# Patient Record
Sex: MALE | Race: WHITE | NOT HISPANIC OR LATINO | Employment: OTHER | URBAN - METROPOLITAN AREA
[De-identification: names, ages, dates, MRNs, and addresses within clinical notes are randomized per-mention and may not be internally consistent; named-entity substitution may affect disease eponyms.]

---

## 2017-01-18 ENCOUNTER — TRANSFERRED RECORDS (OUTPATIENT)
Dept: HEALTH INFORMATION MANAGEMENT | Facility: CLINIC | Age: 67
End: 2017-01-18

## 2017-01-19 ENCOUNTER — TELEPHONE (OUTPATIENT)
Dept: INTERNAL MEDICINE | Facility: CLINIC | Age: 67
End: 2017-01-19

## 2017-01-19 DIAGNOSIS — E78.5 HYPERLIPIDEMIA LDL GOAL <100: ICD-10-CM

## 2017-01-19 DIAGNOSIS — E10.9 TYPE 1 DIABETES, HBA1C GOAL < 7% (H): ICD-10-CM

## 2017-01-19 DIAGNOSIS — E78.5 HYPERLIPIDEMIA LDL GOAL <100: Primary | ICD-10-CM

## 2017-01-19 DIAGNOSIS — R80.9 PROTEINURIA: ICD-10-CM

## 2017-01-19 LAB
ALBUMIN SERPL-MCNC: 4.2 G/DL (ref 3.4–5)
ALP SERPL-CCNC: 68 U/L (ref 40–150)
ALT SERPL W P-5'-P-CCNC: 47 U/L (ref 0–70)
ANION GAP SERPL CALCULATED.3IONS-SCNC: 7 MMOL/L (ref 3–14)
AST SERPL W P-5'-P-CCNC: 22 U/L (ref 0–45)
BILIRUB DIRECT SERPL-MCNC: 0.2 MG/DL (ref 0–0.2)
BILIRUB SERPL-MCNC: 0.9 MG/DL (ref 0.2–1.3)
BUN SERPL-MCNC: 19 MG/DL (ref 7–30)
CALCIUM SERPL-MCNC: 9.2 MG/DL (ref 8.5–10.1)
CHLORIDE SERPL-SCNC: 105 MMOL/L (ref 94–109)
CHOLEST SERPL-MCNC: 159 MG/DL
CO2 SERPL-SCNC: 28 MMOL/L (ref 20–32)
CREAT SERPL-MCNC: 1.03 MG/DL (ref 0.66–1.25)
CREAT UR-MCNC: 120 MG/DL
GFR SERPL CREATININE-BSD FRML MDRD: 72 ML/MIN/1.7M2
GLUCOSE SERPL-MCNC: 148 MG/DL (ref 70–99)
HBA1C MFR BLD: 6.2 % (ref 4.3–6)
HDLC SERPL-MCNC: 65 MG/DL
LDLC SERPL CALC-MCNC: 74 MG/DL
MICROALBUMIN UR-MCNC: 112 MG/L
MICROALBUMIN/CREAT UR: 93.33 MG/G CR (ref 0–17)
NONHDLC SERPL-MCNC: 94 MG/DL
POTASSIUM SERPL-SCNC: 4.7 MMOL/L (ref 3.4–5.3)
PROT SERPL-MCNC: 7.6 G/DL (ref 6.8–8.8)
SODIUM SERPL-SCNC: 140 MMOL/L (ref 133–144)
TRIGL SERPL-MCNC: 100 MG/DL

## 2017-01-19 PROCEDURE — 82043 UR ALBUMIN QUANTITATIVE: CPT | Performed by: FAMILY MEDICINE

## 2017-01-19 PROCEDURE — 36415 COLL VENOUS BLD VENIPUNCTURE: CPT | Performed by: FAMILY MEDICINE

## 2017-01-19 PROCEDURE — 83036 HEMOGLOBIN GLYCOSYLATED A1C: CPT | Performed by: FAMILY MEDICINE

## 2017-01-19 PROCEDURE — 80048 BASIC METABOLIC PNL TOTAL CA: CPT | Performed by: FAMILY MEDICINE

## 2017-01-19 PROCEDURE — 80076 HEPATIC FUNCTION PANEL: CPT | Performed by: FAMILY MEDICINE

## 2017-01-19 PROCEDURE — 80061 LIPID PANEL: CPT | Performed by: FAMILY MEDICINE

## 2017-01-19 NOTE — TELEPHONE ENCOUNTER
Labs ordered for LFT and lipids in addition a nd put in as future order. Please call   EP lab to release these orders

## 2017-01-19 NOTE — TELEPHONE ENCOUNTER
Reason for Call: Request for an order or referral:    Order or referral being requested: lipid lab order    Date needed: as soon as possible    Has the patient been seen by the PCP for this problem? Not Applicable    Additional comments: Pt had labs drawn 1/19/17 in [facility] lab.  Pt is due for a lipid test.  Please add on an order for a lipid test to the current blood draw.    Phone number Patient can be reached at:  Home number on file [phone] (home)    Best Time:      Can we leave a detailed message on this number?      Call taken on 1/19/2017 at 8:51 AM by DARCY THORNTON

## 2017-01-23 ENCOUNTER — OFFICE VISIT (OUTPATIENT)
Dept: INTERNAL MEDICINE | Facility: CLINIC | Age: 67
End: 2017-01-23
Payer: COMMERCIAL

## 2017-01-23 VITALS
OXYGEN SATURATION: 98 % | SYSTOLIC BLOOD PRESSURE: 168 MMHG | HEART RATE: 72 BPM | HEIGHT: 71 IN | WEIGHT: 179 LBS | DIASTOLIC BLOOD PRESSURE: 82 MMHG | BODY MASS INDEX: 25.06 KG/M2 | TEMPERATURE: 98.3 F

## 2017-01-23 DIAGNOSIS — Z23 NEED FOR PROPHYLACTIC VACCINATION AGAINST STREPTOCOCCUS PNEUMONIAE (PNEUMOCOCCUS): ICD-10-CM

## 2017-01-23 DIAGNOSIS — R80.9 PROTEINURIA: ICD-10-CM

## 2017-01-23 DIAGNOSIS — E10.21 TYPE 1 DIABETES MELLITUS WITH NEPHROPATHY (H): ICD-10-CM

## 2017-01-23 DIAGNOSIS — K21.9 GASTROESOPHAGEAL REFLUX DISEASE, ESOPHAGITIS PRESENCE NOT SPECIFIED: ICD-10-CM

## 2017-01-23 DIAGNOSIS — E78.5 HYPERLIPIDEMIA LDL GOAL <100: ICD-10-CM

## 2017-01-23 DIAGNOSIS — I10 ESSENTIAL HYPERTENSION: ICD-10-CM

## 2017-01-23 DIAGNOSIS — N52.9 ERECTILE DYSFUNCTION, UNSPECIFIED ERECTILE DYSFUNCTION TYPE: ICD-10-CM

## 2017-01-23 DIAGNOSIS — Z00.01 ENCOUNTER FOR ROUTINE ADULT MEDICAL EXAM WITH ABNORMAL FINDINGS: Primary | ICD-10-CM

## 2017-01-23 DIAGNOSIS — D47.2 MGUS (MONOCLONAL GAMMOPATHY OF UNKNOWN SIGNIFICANCE): ICD-10-CM

## 2017-01-23 DIAGNOSIS — Z11.59 NEED FOR HEPATITIS C SCREENING TEST: ICD-10-CM

## 2017-01-23 DIAGNOSIS — Z12.5 SCREENING FOR PROSTATE CANCER: ICD-10-CM

## 2017-01-23 PROCEDURE — 99207 C FOOT EXAM  NO CHARGE: CPT | Mod: 25 | Performed by: INTERNAL MEDICINE

## 2017-01-23 PROCEDURE — 99213 OFFICE O/P EST LOW 20 MIN: CPT | Mod: 25 | Performed by: INTERNAL MEDICINE

## 2017-01-23 PROCEDURE — 99397 PER PM REEVAL EST PAT 65+ YR: CPT | Mod: 25 | Performed by: INTERNAL MEDICINE

## 2017-01-23 PROCEDURE — 90670 PCV13 VACCINE IM: CPT | Performed by: INTERNAL MEDICINE

## 2017-01-23 PROCEDURE — 90471 IMMUNIZATION ADMIN: CPT | Performed by: INTERNAL MEDICINE

## 2017-01-23 RX ORDER — NICOTINE POLACRILEX 4 MG/1
GUM, CHEWING ORAL
Qty: 90 TABLET | Refills: 3 | Status: SHIPPED | OUTPATIENT
Start: 2017-01-23 | End: 2018-12-17

## 2017-01-23 RX ORDER — SIMVASTATIN 10 MG
5 TABLET ORAL AT BEDTIME
Qty: 45 TABLET | Refills: 3 | Status: SHIPPED | OUTPATIENT
Start: 2017-01-23 | End: 2018-03-14

## 2017-01-23 RX ORDER — LISINOPRIL 30 MG/1
30 TABLET ORAL DAILY
Qty: 90 TABLET | Refills: 3 | Status: CANCELLED | OUTPATIENT
Start: 2017-01-23

## 2017-01-23 RX ORDER — LISINOPRIL 40 MG/1
40 TABLET ORAL DAILY
Qty: 90 TABLET | Refills: 3 | Status: SHIPPED | OUTPATIENT
Start: 2017-01-23 | End: 2018-01-08

## 2017-01-23 RX ORDER — SILDENAFIL 50 MG/1
25-50 TABLET, FILM COATED ORAL DAILY PRN
Qty: 30 TABLET | Refills: 3 | Status: SHIPPED
Start: 2017-01-23 | End: 2018-10-10

## 2017-01-23 RX ORDER — CARVEDILOL 6.25 MG/1
6.25 TABLET ORAL 2 TIMES DAILY WITH MEALS
Qty: 180 TABLET | Refills: 3 | Status: SHIPPED | OUTPATIENT
Start: 2017-01-23 | End: 2018-01-10

## 2017-01-23 RX ORDER — MULTIVITAMIN WITH IRON
1 TABLET ORAL DAILY
COMMUNITY
End: 2020-01-28

## 2017-01-23 NOTE — PATIENT INSTRUCTIONS
Increase Lisinopril to 40mg daily for blood pressure control  In 2-3 weeks, then check blood pressure twice a day for 4 days and [facility]   Repeat nonfasting labs in 3 months (blood and urine)  Prevnar vaccine today. Will give Pneumovax (23 valent) in 1 year  Diabetes management per Dr Patel

## 2017-01-23 NOTE — MR AVS SNAPSHOT
After Visit Summary   1/23/2017    Eh Perry    MRN: [id number]           Patient Information     Date Of Birth          1950        Visit Information        Provider Department      1/23/2017 9:30 AM Hernan Juárez MD [facility]        Today's Diagnoses     Encounter for routine adult medical exam with abnormal findings    -  1     Essential hypertension         Proteinuria         Gastroesophageal reflux disease, esophagitis presence not specified         Erectile dysfunction, unspecified erectile dysfunction type         Need for hepatitis C screening test         MGUS (monoclonal gammopathy of unknown significance)         Type 1 diabetes mellitus with nephropathy (goal A1C<7)           Care Instructions    Increase Lisinopril to 40mg daily for blood pressure control  In 2-3 weeks, then check blood pressure twice a day for 4 days and email me results in Mychart   Repeat nonfasting labs in 3 months (blood and urine)  Prevnar vaccine today. Will give Pneumovax (23 valent) in 1 year  Diabetes management per Dr Patel        Follow-ups after your visit        Future tests that were ordered for you today     Open Future Orders        Priority Expected Expires Ordered    Hepatitis C Screen Reflex to HCV RNA Quant and Genotype Routine 4/23/2017 1/23/2018 1/23/2017    CBC with platelets Routine 4/23/2017 1/23/2018 1/23/2017    Protein electrophoresis Routine 4/23/2017 1/23/2018 1/23/2017    Basic metabolic panel Routine 4/23/2017 1/23/2018 1/23/2017    Albumin Random Urine Quantitative Routine 4/23/2017 1/23/2018 1/23/2017            Who to contact     If you have questions or need follow up information about today's clinic visit or your schedule please contact [facility] directly at [phone].  Normal or non-critical lab and imaging results will be communicated to you by MyChart, letter or phone within 4 business days after the clinic has  "received the results. If you do not hear from us within 7 days, please contact the clinic through kompany or phone. If you have a critical or abnormal lab result, we will notify you by phone as soon as possible.  Submit refill requests through kompany or call your pharmacy and they will forward the refill request to us. Please allow 3 business days for your refill to be completed.          Additional Information About Your Visit        [facility] Information     kompany gives you secure access to your electronic health record. If you see a primary care provider, you can also send messages to your care team and make appointments. If you have questions, please call your primary care clinic.  If you do not have a primary care provider, please call [phone] and they will assist you.        Care EveryWhere ID     This is your Care EveryWhere ID. This could be used by other organizations to access your [place] medical records  [id number]        Your Vitals Were     Pulse Temperature Height BMI (Body Mass Index) Pulse Oximetry       72 98.3  F (36.8  C) (Oral) 5' 11\" (1.803 m) 24.98 kg/m2 98%        Blood Pressure from Last 3 Encounters:   01/23/17 168/82   10/31/16 178/94   07/27/16 168/88    Weight from Last 3 Encounters:   01/23/17 179 lb (81.194 kg)   11/21/16 173 lb 12.8 oz (78.835 kg)   10/31/16 180 lb (81.647 kg)              We Performed the Following     FOOT EXAM  NO CHARGE [10833.114]          Today's Medication Changes          These changes are accurate as of: 1/23/17 10:27 AM.  If you have any questions, ask your nurse or doctor.               These medicines have changed or have updated prescriptions.        Dose/Directions    * blood glucose monitoring test strip   Commonly known as:  FREESTYLE LITE   This may have changed:  Another medication with the same name was added. Make sure you understand how and when to take each.   Used for:  Type 1 diabetes, HbA1c goal < 7% (H)   Changed by:  Hernan Juárez, " MD        Use to test blood sugars 6-8 daily or as directed.   Quantity:  750 each   Refills:  3       * blood glucose monitoring test strip   Commonly known as:  ACCU-CHEK ERUM PLUS   This may have changed:  Another medication with the same name was added. Make sure you understand how and when to take each.        Test 4 times a day   Quantity:  400 each   Refills:  4       * blood glucose monitoring test strip   Commonly known as:  no brand specified   This may have changed:  You were already taking a medication with the same name, and this prescription was added. Make sure you understand how and when to take each.   Used for:  Type 1 diabetes mellitus with nephropathy (H)   Changed by:  Hernan Juárez MD        Use to test blood sugars 4 times daily or as directed   Quantity:  400 strip   Refills:  3       * Continuous Glucose Monitor Betsy   This may have changed:  additional instructions   Used for:  Type 1 diabetes, HbA1c goal < 7% (H)        FreeStyle Lite Glucose Monitor Device- Check blood sugars 6-8 times daily as directed   Quantity:  1 each   Refills:  0       * DEXCOM G5 MOB/G4 PLAT SENSOR Kit   This may have changed:  how much to take   Used for:  Type 2 diabetes mellitus with proliferative diabetic retinopathy without macular edema        Dose:  12 kit   12 kits every 7 days   Quantity:  12 kit   Refills:  3       * DEXCOM G5 MOBILE TRANSMITTER Kit   This may have changed:  when to take this   Used for:  Type 1 diabetes mellitus with nephropathy (H)        Dose:  1 Box   1 Box See Admin Instructions   Quantity:  1 kit   Refills:  3       lisinopril 40 MG tablet   Commonly known as:  PRINIVIL/ZESTRIL   This may have changed:    - medication strength  - how much to take   Used for:  Essential hypertension   Changed by:  Hernan Juárez MD        Dose:  40 mg   Take 1 tablet (40 mg) by mouth daily   Quantity:  90 tablet   Refills:  3       * Notice:  This list has 6 medication(s) that are the same as other  medications prescribed for you. Read the directions carefully, and ask your doctor or other care provider to review them with you.         Where to get your medicines      These medications were sent to [facility] [place] - [address]  [address], [place] [address]     Phone:  [phone]    - blood glucose monitoring test strip  - carvedilol 6.25 MG tablet  - lisinopril 40 MG tablet  - omeprazole 20 MG tablet  - sildenafil 50 MG cap/tab  - simvastatin 10 MG tablet             Primary Care Provider Office Phone # Fax #    Hernan Juárez [phone]       [facility] [address]  [facility] [address]        Thank you!     Thank you for choosing [facility]  for your care. Our goal is always to provide you with excellent care. Hearing back from our patients is one way we can continue to improve our services. Please take a few minutes to complete the written survey that you may receive in the mail after your visit with us. Thank you!             Your Updated Medication List - Protect others around you: Learn how to safely use, store and throw away your medicines at www.disposemymeds.org.          This list is accurate as of: 1/23/17 10:27 AM.  Always use your most recent med list.                   Brand Name Dispense Instructions for use    aspirin 81 MG tablet      Take 1 tablet by mouth daily.       blood glucose monitoring lancets     3 Box    Test 4 times a day       * blood glucose monitoring test strip    FREESTYLE LITE    750 each    Use to test blood sugars 6-8 daily or as directed.       * blood glucose monitoring test strip    ACCU-CHEK ERUM PLUS    400 each    Test 4 times a day       * blood glucose monitoring test strip    no brand specified    400 strip    Use to test blood sugars 4 times daily or as directed       carvedilol 6.25 MG tablet    COREG    180 tablet    Take 1 tablet (6.25 mg)  "by mouth 2 times daily (with meals)       * Continuous Glucose Monitor Betsy     1 each    FreeStyle Lite Glucose Monitor Device- Check blood sugars 6-8 times daily as directed       * DEXCOM G5 MOB/G4 PLAT SENSOR Kit     12 kit    12 kits every 7 days       * DEXCOM G5 MOBILE TRANSMITTER Kit     1 kit    1 Box See Admin Instructions       fish oil-omega-3 fatty acids 1000 MG capsule      Take 1-2 g by mouth daily       insulin glargine 100 UNIT/ML injection    LANTUS VIAL    3 vial    Inject  Subcutaneous 18 units  Before dinner       insulin lispro 100 UNIT/ML injection    humaLOG    3 vial    3 units before meals and sliding scale. Max  20 units/day total       insulin syringe-needle U-100 30G X 1/2\" 0.3 ML    BD insulin syringe ultrafine    400 each    Injection approx 4-6 times a day       lisinopril 40 MG tablet    PRINIVIL/ZESTRIL    90 tablet    Take 1 tablet (40 mg) by mouth daily       magnesium 250 MG tablet      Take 1 tablet by mouth daily       MULTI vitamin  MENS Tabs     30 tablet    Take  by mouth.       omeprazole 20 MG tablet     90 tablet    1 tab daily       sildenafil 50 MG cap/tab    REVATIO/VIAGRA    30 tablet    Take 0.5-1 tablets (25-50 mg) by mouth daily as needed for erectile dysfunction Take 30 min to 4 hours before intercourse.  Never use with nitroglycerin, terazosin or doxazosin.       simvastatin 10 MG tablet    ZOCOR    45 tablet    Take 0.5 tablets (5 mg) by mouth At Bedtime       * Notice:  This list has 6 medication(s) that are the same as other medications prescribed for you. Read the directions carefully, and ask your doctor or other care provider to review them with you.      "

## 2017-01-23 NOTE — PROGRESS NOTES
SUBJECTIVE:     CC: Eh Perry is an 66 year old male who presents for preventative health visit.     Physical  Annual:     Getting at least 3 servings of Calcium per day::  Yes    Bi-annual eye exam::  Yes    Dental care twice a year::  Yes    Sleep apnea or symptoms of sleep apnea::  None    Diet::  Diabetic    Frequency of exercise::  6-7 days/week    Duration of exercise::  45-60 minutes    Taking medications regularly::  Yes    Medication side effects::  Muscle aches    Additional concerns today::  No      Today's PHQ-2 Score:   PHQ-2 ( 1999 [employer]) 1/20/2017   Q1: Little interest or pleasure in doing things -   Q2: Feeling down, depressed or hopeless -   PHQ-2 Score -   Little interest or pleasure in doing things Not at all   Feeling down, depressed or hopeless Not at all   PHQ-2 Score 0     Answers for HPI/ROS submitted by the patient on 1/20/2017   PHQ-2 Depressed: Not at all, Not at all  PHQ-2 Score: 0      Abuse: Current or Past(Physical, Sexual or Emotional)- No  Do you feel safe in your environment - Yes    Social History   Substance Use Topics     Smoking status: Former Smoker     Smokeless tobacco: Never Used     Alcohol Use: Yes      Comment: occ     The patient does not drink >3 drinks per day nor >7 drinks per week.    Last PSA: No results found for: PSA    Recent Labs   Lab Test  01/19/17   0804  01/07/16   0859  01/16/15   0832  01/20/14   0925   CHOL  159  171  156  163   HDL  65  84  82  60   LDL  74  76  61  88   TRIG  100  57  64  72   CHOLHDLRATIO   --    --   1.9  2.7   NHDL  94  87   --    --        Reviewed orders with patient. Reviewed health maintenance and updated orders accordingly - Yes    All Histories reviewed and updated in Epic.      ROS:  C: NEGATIVE for fever, chills, change in weight  I: NEGATIVE for worrisome rashes, moles or lesions  E: NEGATIVE for vision changes or irritation. Has glasses  ENT: NEGATIVE for ear, mouth and throat problems. Rare nasal congest  R: NEGATIVE  "for significant cough or SOB  CV: NEGATIVE for chest pain, palpitations or peripheral edema (with Carvedilol).  Blood pressure elevated today. Patient states his blood pressure reading at home was 140/74 today  GI: NEGATIVE for nausea, abdominal pain, heartburn, or change in bowel habits   male: negative for dysuria, hematuria, decreased urinary stream,   urethral discharge. Has Viagra for ED. Hx  proteinuria  M: NEGATIVE for significant arthralgias or myalgia  H: Hx prior MGUS. More recent SPEP neg. Oncology wanted labs to be checked annually  N: NEGATIVE for weakness, dizziness or paresthesias  P: NEGATIVE for changes in mood or affect  E: diabetes and lipid therapy. See labs below re: control.  Also followed by endocrinology. Sugars reviewed and under good control    GLC      148   1/19/2017  GLC      204   4/16/2012  A1C      6.2   1/19/2017  CHOL      159   1/19/2017  LDL       74   1/19/2017  HDL       65   1/19/2017  TRIG      100   1/19/2017  CR     1.03   1/19/2017  ALT       47   1/19/2017  AST       22   1/19/2017  MICROL      112   1/19/2017  TSH     2.38   6/25/2015      Problem list, Medication list, Allergies, and Medical/Social/Surgical histories reviewed in [facility] and updated as appropriate.  OBJECTIVE:     /82 mmHg  Pulse 72  Temp(Src) 98.3  F (36.8  C) (Oral)  Ht 5' 11\" (1.803 m)  Wt 179 lb (81.194 kg)  BMI 24.98 kg/m2  SpO2 98%  EXAM:  General appearance - healthy, alert, no distress  Skin - No rashes or lesions.  Head - normocephalic, atraumatic  Eyes - JULIETTE, EOMI, fundi exam with nondilated pupils negative.  Ears - External ears normal. Canals clear. TM's normal.  Nose/Sinuses - Nares normal. Septum midline. Mucosa normal. No drainage or sinus tenderness.  Oropharynx - No erythema, no adenopathy, no exudates.  Neck - Supple without adenopathy or thyromegaly. No bruits.  Lungs - Clear to auscultation without wheezes/rhonchi.  Heart - Regular rate and rhythm without murmurs, clicks, " or gallops.  Nodes - No supraclavicular, axillary, or inguinal adenopathy palpable.  Abdomen - Abdomen soft, non-tender. BS normal. No masses or hepatosplenomegaly palpable. No bruits. Has CGM on right abdomen  Extremities -No cyanosis, clubbing or edema.    Musculoskeletal - Spine ROM normal. Muscular strength intact.   Peripheral pulses - radial=4/4, femoral=4/4, posterior tibial=4/4, dorsalis pedis=4/4,  Neuro - Gait normal. Reflexes normal and symmetric. Sensation grossly WNL.  Genital - Normal-appearing male external genitalia. No scrotal masses or inguinal hernia palpable.   Rectal - Guaic negative stool. Normal tone. Prostate normal in size to palpation. No rectal masses or prostate nodularity palpable      ASSESSMENT/PLAN:     1. Encounter for routine adult medical exam with abnormal findings   vaccinations as below. Other healthcare maintenance up to date    2. Essential hypertension   uncontrolled. See plan discussion below regarding lisinopril dose increase  - carvedilol (COREG) 6.25 MG tablet; Take 1 tablet (6.25 mg) by mouth 2 times daily (with meals)  Dispense: 180 tablet; Refill: 3  - lisinopril (PRINIVIL/ZESTRIL) 40 MG tablet; Take 1 tablet (40 mg) by mouth daily  Dispense: 90 tablet; Refill: 3  - Comprehensive metabolic panel; Future  - Lipid panel reflex to direct LDL; Future  - OFFICE/OUTPT VISIT,EST,LEVL III    3. Proteinuria   increase lisinopril. Future labs ordered. See plan discussion below  - Basic metabolic panel; Future  - Albumin Random Urine Quantitative; Future  - Comprehensive metabolic panel; Future  - Lipid panel reflex to direct LDL; Future  - OFFICE/OUTPT VISIT,EST,LEVL III    4. Gastroesophageal reflux disease, esophagitis presence not specified   stable. Continue current medication  - omeprazole 20 MG tablet; 1 tab daily  Dispense: 90 tablet; Refill: 3    5. Erectile dysfunction, unspecified erectile dysfunction type   stable. Continue current medication  - sildenafil  (REVATIO/VIAGRA) 50 MG cap/tab; Take 0.5-1 tablets (25-50 mg) by mouth daily as needed for erectile dysfunction Take 30 min to 4 hours before intercourse.  Never use with nitroglycerin, terazosin or doxazosin.  Dispense: 30 tablet; Refill: 3    6. MGUS (monoclonal gammopathy of unknown significance)   lab monitoring is ordered  - CBC with platelets; Future  - Protein electrophoresis; Future    7. Type 1 diabetes mellitus with nephropathy (goal A1C<7)   stable. Continue current medication  - simvastatin (ZOCOR) 10 MG tablet; Take 0.5 tablets (5 mg) by mouth At Bedtime  Dispense: 45 tablet; Refill: 3  - FOOT EXAM  NO CHARGE [45650.114]  - blood glucose monitoring (NO BRAND SPECIFIED) test strip; Use to test blood sugars 4 times daily or as directed  Dispense: 400 strip; Refill: 3  - Comprehensive metabolic panel; Future  - Lipid panel reflex to direct LDL; Future  - TSH with free T4 reflex; Future    8. Hyperlipidemia LDL goal <100   stable. Continue current medication. Future labs 1 year  - Comprehensive metabolic panel; Future  - Lipid panel reflex to direct LDL; Future    9. Need for prophylactic vaccination against Streptococcus pneumoniae (pneumococcus)  - PNEUMOCOCCAL CONJ VACCINE 13 VALENT IM (PREVNAR 13)    10. Need for hepatitis C screening test  Pt meets criteria for hepatitis C screening based on birth year 1944-65. Discussed pro/cons of Hep C screening/treatment and recs of USPTF. Pt agreeable to screening  - Hepatitis C Screen Reflex to HCV RNA Quant and Genotype; Future    11. Screening for prostate cancer   PSA ordered for intermittent monitoring after discussion with pt and pt preference. Not doing annual PSA per USPTF recs    - Prostate spec antigen screen; Future        COUNSELING:   Reviewed preventive health counseling, as reflected in patient instructions  Special attention given to:        Regular exercise       Healthy diet/nutrition         reports that he has quit smoking. He has never used  "smokeless tobacco.    Estimated body mass index is 24.77 kg/(m^2) as calculated from the following:    Height as of 10/31/16: 5' 10.25\" (1.784 m).    Weight as of 11/21/16: 173 lb 12.8 oz (78.835 kg).       Counseling Resources:  ATP IV Guidelines  Pooled Cohorts Equation Calculator  FRAX Risk Assessment  ICSI Preventive Guidelines  Dietary Guidelines for Americans, 2010  USDA's MyPlate  ASA Prophylaxis  Lung CA Screening      PLAN:  Increase Lisinopril to 40mg daily for blood pressure control  In 2-3 weeks, then check blood pressure twice a day for 4 days and email me results in [facility]   Repeat nonfasting labs in 3 months (blood and urine) as ordered  Prevnar vaccine today. Will give Pneumovax (23 valent) in 1 year  Diabetes management per Dr Patel  Continue other  medications. Refills done  Pt was informed regarding extra E&M billing for medical issues ( hypertension, proteinuria)  not related to preventative physical       Hernan Juárez MD  [facility]      "

## 2017-01-23 NOTE — NURSING NOTE
"Chief Complaint   Patient presents with     Physical       Initial /90 mmHg  Pulse 72  Temp(Src) 98.3  F (36.8  C) (Oral)  Ht 5' 11\" (1.803 m)  Wt 179 lb (81.194 kg)  BMI 24.98 kg/m2  SpO2 98% Estimated body mass index is 24.98 kg/(m^2) as calculated from the following:    Height as of this encounter: 5' 11\" (1.803 m).    Weight as of this encounter: 179 lb (81.194 kg).  BP completed using cuff size: regular    "

## 2017-02-04 ENCOUNTER — MYC MEDICAL ADVICE (OUTPATIENT)
Dept: INTERNAL MEDICINE | Facility: CLINIC | Age: 67
End: 2017-02-04

## 2017-05-05 DIAGNOSIS — E10.21 TYPE 1 DIABETES MELLITUS WITH NEPHROPATHY (H): Primary | ICD-10-CM

## 2017-05-05 NOTE — TELEPHONE ENCOUNTER
LANTUS VIAL         Last Written Prescription Date: 07/27/16  Last Fill Quantity: 3 vial, # refills: 3  Last Office Visit with [facility], [facility] or  Health prescribing provider:  01/23/17        BP Readings from Last 3 Encounters:   01/23/17 168/82   10/31/16 (!) 178/94   07/27/16 168/88     Lab Results   Component Value Date    MICROL 112 01/19/2017     Lab Results   Component Value Date    UMALCR 93.33 01/19/2017     Creatinine   Date Value Ref Range Status   01/19/2017 1.03 0.66 - 1.25 mg/dL Final   ]  GFR Estimate   Date Value Ref Range Status   01/19/2017 72 >60 mL/min/1.7m2 Final     Comment:     Non  GFR Calc   10/25/2016 79 >60 mL/min/1.7m2 Final     Comment:     Non  GFR Calc   01/07/2016 78 >60 mL/min/1.7m2 Final     Comment:     Non  GFR Calc     GFR Estimate If Black   Date Value Ref Range Status   01/19/2017 87 >60 mL/min/1.7m2 Final     Comment:      GFR Calc   10/25/2016 >90   GFR Calc   >60 mL/min/1.7m2 Final   01/07/2016 >90   GFR Calc   >60 mL/min/1.7m2 Final     Lab Results   Component Value Date    CHOL 159 01/19/2017     Lab Results   Component Value Date    HDL 65 01/19/2017     Lab Results   Component Value Date    LDL 74 01/19/2017     Lab Results   Component Value Date    TRIG 100 01/19/2017     Lab Results   Component Value Date    CHOLHDLRATIO 1.9 01/16/2015     Lab Results   Component Value Date    AST 22 01/19/2017     Lab Results   Component Value Date    ALT 47 01/19/2017     Lab Results   Component Value Date    A1C 6.2 01/19/2017    A1C 6.2 10/25/2016    A1C 6.2 07/27/2016    A1C 6.8 01/07/2016    A1C 6.5 06/25/2015     Potassium   Date Value Ref Range Status   01/19/2017 4.7 3.4 - 5.3 mmol/L Final       Humalog         Last Written Prescription Date: 01/14/16  Last Fill Quantity: 3 vial, # refills: 3  Last Office Visit with [facility], [facility] or  Health prescribing provider:  01/23/17        BP Readings  from Last 3 Encounters:   01/23/17 168/82   10/31/16 (!) 178/94   07/27/16 168/88     Lab Results   Component Value Date    MICROL 112 01/19/2017     Lab Results   Component Value Date    UMALCR 93.33 01/19/2017     Creatinine   Date Value Ref Range Status   01/19/2017 1.03 0.66 - 1.25 mg/dL Final   ]  GFR Estimate   Date Value Ref Range Status   01/19/2017 72 >60 mL/min/1.7m2 Final     Comment:     Non  GFR Calc   10/25/2016 79 >60 mL/min/1.7m2 Final     Comment:     Non  GFR Calc   01/07/2016 78 >60 mL/min/1.7m2 Final     Comment:     Non  GFR Calc     GFR Estimate If Black   Date Value Ref Range Status   01/19/2017 87 >60 mL/min/1.7m2 Final     Comment:      GFR Calc   10/25/2016 >90   GFR Calc   >60 mL/min/1.7m2 Final   01/07/2016 >90   GFR Calc   >60 mL/min/1.7m2 Final     Lab Results   Component Value Date    CHOL 159 01/19/2017     Lab Results   Component Value Date    HDL 65 01/19/2017     Lab Results   Component Value Date    LDL 74 01/19/2017     Lab Results   Component Value Date    TRIG 100 01/19/2017     Lab Results   Component Value Date    CHOLHDLRATIO 1.9 01/16/2015     Lab Results   Component Value Date    AST 22 01/19/2017     Lab Results   Component Value Date    ALT 47 01/19/2017     Lab Results   Component Value Date    A1C 6.2 01/19/2017    A1C 6.2 10/25/2016    A1C 6.2 07/27/2016    A1C 6.8 01/07/2016    A1C 6.5 06/25/2015     Potassium   Date Value Ref Range Status   01/19/2017 4.7 3.4 - 5.3 mmol/L Final

## 2017-05-10 ENCOUNTER — MYC MEDICAL ADVICE (OUTPATIENT)
Dept: INTERNAL MEDICINE | Facility: CLINIC | Age: 67
End: 2017-05-10

## 2017-05-10 RX ORDER — INSULIN GLARGINE 100 [IU]/ML
INJECTION, SOLUTION SUBCUTANEOUS
Qty: 3 VIAL | Refills: 2 | Status: SHIPPED | OUTPATIENT
Start: 2017-05-10 | End: 2018-01-11

## 2017-05-24 DIAGNOSIS — D47.2 MGUS (MONOCLONAL GAMMOPATHY OF UNKNOWN SIGNIFICANCE): ICD-10-CM

## 2017-05-24 DIAGNOSIS — Z12.5 SCREENING FOR PROSTATE CANCER: ICD-10-CM

## 2017-05-24 DIAGNOSIS — E10.21 TYPE 1 DIABETES MELLITUS WITH NEPHROPATHY (H): ICD-10-CM

## 2017-05-24 DIAGNOSIS — Z11.59 NEED FOR HEPATITIS C SCREENING TEST: ICD-10-CM

## 2017-05-24 DIAGNOSIS — R80.9 PROTEINURIA: ICD-10-CM

## 2017-05-24 LAB
ANION GAP SERPL CALCULATED.3IONS-SCNC: 9 MMOL/L (ref 3–14)
BUN SERPL-MCNC: 17 MG/DL (ref 7–30)
CALCIUM SERPL-MCNC: 9.3 MG/DL (ref 8.5–10.1)
CHLORIDE SERPL-SCNC: 104 MMOL/L (ref 94–109)
CO2 SERPL-SCNC: 26 MMOL/L (ref 20–32)
CREAT SERPL-MCNC: 1.03 MG/DL (ref 0.66–1.25)
CREAT UR-MCNC: 36 MG/DL
ERYTHROCYTE [DISTWIDTH] IN BLOOD BY AUTOMATED COUNT: 11.6 % (ref 10–15)
GFR SERPL CREATININE-BSD FRML MDRD: 72 ML/MIN/1.7M2
GLUCOSE SERPL-MCNC: 127 MG/DL (ref 70–99)
HBA1C MFR BLD: 5.9 % (ref 4.3–6)
HCT VFR BLD AUTO: 40.7 % (ref 40–53)
HGB BLD-MCNC: 14.5 G/DL (ref 13.3–17.7)
MCH RBC QN AUTO: 33.4 PG (ref 26.5–33)
MCHC RBC AUTO-ENTMCNC: 35.6 G/DL (ref 31.5–36.5)
MCV RBC AUTO: 94 FL (ref 78–100)
MICROALBUMIN UR-MCNC: 72 MG/L
MICROALBUMIN/CREAT UR: 200 MG/G CR (ref 0–17)
PLATELET # BLD AUTO: 260 10E9/L (ref 150–450)
POTASSIUM SERPL-SCNC: 4.9 MMOL/L (ref 3.4–5.3)
PSA SERPL-ACNC: 0.74 UG/L (ref 0–4)
RBC # BLD AUTO: 4.34 10E12/L (ref 4.4–5.9)
SODIUM SERPL-SCNC: 139 MMOL/L (ref 133–144)
WBC # BLD AUTO: 4.4 10E9/L (ref 4–11)

## 2017-05-24 PROCEDURE — 36415 COLL VENOUS BLD VENIPUNCTURE: CPT | Performed by: INTERNAL MEDICINE

## 2017-05-24 PROCEDURE — 84165 PROTEIN E-PHORESIS SERUM: CPT | Performed by: INTERNAL MEDICINE

## 2017-05-24 PROCEDURE — 83036 HEMOGLOBIN GLYCOSYLATED A1C: CPT | Performed by: INTERNAL MEDICINE

## 2017-05-24 PROCEDURE — G0103 PSA SCREENING: HCPCS | Performed by: INTERNAL MEDICINE

## 2017-05-24 PROCEDURE — 82043 UR ALBUMIN QUANTITATIVE: CPT | Performed by: INTERNAL MEDICINE

## 2017-05-24 PROCEDURE — 80048 BASIC METABOLIC PNL TOTAL CA: CPT | Performed by: INTERNAL MEDICINE

## 2017-05-24 PROCEDURE — 85027 COMPLETE CBC AUTOMATED: CPT | Performed by: INTERNAL MEDICINE

## 2017-05-24 PROCEDURE — 00000402 ZZHCL STATISTIC TOTAL PROTEIN: Performed by: INTERNAL MEDICINE

## 2017-05-24 PROCEDURE — 86803 HEPATITIS C AB TEST: CPT | Performed by: INTERNAL MEDICINE

## 2017-05-25 LAB
ALBUMIN SERPL ELPH-MCNC: 4.7 G/DL (ref 3.7–5.1)
ALPHA1 GLOB SERPL ELPH-MCNC: 0.2 G/DL (ref 0.2–0.4)
ALPHA2 GLOB SERPL ELPH-MCNC: 0.5 G/DL (ref 0.5–0.9)
B-GLOBULIN SERPL ELPH-MCNC: 0.7 G/DL (ref 0.6–1)
GAMMA GLOB SERPL ELPH-MCNC: 1.2 G/DL (ref 0.7–1.6)
HCV AB SERPL QL IA: NORMAL
M PROTEIN SERPL ELPH-MCNC: 0 G/DL
PROT PATTERN SERPL ELPH-IMP: NORMAL

## 2017-06-04 ENCOUNTER — MYC MEDICAL ADVICE (OUTPATIENT)
Dept: INTERNAL MEDICINE | Facility: CLINIC | Age: 67
End: 2017-06-04

## 2017-06-04 DIAGNOSIS — E10.21 TYPE 1 DIABETES MELLITUS WITH NEPHROPATHY (H): Primary | ICD-10-CM

## 2017-07-12 ENCOUNTER — TRANSFERRED RECORDS (OUTPATIENT)
Dept: HEALTH INFORMATION MANAGEMENT | Facility: CLINIC | Age: 67
End: 2017-07-12

## 2017-10-04 DIAGNOSIS — E10.21 TYPE 1 DIABETES MELLITUS WITH NEPHROPATHY (H): Primary | ICD-10-CM

## 2017-10-09 RX ORDER — BLOOD-GLUCOSE TRANSMITTER
EACH MISCELLANEOUS
Qty: 1 EACH | Refills: 3 | Status: SHIPPED | OUTPATIENT
Start: 2017-10-09 | End: 2018-10-29

## 2017-10-09 RX ORDER — BLOOD-GLUCOSE SENSOR
EACH MISCELLANEOUS
Qty: 12 EACH | Refills: 3 | Status: SHIPPED | OUTPATIENT
Start: 2017-10-09 | End: 2018-10-29

## 2017-10-10 NOTE — TELEPHONE ENCOUNTER
Dr Sexton's prescription did not get to the specialty pharmacy, so this nurse read it to pharmacist over the phone.

## 2017-11-09 ENCOUNTER — TRANSFERRED RECORDS (OUTPATIENT)
Dept: HEALTH INFORMATION MANAGEMENT | Facility: CLINIC | Age: 67
End: 2017-11-09

## 2017-12-15 ENCOUNTER — HOSPITAL ENCOUNTER (OUTPATIENT)
Dept: WOUND CARE | Facility: CLINIC | Age: 67
Discharge: HOME | End: 2017-12-15
Attending: PODIATRIST | Admitting: PODIATRIST
Payer: COMMERCIAL

## 2017-12-15 VITALS
DIASTOLIC BLOOD PRESSURE: 93 MMHG | RESPIRATION RATE: 16 BRPM | HEART RATE: 75 BPM | SYSTOLIC BLOOD PRESSURE: 166 MMHG | TEMPERATURE: 97.7 F

## 2017-12-15 PROCEDURE — 27211158 ZZH IODOSORB GEL, 40 GM

## 2017-12-15 PROCEDURE — 99203 OFFICE O/P NEW LOW 30 MIN: CPT | Mod: 25 | Performed by: PODIATRIST

## 2017-12-15 PROCEDURE — 11042 DBRDMT SUBQ TIS 1ST 20SQCM/<: CPT

## 2017-12-15 PROCEDURE — 99214 OFFICE O/P EST MOD 30 MIN: CPT | Mod: 25

## 2017-12-15 PROCEDURE — 11042 DBRDMT SUBQ TIS 1ST 20SQCM/<: CPT | Performed by: PODIATRIST

## 2017-12-15 NOTE — PROGRESS NOTES
[facility] [place] Progress Note    Subject: Patient was seen at wound center.  He presents to Center for the first time for ulceration to the right anterior shin. He was seen by dermatology and had a mall removed. He had that on the left leg as well to but that healed the right one the incision broke open. He's been doing bacitracin to the area. Denies fever, nausea, vomiting, shortness of breath. Minimal pain. He is diabetic sugars are well controlled.    PMH:   Past Medical History:   Diagnosis Date     Anemia      CKD (chronic kidney disease) stage 3, GFR 30-59 ml/min      GERD (gastroesophageal reflux disease)      Hepatitis 2011    elevated AST due to excess ETOH use (5 drinks/day)     HTN (hypertension)      Hyperlipidemia LDL goal <100 3/7/2013     MGUS (monoclonal gammopathy of unknown significance)      IgG Lambda. Followed by Dr Rogers     Proteinuria 1/22/2015     PVC (premature ventricular contraction)      Retinopathy due to secondary diabetes (H)     s/p laser therapy left     Tobacco abuse     20 pack hx. Quit 1991     Type 1 diabetes mellitus with nephropathy (goal A1C<7) 1978       Social Hx:   Social History     Social History     Marital status:      Spouse name: N/A     Number of children: N/A     Years of education: N/A     Occupational History      Retired     anesthesiologist     Social History Main Topics     Smoking status: Former Smoker     Smokeless tobacco: Never Used     Alcohol use Yes      Comment: occ     Drug use: No     Sexual activity: Yes     Partners: Female     Other Topics Concern     Not on file     Social History Narrative       Surgical Hx:   Past Surgical History:   Procedure Laterality Date     HAND SURGERY  2007    contracture release       Allergies:    Allergies   Allergen Reactions     Atorvastatin Calcium       Elevated Ck and myalgias     Wasp Venom Protein      Wasp sting       Medications:   Current Outpatient Prescriptions   Medication      "Continuous Blood Gluc Sensor (DEXCOM G5 MOB/G4 PLAT SENSOR) MISC     Continuous Blood Gluc Transmit (DEXCOM G5 MOBILE TRANSMITTER) MISC     insulin glargine (LANTUS VIAL) 100 UNIT/ML injection     insulin lispro (HUMALOG) 100 UNIT/ML injection     carvedilol (COREG) 6.25 MG tablet     simvastatin (ZOCOR) 10 MG tablet     omeprazole 20 MG tablet     sildenafil (REVATIO/VIAGRA) 50 MG cap/tab     magnesium 250 MG tablet     lisinopril (PRINIVIL/ZESTRIL) 40 MG tablet     blood glucose monitoring (NO BRAND SPECIFIED) test strip     insulin syringe-needle U-100 (BD INSULIN SYRINGE ULTRAFINE) 30G X 1/2\" 0.3 ML     blood glucose monitoring (ACCU-CHEK ERUM PLUS) test strip     blood glucose monitoring (ACCU-CHEK FASTCLIX) lancets     blood glucose monitoring (FREESTYLE LITE) test strip     Continuous Glucose Monitor EMERSON     Multiple Vitamin (MULTI VITAMIN  MENS) TABS     fish oil-omega-3 fatty acids (FISH OIL) 1000 MG capsule     aspirin 81 MG tablet     No current facility-administered medications for this encounter.          Objective:  Vitals:  BP (!) 166/93  Pulse 75  Temp 97.7  F (36.5  C) (Tympanic)  Resp 16    A1C: 5.9    General:  Patient is alert and orientated.  NAD   Dermatologic: Wound dehiscence/ulceration dorsal right anterior shin measuring 1.5 x 1.0 x 0.1 cm. The surrounding erythema. Drainage or malodor noted. No tendon infection noted.     Vascular: DP & PT pulses are intact & regular bilaterally.  Minimal edema and varicosities noted.  CFT and skin temperature is normal to both lower extremities.     Neurologic: Lower extremity sensation is intact to light touch.  No evidence of weakness or contracture in the lower extremities.  No evidence of neuropathy.     Musculoskeletal: Patient is ambulatory without assistive device or brace.  No gross ankle deformity noted.  No foot or ankle joint effusion is noted.    Assessment: A 67-year-old type I diabetic with wound dehiscence right anterior shin, " ulceration right ankle with fat layer exposed, varicosities    Plan:  Wound was debrided. At this time we will do iodosorb dressing changes daily to the wound. He does wear compression socks we will have him continue to wear these. He will follow-up in one month. He was told to call if further questions or concerns.    Procedure:  After verbal consent, per protocol lidocaine was applied to the wound. Excisional debridement was performed on ulcer.   #15 blade was used to debride ulcer down to and including subcutaneous tissue. Bleeding controlled with light pressure.   No drainage noted.  No anesthesia was used due to neuropathy. Dry dressing applied to foot.  Patient tolerated procedure well.      Celi Hdez DPM, Podiatry/Foot and Ankle Surgery

## 2018-01-03 DIAGNOSIS — E10.21 TYPE 1 DIABETES MELLITUS WITH NEPHROPATHY (H): ICD-10-CM

## 2018-01-03 LAB
ALBUMIN SERPL-MCNC: 4 G/DL (ref 3.4–5)
ALP SERPL-CCNC: 62 U/L (ref 40–150)
ALT SERPL W P-5'-P-CCNC: 50 U/L (ref 0–70)
ANION GAP SERPL CALCULATED.3IONS-SCNC: 8 MMOL/L (ref 3–14)
AST SERPL W P-5'-P-CCNC: 40 U/L (ref 0–45)
BILIRUB SERPL-MCNC: 0.9 MG/DL (ref 0.2–1.3)
BUN SERPL-MCNC: 18 MG/DL (ref 7–30)
CALCIUM SERPL-MCNC: 8.6 MG/DL (ref 8.5–10.1)
CHLORIDE SERPL-SCNC: 105 MMOL/L (ref 94–109)
CHOLEST SERPL-MCNC: 163 MG/DL
CO2 SERPL-SCNC: 25 MMOL/L (ref 20–32)
CREAT SERPL-MCNC: 0.89 MG/DL (ref 0.66–1.25)
GFR SERPL CREATININE-BSD FRML MDRD: 85 ML/MIN/1.7M2
GLUCOSE SERPL-MCNC: 188 MG/DL (ref 70–99)
HBA1C MFR BLD: 5.9 % (ref 4.3–6)
HDLC SERPL-MCNC: 82 MG/DL
LDLC SERPL CALC-MCNC: 65 MG/DL
NONHDLC SERPL-MCNC: 81 MG/DL
POTASSIUM SERPL-SCNC: 4 MMOL/L (ref 3.4–5.3)
PROT SERPL-MCNC: 7.1 G/DL (ref 6.8–8.8)
SODIUM SERPL-SCNC: 138 MMOL/L (ref 133–144)
T4 FREE SERPL-MCNC: 0.83 NG/DL (ref 0.76–1.46)
TRIGL SERPL-MCNC: 80 MG/DL
TSH SERPL DL<=0.005 MIU/L-ACNC: 5.47 MU/L (ref 0.4–4)

## 2018-01-03 PROCEDURE — 80053 COMPREHEN METABOLIC PANEL: CPT | Performed by: INTERNAL MEDICINE

## 2018-01-03 PROCEDURE — 80061 LIPID PANEL: CPT | Performed by: INTERNAL MEDICINE

## 2018-01-03 PROCEDURE — 36415 COLL VENOUS BLD VENIPUNCTURE: CPT | Performed by: INTERNAL MEDICINE

## 2018-01-03 PROCEDURE — 84443 ASSAY THYROID STIM HORMONE: CPT | Performed by: INTERNAL MEDICINE

## 2018-01-03 PROCEDURE — 84439 ASSAY OF FREE THYROXINE: CPT | Performed by: INTERNAL MEDICINE

## 2018-01-03 PROCEDURE — 83036 HEMOGLOBIN GLYCOSYLATED A1C: CPT | Performed by: INTERNAL MEDICINE

## 2018-01-08 DIAGNOSIS — I10 ESSENTIAL HYPERTENSION: ICD-10-CM

## 2018-01-08 NOTE — TELEPHONE ENCOUNTER
Requested Prescriptions   Pending Prescriptions Disp Refills     lisinopril (PRINIVIL/ZESTRIL) 40 MG tablet [Pharmacy Med Name: LISINOPRIL 40MG TABS]  Last Written Prescription Date:  1/23/2017  Last Fill Quantity: 90,  # refills: 3   Last Office Visit with [facility], [facility] or [facility] prescribing provider:  1/23/2017   Future Office Visit:    Next 5 appointments (look out 90 days)     Jan 11, 2018 10:00 AM CST   PHYSICAL with Hernan Juárez MD   [facility]    [address] [id number] [phone]            Jan 12, 2018  9:45 AM CST   Return Visit with Celi Hdez DPM, Podiatry/Foot and Ankle Surgery   [facility] [place] ([facility])    [address] [facility] [address]  [facility] [address]   [phone]                  90 tablet 3     Sig: TAKE ONE TABLET BY MOUTH EVERY DAY    ACE Inhibitors (Including Combos) Protocol Failed    1/8/2018 10:18 AM       Failed - Blood pressure under 140/90    BP Readings from Last 3 Encounters:   12/15/17 (!) 166/93   01/23/17 168/82   10/31/16 (!) 178/94                Passed - Recent or future visit with authorizing provider's specialty    Patient had office visit in the last year or has a visit in the next 30 days with authorizing provider.  See chart review.              Passed - Patient is age 18 or older       Passed - Normal serum creatinine on file in past 12 months    Recent Labs   Lab Test  01/03/18   0855   CR  0.89            Passed - Normal serum potassium on file in past 12 months    Recent Labs   Lab Test  01/03/18   0855   POTASSIUM  4.0

## 2018-01-09 RX ORDER — LISINOPRIL 40 MG/1
TABLET ORAL
Qty: 90 TABLET | Refills: 0 | Status: SHIPPED | OUTPATIENT
Start: 2018-01-09 | End: 2018-03-14

## 2018-01-09 ASSESSMENT — ACTIVITIES OF DAILY LIVING (ADL)
CURRENT_FUNCTION: NO ASSISTANCE NEEDED
I_NEED_ASSISTANCE_FOR_THE_FOLLOWING_DAILY_ACTIVITIES:: NO ASSISTANCE IS NEEDED

## 2018-01-10 ENCOUNTER — MEDICAL CORRESPONDENCE (OUTPATIENT)
Dept: HEALTH INFORMATION MANAGEMENT | Facility: CLINIC | Age: 68
End: 2018-01-10

## 2018-01-10 DIAGNOSIS — E10.21 TYPE 1 DIABETES MELLITUS WITH NEPHROPATHY (H): Primary | ICD-10-CM

## 2018-01-10 DIAGNOSIS — I10 ESSENTIAL HYPERTENSION: ICD-10-CM

## 2018-01-10 RX ORDER — CARVEDILOL 6.25 MG/1
TABLET ORAL
Qty: 180 TABLET | Refills: 3 | Status: SHIPPED | OUTPATIENT
Start: 2018-01-10 | End: 2018-07-20

## 2018-01-10 RX ORDER — LANCETS
EACH MISCELLANEOUS
Qty: 306 EACH | Refills: 4 | Status: SHIPPED | OUTPATIENT
Start: 2018-01-10 | End: 2019-01-08

## 2018-01-10 RX ORDER — SYRING-NEEDL,DISP,INSUL,0.3 ML 30 G X1/2"
SYRINGE, EMPTY DISPOSABLE MISCELLANEOUS
Qty: 400 EACH | Refills: 3 | Status: SHIPPED | OUTPATIENT
Start: 2018-01-10 | End: 2020-01-28

## 2018-01-10 NOTE — TELEPHONE ENCOUNTER
Routing refill request to provider for review/approval because:   out of range:  Blood pressure.  Pt does have appt with you tomorrow.

## 2018-01-10 NOTE — TELEPHONE ENCOUNTER
"Requested Prescriptions   Pending Prescriptions Disp Refills     blood glucose monitoring (ACCU-CHEK FASTCLIX) lancets [Pharmacy Med Name: ACCU-CHEK FASTCLIX LANCETS  MISC]  Last Written Prescription Date:  11/21/2016  Last Fill Quantity: 3 Box,  # refills: 4   Last Office Visit with [facility], [facility] or [facility] prescribing provider:  1/23/2017   Future Office Visit:    Next 5 appointments (look out 90 days)     Jan 11, 2018 10:00 AM CST   PHYSICAL with Hernan Juárez MD   [facility]    [address] [address]   [phone]            Jan 12, 2018  9:45 AM CST   Return Visit with Celi Hdez DPM, Podiatry/Foot and Ankle Surgery   [facility] [place] ([facility])    [address]  [facility] [id number] [phone]                  306 each 4     Sig: USE TO TEST 4 TIMES DAILY    Diabetic Supplies Protocol Passed    1/10/2018  9:38 AM       Passed - Patient is 18 years of age or older       Passed - Patient has had appt within past 6 mos    IV to PO - Antibiotics     None                    BD INSULIN SYRINGE ULTRAFINE 30G X 1/2\" 0.3 ML [Pharmacy Med Name: BD INSULIN SYR 30G,1/2\",0.3ML]  Last Written Prescription Date:  12/26/2016  Last Fill Quantity: 400,  # refills: 3   Last Office Visit with [facility] or [facility] prescribing provider:  1/23/2017   Future Office Visit:    Next 5 appointments (look out 90 days)     Jan 11, 2018 10:00 AM CST   PHYSICAL with Hernan Juárez MD   [facility] ([facility])    [address] [address]   [phone]            Jan 12, 2018  9:45 AM CST   Return Visit with Celi Hdez DPM, Podiatry/Foot and Ankle Surgery   [facility] [place] ([facility])    [address]  [address]  [facility] [address] [phone]                  400 each " "3     Sig: USE APPROXIMATELY 4-6 TIMES A DAY    Diabetic Supplies Protocol Passed    1/10/2018  9:38 AM       Passed - Patient is 18 years of age or older       Passed - Patient has had appt within past 6 mos    IV to PO - Antibiotics     None                    carvedilol (COREG) 6.25 MG tablet [Pharmacy Med Name: CARVEDILOL 6.25MG TABS]  Last Written Prescription Date:  1/23/2017  Last Fill Quantity: 180,  # refills: 3   Last Office Visit with [facility], [facility] or [facility] prescribing provider:  1/23/2017   Future Office Visit:    Next 5 appointments (look out 90 days)     Jan 11, 2018 10:00 AM CST   PHYSICAL with Hernan Juárez MD   [facility] ([facility])    [address] [address] [phone]            Jan 12, 2018  9:45 AM CST   Return Visit with Celi Hdez DPM, Podiatry/Foot and Ankle Surgery   [facility] [place] ([facility])    [address] [place] [address] [address] [address] [phone]                    180 tablet 3     Sig: TAKE ONE TABLET BY MOUTH TWICE A DAY WITH MEALS    Beta-Blockers Protocol Failed    1/10/2018  9:38 AM       Failed - Blood pressure under 140/90    BP Readings from Last 3 Encounters:   12/15/17 (!) 166/93   01/23/17 168/82   10/31/16 (!) 178/94                Passed - Patient is age 6 or older       Passed - Recent or future visit with authorizing provider's specialty    Patient had office visit in the last year or has a visit in the next 30 days with authorizing provider.  See \"Patient Info\" tab in inbasket, or \"Choose Columns\" in Meds & Orders section of the refill encounter.                 "

## 2018-01-11 ENCOUNTER — OFFICE VISIT (OUTPATIENT)
Dept: INTERNAL MEDICINE | Facility: CLINIC | Age: 68
End: 2018-01-11
Payer: COMMERCIAL

## 2018-01-11 VITALS
SYSTOLIC BLOOD PRESSURE: 140 MMHG | OXYGEN SATURATION: 100 % | HEIGHT: 71 IN | HEART RATE: 74 BPM | TEMPERATURE: 98.6 F | RESPIRATION RATE: 16 BRPM | WEIGHT: 179 LBS | BODY MASS INDEX: 25.06 KG/M2 | DIASTOLIC BLOOD PRESSURE: 86 MMHG

## 2018-01-11 DIAGNOSIS — E10.21 TYPE 1 DIABETES MELLITUS WITH NEPHROPATHY (H): ICD-10-CM

## 2018-01-11 DIAGNOSIS — Z23 NEED FOR PROPHYLACTIC VACCINATION AGAINST STREPTOCOCCUS PNEUMONIAE (PNEUMOCOCCUS): ICD-10-CM

## 2018-01-11 DIAGNOSIS — R80.9 PROTEINURIA, UNSPECIFIED TYPE: ICD-10-CM

## 2018-01-11 DIAGNOSIS — Z00.00 MEDICARE ANNUAL WELLNESS VISIT, INITIAL: Primary | ICD-10-CM

## 2018-01-11 LAB
CREAT UR-MCNC: 106 MG/DL
MICROALBUMIN UR-MCNC: 327 MG/L
MICROALBUMIN/CREAT UR: 308.49 MG/G CR (ref 0–17)

## 2018-01-11 PROCEDURE — 99207 C FOOT EXAM  NO CHARGE: CPT | Mod: 25 | Performed by: INTERNAL MEDICINE

## 2018-01-11 PROCEDURE — 82043 UR ALBUMIN QUANTITATIVE: CPT | Performed by: INTERNAL MEDICINE

## 2018-01-11 PROCEDURE — G0438 PPPS, INITIAL VISIT: HCPCS | Performed by: INTERNAL MEDICINE

## 2018-01-11 PROCEDURE — G0009 ADMIN PNEUMOCOCCAL VACCINE: HCPCS | Performed by: INTERNAL MEDICINE

## 2018-01-11 PROCEDURE — 90732 PPSV23 VACC 2 YRS+ SUBQ/IM: CPT | Performed by: INTERNAL MEDICINE

## 2018-01-11 RX ORDER — INSULIN GLARGINE 100 [IU]/ML
INJECTION, SOLUTION SUBCUTANEOUS
Qty: 3 VIAL | Refills: 2
Start: 2018-01-11 | End: 2018-07-20

## 2018-01-11 ASSESSMENT — ACTIVITIES OF DAILY LIVING (ADL): CURRENT_FUNCTION: NO ASSISTANCE NEEDED

## 2018-01-11 NOTE — PROGRESS NOTES
SUBJECTIVE:   Eh Perry is a 67 year old male who presents for Preventive Visit.    Are you in the first 12 months of your Medicare coverage?  No    Physical   Annual:     Getting at least 3 servings of Calcium per day::  Yes    Bi-annual eye exam::  Yes    Dental care twice a year::  Yes    Sleep apnea or symptoms of sleep apnea::  None    Diet::  Diabetic    Frequency of exercise::  6-7 days/week    Duration of exercise::  45-60 minutes    Medication side effects::  Muscle aches    Additional concerns today::  No    Ability to successfully perform activities of daily living: no assistance needed  Home Safety:  Throw rugs in the hallway and lack of grab bars in the bathroom  Hearing Impairment: difficulty following a conversation in a noisy restaurant or crowded room    Fall risk:       COGNITIVE SCREEN  1) Repeat 3 items (Banana, Sunrise, Chair)    2) Clock draw: NORMAL  3) 3 item recall: Recalls 3 objects  Results: 3 items recalled: COGNITIVE IMPAIRMENT LESS LIKELY    Mini-CogTM Copyright JOHN Fried. Licensed by the author for use in [facility]; reprinted with permission (jennie@[facility]). All rights reserved.          Reviewed and updated as needed this visit by clinical staff         Reviewed and updated as needed this visit by Provider      Social History   Substance Use Topics     Smoking status: Former Smoker     Smokeless tobacco: Never Used     Alcohol use Yes      Comment: occ       Alcohol Use 1/9/2018   If you drink alcohol, do you typically have greater than 3 drinks per day OR greater than 7 drinks per week?   No                 Today's PHQ-2 Score: PHQ-2 ( 1999 [employer]) 1/9/2018   Q1: Little interest or pleasure in doing things 0   Q2: Feeling down, depressed or hopeless 0   PHQ-2 Score 0   Q1: Little interest or pleasure in doing things Not at all   Q2: Feeling down, depressed or hopeless Not at all   PHQ-2 Score 0       Do you feel safe in your environment - Yes    Do you have a Health  Care Directive?: No: Advance care planning was reviewed with patient; patient declined at this time.      Current providers sharing in care for this patient include: Patient Care Team:  Hernan Juárez MD as PCP - General (Internal Medicine)    The following health maintenance items are reviewed in Epic and correct as of today:  Health Maintenance   Topic Date Due     ADVANCE DIRECTIVE PLANNING Q5 YRS  10/19/2017     FOOT EXAM Q1 YEAR  01/23/2018     FALL RISK ASSESSMENT  01/23/2018     PNEUMOCOCCAL (2 of 2 - PPSV23) 01/23/2018     MICROALBUMIN Q1 YEAR  05/24/2018     A1C Q6 MO  07/03/2018     EYE EXAM Q1 YEAR  11/09/2018     CREATININE Q1 YEAR  01/03/2019     LIPID MONITORING Q1 YEAR  01/03/2019     TSH W/ FREE T4 REFLEX Q2 YEAR  01/03/2020     COLON CANCER SCREEN (SYSTEM ASSIGNED)  06/01/2022     TETANUS IMMUNIZATION (SYSTEM ASSIGNED)  07/03/2025     INFLUENZA VACCINE (SYSTEM ASSIGNED)  Completed     AORTIC ANEURYSM SCREENING (SYSTEM ASSIGNED)  Completed     HEPATITIS C SCREENING  Completed     Labs reviewed in EPIC        Review of Systems  C: NEGATIVE for fever, chills, change in weight  I: NEGATIVE for worrisome rashes, moles. Has wound right  Calf that is being treated with woundcare and has f/u appt tomorrow  E:  POSITIVE for previous right retinal bleed 1 year ago. Last eye exam Nov 2017. Mild bilateral cataracts not affecting vision  E/M: NEGATIVE for ear, mouth and throat problems  R: NEGATIVE for significant cough or SOB  CV: NEGATIVE for chest pain, palpitations or peripheral edema.  BPs at home 115-130/70s per pt  GI: NEGATIVE for nausea, abdominal pain, heartburn (with meds), or change in bowel habits  : NEGATIVE for frequency, dysuria, or hematuria. Stable ED control with Viagra  M: NEGATIVE for significant arthralgias or myalgia that limit activity  N: NEGATIVE for weakness, dizziness or paresthesias  E:  POSITIVE for DM and lipids as below  H: NEGATIVE for bleeding problems  P: NEGATIVE for changes  "in mood or affect    Lab Results   Component Value Date     01/03/2018     Lab Results   Component Value Date    A1C 5.9 01/03/2018     Lab Results   Component Value Date    CHOL 163 01/03/2018     Lab Results   Component Value Date    LDL 65 01/03/2018     Lab Results   Component Value Date    HDL 82 01/03/2018     Lab Results   Component Value Date    TRIG 80 01/03/2018     Lab Results   Component Value Date    CR 0.89 01/03/2018     Lab Results   Component Value Date    ALT 50 01/03/2018     Lab Results   Component Value Date    AST 40 01/03/2018     Lab Results   Component Value Date    MICROL 72 05/24/2017     Lab Results   Component Value Date    TSH 5.47 01/03/2018         OBJECTIVE:   Ht 5' 11\" (1.803 m) Estimated body mass index is 24.97 kg/(m^2) as calculated from the following:    Height as of 1/23/17: 5' 11\" (1.803 m).    Weight as of 1/23/17: 179 lb (81.2 kg).  Physical Exam  General appearance - healthy, alert, no distress  Skin -  approx 3cm ulceration right ant tibial area with bandage. NO surrounding erythema/warmth. Granulating base  Head - normocephalic, atraumatic  Eyes - JULIETTE, EOMI, fundi exam with nondilated pupils negative.  Ears - External ears normal. Canals clear. TM's normal.  Nose/Sinuses - Nares normal. Septum midline. Mucosa normal. No drainage or sinus tenderness.  Oropharynx - No erythema, no adenopathy, no exudates.  Neck - Supple without adenopathy or thyromegaly. No bruits.  Lungs - Clear to auscultation without wheezes/rhonchi.  Heart - Regular rate and rhythm without murmurs, clicks, or gallops.  Nodes - No supraclavicular, axillary, or inguinal adenopathy palpable.  Abdomen - Abdomen soft, non-tender. BS normal. No masses or hepatosplenomegaly palpable. No bruits. CGM right abdomen  Extremities -No cyanosis, clubbing or edema.    Musculoskeletal - Spine ROM normal. Muscular strength intact.   Peripheral pulses - radial=4/4, femoral=4/4, posterior tibial=4/4, dorsalis " "pedis=4/4,  Neuro - Gait normal. Reflexes normal and symmetric. Sensation grossly WNL.  Genital - Normal-appearing male external genitalia. No scrotal masses or inguinal hernia palpable.   Rectal - Guaic negative stool. Normal tone. Prostate normal in size to palpation. No rectal masses or prostate nodularity palpable    ASSESSMENT / PLAN:   1. Medicare annual wellness visit, initial  Pt to f/u with Wound care tomorrow re: right tibial  skin wound    2. Type 1 diabetes mellitus with nephropathy (goal A1C<7)  Controlled.  Continue current medication.  Repeat lab in 6 months for A1c  - FOOT EXAM  NO CHARGE [32957.114]  - insulin glargine (LANTUS VIAL) 100 UNIT/ML injection; Inject  Subcutaneous 16 units  Before dinner  Dispense: 3 vial; Refill: 2  - Albumin Random Urine Quantitative with Creat Ratio  - Hemoglobin A1c; Future    3. Need for prophylactic vaccination against Streptococcus pneumoniae (pneumococcus)  - Pneumococcal vaccine 23 valent PPSV23  (Pneumovax) [97160]  - ADMIN MEDICARE: Pneumococcal Vaccine ()    4. Proteinuria, unspecified type  Repeat lab. If worsened, will add Amlodipine.  Continue current good blood sugar control and ACEI  - Albumin Random Urine Quantitative with Creat Ratio        End of Life Planning:  Patient currently has an advanced directive: No.  I have verified the patient's ablity to prepare an advanced directive/make health care decisions.  Literature  Declined by pt    COUNSELING:  Reviewed preventive health counseling, as reflected in patient instructions        Estimated body mass index is 24.97 kg/(m^2) as calculated from the following:    Height as of 1/23/17: 5' 11\" (1.803 m).    Weight as of 1/23/17: 179 lb (81.2 kg).     reports that he has quit smoking. He has never used smokeless tobacco.        Appropriate preventive services were discussed with this patient, including applicable screening as appropriate for cardiovascular disease, diabetes, osteopenia/osteoporosis, " and glaucoma.  As appropriate for age/gender, discussed screening for colorectal cancer, prostate cancer, breast cancer, and cervical cancer. Checklist reviewing preventive services available has been given to the patient.    Reviewed patients plan of care and provided an AVS. The Basic Care Plan (routine screening as documented in Health Maintenance) for Eh meets the Care Plan requirement. This Care Plan has been established and reviewed with the Patient.    Counseling Resources:  ATP IV Guidelines  Pooled Cohorts Equation Calculator  Breast Cancer Risk Calculator  FRAX Risk Assessment  ICSI Preventive Guidelines  Dietary Guidelines for Americans, 2010  USDA's MyPlate  ASA Prophylaxis  Lung CA Screening    Hernan Juárez MD  [facility]

## 2018-01-11 NOTE — NURSING NOTE
"Chief Complaint   Patient presents with     Wellness Visit       Initial /86  Pulse 74  Temp 98.6  F (37  C)  Resp 16  Ht 5' 11\" (1.803 m)  Wt 179 lb (81.2 kg)  SpO2 100%  BMI 24.97 kg/m2 Estimated body mass index is 24.97 kg/(m^2) as calculated from the following:    Height as of this encounter: 5' 11\" (1.803 m).    Weight as of this encounter: 179 lb (81.2 kg).  Medication Reconciliation: nolan Hussein CMA      "

## 2018-01-11 NOTE — NURSING NOTE
Screening Questionnaire for Adult Immunization    Are you sick today?   No   Do you have allergies to medications, food, a vaccine component or latex?   No   Have you ever had a serious reaction after receiving a vaccination?   No   Do you have a long-term health problem with heart disease, lung disease, asthma, kidney disease, metabolic disease (e.g. diabetes), anemia, or other blood disorder?   Yes   Do you have cancer, leukemia, HIV/AIDS, or any other immune system problem?   No   In the past 3 months, have you taken medications that affect  your immune system, such as prednisone, other steroids, or anticancer drugs; drugs for the treatment of rheumatoid arthritis, Crohn s disease, or psoriasis; or have you had radiation treatments?   No   Have you had a seizure, or a brain or other nervous system problem?   No   During the past year, have you received a transfusion of blood or blood     products, or been given immune (gamma) globulin or antiviral drug?   No   For women: Are you pregnant or is there a chance you could become        pregnant during the next month?   No   Have you received any vaccinations in the past 4 weeks?   No     Immunization questionnaire was positive for at least one answer.  Notified Dr Juárez.        Per orders of Dr. Juárez, injection of Pneumovax 23 given by Tenisha Glass. Patient instructed to remain in clinic for 15 minutes afterwards, and to report any adverse reaction to me immediately.       Screening performed by Tenisha Glass on 1/11/2018 at 11:02 AM.

## 2018-01-11 NOTE — MR AVS SNAPSHOT
After Visit Summary   1/11/2018    Eh Perry    MRN: [id number]           Patient Information     Date Of Birth          1950        Visit Information        Provider Department      1/11/2018 10:00 AM Hernan Juárez MD [facility]        Today's Diagnoses     Medicare annual wellness visit, initial    -  1    Type 1 diabetes mellitus with nephropathy (goal A1C<7)        Need for prophylactic vaccination against Streptococcus pneumoniae (pneumococcus)        Proteinuria, unspecified type          Care Instructions      Preventive Health Recommendations:       Male Ages 65 and over    Yearly exam:             See your health care provider every year in order to  o   Review health changes.   o   Discuss preventive care.    o   Review your medicines if your doctor has prescribed any.    Talk with your health care provider about whether you should have a test to screen for prostate cancer (PSA).    Every 3 years, have a diabetes test (fasting glucose). If you are at risk for diabetes, you should have this test more often.    Every 5 years, have a cholesterol test. Have this test more often if you are at risk for high cholesterol or heart disease.     Every 10 years, have a colonoscopy. Or, have a yearly FIT test (stool test). These exams will check for colon cancer.    Talk to with your health care provider about screening for Abdominal Aortic Aneurysm if you have a family history of AAA or have a history of smoking.  Shots:     Get a flu shot each year.     Get a tetanus shot every 10 years.     Talk to your doctor about your pneumonia vaccines. There are now two you should receive - Pneumovax (PPSV 23) and Prevnar (PCV 13).    Talk to your doctor about a shingles vaccine.     Talk to your doctor about the hepatitis B vaccine.  Nutrition:     Eat at least 5 servings of fruits and vegetables each day.     Eat whole-grain bread, whole-wheat pasta and brown rice instead of  white grains and rice.     Talk to your doctor about Calcium and Vitamin D.   Lifestyle    Exercise for at least 150 minutes a week (30 minutes a day, 5 days a week). This will help you control your weight and prevent disease.     Limit alcohol to one drink per day.     No smoking.     Wear sunscreen to prevent skin cancer.     See your dentist every six months for an exam and cleaning.     See your eye doctor every 1 to 2 years to screen for conditions such as glaucoma, macular degeneration and cataracts.          Follow-ups after your visit        Your next 10 appointments already scheduled     Jan 12, 2018  9:45 AM CST   Return Visit with Celi Hdez DPM, Podiatry/Foot and Ankle Surgery   [facility] [place] ([facility])    [address] [id number] [phone]              Future tests that were ordered for you today     Open Future Orders        Priority Expected Expires Ordered    Hemoglobin A1c Routine 7/10/2018 11/7/2018 1/11/2018            Who to contact     If you have questions or need follow up information about today's clinic visit or your schedule please contact [facility] directly at [phone].  Normal or non-critical lab and imaging results will be communicated to you by MyChart, letter or phone within 4 business days after the clinic has received the results. If you do not hear from us within 7 days, please contact the clinic through MyChart or phone. If you have a critical or abnormal lab result, we will notify you by phone as soon as possible.  Submit refill requests through [facility] or call your pharmacy and they will forward the refill request to us. Please allow 3 business days for your refill to be completed.          Additional Information About Your Visit        [email or web address] Information     [facility] gives you secure access to your electronic health record. If you see a primary care provider, you can  "also send messages to your care team and make appointments. If you have questions, please call your primary care clinic.  If you do not have a primary care provider, please call [phone] and they will assist you.        Care EveryWhere ID     This is your Care EveryWhere ID. This could be used by other organizations to access your [place] medical records  [id number]        Your Vitals Were     Pulse Temperature Respirations Height Pulse Oximetry BMI (Body Mass Index)    74 98.6  F (37  C) 16 5' 11\" (1.803 m) 100% 24.97 kg/m2       Blood Pressure from Last 3 Encounters:   01/11/18 140/86   12/15/17 (!) 166/93   01/23/17 168/82    Weight from Last 3 Encounters:   01/11/18 179 lb (81.2 kg)   01/23/17 179 lb (81.2 kg)   11/21/16 173 lb 12.8 oz (78.8 kg)              We Performed the Following     ADMIN MEDICARE: Pneumococcal Vaccine ()     Albumin Random Urine Quantitative with Creat Ratio     FOOT EXAM  NO CHARGE [36931.114]     Pneumococcal vaccine 23 valent PPSV23  (Pneumovax) [99582]          Today's Medication Changes          These changes are accurate as of: 1/11/18 11:34 PM.  If you have any questions, ask your nurse or doctor.               These medicines have changed or have updated prescriptions.        Dose/Directions    Continuous Glucose Monitor Betsy   This may have changed:  additional instructions   Used for:  Type 1 diabetes, HbA1c goal < 7% (H)        FreeStyle Lite Glucose Monitor Device- Check blood sugars 6-8 times daily as directed   Quantity:  1 each   Refills:  0       insulin glargine 100 UNIT/ML injection   Commonly known as:  LANTUS VIAL   This may have changed:  additional instructions   Used for:  Type 1 diabetes mellitus with nephropathy (H)   Changed by:  Hernan Juárez MD        Inject  Subcutaneous 16 units  Before dinner   Quantity:  3 vial   Refills:  2            Where to get your medicines      Some of these will need a paper prescription and others can be bought over the " "counter.  Ask your nurse if you have questions.     You don't need a prescription for these medications     insulin glargine 100 UNIT/ML injection                Primary Care Provider Office Phone # Fax #    Hernan Juárez [phone]       [address] [facility] [address]        Equal Access to Services     SANDI MESSER : Hadii aad ku hadasho Soomaali, waaxda luqadaha, qaybta kaalmada adeegyada, waxay idiin hayaan adeeg kharash la'aan . So [facility] [phone].    ATENCIÓN: Si habla español, tiene a francois disposición servicios gratuitos de asistencia lingüística. Llame al [phone].    We comply with applicable federal civil rights laws and [place] laws. We do not discriminate on the basis of race, color, national origin, age, disability, sex, sexual orientation, or gender identity.            Thank you!     Thank you for choosing [facility]  for your care. Our goal is always to provide you with excellent care. Hearing back from our patients is one way we can continue to improve our services. Please take a few minutes to complete the written survey that you may receive in the mail after your visit with us. Thank you!             Your Updated Medication List - Protect others around you: Learn how to safely use, store and throw away your medicines at www.disposemymeds.org.          This list is accurate as of: 1/11/18 11:34 PM.  Always use your most recent med list.                   Brand Name Dispense Instructions for use Diagnosis    aspirin 81 MG tablet      Take 1 tablet by mouth daily.    Plantar fascial fibromatosis, Pain in limb, Type II or unspecified type diabetes mellitus without mention of complication, not stated as uncontrolled       BD insulin syringe ultrafine 30G X 1/2\" 0.3 ML   Generic drug:  insulin syringe-needle U-100     400 each    USE APPROXIMATELY 4-6 TIMES A DAY    Type 1 diabetes mellitus with nephropathy (H)       blood glucose monitoring lancets "     306 each    USE TO TEST 4 TIMES DAILY    Type 1 diabetes mellitus with nephropathy (H)       * blood glucose monitoring test strip    FREESTYLE LITE    750 each    Use to test blood sugars 6-8 daily or as directed.    Type 1 diabetes, HbA1c goal < 7% (H)       * blood glucose monitoring test strip    ACCU-CHEK ERUM PLUS    400 each    Test 4 times a day        * blood glucose monitoring test strip    no brand specified    400 strip    Use to test blood sugars 4 times daily or as directed    Type 1 diabetes mellitus with nephropathy (H)       carvedilol 6.25 MG tablet    COREG    180 tablet    TAKE ONE TABLET BY MOUTH TWICE A DAY WITH MEALS    Essential hypertension       Continuous Glucose Monitor Betsy     1 each    FreeStyle Lite Glucose Monitor Device- Check blood sugars 6-8 times daily as directed    Type 1 diabetes, HbA1c goal < 7% (H)       DEXCOM G5 MOB/G4 PLAT SENSOR Misc     12 each    CHANGE EVERY 7 DAYS    Type 1 diabetes mellitus with nephropathy (H)       DEXCOM G5 MOBILE TRANSMITTER Misc     1 each    CHANGE EVERY 3 MONTHS    Type 1 diabetes mellitus with nephropathy (H)       fish oil-omega-3 fatty acids 1000 MG capsule      Take 1-2 g by mouth daily    Plantar fascial fibromatosis, Pain in limb, Type II or unspecified type diabetes mellitus without mention of complication, not stated as uncontrolled       insulin glargine 100 UNIT/ML injection    LANTUS VIAL    3 vial    Inject  Subcutaneous 16 units  Before dinner    Type 1 diabetes mellitus with nephropathy (H)       insulin lispro 100 UNIT/ML injection    humaLOG    3 vial    3 units before meals and sliding scale. Max  20 units/day total    Type 1 diabetes mellitus with nephropathy (H)       lisinopril 40 MG tablet    PRINIVIL/ZESTRIL    90 tablet    TAKE ONE TABLET BY MOUTH EVERY DAY    Essential hypertension       magnesium 250 MG tablet      Take 1 tablet by mouth daily        MULTI vitamin  MENS Tabs     30 tablet    Take  by mouth.     Type 1 diabetes, HbA1c goal < 7% (H)       omeprazole 20 MG tablet     90 tablet    1 tab daily    Gastroesophageal reflux disease, esophagitis presence not specified       sildenafil 50 MG tablet    VIAGRA    30 tablet    Take 0.5-1 tablets (25-50 mg) by mouth daily as needed for erectile dysfunction Take 30 min to 4 hours before intercourse.  Never use with nitroglycerin, terazosin or doxazosin.    Erectile dysfunction, unspecified erectile dysfunction type       simvastatin 10 MG tablet    ZOCOR    45 tablet    Take 0.5 tablets (5 mg) by mouth At Bedtime    Type 1 diabetes mellitus with nephropathy (H)       * Notice:  This list has 3 medication(s) that are the same as other medications prescribed for you. Read the directions carefully, and ask your doctor or other care provider to review them with you.

## 2018-01-12 ENCOUNTER — HOSPITAL ENCOUNTER (OUTPATIENT)
Dept: WOUND CARE | Facility: CLINIC | Age: 68
Discharge: HOME | End: 2018-01-12
Attending: PODIATRIST | Admitting: PODIATRIST
Payer: COMMERCIAL

## 2018-01-12 VITALS — HEART RATE: 74 BPM | TEMPERATURE: 97.3 F | DIASTOLIC BLOOD PRESSURE: 81 MMHG | SYSTOLIC BLOOD PRESSURE: 162 MMHG

## 2018-01-12 PROCEDURE — 11042 DBRDMT SUBQ TIS 1ST 20SQCM/<: CPT | Performed by: PODIATRIST

## 2018-01-12 PROCEDURE — 27211076 ZZH DRESSING FIBRACOL 4X4 3/8 INCH

## 2018-01-12 NOTE — PROGRESS NOTES
"[facility] [place] Progress Note    Subject: Patient was seen at wound center.  Here for follow-up of right leg wound after a Mohs procedure. Denies fever, nausea, vomiting, shortness of breath.    PMH:   Past Medical History:   Diagnosis Date     Anemia      CKD (chronic kidney disease) stage 3, GFR 30-59 ml/min      GERD (gastroesophageal reflux disease)      Hepatitis 2011    elevated AST due to excess ETOH use (5 drinks/day)     HTN (hypertension)      Hyperlipidemia LDL goal <100 3/7/2013     MGUS (monoclonal gammopathy of unknown significance)      IgG Lambda. Followed by Dr Rogers     Proteinuria 1/22/2015     PVC (premature ventricular contraction)      Retinopathy due to secondary diabetes (H)     s/p laser therapy left     Tobacco abuse     20 pack hx. Quit 1991     Type 1 diabetes mellitus with nephropathy (goal A1C<7) 1978       Social Hx:   Social History     Social History     Marital status:      Spouse name: N/A     Number of children: N/A     Years of education: N/A     Occupational History      Retired     anesthesiologist     Social History Main Topics     Smoking status: Former Smoker     Smokeless tobacco: Never Used     Alcohol use Yes      Comment: occ     Drug use: No     Sexual activity: Yes     Partners: Female     Other Topics Concern     Not on file     Social History Narrative       Surgical Hx:   Past Surgical History:   Procedure Laterality Date     HAND SURGERY  2007    contracture release       Allergies:    Allergies   Allergen Reactions     Atorvastatin Calcium       Elevated Ck and myalgias     Wasp Venom Protein      Wasp sting       Medications:   Current Outpatient Prescriptions   Medication     insulin glargine (LANTUS VIAL) 100 UNIT/ML injection     blood glucose monitoring (ACCU-CHEK FASTCLIX) lancets     BD INSULIN SYRINGE ULTRAFINE 30G X 1/2\" 0.3 ML     carvedilol (COREG) 6.25 MG tablet     lisinopril (PRINIVIL/ZESTRIL) 40 MG tablet     Continuous " Blood Gluc Sensor (DEXCOM G5 MOB/G4 PLAT SENSOR) MISC     Continuous Blood Gluc Transmit (DEXCOM G5 MOBILE TRANSMITTER) MISC     insulin lispro (HUMALOG) 100 UNIT/ML injection     simvastatin (ZOCOR) 10 MG tablet     omeprazole 20 MG tablet     sildenafil (REVATIO/VIAGRA) 50 MG cap/tab     magnesium 250 MG tablet     blood glucose monitoring (NO BRAND SPECIFIED) test strip     blood glucose monitoring (ACCU-CHEK ERUM PLUS) test strip     blood glucose monitoring (FREESTYLE LITE) test strip     Continuous Glucose Monitor EMERSON     Multiple Vitamin (MULTI VITAMIN  MENS) TABS     fish oil-omega-3 fatty acids (FISH OIL) 1000 MG capsule     aspirin 81 MG tablet     No current facility-administered medications for this encounter.          Objective:  Vitals:  /81  Pulse 74  Temp 97.3  F (36.3  C)    A1C: 5.9     General:  Patient is alert and orientated.  NAD   Dermatologic: Wound dehiscence/ulceration dorsal right anterior shin measuring 1.8 x 1.0 x 0.1 cm. The surrounding erythema. Drainage or malodor noted. No tendon infection noted.      Vascular: DP & PT pulses are intact & regular bilaterally.  Minimal edema and varicosities noted.  CFT and skin temperature is normal to both lower extremities.      Neurologic: Lower extremity sensation is intact to light touch.  No evidence of weakness or contracture in the lower extremities.  No evidence of neuropathy.      Musculoskeletal: Patient is ambulatory without assistive device or brace.  No gross ankle deformity noted.  No foot or ankle joint effusion is noted.     Assessment: A 67-year-old type I diabetic with wound dehiscence right anterior shin, ulceration right ankle with fat layer exposed, varicosities     Plan:  Wound was debrided. At this time, we will do fibrocall dressing changes. He will follow up in 1 month. This was cultured further questions or concerns. We'll continue to wear compression socks.     Procedure:  After verbal consent, per protocol  lidocaine was applied to the wound. Excisional debridement was performed on ulcer.   #15 blade was used to debride ulcer down to and including subcutaneous tissue. Bleeding controlled with light pressure.   No drainage noted.  No anesthesia was used due to neuropathy. Dry dressing applied to foot.  Patient tolerated procedure well.    Celi Hdez DPM, Podiatry/Foot and Ankle Surgery

## 2018-02-09 ENCOUNTER — HOSPITAL ENCOUNTER (OUTPATIENT)
Dept: WOUND CARE | Facility: CLINIC | Age: 68
Discharge: HOME | End: 2018-02-09
Attending: PODIATRIST | Admitting: PODIATRIST
Payer: COMMERCIAL

## 2018-02-09 VITALS — TEMPERATURE: 97.3 F | DIASTOLIC BLOOD PRESSURE: 88 MMHG | SYSTOLIC BLOOD PRESSURE: 146 MMHG | HEART RATE: 78 BPM

## 2018-02-09 PROCEDURE — 27211076 ZZH DRESSING FIBRACOL 4X4 3/8 INCH

## 2018-02-09 PROCEDURE — 11042 DBRDMT SUBQ TIS 1ST 20SQCM/<: CPT

## 2018-02-09 PROCEDURE — 11042 DBRDMT SUBQ TIS 1ST 20SQCM/<: CPT | Performed by: PODIATRIST

## 2018-02-09 NOTE — DISCHARGE INSTRUCTIONS
Wound Dressing Change:  1.Cleanse wound and surrounding skin with: saline or wound cleanser    2.Cover wound with:Cut Fibracol to size of wound (this will dissolve into the wound)  3.Cover wound with Bandaid  4.Change dressing daily   Compression:   You have a compression wrap Compression Socks is supposed to be removed at night and put back on first thing in the morning.   Please remove compression dressing if toes turn blue and/or tingle and can not be relieved by raising the leg for one hour.     Call us at [phone] if you have any questions about your wounds, have redness or swelling around your wound, have a fever of 101 or greater or if you have any other problems or concerns. We answer the phone Monday through Friday 8 am to 4 pm, please leave a message as we check the voicemail frequently throughout the day.     Follow up with Provider - 4 weeks

## 2018-02-09 NOTE — MR AVS SNAPSHOT
MRN:[id number]                      After Visit Summary   2/9/2018    Eh Perry    MRN: [id number]           Visit Information        Provider Department      2/9/2018  9:45 AM Celi Hdez DPM, Podiatry/Foot and Ankle Surgery [facility] [place]          Further instructions from your care team       Wound Dressing Change:  1.Cleanse wound and surrounding skin with: saline or wound cleanser    2.Cover wound with:Cut Fibracol to size of wound (this will dissolve into the wound)  3.Cover wound with Bandaid  4.Change dressing daily   Compression:   You have a compression wrap Compression Socks is supposed to be removed at night and put back on first thing in the morning.   Please remove compression dressing if toes turn blue and/or tingle and can not be relieved by raising the leg for one hour.     Call us at [phone] if you have any questions about your wounds, have redness or swelling around your wound, have a fever of 101 or greater or if you have any other problems or concerns. We answer the phone Monday through Friday 8 am to 4 pm, please leave a message as we check the voicemail frequently throughout the day.     Follow up with Provider - 4 weeks               MyChart Information     [facility] gives you secure access to your electronic health record. If you see a primary care provider, you can also send messages to your care team and make appointments. If you have questions, please call your primary care clinic.  If you do not have a primary care provider, please call [phone] and they will assist you.        Care EveryWhere ID     This is your Care EveryWhere ID. This could be used by other organizations to access your [place] medical records  [id number]        Equal Access to Services     [facility]: Carmen Gonzalez, wadeb dill, qagibran sunshine. [facility] [phone].    ATENCIÓN:  Si habla sandy, tiene a francois disposición servicios gratuitos de asistencia lingüística. Llame al [phone].    We comply with applicable federal civil rights laws and [place] laws. We do not discriminate on the basis of race, color, national origin, age, disability, sex, sexual orientation, or gender identity.

## 2018-02-09 NOTE — PROGRESS NOTES
"[facility] [place] Progress Note    Subject: Patient was seen at wound center.  He is here for follow-up on right leg ulceration. Denies fever, nausea, vomiting. Notes that he hasn't been wearing his compression socks for the last week.    PMH:   Past Medical History:   Diagnosis Date     Anemia      CKD (chronic kidney disease) stage 3, GFR 30-59 ml/min      GERD (gastroesophageal reflux disease)      Hepatitis 2011    elevated AST due to excess ETOH use (5 drinks/day)     HTN (hypertension)      Hyperlipidemia LDL goal <100 3/7/2013     MGUS (monoclonal gammopathy of unknown significance)      IgG Lambda. Followed by Dr Rogers     Proteinuria 1/22/2015     PVC (premature ventricular contraction)      Retinopathy due to secondary diabetes (H)     s/p laser therapy left     Tobacco abuse     20 pack hx. Quit 1991     Type 1 diabetes mellitus with nephropathy (goal A1C<7) 1978       Social Hx:   Social History     Social History     Marital status:      Spouse name: N/A     Number of children: N/A     Years of education: N/A     Occupational History      Retired     anesthesiologist     Social History Main Topics     Smoking status: Former Smoker     Smokeless tobacco: Never Used     Alcohol use Yes      Comment: occ     Drug use: No     Sexual activity: Yes     Partners: Female     Other Topics Concern     Not on file     Social History Narrative       Surgical Hx:   Past Surgical History:   Procedure Laterality Date     HAND SURGERY  2007    contracture release       Allergies:    Allergies   Allergen Reactions     Atorvastatin Calcium       Elevated Ck and myalgias     Wasp Venom Protein      Wasp sting       Medications:   Current Outpatient Prescriptions   Medication     insulin glargine (LANTUS VIAL) 100 UNIT/ML injection     blood glucose monitoring (ACCU-CHEK FASTCLIX) lancets     BD INSULIN SYRINGE ULTRAFINE 30G X 1/2\" 0.3 ML     carvedilol (COREG) 6.25 MG tablet     lisinopril " (PRINIVIL/ZESTRIL) 40 MG tablet     Continuous Blood Gluc Sensor (DEXCOM G5 MOB/G4 PLAT SENSOR) MISC     Continuous Blood Gluc Transmit (DEXCOM G5 MOBILE TRANSMITTER) MISC     insulin lispro (HUMALOG) 100 UNIT/ML injection     simvastatin (ZOCOR) 10 MG tablet     omeprazole 20 MG tablet     sildenafil (REVATIO/VIAGRA) 50 MG cap/tab     magnesium 250 MG tablet     blood glucose monitoring (NO BRAND SPECIFIED) test strip     blood glucose monitoring (ACCU-CHEK ERUM PLUS) test strip     blood glucose monitoring (FREESTYLE LITE) test strip     Continuous Glucose Monitor EMERSON     Multiple Vitamin (MULTI VITAMIN  MENS) TABS     fish oil-omega-3 fatty acids (FISH OIL) 1000 MG capsule     aspirin 81 MG tablet     No current facility-administered medications for this encounter.          Objective:  Vitals:  /88  Pulse 78  Temp 97.3  F (36.3  C) (Tympanic)    A1C: 5.9      General:  Patient is alert and orientated.  NAD   Dermatologic: Wound dehiscence/ulceration dorsal right anterior shin has healed so there are 2 small wounds measuring 0.2 x 0.4 x 0.1 cm and 0.3 x 0.4 x 0.1 cm. No surrounding erythema, Drainage or malodor noted. No tendon infection noted.      Vascular: DP & PT pulses are intact & regular bilaterally.  Minimal edema and varicosities noted.  CFT and skin temperature is normal to both lower extremities.      Neurologic: Lower extremity sensation is intact to light touch.  No evidence of weakness or contracture in the lower extremities.  No evidence of neuropathy.      Musculoskeletal: Patient is ambulatory without assistive device or brace.  No gross ankle deformity noted.  No foot or ankle joint effusion is noted.      Assessment: A 67-year-old type I diabetic with wound dehiscence right anterior shin, ulceration right ankle with fat layer exposed, varicosities      Plan:  Wound was debrided. We'll continue fibrocall dressing changes. Recommend that he continue to wear his compression socks. He will  follow up in 1 month. This was cultured further questions or concerns. We'll continue to wear compression socks.      Procedure:  After verbal consent, per protocol lidocaine was applied to the wound. Excisional debridement was performed on ulcer.   #15 blade was used to debride ulcer down to and including subcutaneous tissue. Bleeding controlled with light pressure.   No drainage noted.  No anesthesia was used due to neuropathy. Dry dressing applied to foot.  Patient tolerated procedure well.      Celi Hdez DPM, Podiatry/Foot and Ankle Surgery

## 2018-03-14 DIAGNOSIS — E10.21 TYPE 1 DIABETES MELLITUS WITH NEPHROPATHY (H): ICD-10-CM

## 2018-03-14 DIAGNOSIS — I10 ESSENTIAL HYPERTENSION: ICD-10-CM

## 2018-03-14 NOTE — TELEPHONE ENCOUNTER
"Requested Prescriptions   Pending Prescriptions Disp Refills     simvastatin (ZOCOR) 10 MG tablet [Pharmacy Med Name: SIMVASTATIN 10MG TABS]  Last Written Prescription Date:  1/23/2017  Last Fill Quantity: 45,  # refills: 3   Last office visit: 1/11/2018 with prescribing provider:  1/11/2018   Future Office Visit:   Next 5 appointments (look out 90 days)     Mar 16, 2018  8:30 AM CDT   Return Visit with Celi Hdez DPM, Podiatry/Foot and Ankle Surgery   [facility] [place] ([facility])    [address]  [address]  [facility] [address]   [phone]                  45 tablet 3     Sig: TAKE 1/2 TABLET (5 MG) BY MOUTH AT BEDTIME    Statins Protocol Passed    3/14/2018  1:44 PM       Passed - LDL on file in past 12 months    Recent Labs   Lab Test  01/03/18   0855   LDL  65            Passed - No abnormal creatine kinase in past 12 months    No lab results found.         Passed - Recent (12 mo) or future (30 days) visit within the authorizing provider's specialty    Patient had office visit in the last 12 months or has a visit in the next 30 days with authorizing provider or within the authorizing provider's specialty.  See \"Patient Info\" tab in inbasket, or \"Choose Columns\" in Meds & Orders section of the refill encounter.           Passed - Patient is age 18 or older        lisinopril (PRINIVIL/ZESTRIL) 40 MG tablet [Pharmacy Med Name: LISINOPRIL 40MG TABS]  Last Written Prescription Date:  1/09/2018  Last Fill Quantity: 90,  # refills: 0   Last office visit: 1/11/2018 with prescribing provider:  1/11/2018   Future Office Visit:   Next 5 appointments (look out 90 days)     Mar 16, 2018  8:30 AM CDT   Return Visit with Celi Hdez DPM, Podiatry/Foot and Ankle Surgery   [facility] [place] ([facility])    [address]  [address]  [place] [address] [phone]                  90 tablet 0     Sig: TAKE ONE TABLET BY " "MOUTH EVERY DAY    ACE Inhibitors (Including Combos) Protocol Failed    3/14/2018  1:44 PM       Failed - Blood pressure under 140/90 in past 12 months    BP Readings from Last 3 Encounters:   02/09/18 146/88   01/12/18 162/81   01/11/18 140/86                Passed - Recent (12 mo) or future (30 days) visit within the authorizing provider's specialty    Patient had office visit in the last 12 months or has a visit in the next 30 days with authorizing provider or within the authorizing provider's specialty.  See \"Patient Info\" tab in inbasket, or \"Choose Columns\" in Meds & Orders section of the refill encounter.           Passed - Patient is age 18 or older       Passed - Normal serum creatinine on file in past 12 months    Recent Labs   Lab Test  01/03/18   0855   CR  0.89            Passed - Normal serum potassium on file in past 12 months    Recent Labs   Lab Test  01/03/18   0855   POTASSIUM  4.0               "

## 2018-03-15 RX ORDER — LISINOPRIL 40 MG/1
TABLET ORAL
Qty: 90 TABLET | Refills: 0 | Status: SHIPPED | OUTPATIENT
Start: 2018-03-15 | End: 2018-06-25

## 2018-03-15 RX ORDER — SIMVASTATIN 10 MG
TABLET ORAL
Qty: 45 TABLET | Refills: 3 | Status: SHIPPED | OUTPATIENT
Start: 2018-03-15 | End: 2019-01-08

## 2018-03-16 ENCOUNTER — HOSPITAL ENCOUNTER (OUTPATIENT)
Dept: WOUND CARE | Facility: CLINIC | Age: 68
Discharge: HOME | End: 2018-03-16
Attending: PODIATRIST | Admitting: PODIATRIST
Payer: COMMERCIAL

## 2018-03-16 VITALS — SYSTOLIC BLOOD PRESSURE: 167 MMHG | DIASTOLIC BLOOD PRESSURE: 89 MMHG | HEART RATE: 80 BPM | TEMPERATURE: 95.6 F

## 2018-03-16 PROCEDURE — G0463 HOSPITAL OUTPT CLINIC VISIT: HCPCS

## 2018-03-16 PROCEDURE — 99213 OFFICE O/P EST LOW 20 MIN: CPT | Performed by: PODIATRIST

## 2018-03-16 NOTE — MR AVS SNAPSHOT
MRN:[id number]                      After Visit Summary   3/16/2018    Eh Perry    MRN: [id number]           Visit Information        Provider Department      3/16/2018  8:30 AM Celi Hdez DPM, Podiatry/Foot and Ankle Surgery [facility] [place]          Further instructions from your care team             [facility] [place]  [address] [facility] [address], [facility] [address]  Appointment Phone [phone] Nurse Advisors [phone]    Eh Perry      1950    Compression: Wear when you are going to be up a lot.  You have a compression wrap Compression Socks is supposed to be removed at night and put back on first thing in the morning.   Please remove compression dressing if toes turn blue and/or tingle and can not be relieved by raising the leg for one hour.        HARMONY EganP.ONOFRE. March 16, 2018    Call us at [phone] if you have any questions about your wounds, have redness or swelling around your wound, have a fever of 101 or greater or if you have any other problems or concerns. We answer the phone Monday through Friday 8 am to 4 pm, please leave a message as we check the voicemail frequently throughout the day.     Follow up with Provider - as needed             MyChart Information     [facility] gives you secure access to your electronic health record. If you see a primary care provider, you can also send messages to your care team and make appointments. If you have questions, please call your primary care clinic.  If you do not have a primary care provider, please call [phone] and they will assist you.        Care EveryWhere ID     This is your Care EveryWhere ID. This could be used by other organizations to access your [place] medical records  [id number]        Equal Access to Services     SANDI MESSER AH: Carmen Gonzalez, deyanira dill, gibran capone  macy [facility] ah. So [facility] [phone].    ATENCIÓN: Si habla español, tiene a francois disposición servicios gratuitos de asistencia lingüística. Llame al [phone].    We comply with applicable federal civil rights laws and [place] laws. We do not discriminate on the basis of race, color, national origin, age, disability, sex, sexual orientation, or gender identity.

## 2018-03-16 NOTE — DISCHARGE INSTRUCTIONS
[facility]  [address] [facility] [address], [place] [address]  Appointment Phone [phone] Nurse Advisors [phone]    Eh Perry      1950    Compression: Wear when you are going to be up a lot.  You have a compression wrap Compression Socks is supposed to be removed at night and put back on first thing in the morning.   Please remove compression dressing if toes turn blue and/or tingle and can not be relieved by raising the leg for one hour.        BENITO Egan.P.M.. March 16, 2018    Call us at [phone] if you have any questions about your wounds, have redness or swelling around your wound, have a fever of 101 or greater or if you have any other problems or concerns. We answer the phone Monday through Friday 8 am to 4 pm, please leave a message as we check the voicemail frequently throughout the day.     Follow up with Provider - as needed

## 2018-03-16 NOTE — PROGRESS NOTES
"[facility] [place] Progress Note    Subject: Patient was seen at wound center.  He is here for follow-up on right leg ulceration from a biopsy. Denies fever, nausea, vomiting. No concerns today.    PMH:   Past Medical History:   Diagnosis Date     Anemia      CKD (chronic kidney disease) stage 3, GFR 30-59 ml/min      GERD (gastroesophageal reflux disease)      Hepatitis 2011    elevated AST due to excess ETOH use (5 drinks/day)     HTN (hypertension)      Hyperlipidemia LDL goal <100 3/7/2013     MGUS (monoclonal gammopathy of unknown significance)      IgG Lambda. Followed by Dr Rogers     Proteinuria 1/22/2015     PVC (premature ventricular contraction)      Retinopathy due to secondary diabetes (H)     s/p laser therapy left     Tobacco abuse     20 pack hx. Quit 1991     Type 1 diabetes mellitus with nephropathy (goal A1C<7) 1978       Social Hx:   Social History     Social History     Marital status:      Spouse name: N/A     Number of children: N/A     Years of education: N/A     Occupational History      Retired     anesthesiologist     Social History Main Topics     Smoking status: Former Smoker     Smokeless tobacco: Never Used     Alcohol use Yes      Comment: occ     Drug use: No     Sexual activity: Yes     Partners: Female     Other Topics Concern     Not on file     Social History Narrative       Surgical Hx:   Past Surgical History:   Procedure Laterality Date     HAND SURGERY  2007    contracture release       Allergies:    Allergies   Allergen Reactions     Atorvastatin Calcium       Elevated Ck and myalgias     Wasp Venom Protein      Wasp sting       Medications:   Current Outpatient Prescriptions   Medication     simvastatin (ZOCOR) 10 MG tablet     lisinopril (PRINIVIL/ZESTRIL) 40 MG tablet     insulin glargine (LANTUS VIAL) 100 UNIT/ML injection     blood glucose monitoring (ACCU-CHEK FASTCLIX) lancets     BD INSULIN SYRINGE ULTRAFINE 30G X 1/2\" 0.3 ML     carvedilol " (COREG) 6.25 MG tablet     Continuous Blood Gluc Sensor (DEXCOM G5 MOB/G4 PLAT SENSOR) MISC     Continuous Blood Gluc Transmit (DEXCOM G5 MOBILE TRANSMITTER) MISC     insulin lispro (HUMALOG) 100 UNIT/ML injection     omeprazole 20 MG tablet     sildenafil (REVATIO/VIAGRA) 50 MG cap/tab     magnesium 250 MG tablet     blood glucose monitoring (NO BRAND SPECIFIED) test strip     blood glucose monitoring (ACCU-CHEK ERUM PLUS) test strip     blood glucose monitoring (FREESTYLE LITE) test strip     Continuous Glucose Monitor EMERSON     Multiple Vitamin (MULTI VITAMIN  MENS) TABS     fish oil-omega-3 fatty acids (FISH OIL) 1000 MG capsule     aspirin 81 MG tablet     No current facility-administered medications for this encounter.          Objective:  Vitals:  /89  Pulse 80  Temp 95.6  F (35.3  C) (Temporal)    A1C: 5.9      General:  Patient is alert and orientated.  NAD     Dermatologic: This time the wound is healed on the right leg. No other open lesions noted.      Vascular: DP & PT pulses are intact & regular bilaterally.  Minimal edema and varicosities noted.  CFT and skin temperature is normal to both lower extremities.      Neurologic: Lower extremity sensation is intact to light touch.  No evidence of weakness or contracture in the lower extremities.  No evidence of neuropathy.      Musculoskeletal: Patient is ambulatory without assistive device or brace.  No gross ankle deformity noted.  No foot or ankle joint effusion is noted.      Assessment: A 67-year-old type I diabetic with wound dehiscence right anterior shin, ulceration right ankle with fat layer exposed, varicosities      Plan:  At this time the wound is healed. He will be discharged from the wound clinic. He will call for the carcinoid concerns. We will have him continue to wear compression socks.      Celi Hdez DPM, Podiatry/Foot and Ankle Surgery

## 2018-05-14 ENCOUNTER — TRANSFERRED RECORDS (OUTPATIENT)
Dept: HEALTH INFORMATION MANAGEMENT | Facility: CLINIC | Age: 68
End: 2018-05-14

## 2018-06-01 ENCOUNTER — MYC MEDICAL ADVICE (OUTPATIENT)
Dept: INTERNAL MEDICINE | Facility: CLINIC | Age: 68
End: 2018-06-01

## 2018-06-01 DIAGNOSIS — T63.461D ANAPHYLACTIC REACTION TO WASP STING, ACCIDENTAL OR UNINTENTIONAL, SUBSEQUENT ENCOUNTER: Primary | ICD-10-CM

## 2018-06-01 DIAGNOSIS — T78.2XXD ANAPHYLACTIC REACTION TO WASP STING, ACCIDENTAL OR UNINTENTIONAL, SUBSEQUENT ENCOUNTER: Primary | ICD-10-CM

## 2018-06-01 RX ORDER — EPINEPHRINE 0.3 MG/.3ML
0.3 INJECTION SUBCUTANEOUS PRN
Qty: 0.6 ML | Refills: 5 | Status: SHIPPED | OUTPATIENT
Start: 2018-06-01 | End: 2021-01-08

## 2018-06-01 NOTE — TELEPHONE ENCOUNTER
EpiPen      Last Written Prescription Date:  na  Last Fill Quantity: na,   # refills: na  Last Office Visit: 1/11/18  Future Office visit:       Routing refill request to provider for review/approval because:  Drug not on the [facility] or [facility] refill protocol or controlled substance  Drug not active on patient's medication list  Medication is reported/historical

## 2018-06-11 ENCOUNTER — TELEPHONE (OUTPATIENT)
Dept: ENDOCRINOLOGY | Facility: CLINIC | Age: 68
End: 2018-06-11

## 2018-06-11 DIAGNOSIS — E10.21 TYPE 1 DIABETES MELLITUS WITH NEPHROPATHY (H): Primary | ICD-10-CM

## 2018-06-11 DIAGNOSIS — E03.8 SUBCLINICAL HYPOTHYROIDISM: ICD-10-CM

## 2018-06-11 NOTE — TELEPHONE ENCOUNTER
Patient scheduled OV with Dr. Sexton for 08/23/18.  Patient is requesting labs be ordered prior to visit.  Patient also advised he has had an irregular Thyroid test come back on top of his diabetes.  Please advise.

## 2018-06-25 DIAGNOSIS — I10 ESSENTIAL HYPERTENSION: ICD-10-CM

## 2018-06-26 NOTE — TELEPHONE ENCOUNTER
"Requested Prescriptions   Pending Prescriptions Disp Refills     lisinopril (PRINIVIL/ZESTRIL) 40 MG tablet [Pharmacy Med Name: LISINOPRIL 40MG TABS] 90 tablet 0     Sig: TAKE ONE TABLET BY MOUTH EVERY DAY    ACE Inhibitors (Including Combos) Protocol Failed    6/25/2018  3:40 PM       Failed - Blood pressure under 140/90 in past 12 months    BP Readings from Last 3 Encounters:   03/16/18 167/89   02/09/18 146/88   01/12/18 162/81                Passed - Recent (12 mo) or future (30 days) visit within the authorizing provider's specialty    Patient had office visit in the last 12 months or has a visit in the next 30 days with authorizing provider or within the authorizing provider's specialty.  See \"Patient Info\" tab in inbasket, or \"Choose Columns\" in Meds & Orders section of the refill encounter.           Passed - Patient is age 18 or older       Passed - Normal serum creatinine on file in past 12 months    Recent Labs   Lab Test  01/03/18   0855   CR  0.89            Passed - Normal serum potassium on file in past 12 months    Recent Labs   Lab Test  01/03/18   0855   POTASSIUM  4.0             Last Written Prescription Date:  3/15/18  Last Fill Quantity: 90,  # refills: 0   Last office visit: 1/11/2018 with prescribing provider:  1/11/18   Future Office Visit:   Next 5 appointments (look out 90 days)     Aug 23, 2018 10:00 AM CDT   Return Visit with Kierra Sexton MD   [facility]    [address] [facility] [address]  [facility] [address] [phone]                   "

## 2018-06-26 NOTE — TELEPHONE ENCOUNTER
BP Readings from Last 3 Encounters:   03/16/18 167/89        Last blood pressure out of range per protocol .Kellie Silverman RN

## 2018-06-27 ENCOUNTER — MYC MEDICAL ADVICE (OUTPATIENT)
Dept: INTERNAL MEDICINE | Facility: CLINIC | Age: 68
End: 2018-06-27

## 2018-06-27 RX ORDER — LISINOPRIL 40 MG/1
TABLET ORAL
Qty: 90 TABLET | Refills: 0 | Status: SHIPPED | OUTPATIENT
Start: 2018-06-27 | End: 2018-07-31

## 2018-07-11 DIAGNOSIS — E10.21 TYPE 1 DIABETES MELLITUS WITH NEPHROPATHY (H): ICD-10-CM

## 2018-07-11 RX ORDER — BLOOD SUGAR DIAGNOSTIC
STRIP MISCELLANEOUS
Qty: 400 EACH | Refills: 3 | Status: SHIPPED | OUTPATIENT
Start: 2018-07-11 | End: 2019-01-08

## 2018-07-11 NOTE — TELEPHONE ENCOUNTER
Routing refill request to provider for review/approval because:  A break in medication  Patient needs to be seen because:  Due for diabetic follow up

## 2018-07-12 DIAGNOSIS — E10.21 TYPE 1 DIABETES MELLITUS WITH NEPHROPATHY (H): ICD-10-CM

## 2018-07-18 ENCOUNTER — TRANSFERRED RECORDS (OUTPATIENT)
Dept: HEALTH INFORMATION MANAGEMENT | Facility: CLINIC | Age: 68
End: 2018-07-18

## 2018-07-20 DIAGNOSIS — E10.21 TYPE 1 DIABETES MELLITUS WITH NEPHROPATHY (H): ICD-10-CM

## 2018-07-20 DIAGNOSIS — R80.9 PROTEINURIA, UNSPECIFIED TYPE: Primary | ICD-10-CM

## 2018-07-20 DIAGNOSIS — I10 ESSENTIAL HYPERTENSION: ICD-10-CM

## 2018-07-20 RX ORDER — CARVEDILOL 6.25 MG/1
6.25 TABLET ORAL 2 TIMES DAILY WITH MEALS
Qty: 180 TABLET | Refills: 0 | Status: SHIPPED | OUTPATIENT
Start: 2018-07-20 | End: 2018-10-09

## 2018-07-20 RX ORDER — INSULIN GLARGINE 100 [IU]/ML
INJECTION, SOLUTION SUBCUTANEOUS
Qty: 3 VIAL | Refills: 3 | Status: SHIPPED | OUTPATIENT
Start: 2018-07-20 | End: 2018-09-13

## 2018-07-20 NOTE — TELEPHONE ENCOUNTER
Dr. Juárez,     Spoke to pt, and 6 month f/u appt scheduled with you for 7/31. Lab appt the week before.   These labs are pending--are there any other labs you would like to order for pt?        Pt also needs a refill of his carvedilol (COREG) 6.25 MG tablet.    FYI---Pt has appt with Endo--Dr. Sexton on 8/23 for a diabetes abd thyroid check.

## 2018-07-20 NOTE — TELEPHONE ENCOUNTER
"insulin glargine (LANTUS VIAL) 100 UNIT/ML injection    Routing refill request to provider for review/approval because:  Labs out of range:  BP & HgbA1C  Patient needs to be seen because:  DUE for 6 month follow-up (LOV was 1/11/18).      Requested Prescriptions   Pending Prescriptions Disp Refills     insulin glargine (LANTUS VIAL) 100 UNIT/ML injection 3 vial 2     Sig: Inject  Subcutaneous 16 units  Before dinner    Long Acting Insulin Protocol Failed    7/20/2018  3:13 PM       Failed - Blood pressure less than 140/90 in past 6 months    BP Readings from Last 3 Encounters:   03/16/18 167/89   02/09/18 146/88   01/12/18 162/81                Failed - HgbA1C in past 3 or 6 months    If HgbA1C is 8 or greater, it needs to be on file within the past 3 months.  If less than 8, must be on file within the past 6 months.     Recent Labs   Lab Test  01/03/18   0855   A1C  5.9            Failed - Recent (6 mo) or future (30 days) visit within the authorizing provider's specialty    Patient had office visit in the last 6 months or has a visit in the next 30 days with authorizing provider or within the authorizing provider's specialty.  See \"Patient Info\" tab in inbasket, or \"Choose Columns\" in Meds & Orders section of the refill encounter.           Passed - LDL on file in past 12 months    Recent Labs   Lab Test  01/03/18   0855   LDL  65            Passed - Microalbumin on file in past 12 months    Recent Labs   Lab Test  01/11/18   1107   MICROL  327   UMALCR  308.49*            Passed - Serum creatinine on file in past 12 months    Recent Labs   Lab Test  01/03/18   0855   CR  0.89            Passed - Patient is age 18 or older          "

## 2018-07-20 NOTE — TELEPHONE ENCOUNTER
"insulin lispro (HUMALOG) 100 UNIT/ML injection    Routing refill request to provider for review/approval because:  Labs out of range:  BP & HgbA1C  Patient needs to be seen because:  DUE for 6 month follow-up (LOV was 1/11/18).      Requested Prescriptions   Pending Prescriptions Disp Refills     insulin lispro (HUMALOG) 100 UNIT/ML injection 3 vial 2     Sig: 3 units before meals and sliding scale. Max  20 units/day total    Short Acting Insulin Protocol Failed    7/20/2018  3:12 PM       Failed - Blood pressure less than 140/90 in past 6 months    BP Readings from Last 3 Encounters:   03/16/18 167/89   02/09/18 146/88   01/12/18 162/81                Failed - HgbA1C in past 3 or 6 months    If HgbA1C is 8 or greater, it needs to be on file within the past 3 months.  If less than 8, must be on file within the past 6 months.     Recent Labs   Lab Test  01/03/18   0855   A1C  5.9            Failed - Recent (6 mo) or future (30 days) visit within the authorizing provider's specialty    Patient had office visit in the last 6 months or has a visit in the next 30 days with authorizing provider or within the authorizing provider's specialty.  See \"Patient Info\" tab in inbasket, or \"Choose Columns\" in Meds & Orders section of the refill encounter.           Passed - LDL on file in past 12 months    Recent Labs   Lab Test  01/03/18   0855   LDL  65            Passed - Microalbumin on file in past 12 months    Recent Labs   Lab Test  01/11/18   1107   MICROL  327   UMALCR  308.49*            Passed - Serum creatinine on file in past 12 months    Recent Labs   Lab Test  01/03/18   0855   CR  0.89            Passed - Patient is age 18 or older          "

## 2018-07-25 DIAGNOSIS — E10.21 TYPE 1 DIABETES MELLITUS WITH NEPHROPATHY (H): ICD-10-CM

## 2018-07-25 DIAGNOSIS — I10 ESSENTIAL HYPERTENSION: ICD-10-CM

## 2018-07-25 DIAGNOSIS — R80.9 PROTEINURIA, UNSPECIFIED TYPE: ICD-10-CM

## 2018-07-25 LAB
ANION GAP SERPL CALCULATED.3IONS-SCNC: 10 MMOL/L (ref 3–14)
BUN SERPL-MCNC: 18 MG/DL (ref 7–30)
CALCIUM SERPL-MCNC: 8.4 MG/DL (ref 8.5–10.1)
CHLORIDE SERPL-SCNC: 106 MMOL/L (ref 94–109)
CO2 SERPL-SCNC: 21 MMOL/L (ref 20–32)
CREAT SERPL-MCNC: 0.94 MG/DL (ref 0.66–1.25)
GFR SERPL CREATININE-BSD FRML MDRD: 80 ML/MIN/1.7M2
GLUCOSE SERPL-MCNC: 164 MG/DL (ref 70–99)
POTASSIUM SERPL-SCNC: 5.1 MMOL/L (ref 3.4–5.3)
SODIUM SERPL-SCNC: 137 MMOL/L (ref 133–144)

## 2018-07-25 PROCEDURE — 80048 BASIC METABOLIC PNL TOTAL CA: CPT | Performed by: INTERNAL MEDICINE

## 2018-07-25 PROCEDURE — 82043 UR ALBUMIN QUANTITATIVE: CPT | Performed by: INTERNAL MEDICINE

## 2018-07-25 PROCEDURE — 36415 COLL VENOUS BLD VENIPUNCTURE: CPT | Performed by: INTERNAL MEDICINE

## 2018-07-26 LAB
CREAT UR-MCNC: 44 MG/DL
MICROALBUMIN UR-MCNC: 60 MG/L
MICROALBUMIN/CREAT UR: 135.83 MG/G CR (ref 0–17)

## 2018-07-31 ENCOUNTER — OFFICE VISIT (OUTPATIENT)
Dept: INTERNAL MEDICINE | Facility: CLINIC | Age: 68
End: 2018-07-31
Payer: COMMERCIAL

## 2018-07-31 VITALS
OXYGEN SATURATION: 98 % | BODY MASS INDEX: 24.83 KG/M2 | WEIGHT: 178 LBS | SYSTOLIC BLOOD PRESSURE: 128 MMHG | DIASTOLIC BLOOD PRESSURE: 80 MMHG | HEART RATE: 78 BPM | TEMPERATURE: 98.4 F | RESPIRATION RATE: 16 BRPM

## 2018-07-31 DIAGNOSIS — E10.21 TYPE 1 DIABETES MELLITUS WITH NEPHROPATHY (H): ICD-10-CM

## 2018-07-31 DIAGNOSIS — E03.8 SUBCLINICAL HYPOTHYROIDISM: ICD-10-CM

## 2018-07-31 DIAGNOSIS — I10 ESSENTIAL HYPERTENSION: ICD-10-CM

## 2018-07-31 LAB
HBA1C MFR BLD: 6 % (ref 0–5.6)
T4 FREE SERPL-MCNC: 0.78 NG/DL (ref 0.76–1.46)
TSH SERPL DL<=0.005 MIU/L-ACNC: 2.46 MU/L (ref 0.4–4)

## 2018-07-31 PROCEDURE — 99214 OFFICE O/P EST MOD 30 MIN: CPT | Performed by: INTERNAL MEDICINE

## 2018-07-31 PROCEDURE — 36415 COLL VENOUS BLD VENIPUNCTURE: CPT | Performed by: INTERNAL MEDICINE

## 2018-07-31 PROCEDURE — 83036 HEMOGLOBIN GLYCOSYLATED A1C: CPT | Performed by: INTERNAL MEDICINE

## 2018-07-31 PROCEDURE — 86376 MICROSOMAL ANTIBODY EACH: CPT | Performed by: INTERNAL MEDICINE

## 2018-07-31 PROCEDURE — 84443 ASSAY THYROID STIM HORMONE: CPT | Performed by: INTERNAL MEDICINE

## 2018-07-31 PROCEDURE — 84439 ASSAY OF FREE THYROXINE: CPT | Performed by: INTERNAL MEDICINE

## 2018-07-31 RX ORDER — LISINOPRIL 40 MG/1
40 TABLET ORAL DAILY
Qty: 90 TABLET | Refills: 3 | Status: SHIPPED | OUTPATIENT
Start: 2018-07-31 | End: 2019-01-08

## 2018-07-31 NOTE — PATIENT INSTRUCTIONS
Continue current meds  Labs today as ordered   Check with insurance or speak with your pharmacist re: Shingrix vaccine coverage for shingles prevention.  This is a 2 shot series done 2-6 months apart   Eye exam this Fall   Flu shot this Fall

## 2018-07-31 NOTE — MR AVS SNAPSHOT
After Visit Summary   7/31/2018    Eh Perry    MRN: [id number]           Patient Information     Date Of Birth          1950        Visit Information        Provider Department      7/31/2018 10:00 AM Hernan Juárez MD [facility]        Today's Diagnoses     Essential hypertension        Type 1 diabetes mellitus with nephropathy (goal A1C<7)        Subclinical hypothyroidism          Care Instructions    Continue current meds  Labs today as ordered   Check with insurance or speak with your pharmacist re: Shingrix vaccine coverage for shingles prevention.  This is a 2 shot series done 2-6 months apart   Eye exam this Fall   Flu shot this Fall          Follow-ups after your visit        Your next 10 appointments already scheduled     Aug 23, 2018 10:00 AM CDT   [facility]    [address] [facility] [address]  [facility] [id number] [phone]              Who to contact     If you have questions or need follow up information about today's clinic visit or your schedule please contact [facility] directly at [phone].  Normal or non-critical lab and imaging results will be communicated to you by MyChart, letter or phone within 4 business days after the clinic has received the results. If you do not hear from us within 7 days, please contact the clinic through [employer] or phone. If you have a critical or abnormal lab result, we will notify you by phone as soon as possible.  Submit refill requests through [employer] or call your pharmacy and they will forward the refill request to us. Please allow 3 business days for your refill to be completed.          Additional Information About Your Visit        MyChart Information     [employer] gives you secure access to your electronic health record. If you see a primary care provider, you can also send messages to your  care team and make appointments. If you have questions, please call your primary care clinic.  If you do not have a primary care provider, please call [phone] and they will assist you.        Care EveryWhere ID     This is your Care EveryWhere ID. This could be used by other organizations to access your [place] medical records  [id number]        Your Vitals Were     Pulse Temperature Respirations Pulse Oximetry BMI (Body Mass Index)       78 98.4  F (36.9  C) (Oral) 16 98% 24.83 kg/m2        Blood Pressure from Last 3 Encounters:   07/31/18 128/80   03/16/18 167/89   02/09/18 146/88    Weight from Last 3 Encounters:   07/31/18 178 lb (80.7 kg)   01/11/18 179 lb (81.2 kg)   01/23/17 179 lb (81.2 kg)              We Performed the Following     Hemoglobin A1c     T4 free     Thyroid peroxidase antibody     TSH          Today's Medication Changes          These changes are accurate as of 7/31/18 10:28 AM.  If you have any questions, ask your nurse or doctor.               These medicines have changed or have updated prescriptions.        Dose/Directions    Continuous Glucose Monitor Betsy   This may have changed:  additional instructions   Used for:  Type 1 diabetes, HbA1c goal < 7% (H)        FreeStyle Lite Glucose Monitor Device- Check blood sugars 6-8 times daily as directed   Quantity:  1 each   Refills:  0                Primary Care Provider Office Phone # Fax #    Hernan Juárez [phone]       [address] [facility] [address]        Equal Access to Services     SANDI MESSER : Hadii annie babino Soklarissa, waaxda luqadaha, qaybta kaalmada gibran martinez. So [facility] [phone].    ATENCIÓN: Si habla español, tiene a francois disposición servicios gratuitos de asistencia lingüística. Llame al [phone].    We comply with applicable federal civil rights laws and [place] laws. We do not discriminate on the basis of race, color, national origin, age,  "disability, sex, sexual orientation, or gender identity.            Thank you!     Thank you for choosing [facility]  for your care. Our goal is always to provide you with excellent care. Hearing back from our patients is one way we can continue to improve our services. Please take a few minutes to complete the written survey that you may receive in the mail after your visit with us. Thank you!             Your Updated Medication List - Protect others around you: Learn how to safely use, store and throw away your medicines at www.disposemymeds.org.          This list is accurate as of 7/31/18 10:28 AM.  Always use your most recent med list.                   Brand Name Dispense Instructions for use Diagnosis    aspirin 81 MG tablet      Take 1 tablet by mouth daily.    Plantar fascial fibromatosis, Pain in limb, Type II or unspecified type diabetes mellitus without mention of complication, not stated as uncontrolled       BD insulin syringe ultrafine 30G X 1/2\" 0.3 ML   Generic drug:  insulin syringe-needle U-100     400 each    USE APPROXIMATELY 4-6 TIMES A DAY    Type 1 diabetes mellitus with nephropathy (H)       blood glucose monitoring lancets     306 each    USE TO TEST 4 TIMES DAILY    Type 1 diabetes mellitus with nephropathy (H)       * blood glucose monitoring test strip    FREESTYLE LITE    750 each    Use to test blood sugars 6-8 daily or as directed.    Type 1 diabetes, HbA1c goal < 7% (H)       * blood glucose monitoring test strip    ACCU-CHEK ERUM PLUS    400 each    Test 4 times a day        * ACCU-CHEK ERUM PLUS test strip   Generic drug:  blood glucose monitoring     400 each    USE TO TEST BLOOD SUGARS 4 TIMES DAILY OR AS DIRECTED    Type 1 diabetes mellitus with nephropathy (H)       carvedilol 6.25 MG tablet    COREG    180 tablet    Take 1 tablet (6.25 mg) by mouth 2 times daily (with meals)    Essential hypertension       Continuous Glucose Monitor Betsy     1 each "    FreeStyle Lite Glucose Monitor Device- Check blood sugars 6-8 times daily as directed    Type 1 diabetes, HbA1c goal < 7% (H)       DEXCOM G5 MOB/G4 PLAT SENSOR Misc     12 each    CHANGE EVERY 7 DAYS    Type 1 diabetes mellitus with nephropathy (H)       DEXCOM G5 MOBILE TRANSMITTER Misc     1 each    CHANGE EVERY 3 MONTHS    Type 1 diabetes mellitus with nephropathy (H)       EPINEPHrine 0.3 MG/0.3ML injection 2-pack    EPIPEN/ADRENACLICK/or ANY BX GENERIC EQUIV    0.6 mL    Inject 0.3 mLs (0.3 mg) into the muscle as needed for anaphylaxis    Anaphylactic reaction to wasp sting, accidental or unintentional, subsequent encounter       fish oil-omega-3 fatty acids 1000 MG capsule      Take 1-2 g by mouth daily    Plantar fascial fibromatosis, Pain in limb, Type II or unspecified type diabetes mellitus without mention of complication, not stated as uncontrolled       insulin glargine 100 UNIT/ML injection    LANTUS VIAL    3 vial    Inject  Subcutaneous 16 units  Before dinner    Type 1 diabetes mellitus with nephropathy (H)       insulin lispro 100 UNIT/ML injection    humaLOG    3 vial    3 units before meals and sliding scale. Max  20 units/day total    Type 1 diabetes mellitus with nephropathy (H)       lisinopril 40 MG tablet    PRINIVIL/ZESTRIL    90 tablet    TAKE ONE TABLET BY MOUTH EVERY DAY    Essential hypertension       magnesium 250 MG tablet      Take 1 tablet by mouth daily        MULTI vitamin  MENS Tabs     30 tablet    Take  by mouth.    Type 1 diabetes, HbA1c goal < 7% (H)       omeprazole 20 MG tablet     90 tablet    1 tab daily    Gastroesophageal reflux disease, esophagitis presence not specified       sildenafil 50 MG tablet    VIAGRA    30 tablet    Take 0.5-1 tablets (25-50 mg) by mouth daily as needed for erectile dysfunction Take 30 min to 4 hours before intercourse.  Never use with nitroglycerin, terazosin or doxazosin.    Erectile dysfunction, unspecified erectile dysfunction type        simvastatin 10 MG tablet    ZOCOR    45 tablet    TAKE 1/2 TABLET (5 MG) BY MOUTH AT BEDTIME    Type 1 diabetes mellitus with nephropathy (H)       * Notice:  This list has 3 medication(s) that are the same as other medications prescribed for you. Read the directions carefully, and ask your doctor or other care provider to review them with you.

## 2018-07-31 NOTE — PROGRESS NOTES
SUBJECTIVE:   Eh Perry is a 68 year old male who presents to clinic today for the following health issues:    Chief Complaint   Patient presents with     Hypertension     Diabetes     Hyperlipidemia       Diabetes Follow-up    Patient is checking blood sugars: twice daily.    Blood sugar testing frequency justification: Uncontrolled diabetes  Results are as follows:         am - Varies          bedtime - Varies    Diabetic concerns: None     Symptoms of hypoglycemia (low blood sugar): none     Paresthesias (numbness or burning in feet) or sores: No     Date of last diabetic eye exam: 11/09/2017    Diabetes Management Resources    Hyperlipidemia Follow-Up      Rate your low fat/cholesterol diet?: not monitoring fat    Taking statin?  Yes, no muscle aches from statin    Other lipid medications/supplements?:  none    Hypertension Follow-up      Outpatient blood pressures are being checked at home.  Results are 130/70.    Low Salt Diet: low salt    BP Readings from Last 2 Encounters:   03/16/18 167/89   02/09/18 146/88     Hemoglobin A1C (%)   Date Value   01/03/2018 5.9   05/24/2017 5.9     LDL Cholesterol Calculated (mg/dL)   Date Value   01/03/2018 65   01/19/2017 74       Amount of exercise or physical activity: 6-7 days/week for an average of greater than 60 minutes    Problems taking medications regularly: No    Medication side effects: none    Diet: regular (no restrictions) and low salt      Pt's past medical history, family history, habits, medications and allergies were reviewed with the patient today.  See snap shot for  HCM status. Most recent lab results reviewed with pt. Problem list and histories reviewed & adjusted, as indicated.  Additional history as below:    Denies CP, SOB, abdominal pain, polyuria, polydipsia, vision changes, extremity numbness/parasthesias or skin problems.  Using Dexcom CGM.  Blood sugars generally between  throughout the day.  Tends to rise to be higher and between  "9 and 11 PM    Lab Results   Component Value Date     07/25/2018     Lab Results   Component Value Date    A1C 5.9 01/03/2018     Lab Results   Component Value Date    CHOL 163 01/03/2018     Lab Results   Component Value Date    LDL 65 01/03/2018     Lab Results   Component Value Date    HDL 82 01/03/2018     Lab Results   Component Value Date    TRIG 80 01/03/2018     Lab Results   Component Value Date    CR 0.94 07/25/2018     Lab Results   Component Value Date    ALT 50 01/03/2018     Lab Results   Component Value Date    AST 40 01/03/2018     Lab Results   Component Value Date    MICROL 60 07/25/2018     Lab Results   Component Value Date    TSH 5.47 01/03/2018          Additional ROS:   Constitutional, HEENT, Cardiovascular, Pulmonary, GI and , Neuro, MSK and Psych review of systems/symptoms are otherwise negative or unchanged from previous, except as noted above.      OBJECTIVE:  /80  Pulse 78  Temp 98.4  F (36.9  C) (Oral)  Resp 16  Wt 178 lb (80.7 kg)  SpO2 98%  BMI 24.83 kg/m2   Estimated body mass index is 24.83 kg/(m^2) as calculated from the following:    Height as of 1/11/18: 5' 11\" (1.803 m).    Weight as of this encounter: 178 lb (80.7 kg).    Neck: no adenopathy. Thyroid normal to palpation. No bruits  Pulm: Lungs clear to auscultation   CV: Regular rates and rhythm  GI: Soft, nontender, Normal active bowel sounds, No hepatosplenomegaly or masses palpable  Ext: Peripheral pulses intact. No edema.  Normal diabetic foot exam.  No calluses  Neuro: Normal strength and tone, sensory exam grossly normal distal BLE    Assessment/Plan: (See plan discussion below for further details)  1. Type 1 diabetes mellitus with nephropathy (goal A1C<7)  Previously well controlled.  Blood sugars per glucometer at goal.  Check A1c  - Hemoglobin A1c    2. Essential hypertension  Controlled.  Continue current medication  - lisinopril (PRINIVIL/ZESTRIL) 40 MG tablet; Take 1 tablet (40 mg) by mouth daily "  Dispense: 90 tablet; Refill: 3    3. Subclinical hypothyroidism  Previous lab as above.  Weight stable more recently.  Good energy.  Lab was ordered  - T4 free  - TSH  - Thyroid peroxidase antibody    Plan discussion:  Continue current meds  Labs today as ordered   Check with insurance or speak with your pharmacist re: Shingrix vaccine coverage for shingles prevention.  This is a 2 shot series done 2-6 months apart   Eye exam this Fall   Flu shot this Fall    Hernan Juárez MD  Internal Medicine Department  [facility]

## 2018-08-01 LAB — THYROPEROXIDASE AB SERPL-ACNC: <10 IU/ML

## 2018-08-03 ENCOUNTER — MYC MEDICAL ADVICE (OUTPATIENT)
Dept: INTERNAL MEDICINE | Facility: CLINIC | Age: 68
End: 2018-08-03

## 2018-08-03 DIAGNOSIS — E10.21 TYPE 1 DIABETES MELLITUS WITH NEPHROPATHY (H): Primary | ICD-10-CM

## 2018-08-23 ENCOUNTER — OFFICE VISIT (OUTPATIENT)
Dept: ENDOCRINOLOGY | Facility: CLINIC | Age: 68
End: 2018-08-23
Payer: COMMERCIAL

## 2018-08-23 VITALS
OXYGEN SATURATION: 98 % | TEMPERATURE: 98.2 F | HEIGHT: 71 IN | DIASTOLIC BLOOD PRESSURE: 80 MMHG | BODY MASS INDEX: 24.78 KG/M2 | RESPIRATION RATE: 16 BRPM | SYSTOLIC BLOOD PRESSURE: 154 MMHG | HEART RATE: 83 BPM | WEIGHT: 177 LBS

## 2018-08-23 DIAGNOSIS — E78.5 HYPERLIPIDEMIA LDL GOAL <100: ICD-10-CM

## 2018-08-23 DIAGNOSIS — E10.21 TYPE 1 DIABETES MELLITUS WITH NEPHROPATHY (H): Primary | ICD-10-CM

## 2018-08-23 DIAGNOSIS — E03.8 SUBCLINICAL HYPOTHYROIDISM: ICD-10-CM

## 2018-08-23 PROCEDURE — 99214 OFFICE O/P EST MOD 30 MIN: CPT | Performed by: INTERNAL MEDICINE

## 2018-08-23 NOTE — MR AVS SNAPSHOT
After Visit Summary   2018    Eh Perry    MRN: [id number]           Patient Information     Date Of Birth          1950        Visit Information        Provider Department      2018 10:00 AM Kierra Sexton MD [facility]        Today's Diagnoses     Type 1 diabetes mellitus with nephropathy (goal A1C<7)    -  1    Hyperlipidemia LDL goal <100        Subclinical hypothyroidism          Care Instructions    [facility] [place] locations   Dr Sexton, Endocrinology Department      [facility]   [address] [facility] [address]  Appointment [phone]  Fax: [phone]   Monday and Tuesday         [place] [address] [place] [address]  Appointment [phone]  Fax: [phone]  Wednesday and Thursday           Continue current regimen for Diabetes.  Labs in 3 months  Follow up in 6 months  Let me know if you want to change to insulin pens.    Recommend checking blood sugars before meals and at bedtime.    If Blood glucose are low more often-> 2-3 times/week- give us a call.  The patient is advised to Make better food choices: reduce carbs, Reduce portion size, weight loss and exercise 3-4 times a week.  Discussed hypoglycemia signs and symptoms as well as management in detail.                Follow-ups after your visit        Future tests that were ordered for you today     Open Future Orders        Priority Expected Expires Ordered    Hemoglobin A1c ASAP 10/22/2018 2019 2018            Who to contact     If you have questions or need follow up information about today's clinic visit or your schedule please contact [facility] directly at [phone].  Normal or non-critical lab and imaging results will be communicated to you by MyChart, letter or phone within 4 business days after the clinic has received the results.  "If you do not hear from us within 7 days, please contact the clinic through [employer] or phone. If you have a critical or abnormal lab result, we will notify you by phone as soon as possible.  Submit refill requests through [employer] or call your pharmacy and they will forward the refill request to us. Please allow 3 business days for your refill to be completed.          Additional Information About Your Visit        [employer] Information     [employer] gives you secure access to your electronic health record. If you see a primary care provider, you can also send messages to your care team and make appointments. If you have questions, please call your primary care clinic.  If you do not have a primary care provider, please call [phone] and they will assist you.        Care EveryWhere ID     This is your Care EveryWhere ID. This could be used by other organizations to access your [place] medical records  [id number]        Your Vitals Were     Pulse Temperature Respirations Height Pulse Oximetry BMI (Body Mass Index)    83 98.2  F (36.8  C) (Oral) 16 1.803 m (5' 11\") 98% 24.69 kg/m2       Blood Pressure from Last 3 Encounters:   08/23/18 154/80   07/31/18 128/80   03/16/18 167/89    Weight from Last 3 Encounters:   08/23/18 80.3 kg (177 lb)   07/31/18 80.7 kg (178 lb)   01/11/18 81.2 kg (179 lb)                 Today's Medication Changes          These changes are accurate as of 8/23/18 10:28 AM.  If you have any questions, ask your nurse or doctor.               These medicines have changed or have updated prescriptions.        Dose/Directions    Continuous Glucose Monitor Betsy   This may have changed:  additional instructions   Used for:  Type 1 diabetes, HbA1c goal < 7% (H)        FreeStyle Lite Glucose Monitor Device- Check blood sugars 6-8 times daily as directed   Quantity:  1 each   Refills:  0                Primary Care Provider Office Phone # Fax #    Hernan Juárez [phone]       [address] [facility] [address]        Equal Access to Services     SANDI MESSER : Hadii annie hawkins viv Soklarissa, waaxda luqadaha, qaybta kamelissa corineanalisaarleth, waxlety duke benitojose fsean cordova. So [facility] [place] [phone].    ATENCIÓN: Si habla español, tiene a francois disposición servicios gratuitos de asistencia lingüística. Llame al [phone].    We comply with applicable federal civil rights laws and [place] laws. We do not discriminate on the basis of race, color, national origin, age, disability, sex, sexual orientation, or gender identity.            Thank you!     Thank you for choosing [facility]  for your care. Our goal is always to provide you with excellent care. Hearing back from our patients is one way we can continue to improve our services. Please take a few minutes to complete the written survey that you may receive in the mail after your visit with us. Thank you!             Your Updated Medication List - Protect others around you: Learn how to safely use, store and throw away your medicines at www.disposemymeds.org.          This list is accurate as of 8/23/18 10:28 AM.  Always use your most recent med list.                   Brand Name Dispense Instructions for use Diagnosis    aspirin 81 MG tablet      Take 1 tablet by mouth daily.    Plantar fascial fibromatosis, Pain in limb, Type II or unspecified type diabetes mellitus without mention of complication, not stated as uncontrolled       BD insulin syringe ultrafine 30G X 1/2\" 0.3 ML   Generic drug:  insulin syringe-needle U-100     400 each    USE APPROXIMATELY 4-6 TIMES A DAY    Type 1 diabetes mellitus with nephropathy (H)       blood glucose monitoring lancets     306 each    USE TO TEST 4 TIMES DAILY    Type 1 diabetes mellitus with nephropathy (H)       * blood glucose monitoring test strip    FREESTYLE LITE    750 each    Use to test blood sugars 6-8 daily or as directed.    Type 1 diabetes, HbA1c goal < 7% (H)       * " blood glucose monitoring test strip    ACCU-CHEK ERUM PLUS    400 each    Test 4 times a day        * ACCU-CHEK ERUM PLUS test strip   Generic drug:  blood glucose monitoring     400 each    USE TO TEST BLOOD SUGARS 4 TIMES DAILY OR AS DIRECTED    Type 1 diabetes mellitus with nephropathy (H)       carvedilol 6.25 MG tablet    COREG    180 tablet    Take 1 tablet (6.25 mg) by mouth 2 times daily (with meals)    Essential hypertension       Continuous Glucose Monitor Betsy     1 each    FreeStyle Lite Glucose Monitor Device- Check blood sugars 6-8 times daily as directed    Type 1 diabetes, HbA1c goal < 7% (H)       DEXCOM G5 MOB/G4 PLAT SENSOR Misc     12 each    CHANGE EVERY 7 DAYS    Type 1 diabetes mellitus with nephropathy (H)       DEXCOM G5 MOBILE TRANSMITTER Misc     1 each    CHANGE EVERY 3 MONTHS    Type 1 diabetes mellitus with nephropathy (H)       EPINEPHrine 0.3 MG/0.3ML injection 2-pack    EPIPEN/ADRENACLICK/or ANY BX GENERIC EQUIV    0.6 mL    Inject 0.3 mLs (0.3 mg) into the muscle as needed for anaphylaxis    Anaphylactic reaction to wasp sting, accidental or unintentional, subsequent encounter       fish oil-omega-3 fatty acids 1000 MG capsule      Take 1-2 g by mouth daily    Plantar fascial fibromatosis, Pain in limb, Type II or unspecified type diabetes mellitus without mention of complication, not stated as uncontrolled       insulin glargine 100 UNIT/ML injection    LANTUS VIAL    3 vial    Inject  Subcutaneous 16 units  Before dinner    Type 1 diabetes mellitus with nephropathy (H)       insulin lispro 100 UNIT/ML injection    humaLOG    3 vial    3 units before meals and sliding scale. Max  20 units/day total    Type 1 diabetes mellitus with nephropathy (H)       lisinopril 40 MG tablet    PRINIVIL/ZESTRIL    90 tablet    Take 1 tablet (40 mg) by mouth daily    Essential hypertension       magnesium 250 MG tablet      Take 1 tablet by mouth daily        MULTI vitamin  MENS Tabs     30  tablet    Take  by mouth.    Type 1 diabetes, HbA1c goal < 7% (H)       omeprazole 20 MG tablet     90 tablet    1 tab daily    Gastroesophageal reflux disease, esophagitis presence not specified       sildenafil 50 MG tablet    VIAGRA    30 tablet    Take 0.5-1 tablets (25-50 mg) by mouth daily as needed for erectile dysfunction Take 30 min to 4 hours before intercourse.  Never use with nitroglycerin, terazosin or doxazosin.    Erectile dysfunction, unspecified erectile dysfunction type       simvastatin 10 MG tablet    ZOCOR    45 tablet    TAKE 1/2 TABLET (5 MG) BY MOUTH AT BEDTIME    Type 1 diabetes mellitus with nephropathy (H)       * Notice:  This list has 3 medication(s) that are the same as other medications prescribed for you. Read the directions carefully, and ask your doctor or other care provider to review them with you.

## 2018-08-23 NOTE — PATIENT INSTRUCTIONS
[facility]   Dr Sexton, Endocrinology Department      [facility]   [address] [address]  Appointment [phone]  Fax: [phone]   Monday and Tuesday         [facility]   [address] [place] [address]  Appointment [phone]  Fax: [phone]  Wednesday and Thursday           Continue current regimen for Diabetes.  Labs in 3 months  Follow up in 6 months  Let me know if you want to change to insulin pens.    Recommend checking blood sugars before meals and at bedtime.    If Blood glucose are low more often-> 2-3 times/week- give us a call.  The patient is advised to Make better food choices: reduce carbs, Reduce portion size, weight loss and exercise 3-4 times a week.  Discussed hypoglycemia signs and symptoms as well as management in detail.

## 2018-08-23 NOTE — PROGRESS NOTES
Endocrinology Clinic Note:  Name: Eh Perry  Seen for Diabetes. Last visit 2016.  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  Is using vials of insulin and Dexcom.     1. Type 1 DM:  Orginally diagnosed at the age of: 24  Hospitalization for DKA: no  Current Regimen: Lantus 16 units at night and Humalog three units with each meal plus correction scale+ 2 Untis before bedtime if BG >200 (For blood sugar 120-160 take an additional one unit, about 200 takes no more units and for bowel 220 takes three more units).  About 15-20 Untis/day    BS checks: 3-4 times a day  Average Meter Download: per CGM average BG is 154 but is does not co-relate with current A1c 6.0%  Symptoms of hypoglycemia (low blood sugar): Get symptoms of hypoglycemia  Episodes of hypoglycemia: No major episodes of hypoglycemia noted/reported.   Fixed meal pattern: Yes.  Has three meals and sometimes snacks at bedtime if her blood sugar is below 80.  Patient counting carbs: No  Using PUMP: No.  He was on pump before but did not like it.  Does not want to go back on pump.     DM Complications:   Nephropathy: No  Retinopathy: Present.  Followed by ophthalmology.  Proliferative diabetic retinopathy status post laser treatment ×2.  Last visit treatment was in 2010.  Neuropathy: No  Microalbuminuria: Present, on lisinopril 20 mg  CAD/PAD: No  Gastroparesis: No  Hypoglycemia unawareness: No     2. Hypertension: Blood Pressure today:   BP Readings from Last 3 Encounters:   08/23/18 154/80   07/31/18 128/80   03/16/18 167/89     Blood pressure medications include lisinopril 20 mg      3. Hyperlipidemia:  Takes simvastatin 5 mg    PMH/PSH:  Past Medical History:   Diagnosis Date     Anemia      CKD (chronic kidney disease) stage 3, GFR 30-59 ml/min      GERD (gastroesophageal reflux disease)      Hepatitis 2011    elevated AST due to excess ETOH use (5 drinks/day)     HTN (hypertension)       Hyperlipidemia LDL goal <100 3/7/2013     MGUS (monoclonal gammopathy of unknown significance)      IgG Lambda. Followed by Dr Rogers     Proteinuria 1/22/2015     PVC (premature ventricular contraction)      Retinopathy due to secondary diabetes (H)     s/p laser therapy left     Subclinical hypothyroidism 6/11/2018     Tobacco abuse     20 pack hx. Quit 1991     Type 1 diabetes mellitus with nephropathy (goal A1C<7) 1978     Past Surgical History:   Procedure Laterality Date     HAND SURGERY  2007    contracture release     Family Hx:  Family History   Problem Relation Age of Onset     Diabetes Sister      Diabetes Paternal Grandmother      Diabetes Father      Cerebrovascular Disease Maternal Grandmother      Cancer Mother      peritoneal carcinoma           DM2:  Sister with gestational diabetes and now type 2 diabetes.  On insulin           Social Hx:  Social History     Social History     Marital status:      Spouse name: N/A     Number of children: N/A     Years of education: N/A     Occupational History      Retired     anesthesiologist     Social History Main Topics     Smoking status: Former Smoker     Smokeless tobacco: Never Used     Alcohol use Yes      Comment: occ     Drug use: No     Sexual activity: Yes     Partners: Female     Other Topics Concern     Not on file     Social History Narrative          MEDICATIONS:  has a current medication list which includes the following prescription(s): accu-chek ulysses plus, aspirin, bd insulin syringe ultrafine, blood glucose monitoring, blood glucose monitoring, blood glucose monitoring, carvedilol, dexcom g5 mob/g4 plat sensor, dexcom g5 mobile transmitter, continuous glucose monitor, epinephrine, fish oil-omega-3 fatty acids, insulin glargine, insulin lispro, lisinopril, magnesium, multi vitamin  mens, omeprazole, sildenafil, and simvastatin.    ROS     ROS: 10 point ROS neg other than the symptoms noted above in the HPI.    Physical Exam   VS:  "/80 (BP Location: Right arm, Patient Position: Sitting, Cuff Size: Adult Large)  Pulse 83  Temp 98.2  F (36.8  C) (Oral)  Resp 16  Ht 1.803 m (5' 11\")  Wt 80.3 kg (177 lb)  SpO2 98%  BMI 24.69 kg/m2  GENERAL: AXOX3, NAD, well dressed, answering questions appropriately, appears stated age.  HEENT: No exopthalmous, no proptosis, EOMI, no lig lag, no retraction  NECK: Thyroid normal in size, non tender, no nodules were palpated.  CV: RRR  LUNGS: CTAB  ABDOMEN: +BS  NEUROLOGY: CN grossly intact, no tremors  PSYCH: normal affect and mood    LABS:  A1c:  Lab Results   Component Value Date    A1C 6.0 07/31/2018    A1C 5.9 01/03/2018    A1C 5.9 05/24/2017    A1C 6.2 01/19/2017    A1C 6.2 10/25/2016       BMP:  Last Basic Metabolic Panel:  NA      137   1/7/2016   POTASSIUM      4.6   1/7/2016  CHLORIDE      104   1/7/2016  MARIE      8.8   1/7/2016  CO2       27   1/7/2016  BUN       16   1/7/2016  BUN       27   4/16/2012  CR     0.97   1/7/2016  GLC      210   1/7/2016  GLC      204   4/16/2012      Urine Micro:  Lab Results   Component Value Date    UMALCR 135.83 07/25/2018      LFTs/Lipids:  Recent Labs   Lab Test  01/03/18   0855  01/19/17   0804   01/16/15   0832  01/20/14   0925   CHOL  163  159   < >  156  163   HDL  82  65   < >  82  60   LDL  65  74   < >  61  88   TRIG  80  100   < >  64  72   CHOLHDLRATIO   --    --    --   1.9  2.7    < > = values in this interval not displayed.     TFTs:  ENDO THYROID LABS-[facility] Latest Ref Rng 6/25/2015 7/15/2014   TSH 0.40 - 4.00 mU/L 2.38 2.17       Blood Glucose and pump data/ Meter reviewed.  CGM data reviewed.    All pertinent notes, labs, and images personally reviewed by me.     A/P  Mr.David RICHAR Perry is a 66 year old here for the evaluation/management of diabetes:    1. DM1 - Under Good control.  A1c 6.0%.  Diabetes complicated by proliferative diabetic retinopathy.  In general BG are optimal. BG in 150-180 range overnight. Using dexcom.  Continue current " regimen  Continue Lantus 16units at night  Continue with Humalog at current dose with three units with each meal plus correction scale+ 2 units hs if BG >200  Consider metformin in next visit  Discussed insulin pens- he will think about it and will let us know.  Labs in 3 months  F/u in 6 months    Recommend checking blood sugars before meals and at bedtime.    If Blood glucose are low more often-> 2-3 times/week- give us a call.  The patient is advised to Make better food choices: reduce carbs, Reduce portion size, weight loss and exercise 3-4 times a week.  Discussed hypoglycemia signs and symptoms as well as management in detail.        2. Hypertension - Under Poor  control.  Continue careg and lisinopril 40 mg.   Patient to follow up with Primary Care provider regarding elevated blood pressure.    3. Hyperlipidemia - Under Good control. Takes simvastatin 5 mg.  LDL 76, HDL 84  4. Prevention  Flu Shot- NA  Pneumovax- 2011  Opthalmology-May 2016  ASA-81 mg  Smoking- no    5.  Microalbuminuria:  Lab Results   Component Value Date    UMALCR 135.83 07/25/2018    Continue lisinopril 40 mg.      Labs ordered today:   Orders Placed This Encounter   Procedures     Hemoglobin A1c     Follow-up:  follow up in 6 month(s)    Kierra Sexton M.D  Endocrinology  [facility]/[place]  CC: Hernan Juárez    More than 50% of face to face time spent with Mr. Perry on counseling / coordinating his care.  25 min.  All questions were answered.  The patient indicates understanding of the above issues and agrees with the plan set forth.     Disclaimer: This note consists of symbols derived from keyboarding, dictation and/or voice recognition software. As a result, there may be errors in the script that have gone undetected. Please consider this when interpreting information found in this chart.

## 2018-08-23 NOTE — LETTER
8/23/2018         RE: Eh Perry  [address]  [facility] [address]        Dear Colleague,    Thank you for referring your patient, Eh Perry, to the [facility]. Please see a copy of my visit note below.    Endocrinology Clinic Note:  Name: Eh Perry  Seen for Diabetes. Last visit 2016.  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  Is using vials of insulin and Dexcom.     1. Type 1 DM:  Orginally diagnosed at the age of: 24  Hospitalization for DKA: no  Current Regimen: Lantus 16 units at night and Humalog three units with each meal plus correction scale+ 2 Untis before bedtime if BG >200 (For blood sugar 120-160 take an additional one unit, about 200 takes no more units and for bowel 220 takes three more units).  About 15-20 Untis/day    BS checks: 3-4 times a day  Average Meter Download: per CGM average BG is 154 but is does not co-relate with current A1c 6.0%  Symptoms of hypoglycemia (low blood sugar): Get symptoms of hypoglycemia  Episodes of hypoglycemia: No major episodes of hypoglycemia noted/reported.   Fixed meal pattern: Yes.  Has three meals and sometimes snacks at bedtime if her blood sugar is below 80.  Patient counting carbs: No  Using PUMP: No.  He was on pump before but did not like it.  Does not want to go back on pump.     DM Complications:   Nephropathy: No  Retinopathy: Present.  Followed by ophthalmology.  Proliferative diabetic retinopathy status post laser treatment ×2.  Last visit treatment was in 2010.  Neuropathy: No  Microalbuminuria: Present, on lisinopril 20 mg  CAD/PAD: No  Gastroparesis: No  Hypoglycemia unawareness: No     2. Hypertension: Blood Pressure today:   BP Readings from Last 3 Encounters:   08/23/18 154/80   07/31/18 128/80   03/16/18 167/89     Blood pressure medications include lisinopril 20 mg      3. Hyperlipidemia:  Takes simvastatin 5 mg    PMH/PSH:  Past  Medical History:   Diagnosis Date     Anemia      CKD (chronic kidney disease) stage 3, GFR 30-59 ml/min      GERD (gastroesophageal reflux disease)      Hepatitis 2011    elevated AST due to excess ETOH use (5 drinks/day)     HTN (hypertension)      Hyperlipidemia LDL goal <100 3/7/2013     MGUS (monoclonal gammopathy of unknown significance)      IgG Lambda. Followed by Dr Rogers     Proteinuria 1/22/2015     PVC (premature ventricular contraction)      Retinopathy due to secondary diabetes (H)     s/p laser therapy left     Subclinical hypothyroidism 6/11/2018     Tobacco abuse     20 pack hx. Quit 1991     Type 1 diabetes mellitus with nephropathy (goal A1C<7) 1978     Past Surgical History:   Procedure Laterality Date     HAND SURGERY  2007    contracture release     Family Hx:  Family History   Problem Relation Age of Onset     Diabetes Sister      Diabetes Paternal Grandmother      Diabetes Father      Cerebrovascular Disease Maternal Grandmother      Cancer Mother      peritoneal carcinoma           DM2:  Sister with gestational diabetes and now type 2 diabetes.  On insulin           Social Hx:  Social History     Social History     Marital status:      Spouse name: N/A     Number of children: N/A     Years of education: N/A     Occupational History      Retired     anesthesiologist     Social History Main Topics     Smoking status: Former Smoker     Smokeless tobacco: Never Used     Alcohol use Yes      Comment: occ     Drug use: No     Sexual activity: Yes     Partners: Female     Other Topics Concern     Not on file     Social History Narrative          MEDICATIONS:  has a current medication list which includes the following prescription(s): accu-chek ulysses plus, aspirin, bd insulin syringe ultrafine, blood glucose monitoring, blood glucose monitoring, blood glucose monitoring, carvedilol, dexcom g5 mob/g4 plat sensor, dexcom g5 mobile transmitter, continuous glucose monitor, epinephrine, fish  "oil-omega-3 fatty acids, insulin glargine, insulin lispro, lisinopril, magnesium, multi vitamin  mens, omeprazole, sildenafil, and simvastatin.    ROS     ROS: 10 point ROS neg other than the symptoms noted above in the HPI.    Physical Exam   VS: /80 (BP Location: Right arm, Patient Position: Sitting, Cuff Size: Adult Large)  Pulse 83  Temp 98.2  F (36.8  C) (Oral)  Resp 16  Ht 1.803 m (5' 11\")  Wt 80.3 kg (177 lb)  SpO2 98%  BMI 24.69 kg/m2  GENERAL: AXOX3, NAD, well dressed, answering questions appropriately, appears stated age.  HEENT: No exopthalmous, no proptosis, EOMI, no lig lag, no retraction  NECK: Thyroid normal in size, non tender, no nodules were palpated.  CV: RRR  LUNGS: CTAB  ABDOMEN: +BS  NEUROLOGY: CN grossly intact, no tremors  PSYCH: normal affect and mood    LABS:  A1c:  Lab Results   Component Value Date    A1C 6.0 07/31/2018    A1C 5.9 01/03/2018    A1C 5.9 05/24/2017    A1C 6.2 01/19/2017    A1C 6.2 10/25/2016       BMP:  Last Basic Metabolic Panel:  NA      137   1/7/2016   POTASSIUM      4.6   1/7/2016  CHLORIDE      104   1/7/2016  MARIE      8.8   1/7/2016  CO2       27   1/7/2016  BUN       16   1/7/2016  BUN       27   4/16/2012  CR     0.97   1/7/2016  GLC      210   1/7/2016  GLC      204   4/16/2012      Urine Micro:  Lab Results   Component Value Date    UMALCR 135.83 07/25/2018      LFTs/Lipids:  Recent Labs   Lab Test  01/03/18   0855  01/19/17   0804   01/16/15   0832  01/20/14   0925   CHOL  163  159   < >  156  163   HDL  82  65   < >  82  60   LDL  65  74   < >  61  88   TRIG  80  100   < >  64  72   CHOLHDLRATIO   --    --    --   1.9  2.7    < > = values in this interval not displayed.     TFTs:  ENDO THYROID LABS-[facility] Latest Ref Rng 6/25/2015 7/15/2014   TSH 0.40 - 4.00 mU/L 2.38 2.17       Blood Glucose and pump data/ Meter reviewed.  CGM data reviewed.    All pertinent notes, labs, and images personally reviewed by me.     A/P  .Eh RICHAR Perry is a 66 year old " here for the evaluation/management of diabetes:    1. DM1 - Under Good control.  A1c 6.0%.  Diabetes complicated by proliferative diabetic retinopathy.  In general BG are optimal. BG in 150-180 range overnight. Using dexcom.  Continue current regimen  Continue Lantus 16units at night  Continue with Humalog at current dose with three units with each meal plus correction scale+ 2 units hs if BG >200  Consider metformin in next visit  Discussed insulin pens- he will think about it and will let us know.  Labs in 3 months  F/u in 6 months    Recommend checking blood sugars before meals and at bedtime.    If Blood glucose are low more often-> 2-3 times/week- give us a call.  The patient is advised to Make better food choices: reduce carbs, Reduce portion size, weight loss and exercise 3-4 times a week.  Discussed hypoglycemia signs and symptoms as well as management in detail.        2. Hypertension - Under Poor  control.  Continue careg and lisinopril 40 mg.   Patient to follow up with Primary Care provider regarding elevated blood pressure.    3. Hyperlipidemia - Under Good control. Takes simvastatin 5 mg.  LDL 76, HDL 84  4. Prevention  Flu Shot- NA  Pneumovax- 2011  Opthalmology-May 2016  ASA-81 mg  Smoking- no    5.  Microalbuminuria:  Lab Results   Component Value Date    UMALCR 135.83 07/25/2018    Continue lisinopril 40 mg.      Labs ordered today:   Orders Placed This Encounter   Procedures     Hemoglobin A1c     Follow-up:  follow up in 6 month(s)    Kierra Sexton M.D  Endocrinology  [facility]/[place]  CC: Hernan Juárez    More than 50% of face to face time spent with Mr. Perry on counseling / coordinating his care.  25 min.  All questions were answered.  The patient indicates understanding of the above issues and agrees with the plan set forth.     Disclaimer: This note consists of symbols derived from keyboarding, dictation and/or voice recognition software. As a result, there may be  errors in the script that have gone undetected. Please consider this when interpreting information found in this chart.          Again, thank you for allowing me to participate in the care of your patient.        Sincerely,        Kierra Sexton MD

## 2018-09-04 ENCOUNTER — TRANSFERRED RECORDS (OUTPATIENT)
Dept: HEALTH INFORMATION MANAGEMENT | Facility: CLINIC | Age: 68
End: 2018-09-04

## 2018-09-06 ENCOUNTER — TRANSFERRED RECORDS (OUTPATIENT)
Dept: HEALTH INFORMATION MANAGEMENT | Facility: CLINIC | Age: 68
End: 2018-09-06

## 2018-09-12 ENCOUNTER — MYC MEDICAL ADVICE (OUTPATIENT)
Dept: ENDOCRINOLOGY | Facility: CLINIC | Age: 68
End: 2018-09-12

## 2018-09-12 DIAGNOSIS — E10.21 TYPE 1 DIABETES MELLITUS WITH NEPHROPATHY (H): Primary | ICD-10-CM

## 2018-10-02 ENCOUNTER — TELEPHONE (OUTPATIENT)
Dept: INTERNAL MEDICINE | Facility: CLINIC | Age: 68
End: 2018-10-02

## 2018-10-02 NOTE — TELEPHONE ENCOUNTER
Panel Management Review    Patient Active Problem List   Diagnosis     ACP (advance care planning)     Hyperlipidemia LDL goal <100     Type 1 diabetes mellitus with nephropathy (goal A1C<7)     Essential hypertension     Gastroesophageal reflux disease, esophagitis presence not specified     Erectile dysfunction, unspecified erectile dysfunction type     Subclinical hypothyroidism       Patient has the following on his problem list:     Diabetes    ASA: Passed    Last A1C  Lab Results   Component Value Date    A1C 6.0 07/31/2018    A1C 5.9 01/03/2018    A1C 5.9 05/24/2017    A1C 6.2 01/19/2017    A1C 6.2 10/25/2016     A1C tested: Passed    Last LDL:    Lab Results   Component Value Date    CHOL 163 01/03/2018     Lab Results   Component Value Date    HDL 82 01/03/2018     Lab Results   Component Value Date    LDL 65 01/03/2018     Lab Results   Component Value Date    TRIG 80 01/03/2018     Lab Results   Component Value Date    CHOLHDLRATIO 1.9 01/16/2015     Lab Results   Component Value Date    NHDL 81 01/03/2018       Is the patient on a Statin? YES             Is the patient on Aspirin? YES    Medications     HMG CoA Reductase Inhibitors    simvastatin (ZOCOR) 10 MG tablet    Salicylates    aspirin 81 MG tablet          Last three blood pressure readings:  BP Readings from Last 3 Encounters:   08/23/18 154/80   07/31/18 128/80   03/16/18 167/89       Date of last diabetes office visit: 07/31/2018     Tobacco History:     History   Smoking Status     Former Smoker   Smokeless Tobacco     Never Used         Hypertension   Last three blood pressure readings:  BP Readings from Last 3 Encounters:   08/23/18 154/80   07/31/18 128/80   03/16/18 167/89     Blood pressure: FAILED    HTN Guidelines:  Age 18-59 BP range:  Less than 140/90  Age 60-85 with Diabetes:  Less than 140/90  Age 60-85 without Diabetes:  less than 150/90      Composite cancer screening  Chart review shows that this patient is due/due soon for  the following None  Summary:    Patient is due/failing the following:   none    Action needed:   none    Type of outreach:    none  Questions for provider review:    None                                                                                                                                    Chief Complaint   Patient presents with     Panel Management     Arabella Grady CMA

## 2018-10-09 DIAGNOSIS — I10 ESSENTIAL HYPERTENSION: ICD-10-CM

## 2018-10-09 NOTE — TELEPHONE ENCOUNTER
"Requested Prescriptions   Pending Prescriptions Disp Refills     carvedilol (COREG) 6.25 MG tablet [Pharmacy Med Name: CARVEDILOL 6.25MG TABS] 180 tablet 0     Sig: TAKE ONE TABLET BY MOUTH TWICE A DAY WITH MEALS    Beta-Blockers Protocol Failed    10/9/2018  4:09 PM       Failed - Blood pressure under 140/90 in past 12 months    BP Readings from Last 3 Encounters:   08/23/18 154/80   07/31/18 128/80   03/16/18 167/89                Passed - Patient is age 6 or older       Passed - Recent (12 mo) or future (30 days) visit within the authorizing provider's specialty    Patient had office visit in the last 12 months or has a visit in the next 30 days with authorizing provider or within the authorizing provider's specialty.  See \"Patient Info\" tab in inbasket, or \"Choose Columns\" in Meds & Orders section of the refill encounter.            Last Written Prescription Date:  7/20/18  Last Fill Quantity: 180,  # refills: 0   Last office visit: 8/23/2018 with prescribing provider:  8/23/18   Future Office Visit:      "

## 2018-10-10 DIAGNOSIS — N52.9 ERECTILE DYSFUNCTION, UNSPECIFIED ERECTILE DYSFUNCTION TYPE: ICD-10-CM

## 2018-10-12 ENCOUNTER — MYC MEDICAL ADVICE (OUTPATIENT)
Dept: INTERNAL MEDICINE | Facility: CLINIC | Age: 68
End: 2018-10-12

## 2018-10-12 RX ORDER — SILDENAFIL 50 MG/1
TABLET, FILM COATED ORAL
Qty: 30 TABLET | Refills: 3 | Status: SHIPPED | OUTPATIENT
Start: 2018-10-12 | End: 2020-01-28

## 2018-10-12 RX ORDER — CARVEDILOL 6.25 MG/1
TABLET ORAL
Qty: 180 TABLET | Refills: 0 | Status: SHIPPED | OUTPATIENT
Start: 2018-10-12 | End: 2018-12-13

## 2018-10-12 NOTE — TELEPHONE ENCOUNTER
Medication refilled.  Patient sent [employer] message to follow-up with me in clinic in the next 1-2 months for blood pressure recheck

## 2018-10-18 ENCOUNTER — TRANSFERRED RECORDS (OUTPATIENT)
Dept: HEALTH INFORMATION MANAGEMENT | Facility: CLINIC | Age: 68
End: 2018-10-18

## 2018-10-19 DIAGNOSIS — E10.21 TYPE 1 DIABETES MELLITUS WITH NEPHROPATHY (H): Primary | ICD-10-CM

## 2018-10-19 RX ORDER — PEN NEEDLE, DIABETIC 31 GX5/16"
NEEDLE, DISPOSABLE MISCELLANEOUS
Qty: 100 EACH | Refills: 2 | Status: SHIPPED | OUTPATIENT
Start: 2018-10-19 | End: 2019-01-08

## 2018-10-19 NOTE — TELEPHONE ENCOUNTER
"Requested Prescriptions   Pending Prescriptions Disp Refills     B-D U/F 31G X 8 MM insulin pen needle [Pharmacy Med Name: BD PEN NEEDLE SHORT 31G X 8 MM MISC] 100 each 0          Last Written Prescription Date:    Last Fill Quantity: ,   # refills:   Last Office Visit: 08/23/18  Future Office visit:       Routing refill request to provider for review/approval because:  Drug not active on patient's medication list   Sig: USE WITH INSULIN PEN FOUR TIMES A DAY AS DIRECTED    Diabetic Supplies Protocol Passed    10/19/2018 10:09 AM       Passed - Patient is 18 years of age or older       Passed - Recent (6 mo) or future (30 days) visit within the authorizing provider's specialty    Patient had office visit in the last 6 months or has a visit in the next 30 days with authorizing provider.  See \"Patient Info\" tab in inbasket, or \"Choose Columns\" in Meds & Orders section of the refill encounter.              "

## 2018-10-29 DIAGNOSIS — E10.21 TYPE 1 DIABETES MELLITUS WITH NEPHROPATHY (H): ICD-10-CM

## 2018-10-29 RX ORDER — BLOOD-GLUCOSE TRANSMITTER
EACH MISCELLANEOUS
Qty: 1 EACH | Refills: 3 | Status: SHIPPED | OUTPATIENT
Start: 2018-10-29 | End: 2019-01-08

## 2018-10-29 RX ORDER — BLOOD-GLUCOSE SENSOR
EACH MISCELLANEOUS
Qty: 12 EACH | Refills: 3 | Status: SHIPPED | OUTPATIENT
Start: 2018-10-29 | End: 2019-01-08

## 2018-10-29 NOTE — TELEPHONE ENCOUNTER
Lab Results   Component Value Date    A1C 6.0 07/31/2018    A1C 5.9 01/03/2018    A1C 5.9 05/24/2017    A1C 6.2 01/19/2017    A1C 6.2 10/25/2016     LAST OFFICE/VIRTUAL VISIT:  08/23/18    FUTURE OFFICE/VIRTUAL VISIT:  none    Kellie Doherty CMA

## 2018-10-29 NOTE — TELEPHONE ENCOUNTER
Rx sent.    Its for G5 sensors.  Please make sure that he wants G5.  G6 sensors are also avaialble based on  Insurance coverage.

## 2018-11-19 NOTE — PROGRESS NOTES
SUBJECTIVE:   Eh Perry is a 68 year old male who presents to clinic today for the following health issues:    Chief Complaint   Patient presents with     Hypertension     Hyperlipidemia     Diabetes       Diabetes Follow-up    Patient is checking blood sugars: twice daily.    Blood sugar testing frequency justification: Uncontrolled diabetes  Results are as follows:         am - 70's          bedtime -180's    Diabetic concerns: None     Symptoms of hypoglycemia (low blood sugar): none     Paresthesias (numbness or burning in feet) or sores: No     Date of last diabetic eye exam: 07/18/2018    Diabetes Management Resources    Hyperlipidemia Follow-Up      Rate your low fat/cholesterol diet?: good    Taking statin?  Yes, no muscle aches from statin    Other lipid medications/supplements?:  none    Hypertension Follow-up      Outpatient blood pressures are being checked at home.  Results are 120's to 130's/60's to 80's.    Low Salt Diet: not monitoring salt    BP Readings from Last 2 Encounters:   08/23/18 154/80   07/31/18 128/80     Hemoglobin A1C (%)   Date Value   07/31/2018 6.0 (H)   01/03/2018 5.9     LDL Cholesterol Calculated (mg/dL)   Date Value   01/03/2018 65   01/19/2017 74       Amount of exercise or physical activity: 6-7 days/week for an average of 45-60 minutes    Problems taking medications regularly: No    Medication side effects: none    Diet: regular (no restrictions)      Pt's past medical history, family history, habits, medications and allergies were reviewed with the patient today.  See snap shot for  HCM status. Most recent lab results reviewed with pt. Problem list and histories reviewed & adjusted, as indicated.  Additional history as below:    Component      Latest Ref Rng & Units 1/19/2017 5/24/2017 1/11/2018 7/25/2018   Sodium      133 - 144 mmol/L 140 139  137   Potassium      3.4 - 5.3 mmol/L 4.7 4.9  5.1   Chloride      94 - 109 mmol/L 105 104  106   Carbon Dioxide      20 - 32  mmol/L 28 26  21   Anion Gap      3 - 14 mmol/L 7 9  10   Glucose      70 - 99 mg/dL 148 (H) 127 (H)  164 (H)   Urea Nitrogen      7 - 30 mg/dL 19 17  18   Creatinine      0.66 - 1.25 mg/dL 1.03 1.03  0.94   GFR Estimate      >60 mL/min/1.7m2 72 72  80   GFR Estimate If Black      >60 mL/min/1.7m2 87 87  >90   Calcium      8.5 - 10.1 mg/dL 9.2 9.3  8.4 (L)   Cholesterol      <200 mg/dL 159      Triglycerides      <150 mg/dL 100      HDL Cholesterol      >39 mg/dL 65      LDL Cholesterol Calculated      <100 mg/dL 74      Non HDL Cholesterol      <130 mg/dL 94      Creatinine Urine      mg/dL 120 36 106 44   Albumin Urine mg/L      mg/L 112 72 327 60   Albumin Urine mg/g Cr      0 - 17 mg/g Cr 93.33 (H) 200.00 (H) 308.49 (H) 135.83 (H)   Hemoglobin A1C      0 - 5.6 % 6.2 (H)      T4 Free      0.76 - 1.46 ng/dL       TSH      0.40 - 4.00 mU/L       Thyroid Peroxidase Antibody      <35 IU/mL         Component      Latest Ref Rng & Units 7/31/2018   Sodium      133 - 144 mmol/L    Potassium      3.4 - 5.3 mmol/L    Chloride      94 - 109 mmol/L    Carbon Dioxide      20 - 32 mmol/L    Anion Gap      3 - 14 mmol/L    Glucose      70 - 99 mg/dL    Urea Nitrogen      7 - 30 mg/dL    Creatinine      0.66 - 1.25 mg/dL    GFR Estimate      >60 mL/min/1.7m2    GFR Estimate If Black      >60 mL/min/1.7m2    Calcium      8.5 - 10.1 mg/dL    Cholesterol      <200 mg/dL    Triglycerides      <150 mg/dL    HDL Cholesterol      >39 mg/dL    LDL Cholesterol Calculated      <100 mg/dL    Non HDL Cholesterol      <130 mg/dL    Creatinine Urine      mg/dL    Albumin Urine mg/L      mg/L    Albumin Urine mg/g Cr      0 - 17 mg/g Cr    Hemoglobin A1C      0 - 5.6 % 6.0 (H)   T4 Free      0.76 - 1.46 ng/dL 0.78   TSH      0.40 - 4.00 mU/L 2.46   Thyroid Peroxidase Antibody      <35 IU/mL <10        Additional ROS:   Constitutional, HEENT, Cardiovascular, Pulmonary, GI and , Neuro, MSK and Psych review of systems/symptoms are otherwise  "negative or unchanged from previous, except as noted above.      OBJECTIVE:  /78  Pulse 80  Temp 98.5  F (36.9  C) (Oral)  Resp 16  Wt 173 lb (78.5 kg)  SpO2 99%  BMI 24.13 kg/m2   Estimated body mass index is 24.13 kg/(m^2) as calculated from the following:    Height as of 8/23/18: 5' 11\" (1.803 m).    Weight as of this encounter: 173 lb (78.5 kg).    Pulm: Lungs clear to auscultation   CV: Regular rates and rhythm  GI: Soft, nontender, Normal active bowel sounds, No hepatosplenomegaly or masses palpable  Ext: Peripheral pulses intact. No edema.    Assessment/Plan: (See plan discussion below for further details)  1. Essential hypertension  Needs better control with goal < 130/80. Will add Amlodipine  - amLODIPine (NORVASC) 5 MG tablet; Take 1 tablet (5 mg) by mouth daily  Dispense: 30 tablet; Refill: 11    2. Proteinuria, unspecified type  Pt on max ACEI and DM has been well controlled. Will improve BP control as above. Repeat labs in  Feb 2019 as previously ordered    3. Type 1 diabetes mellitus with nephropathy (goal A1C<7)  Previously well controlled. Had A1C lab done today but results pending. Being managed by Endo    Plan discussion:  Amlodipine 5mg tab, 1 tab daily for blood pressure  Email me in Mychart in about 3 weeks with average blood pressure or earlier if side effects with med  Continue other meds   Fasting blood and urine labs in February 2019. See me a few days later to review results, BP, etc        Hernan Juárez MD  Internal Medicine Department  [facility]        "

## 2018-11-23 ENCOUNTER — OFFICE VISIT (OUTPATIENT)
Dept: INTERNAL MEDICINE | Facility: CLINIC | Age: 68
End: 2018-11-23
Payer: COMMERCIAL

## 2018-11-23 VITALS
RESPIRATION RATE: 16 BRPM | SYSTOLIC BLOOD PRESSURE: 140 MMHG | TEMPERATURE: 98.5 F | BODY MASS INDEX: 24.13 KG/M2 | OXYGEN SATURATION: 99 % | WEIGHT: 173 LBS | DIASTOLIC BLOOD PRESSURE: 78 MMHG | HEART RATE: 80 BPM

## 2018-11-23 DIAGNOSIS — E10.21 TYPE 1 DIABETES MELLITUS WITH NEPHROPATHY (H): ICD-10-CM

## 2018-11-23 DIAGNOSIS — I10 ESSENTIAL HYPERTENSION: Primary | ICD-10-CM

## 2018-11-23 DIAGNOSIS — R80.9 PROTEINURIA, UNSPECIFIED TYPE: ICD-10-CM

## 2018-11-23 LAB — HBA1C MFR BLD: 6.2 % (ref 0–5.6)

## 2018-11-23 PROCEDURE — 83036 HEMOGLOBIN GLYCOSYLATED A1C: CPT | Performed by: INTERNAL MEDICINE

## 2018-11-23 PROCEDURE — 99214 OFFICE O/P EST MOD 30 MIN: CPT | Performed by: INTERNAL MEDICINE

## 2018-11-23 PROCEDURE — 36415 COLL VENOUS BLD VENIPUNCTURE: CPT | Performed by: INTERNAL MEDICINE

## 2018-11-23 RX ORDER — AMLODIPINE BESYLATE 5 MG/1
5 TABLET ORAL DAILY
Qty: 30 TABLET | Refills: 11 | Status: SHIPPED | OUTPATIENT
Start: 2018-11-23 | End: 2018-12-06

## 2018-11-23 NOTE — PATIENT INSTRUCTIONS
Amlodipine 5mg tab, 1 tab daily for blood pressure  Email me in Nort[facility] in about 3 weeks with average blood pressure or earlier if side effects with med  Continue other meds   Fasting blood and urine labs in February 2019. See me a few days later to review results, BP, etc

## 2018-11-23 NOTE — MR AVS SNAPSHOT
After Visit Summary   11/23/2018    Eh Perry    MRN: [id number]           Patient Information     Date Of Birth          1950        Visit Information        Provider Department      11/23/2018 9:30 AM Hernan Juárez MD [facility]        Today's Diagnoses     Essential hypertension    -  1      Care Instructions    Amlodipine 5mg tab, 1 tab daily for blood pressure  Email me in Germin8hart in about 3 weeks with average blood pressure or earlier if side effects with med  Continue other meds   Fasting blood and urine labs in February 2019          Follow-ups after your visit        Who to contact     If you have questions or need follow up information about today's clinic visit or your schedule please contact [facility] directly at [phone].  Normal or non-critical lab and imaging results will be communicated to you by [email or web address], letter or phone within 4 business days after the clinic has received the results. If you do not hear from us within 7 days, please contact the clinic through [email or web address] or phone. If you have a critical or abnormal lab result, we will notify you by phone as soon as possible.  Submit refill requests through CRE Secure or call your pharmacy and they will forward the refill request to us. Please allow 3 business days for your refill to be completed.          Additional Information About Your Visit        MyChart Information     [facility] Secure gives you secure access to your electronic health record. If you see a primary care provider, you can also send messages to your care team and make appointments. If you have questions, please call your primary care clinic.  If you do not have a primary care provider, please call [phone] and they will assist you.        Care EveryWhere ID     This is your Care EveryWhere ID. This could be used by other organizations to access your [place] medical records  [id number]        Your Vitals Were      Pulse Temperature Respirations Pulse Oximetry BMI (Body Mass Index)       80 98.5  F (36.9  C) (Oral) 16 99% 24.13 kg/m2        Blood Pressure from Last 3 Encounters:   11/23/18 140/78   08/23/18 154/80   07/31/18 128/80    Weight from Last 3 Encounters:   11/23/18 173 lb (78.5 kg)   08/23/18 177 lb (80.3 kg)   07/31/18 178 lb (80.7 kg)              Today, you had the following     No orders found for display         Today's Medication Changes          These changes are accurate as of 11/23/18  9:53 AM.  If you have any questions, ask your nurse or doctor.               Start taking these medicines.        Dose/Directions    amLODIPine 5 MG tablet   Commonly known as:  NORVASC   Used for:  Essential hypertension   Started by:  Hernan Juárez MD        Dose:  5 mg   Take 1 tablet (5 mg) by mouth daily   Quantity:  30 tablet   Refills:  11         These medicines have changed or have updated prescriptions.        Dose/Directions    Continuous Glucose Monitor Betsy   This may have changed:  additional instructions   Used for:  Type 1 diabetes, HbA1c goal < 7% (H)        FreeStyle Lite Glucose Monitor Device- Check blood sugars 6-8 times daily as directed   Quantity:  1 each   Refills:  0            Where to get your medicines      These medications were sent to [facility] [place] [address]     Phone:  [phone]     amLODIPine 5 MG tablet                Primary Care Provider Office Phone # Fax #    Hernan Juárez [phone]       [address] [place] [address]        Equal Access to Services     [facility] AYDE AH: Hadii annie ku hadasho Soomaali, waaxda luqadaha, qaybta kaalmada adeegkaran, gibran cordova. So [facility] [phone].    ATENCIÓN: Si habla español, tiene a francois disposición servicios gratuitos de asistencia lingüística. Llame al [phone].    We comply with applicable federal civil rights  "laws and [place] laws. We do not discriminate on the basis of race, color, national origin, age, disability, sex, sexual orientation, or gender identity.            Thank you!     Thank you for choosing [facility]  for your care. Our goal is always to provide you with excellent care. Hearing back from our patients is one way we can continue to improve our services. Please take a few minutes to complete the written survey that you may receive in the mail after your visit with us. Thank you!             Your Updated Medication List - Protect others around you: Learn how to safely use, store and throw away your medicines at www.disposemymeds.org.          This list is accurate as of 11/23/18  9:53 AM.  Always use your most recent med list.                   Brand Name Dispense Instructions for use Diagnosis    amLODIPine 5 MG tablet    NORVASC    30 tablet    Take 1 tablet (5 mg) by mouth daily    Essential hypertension       aspirin 81 MG tablet    ASA     Take 1 tablet by mouth daily.    Plantar fascial fibromatosis, Pain in limb, Type II or unspecified type diabetes mellitus without mention of complication, not stated as uncontrolled       B-D U/F 31G X 8 MM miscellaneous   Generic drug:  insulin pen needle     100 each    USE WITH INSULIN PEN FOUR TIMES A DAY AS DIRECTED    Type 1 diabetes mellitus with nephropathy (H)       BD insulin syringe ultrafine 30G X 1/2\" 0.3 ML miscellaneous   Generic drug:  insulin syringe-needle U-100     400 each    USE APPROXIMATELY 4-6 TIMES A DAY    Type 1 diabetes mellitus with nephropathy (H)       blood glucose monitoring lancets     306 each    USE TO TEST 4 TIMES DAILY    Type 1 diabetes mellitus with nephropathy (H)       * blood glucose monitoring test strip    FREESTYLE LITE    750 each    Use to test blood sugars 6-8 daily or as directed.    Type 1 diabetes, HbA1c goal < 7% (H)       * blood glucose monitoring test strip    ACCU-CHEK ERUM PLUS    " 400 each    Test 4 times a day        * ACCU-CHEK ERUM PLUS test strip   Generic drug:  blood glucose monitoring     400 each    USE TO TEST BLOOD SUGARS 4 TIMES DAILY OR AS DIRECTED    Type 1 diabetes mellitus with nephropathy (H)       carvedilol 6.25 MG tablet    COREG    180 tablet    TAKE ONE TABLET BY MOUTH TWICE A DAY WITH MEALS    Essential hypertension       Continuous Glucose Monitor Betsy     1 each    FreeStyle Lite Glucose Monitor Device- Check blood sugars 6-8 times daily as directed    Type 1 diabetes, HbA1c goal < 7% (H)       DEXCOM G5 MOB/G4 PLAT SENSOR Misc     12 each    CHANGE EVERY 7 DAYS    Type 1 diabetes mellitus with nephropathy (H)       DEXCOM G5 MOBILE TRANSMITTER Misc     1 each    CHANGE EVERY 3 MONTHS    Type 1 diabetes mellitus with nephropathy (H)       EPINEPHrine 0.3 MG/0.3ML injection 2-pack    EPIPEN/ADRENACLICK/or ANY BX GENERIC EQUIV    0.6 mL    Inject 0.3 mLs (0.3 mg) into the muscle as needed for anaphylaxis    Anaphylactic reaction to wasp sting, accidental or unintentional, subsequent encounter       fish oil-omega-3 fatty acids 1000 MG capsule      Take 1-2 g by mouth daily    Plantar fascial fibromatosis, Pain in limb, Type II or unspecified type diabetes mellitus without mention of complication, not stated as uncontrolled       insulin glargine 100 UNIT/ML injection    LANTUS SOLOSTAR    15 mL    Inject 16 Units Subcutaneous daily    Type 1 diabetes mellitus with nephropathy (H)       insulin lispro 100 UNIT/ML injection    HumaLOG PEN    15 mL    3 units before meals and sliding scale. Max  20 units/day total    Type 1 diabetes mellitus with nephropathy (H)       lisinopril 40 MG tablet    PRINIVIL/ZESTRIL    90 tablet    Take 1 tablet (40 mg) by mouth daily    Essential hypertension       magnesium 250 MG tablet      Take 1 tablet by mouth daily        MULTI vitamin  MENS Tabs     30 tablet    Take  by mouth.    Type 1 diabetes, HbA1c goal < 7% (H)       omeprazole  20 MG tablet     90 tablet    1 tab daily    Gastroesophageal reflux disease, esophagitis presence not specified       sildenafil 50 MG tablet    VIAGRA    30 tablet    Take 0.5-1 tablets  by mouth  min before  sex. Max use once a day. Never use with nitroglycerin, terazosin or doxazosin.    Erectile dysfunction, unspecified erectile dysfunction type       simvastatin 10 MG tablet    ZOCOR    45 tablet    TAKE 1/2 TABLET (5 MG) BY MOUTH AT BEDTIME    Type 1 diabetes mellitus with nephropathy (H)       * Notice:  This list has 3 medication(s) that are the same as other medications prescribed for you. Read the directions carefully, and ask your doctor or other care provider to review them with you.

## 2018-12-03 ENCOUNTER — TRANSFERRED RECORDS (OUTPATIENT)
Dept: HEALTH INFORMATION MANAGEMENT | Facility: CLINIC | Age: 68
End: 2018-12-03

## 2018-12-06 ENCOUNTER — MYC MEDICAL ADVICE (OUTPATIENT)
Dept: INTERNAL MEDICINE | Facility: CLINIC | Age: 68
End: 2018-12-06

## 2018-12-06 DIAGNOSIS — I10 ESSENTIAL HYPERTENSION: ICD-10-CM

## 2018-12-06 RX ORDER — AMLODIPINE BESYLATE 5 MG/1
2.5 TABLET ORAL DAILY
Qty: 30 TABLET | Refills: 11
Start: 2018-12-06 | End: 2018-12-13 | Stop reason: DRUGHIGH

## 2018-12-07 ENCOUNTER — MYC MEDICAL ADVICE (OUTPATIENT)
Dept: INTERNAL MEDICINE | Facility: CLINIC | Age: 68
End: 2018-12-07

## 2018-12-13 ENCOUNTER — MYC MEDICAL ADVICE (OUTPATIENT)
Dept: INTERNAL MEDICINE | Facility: CLINIC | Age: 68
End: 2018-12-13

## 2018-12-13 DIAGNOSIS — I10 ESSENTIAL HYPERTENSION: ICD-10-CM

## 2018-12-13 RX ORDER — AMLODIPINE BESYLATE 2.5 MG/1
2.5 TABLET ORAL DAILY
Qty: 90 TABLET | Refills: 3 | Status: SHIPPED | OUTPATIENT
Start: 2018-12-13 | End: 2019-01-07

## 2018-12-13 RX ORDER — AMLODIPINE BESYLATE 5 MG/1
2.5 TABLET ORAL DAILY
Qty: 30 TABLET | Refills: 11 | Status: CANCELLED | OUTPATIENT
Start: 2018-12-13

## 2018-12-13 RX ORDER — CARVEDILOL 6.25 MG/1
TABLET ORAL
Qty: 180 TABLET | Refills: 3 | Status: SHIPPED | OUTPATIENT
Start: 2018-12-13 | End: 2019-01-07

## 2018-12-13 NOTE — TELEPHONE ENCOUNTER
"Requested Prescriptions   Pending Prescriptions Disp Refills     carvedilol (COREG) 6.25 MG tablet [Pharmacy Med Name: CARVEDILOL 6.25MG TABS] 180 tablet 0     Sig: TAKE ONE TABLET BY MOUTH TWICE A DAY WITH MEALS    Beta-Blockers Protocol Failed - 12/13/2018  2:10 PM       Failed - Blood pressure under 140/90 in past 12 months    BP Readings from Last 3 Encounters:   11/23/18 140/78   08/23/18 154/80   07/31/18 128/80                Passed - Patient is age 6 or older       Passed - Recent (12 mo) or future (30 days) visit within the authorizing provider's specialty    Patient had office visit in the last 12 months or has a visit in the next 30 days with authorizing provider or within the authorizing provider's specialty.  See \"Patient Info\" tab in inbasket, or \"Choose Columns\" in Meds & Orders section of the refill encounter.              Last Written Prescription Date:  10/12/18  Last Fill Quantity: 180,  # refills: 0   Last office visit: 8/23/2018 with prescribing provider:  8/23/18   Future Office Visit:      "

## 2018-12-13 NOTE — TELEPHONE ENCOUNTER
PCP please review Mychart message with BP update.     Med and pharmacy Td'up for refill approval    Jennie ROGERS RN, BSN, PHN

## 2018-12-14 ENCOUNTER — MYC MEDICAL ADVICE (OUTPATIENT)
Dept: INTERNAL MEDICINE | Facility: CLINIC | Age: 68
End: 2018-12-14

## 2018-12-14 NOTE — TELEPHONE ENCOUNTER
PCP please review Mychart message. Patient reporting ocassional angina since starting Amlodipine.    Jennie ROGERS RN, BSN, PHN

## 2019-01-03 ENCOUNTER — TELEPHONE (OUTPATIENT)
Dept: ENDOCRINOLOGY | Facility: CLINIC | Age: 69
End: 2019-01-03

## 2019-01-03 DIAGNOSIS — E10.21 TYPE 1 DIABETES MELLITUS WITH NEPHROPATHY (H): Primary | ICD-10-CM

## 2019-01-03 NOTE — TELEPHONE ENCOUNTER
----- Message from Zuly Obregon RD sent at 1/3/2019  8:29 AM CST -----  Regarding: diabetes referral  Good morning,     This patient is scheduled to see me tomorrow. Can you place a referral for him please? Thank you.     Also, the appointment notes say it is for pump education but I do not see any mention of an insulin pump for him. Do you know anything on this?    JAMA Luther CDE

## 2019-01-07 ENCOUNTER — PATIENT OUTREACH (OUTPATIENT)
Dept: EDUCATION SERVICES | Facility: CLINIC | Age: 69
End: 2019-01-07

## 2019-01-07 ENCOUNTER — MYC MEDICAL ADVICE (OUTPATIENT)
Dept: ENDOCRINOLOGY | Facility: CLINIC | Age: 69
End: 2019-01-07

## 2019-01-07 ENCOUNTER — MYC MEDICAL ADVICE (OUTPATIENT)
Dept: INTERNAL MEDICINE | Facility: CLINIC | Age: 69
End: 2019-01-07

## 2019-01-07 DIAGNOSIS — Z12.5 SCREENING FOR PROSTATE CANCER: Primary | ICD-10-CM

## 2019-01-07 DIAGNOSIS — I10 ESSENTIAL HYPERTENSION: ICD-10-CM

## 2019-01-07 DIAGNOSIS — E10.21 TYPE 1 DIABETES MELLITUS WITH NEPHROPATHY (H): ICD-10-CM

## 2019-01-07 DIAGNOSIS — K21.9 GASTROESOPHAGEAL REFLUX DISEASE, ESOPHAGITIS PRESENCE NOT SPECIFIED: ICD-10-CM

## 2019-01-07 RX ORDER — NICOTINE POLACRILEX 4 MG/1
GUM, CHEWING ORAL
Qty: 90 TABLET | Refills: 3 | Status: SHIPPED | OUTPATIENT
Start: 2019-01-07 | End: 2020-01-28

## 2019-01-07 RX ORDER — CARVEDILOL 6.25 MG/1
6.25 TABLET ORAL 2 TIMES DAILY WITH MEALS
Qty: 180 TABLET | Refills: 3 | Status: SHIPPED | OUTPATIENT
Start: 2019-01-07 | End: 2020-01-28

## 2019-01-07 RX ORDER — AMLODIPINE BESYLATE 2.5 MG/1
2.5 TABLET ORAL DAILY
Qty: 90 TABLET | Refills: 3 | Status: SHIPPED | OUTPATIENT
Start: 2019-01-07 | End: 2020-01-28

## 2019-01-07 NOTE — TELEPHONE ENCOUNTER
PCP please review MyChart message regarding need for PSA lab.    Ok to discuss at upcoming Annual Px on 1/14/2019 to discuss risk factors? Or ok with ordering lab? Lab Td'up just incase.     Chelii, new pharmacy added and transferred meds.    Jennie ROGERS RN, BSN, PHN

## 2019-01-07 NOTE — PROGRESS NOTES
Pt had appointment last Friday with this writer but had to be rescheduled as this writer was out sick.     He had a few questions so called him back today.  He was wondering if we are considered Medicare Assigned. As RDs we are all credentialed with Medicare and our Diabetes Ed accreditation means we are recognized to bill Medicare but explained that I do not know what he means by Medicare Assigned specifically.     Eh was also wondering how coverage of insulin pumps is with Medicare. Explained they do require some lab work such as a qualifying C-peptide but the pump supplies are then ran under Part B for Medicare. Discussed that from there I am not sure on exact cost of supplies compared to insulin pens and that I would recommend asking his Medicare plan.     Lastly, he was wondering about coverage for his Dexcom. Explained that to get started on a Dexcom with Medicare that patients have to be on MDI and check their BG 4x/day. However, since he is already using the G5, would suspect it should be covered.     No further questions at this time.     Zuly Obregon, RD LD CDE

## 2019-01-08 ENCOUNTER — MYC MEDICAL ADVICE (OUTPATIENT)
Dept: INTERNAL MEDICINE | Facility: CLINIC | Age: 69
End: 2019-01-08

## 2019-01-08 DIAGNOSIS — I10 ESSENTIAL HYPERTENSION: ICD-10-CM

## 2019-01-08 DIAGNOSIS — E10.21 TYPE 1 DIABETES MELLITUS WITH NEPHROPATHY (H): ICD-10-CM

## 2019-01-08 RX ORDER — LISINOPRIL 40 MG/1
40 TABLET ORAL DAILY
Qty: 90 TABLET | Refills: 1 | Status: SHIPPED | OUTPATIENT
Start: 2019-01-08 | End: 2019-01-14

## 2019-01-08 RX ORDER — SIMVASTATIN 10 MG
TABLET ORAL
Qty: 45 TABLET | Refills: 0 | Status: SHIPPED | OUTPATIENT
Start: 2019-01-08 | End: 2019-01-14

## 2019-01-08 NOTE — TELEPHONE ENCOUNTER
LAST OFFICE/VIRTUAL VISIT:  08/23/18    FUTURE OFFICE/VIRTUAL VISIT:  None    I pended the requested items using 2 different pharmacies.    Lab Results   Component Value Date    A1C 6.2 11/23/2018    A1C 6.0 07/31/2018    A1C 5.9 01/03/2018    A1C 5.9 05/24/2017    A1C 6.2 01/19/2017         Kellie Doherty, [facility]

## 2019-01-09 DIAGNOSIS — E10.21 TYPE 1 DIABETES MELLITUS WITH NEPHROPATHY (H): ICD-10-CM

## 2019-01-09 DIAGNOSIS — Z12.5 SCREENING FOR PROSTATE CANCER: ICD-10-CM

## 2019-01-09 LAB
ALBUMIN SERPL-MCNC: 4.4 G/DL (ref 3.4–5)
ALP SERPL-CCNC: 58 U/L (ref 40–150)
ALT SERPL W P-5'-P-CCNC: 41 U/L (ref 0–70)
ANION GAP SERPL CALCULATED.3IONS-SCNC: 5 MMOL/L (ref 3–14)
AST SERPL W P-5'-P-CCNC: 23 U/L (ref 0–45)
BILIRUB SERPL-MCNC: 0.7 MG/DL (ref 0.2–1.3)
BUN SERPL-MCNC: 14 MG/DL (ref 7–30)
CALCIUM SERPL-MCNC: 9.3 MG/DL (ref 8.5–10.1)
CHLORIDE SERPL-SCNC: 104 MMOL/L (ref 94–109)
CHOLEST SERPL-MCNC: 150 MG/DL
CO2 SERPL-SCNC: 27 MMOL/L (ref 20–32)
CREAT SERPL-MCNC: 0.94 MG/DL (ref 0.66–1.25)
CREAT UR-MCNC: 129 MG/DL
GFR SERPL CREATININE-BSD FRML MDRD: 84 ML/MIN/{1.73_M2}
GLUCOSE SERPL-MCNC: 154 MG/DL (ref 70–99)
HDLC SERPL-MCNC: 70 MG/DL
LDLC SERPL CALC-MCNC: 67 MG/DL
MICROALBUMIN UR-MCNC: 234 MG/L
MICROALBUMIN/CREAT UR: 181.4 MG/G CR (ref 0–17)
NONHDLC SERPL-MCNC: 80 MG/DL
POTASSIUM SERPL-SCNC: 4.8 MMOL/L (ref 3.4–5.3)
PROT SERPL-MCNC: 7.5 G/DL (ref 6.8–8.8)
PSA SERPL-ACNC: 0.6 UG/L (ref 0–4)
SODIUM SERPL-SCNC: 136 MMOL/L (ref 133–144)
TRIGL SERPL-MCNC: 65 MG/DL

## 2019-01-09 PROCEDURE — 82043 UR ALBUMIN QUANTITATIVE: CPT | Performed by: INTERNAL MEDICINE

## 2019-01-09 PROCEDURE — 80053 COMPREHEN METABOLIC PANEL: CPT | Performed by: INTERNAL MEDICINE

## 2019-01-09 PROCEDURE — G0103 PSA SCREENING: HCPCS | Performed by: INTERNAL MEDICINE

## 2019-01-09 PROCEDURE — 80061 LIPID PANEL: CPT | Performed by: INTERNAL MEDICINE

## 2019-01-09 PROCEDURE — 36415 COLL VENOUS BLD VENIPUNCTURE: CPT | Performed by: INTERNAL MEDICINE

## 2019-01-10 ENCOUNTER — TELEPHONE (OUTPATIENT)
Dept: ENDOCRINOLOGY | Facility: CLINIC | Age: 69
End: 2019-01-10

## 2019-01-10 RX ORDER — BLOOD-GLUCOSE SENSOR
1 EACH MISCELLANEOUS
Qty: 12 EACH | Refills: 3 | Status: SHIPPED | OUTPATIENT
Start: 2019-01-10 | End: 2019-02-20

## 2019-01-10 RX ORDER — LANCETS
EACH MISCELLANEOUS
Qty: 306 EACH | Refills: 4 | Status: SHIPPED | OUTPATIENT
Start: 2019-01-10 | End: 2020-01-28

## 2019-01-10 RX ORDER — BLOOD-GLUCOSE TRANSMITTER
1 EACH MISCELLANEOUS
Qty: 1 EACH | Refills: 3 | Status: SHIPPED | OUTPATIENT
Start: 2019-01-10 | End: 2019-02-20

## 2019-01-10 NOTE — TELEPHONE ENCOUNTER
Forms/paperwork reviewed, completed and signed.  Please fax or send the papers as requested, document in chart and close the encounter.    Thank you.    Kierra Sexton

## 2019-01-10 NOTE — TELEPHONE ENCOUNTER
"Prescription approved per [facility], [facility] or [facility] refill protocol.  Mignon ANTUNEZ RN - [facility]        Requested Prescriptions   Signed Prescriptions Disp Refills     blood glucose (ACCU-CHEK ERUM PLUS) test strip 400 each 3     Sig: USE TO TEST BLOOD SUGARS 4 TIMES DAILY OR AS DIRECTED    Diabetic Supplies Protocol Passed - 1/8/2019 10:56 AM       Passed - Medication is active on med list       Passed - Patient is 18 years of age or older       Passed - Recent (6 mo) or future (30 days) visit within the authorizing provider's specialty    Patient had office visit in the last 6 months or has a visit in the next 30 days with authorizing provider.  See \"Patient Info\" tab in inbasket, or \"Choose Columns\" in Meds & Orders section of the refill encounter.            blood glucose monitoring (ACCU-CHEK FASTCLIX) lancets 306 each 4     Sig: USE TO TEST 4 TIMES DAILY    Diabetic Supplies Protocol Passed - 1/8/2019 10:56 AM       Passed - Medication is active on med list       Passed - Patient is 18 years of age or older       Passed - Recent (6 mo) or future (30 days) visit within the authorizing provider's specialty    Patient had office visit in the last 6 months or has a visit in the next 30 days with authorizing provider.  See \"Patient Info\" tab in inbasket, or \"Choose Columns\" in Meds & Orders section of the refill encounter.            Blood Glucose Calibration (ACCU-CHEK ACTIVE GLUCOSE CONT) LIQD 1 Bottle 3     Sig: Calibrate the meter daily    Diabetic Supplies Protocol Failed - 1/8/2019 10:56 AM       Failed - Medication is active on med list       Passed - Patient is 18 years of age or older       Passed - Recent (6 mo) or future (30 days) visit within the authorizing provider's specialty    Patient had office visit in the last 6 months or has a visit in the next 30 days with authorizing provider.  See \"Patient Info\" tab in inbasket, or \"Choose Columns\" in Meds & Orders section of the refill " "encounter.            insulin pen needle (B-D U/F) 31G X 8 MM miscellaneous 100 each 2     Sig: USE WITH INSULIN PEN FOUR TIMES A DAY AS DIRECTED    Diabetic Supplies Protocol Passed - 2019 10:56 AM       Passed - Medication is active on med list       Passed - Patient is 18 years of age or older       Passed - Recent (6 mo) or future (30 days) visit within the authorizing provider's specialty    Patient had office visit in the last 6 months or has a visit in the next 30 days with authorizing provider.  See \"Patient Info\" tab in inbasket, or \"Choose Columns\" in Meds & Orders section of the refill encounter.            Continuous Blood Gluc Sensor (DEXCOM G5 MOB/G4 PLAT SENSOR) MISC 12 each 3     Si Device every 7 days    There is no refill protocol information for this order        Continuous Blood Gluc Transmit (DEXCOM G5 MOBILE TRANSMITTER) MISC 1 each 3     Si Device every 3 months    There is no refill protocol information for this order          "

## 2019-01-10 NOTE — TELEPHONE ENCOUNTER
Panel Management Review      Patient has the following on his problem list:     Diabetes    ASA: Passed    Last A1C  Lab Results   Component Value Date    A1C 6.2 11/23/2018    A1C 6.0 07/31/2018    A1C 5.9 01/03/2018    A1C 5.9 05/24/2017    A1C 6.2 01/19/2017     A1C tested: Passed    Last LDL:    Lab Results   Component Value Date    CHOL 150 01/09/2019     Lab Results   Component Value Date    HDL 70 01/09/2019     Lab Results   Component Value Date    LDL 67 01/09/2019     Lab Results   Component Value Date    TRIG 65 01/09/2019     Lab Results   Component Value Date    CHOLHDLRATIO 1.9 01/16/2015     Lab Results   Component Value Date    NHDL 80 01/09/2019       Is the patient on a Statin? YES             Is the patient on Aspirin? YES    Medications     HMG CoA Reductase Inhibitors    simvastatin (ZOCOR) 10 MG tablet    Salicylates    aspirin 81 MG tablet          Last three blood pressure readings:  BP Readings from Last 3 Encounters:   11/23/18 140/78   08/23/18 154/80   07/31/18 128/80       Date of last diabetes office visit: 08/23/18     Tobacco History:     History   Smoking Status     Former Smoker   Smokeless Tobacco     Never Used           Composite cancer screening  Chart review shows that this patient is due/due soon for the following None  Summary:    Patient is due/failing the following:   FOLLOW UP    Action needed:   Patient needs office visit for endo.    Type of outreach:    Sent [employer] message.    Questions for provider review:    None                                                                                                                                    Kellie Doherty CMA       Chart routed to no one .

## 2019-01-14 ENCOUNTER — TELEPHONE (OUTPATIENT)
Dept: ENDOCRINOLOGY | Facility: CLINIC | Age: 69
End: 2019-01-14

## 2019-01-14 ENCOUNTER — MYC MEDICAL ADVICE (OUTPATIENT)
Dept: ENDOCRINOLOGY | Facility: CLINIC | Age: 69
End: 2019-01-14

## 2019-01-14 ENCOUNTER — OFFICE VISIT (OUTPATIENT)
Dept: INTERNAL MEDICINE | Facility: CLINIC | Age: 69
End: 2019-01-14
Payer: MEDICARE

## 2019-01-14 DIAGNOSIS — E78.5 HYPERLIPIDEMIA LDL GOAL <100: ICD-10-CM

## 2019-01-14 DIAGNOSIS — E10.21 TYPE 1 DIABETES MELLITUS WITH NEPHROPATHY (H): ICD-10-CM

## 2019-01-14 DIAGNOSIS — Z13.89 SCREENING FOR DIABETIC PERIPHERAL NEUROPATHY: ICD-10-CM

## 2019-01-14 DIAGNOSIS — M72.0 CONTRACTURE OF PALMAR FASCIA: ICD-10-CM

## 2019-01-14 DIAGNOSIS — Z13.6 SCREENING FOR AAA (ABDOMINAL AORTIC ANEURYSM): ICD-10-CM

## 2019-01-14 DIAGNOSIS — I10 ESSENTIAL HYPERTENSION: ICD-10-CM

## 2019-01-14 DIAGNOSIS — Z00.00 MEDICARE ANNUAL WELLNESS VISIT, SUBSEQUENT: Primary | ICD-10-CM

## 2019-01-14 PROCEDURE — 99207 C FOOT EXAM  NO CHARGE: CPT | Mod: 25 | Performed by: INTERNAL MEDICINE

## 2019-01-14 PROCEDURE — 99397 PER PM REEVAL EST PAT 65+ YR: CPT | Performed by: INTERNAL MEDICINE

## 2019-01-14 RX ORDER — SIMVASTATIN 10 MG
TABLET ORAL
Qty: 45 TABLET | Refills: 3 | Status: SHIPPED | OUTPATIENT
Start: 2019-01-14 | End: 2020-01-28

## 2019-01-14 RX ORDER — LISINOPRIL 40 MG/1
40 TABLET ORAL DAILY
Qty: 90 TABLET | Refills: 3 | Status: SHIPPED | OUTPATIENT
Start: 2019-01-14 | End: 2020-01-28

## 2019-01-14 ASSESSMENT — ACTIVITIES OF DAILY LIVING (ADL): CURRENT_FUNCTION: NO ASSISTANCE NEEDED

## 2019-01-14 ASSESSMENT — MIFFLIN-ST. JEOR: SCORE: 1581.85

## 2019-01-14 NOTE — TELEPHONE ENCOUNTER
Lab Results   Component Value Date    A1C 6.2 11/23/2018    A1C 6.0 07/31/2018    A1C 5.9 01/03/2018    A1C 5.9 05/24/2017    A1C 6.2 01/19/2017     LAST OFFICE/VIRTUAL VISIT:  08/23/18    FUTURE OFFICE/VIRTUAL VISIT:  none    Kellie Doherty CMA

## 2019-01-14 NOTE — PROGRESS NOTES
"    SUBJECTIVE:   Eh Perry is a 68 year old male who presents for Preventive Visit.    Are you in the first 12 months of your Medicare Part B coverage?  No    Physical Health:  Answers for HPI/ROS submitted by the patient on 2019   Annual Exam:  In general, how would you rate your overall physical health?: good  Frequency of exercise:: 6-7 days/week  Do you usually eat at least 4 servings of fruit and vegetables a day, include whole grains & fiber, and avoid regularly eating high fat or \"junk\" foods? : Yes  Taking medications regularly:: Yes  Medication side effects:: None  Activities of Daily Living: no assistance needed  Home safety: lack of grab bars in the bathroom  Hearing Impairment:: no hearing concerns  In the past 6 months, have you been bothered by leaking of urine?: No  In general, how would you rate your overall mental or emotional health?: good  Additional concerns today:: No  Duration of exercise:: 30-45 minutes      Mental Health:    In general, how would you rate your overall mental or emotional health? good  PHQ-2 Score: (P) 0    Do you feel safe in your environment? Yes    Do you have a Health Care Directive? Yes: Patient states has Advance Directive and will bring in a copy to clinic.    Additional concerns to address?  No    Fall risk:  Fallen 2 or more times in the past year?: No  Any fall with injury in the past year?: No    Cognitive Screenin) Repeat 3 items (Leader, Season, Table)    2) Clock draw: NORMAL  3) 3 item recall: Recalls 2 objects   Results: NORMAL clock, 1-2 items recalled: COGNITIVE IMPAIRMENT LESS LIKELY    Mini-CogTM Jaz Fried. Licensed by the author for use in [facility]; reprinted with permission (jennie@.[facility]). All rights reserved.      Do you have sleep apnea, excessive snoring or daytime drowsiness?: no            Reviewed and updated as needed this visit by clinical staff         Reviewed and updated as needed this visit by " Provider        Social History     Tobacco Use     Smoking status: Former Smoker     Smokeless tobacco: Never Used   Substance Use Topics     Alcohol use: Yes     Comment: occ                           Current providers sharing in care for this patient include:   Patient Care Team:  Hernan Juárez MD as PCP - General (Internal Medicine)  Hernan Juárez MD as PCP - Assigned PCP    The following health maintenance items are reviewed in Epic and correct as of today:  Health Maintenance   Topic Date Due     ADVANCE DIRECTIVE PLANNING Q5 YRS  10/19/2017     FOOT EXAM Q1 YEAR  01/11/2019     FALL RISK ASSESSMENT  01/11/2019     A1C Q6 MO  05/23/2019     PHQ-2 Q1 YR  11/23/2019     EYE EXAM Q1 YEAR  12/03/2019     CREATININE Q1 YEAR  01/09/2020     LIPID MONITORING Q1 YEAR  01/09/2020     MICROALBUMIN Q1 YEAR  01/09/2020     TSH W/ FREE T4 REFLEX Q2 YEAR  07/31/2020     PSA Q3 YR  01/09/2022     COLON CANCER SCREEN (SYSTEM ASSIGNED)  06/01/2022     DTAP/TDAP/TD IMMUNIZATION (3 - Td) 07/03/2025     INFLUENZA VACCINE  Completed     ZOSTER IMMUNIZATION  Completed     AORTIC ANEURYSM SCREENING (SYSTEM ASSIGNED)  Completed     HEPATITIS C SCREENING  Completed     IPV IMMUNIZATION  Aged Out     MENINGITIS IMMUNIZATION  Aged Out     Labs reviewed in EPIC      ROS:  CONSTITUTIONAL: NEGATIVE for fever, chills. Weight down 6 pounds 1 year  INTEGUMENTARY/SKIN: NEGATIVE for worrisome rashes, moles or lesions. Sees Derm once a year  EYES: NEGATIVE for   Irritation. HAS bilateral cataracts (R>L) and told by eye doctor that will need surgery probably in a year per pt  ENT/MOUTH: NEGATIVE for ear, mouth and throat problems  RESP: NEGATIVE for significant cough or SOB. Smoked 1ppd for 15 years. Quit years ago  CV: NEGATIVE for chest pain or peripheral edema. Rare PVC.  HAs a lot of caffeine  GI: NEGATIVE for nausea, abdominal pain, heartburn, or change in bowel habits  : NEGATIVE for frequency, dysuria, or hematuria. Hx ED and works well  "with Sildenafil  MUSCULOSKELETAL: NEGATIVE for significant arthralgias or myalgia. Hx Dup contracture left 4th and 5th fingers. Also right palm 4th finger. Working with otho  NEURO: NEGATIVE for weakness, dizziness or paresthesias  ENDOCRINE: NEGATIVE for temperature intolerance. Dm, lipids and thyroid treated with meds. See below for results. Followed by Endo  HEME: NEGATIVE for bleeding problems  PSYCHIATRIC: NEGATIVE for changes in mood or affect        Lab Results   Component Value Date     01/09/2019     Lab Results   Component Value Date    A1C 6.2 11/23/2018     Lab Results   Component Value Date    CHOL 150 01/09/2019     Lab Results   Component Value Date    LDL 67 01/09/2019     Lab Results   Component Value Date    HDL 70 01/09/2019     Lab Results   Component Value Date    TRIG 65 01/09/2019     Lab Results   Component Value Date    CR 0.94 01/09/2019     Lab Results   Component Value Date    ALT 41 01/09/2019     Lab Results   Component Value Date    AST 23 01/09/2019     Lab Results   Component Value Date    MICROL 234 01/09/2019     Lab Results   Component Value Date    TSH 2.46 07/31/2018       OBJECTIVE:   /78   Pulse 80   Temp 98.4  F (36.9  C) (Oral)   Resp 16   Ht 1.803 m (5' 11\")   Wt 78.5 kg (173 lb)   SpO2 99%   BMI 24.13 kg/m   Estimated body mass index is 24.13 kg/m  as calculated from the following:    Height as of 8/23/18: 1.803 m (5' 11\").    Weight as of 11/23/18: 78.5 kg (173 lb).  EXAM:   General appearance - healthy, alert, no distress  Skin - No rashes or lesions. CGM present on abd  Head - normocephalic, atraumatic  Eyes - JULIETTE, EOMI, fundi exam with nondilated pupils shows faint cataracts  Ears - External ears normal. Canals clear. TM's normal.  Nose/Sinuses - Nares normal. Septum midline. Mucosa normal. No drainage or sinus tenderness.  Oropharynx - No erythema, no adenopathy, no exudates.  Neck - Supple without adenopathy or thyromegaly. No bruits.  Lungs - " Clear to auscultation without wheezes/rhonchi.  Heart - Regular rate and rhythm without murmurs, clicks, or gallops.  Nodes - No supraclavicular, axillary, or inguinal adenopathy palpable.  Abdomen - Abdomen soft, non-tender. BS normal. No masses or hepatosplenomegaly palpable. No bruits.  Extremities -No cyanosis, clubbing or edema.    Musculoskeletal - Spine ROM normal. Muscular strength intact.  Palmar contracture bilateral palms involving 4th fingers bilaterallyand  Slight left 5th finger  Peripheral pulses - radial=4/4, femoral=4/4, posterior tibial=4/4, dorsalis pedis=4/4,  Neuro - Gait normal. Reflexes normal and symmetric. Sensation grossly WNL.  Genital - Normal-appearing male external genitalia. No scrotal masses or inguinal hernia palpable.   Rectal - Guaic negative stool. Normal tone. Prostate normal in size to palpation. No rectal masses or prostate nodularity palpable       ASSESSMENT / PLAN:   1. Medicare annual wellness visit, subsequent  Health maintenance up-to-date.  Patient agreeable to PSA monitoring every other year since less than 1 but patient still wishes to have testing done    2. Type 1 diabetes mellitus with nephropathy (goal A1C<7)  Controlled with A1c at goal.  Continue management per endocrinology.  Future labs as ordered  - simvastatin (ZOCOR) 10 MG tablet; TAKE 1/2 TABLET (5 MG) BY MOUTH AT BEDTIME  Dispense: 45 tablet; Refill: 3  - Comprehensive metabolic panel; Future  - Lipid panel reflex to direct LDL Fasting; Future  - TSH with free T4 reflex; Future  - Albumin Random Urine Quantitative with Creat Ratio; Future    3. Essential hypertension  Controlled.  Continue current medication.  Future labs as ordered  - lisinopril (PRINIVIL/ZESTRIL) 40 MG tablet; Take 1 tablet (40 mg) by mouth daily  Dispense: 90 tablet; Refill: 3  - Comprehensive metabolic panel; Future  - Albumin Random Urine Quantitative with Creat Ratio; Future    4. Screening for AAA (abdominal aortic  "aneurysm)  Previous history of smoking greater than 100 cigarettes.  Will order ultrasound screening  - US Aorta Medicare AAA Screening; Future    5. Screening for diabetic peripheral neuropathy  Normal diabetic foot exam today  - FOOT EXAM  NO CHARGE [[id number]]    6. Hyperlipidemia LDL goal <100  Controlled.  Continue current medication.  Repeat labs 1 year  - simvastatin (ZOCOR) 10 MG tablet; TAKE 1/2 TABLET (5 MG) BY MOUTH AT BEDTIME  Dispense: 45 tablet; Refill: 3  - Comprehensive metabolic panel; Future  - Lipid panel reflex to direct LDL Fasting; Future      7. Contracture of palmar fascia  Management per orthopedics        End of Life Planning:  Patient currently has an advanced directive: Yes.  Practitioner is supportive of decision.    COUNSELING:  Reviewed preventive health counseling, as reflected in patient instructions    BP Readings from Last 1 Encounters:   11/23/18 140/78     Estimated body mass index is 24.13 kg/m  as calculated from the following:    Height as of 8/23/18: 1.803 m (5' 11\").    Weight as of 11/23/18: 78.5 kg (173 lb).           reports that he has quit smoking. he has never used smokeless tobacco.      Appropriate preventive services were discussed with this patient, including applicable screening as appropriate for cardiovascular disease, diabetes, osteopenia/osteoporosis, and glaucoma.  As appropriate for age/gender, discussed screening for colorectal cancer, prostate cancer, breast cancer, and cervical cancer. Checklist reviewing preventive services available has been given to the patient.    Reviewed patients plan of care and provided an AVS. The Basic Care Plan (routine screening as documented in Health Maintenance) for Eh meets the Care Plan requirement. This Care Plan has been established and reviewed with the Patient.    Counseling Resources:  ATP IV Guidelines  Pooled Cohorts Equation Calculator  Breast Cancer Risk Calculator  FRAX Risk Assessment  ICSI Preventive " Guidelines  Dietary Guidelines for Americans, 2010  USDA's MyPlate  ASA Prophylaxis  Lung CA Screening    Hernan Juárez MD  [facility]

## 2019-01-14 NOTE — PATIENT INSTRUCTIONS
Preventive Health Recommendations:     See your health care provider every year to    Review health changes.     Discuss preventive care.      Review your medicines if your doctor has prescribed any.    Talk with your health care provider about whether you should have a test to screen for prostate cancer (PSA).    Every 3 years, have a diabetes test (fasting glucose). If you are at risk for diabetes, you should have this test more often.    Every 5 years, have a cholesterol test. Have this test more often if you are at risk for high cholesterol or heart disease.     Every 10 years, have a colonoscopy. Or, have a yearly FIT test (stool test). These exams will check for colon cancer.    Talk to with your health care provider about screening for Abdominal Aortic Aneurysm if you have a family history of AAA or have a history of smoking.  Shots:     Get a flu shot each year.     Get a tetanus shot every 10 years.     Talk to your doctor about your pneumonia vaccines. There are now two you should receive - Pneumovax (PPSV 23) and Prevnar (PCV 13).    Talk to your pharmacist about a shingles vaccine.     Talk to your doctor about the hepatitis B vaccine.  Nutrition:     Eat at least 5 servings of fruits and vegetables each day.     Eat whole-grain bread, whole-wheat pasta and brown rice instead of white grains and rice.     Get adequate Calcium and Vitamin D.   Lifestyle    Exercise for at least 150 minutes a week (30 minutes a day, 5 days a week). This will help you control your weight and prevent disease.     Limit alcohol to one drink per day.     No smoking.     Wear sunscreen to prevent skin cancer.     See your dentist every six months for an exam and cleaning.     See your eye doctor every 1 to 2 years to screen for conditions such as glaucoma, macular degeneration and cataracts.    Personalized Prevention Plan  You are due for the preventive services outlined below.  Your care team is available to assist you in  scheduling these services.  If you have already completed any of these items, please share that information with your care team to update in your medical record.    Health Maintenance Due   Topic Date Due     Discuss Advance Directive Planning  10/19/2017     Diabetic Foot Exam - yearly  01/11/2019     FALL RISK ASSESSMENT  01/11/2019

## 2019-01-16 ENCOUNTER — OFFICE VISIT (OUTPATIENT)
Dept: ENDOCRINOLOGY | Facility: CLINIC | Age: 69
End: 2019-01-16
Payer: MEDICARE

## 2019-01-16 VITALS
HEIGHT: 71 IN | HEART RATE: 80 BPM | SYSTOLIC BLOOD PRESSURE: 136 MMHG | OXYGEN SATURATION: 99 % | DIASTOLIC BLOOD PRESSURE: 78 MMHG | BODY MASS INDEX: 24.22 KG/M2 | RESPIRATION RATE: 16 BRPM | WEIGHT: 173 LBS | TEMPERATURE: 98.4 F

## 2019-01-16 VITALS
BODY MASS INDEX: 24.64 KG/M2 | HEIGHT: 71 IN | OXYGEN SATURATION: 98 % | SYSTOLIC BLOOD PRESSURE: 164 MMHG | TEMPERATURE: 98.1 F | DIASTOLIC BLOOD PRESSURE: 84 MMHG | WEIGHT: 176 LBS | HEART RATE: 83 BPM

## 2019-01-16 DIAGNOSIS — E10.21 TYPE 1 DIABETES MELLITUS WITH NEPHROPATHY (H): ICD-10-CM

## 2019-01-16 PROCEDURE — 99214 OFFICE O/P EST MOD 30 MIN: CPT | Performed by: INTERNAL MEDICINE

## 2019-01-16 ASSESSMENT — MIFFLIN-ST. JEOR: SCORE: 1595.46

## 2019-01-16 NOTE — PATIENT INSTRUCTIONS
[facility]   Dr Sexton, Endocrinology Department      [facility]   [address]  [place] [address]  Appointment [phone]  Fax: [phone]  [place]: Monday and Tuesday         [facility]   [address]  [place] [address]  Appointment [phone]  Fax: [phone]  [place]: Wednesday and Thursday          Continue current regimen  Labs in 3 months  Need to make lab only appointment.    Follow up in 6 months.    Recommend checking blood sugars before meals and at bedtime.    If Blood glucose are low more often-> 2-3 times/week- give us a call.  The patient is advised to Make better food choices: reduce carbs, Reduce portion size, weight loss and exercise 3-4 times a week.  Discussed hypoglycemia signs and symptoms as well as management in detail.

## 2019-01-16 NOTE — NURSING NOTE
"ENDOCRINOLOGY INTAKE FORM    Patient Name:  Eh Perry  :  1950    Is patient Diabetic?   Yes: type 1  Does patient have non-diabetic or other endocrine issues?  Yes: hypothyroidism    Vitals: /84 (BP Location: Left arm, Patient Position: Chair, Cuff Size: Adult Regular)   Pulse 83   Temp 98.1  F (36.7  C) (Oral)   Ht 1.803 m (5' 11\")   Wt 79.8 kg (176 lb)   SpO2 98%   BMI 24.55 kg/m    BMI= Body mass index is 24.55 kg/m .    Flu vaccine:  Yes: 10/10/18  Pneumonia vaccine:  Yes: pneumovax    Smoking and Alcohol use:  Social History     Tobacco Use     Smoking status: Former Smoker     Packs/day: 1.00     Years: 15.00     Pack years: 15.00     Smokeless tobacco: Never Used   Substance Use Topics     Alcohol use: Yes     Comment: occ     Drug use: No       Foot Exam: Yes: 19  Eye Exam:  Yes: 18  Dental Exam:  Yes: every 4 mos  Aspirin Use:  Yes: 81 mg    Lab Results   Component Value Date    A1C 6.2 2018    A1C 6.0 2018    A1C 5.9 2018    A1C 5.9 2017    A1C 6.2 2017       Lab Results   Component Value Date    MICROL 234 2019     No results found for: MICROALBUMIN    Kellie Doherty CMA    "

## 2019-01-16 NOTE — LETTER
1/16/2019         RE: Eh Perry  [address]  [facility] [address]        Dear Colleague,    Thank you for referring your patient, Eh Perry, to the [facility]. Please see a copy of my visit note below.    Endocrinology Clinic Note:  Name: Eh Perry  Seen for Diabetes.   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  Is using MDI and Dexcom with G5 sensors.  Using pens now.     1. Type 1 DM:  Orginally diagnosed at the age of: 24  Hospitalization for DKA: no  Current Regimen: Lantus 16 units at night and Humalog 3 units with each meal plus correction scale+ 2 Untis before bedtime if BG >200 (For blood sugar 120-160 take an additional one unit, about 200 takes no more units and for bowel 220 takes three more units).  About 15-20 Untis/day  yes:     Diabetes Medication(s)     Insulin Sig    insulin glargine (LANTUS SOLOSTAR PEN) 100 UNIT/ML pen Inject 16 Units Subcutaneous daily    insulin lispro (HUMALOG PEN) 100 UNIT/ML pen 3 units before meals and sliding scale. Max  20 units/day total          BS checks: 3-4 times a day  Average Meter Download: Patient did not bring glucometer. Without Blood sugar data it is difficult to make adjustment to medical regimen.   Based on DEXCOM average is 158 with SD 38.  BG mostly in range.  No major episodes of hypoglycemia noted/reported.     Symptoms of hypoglycemia (low blood sugar): Get symptoms of hypoglycemia  Episodes of hypoglycemia: No major episodes of hypoglycemia noted/reported.   Fixed meal pattern: Yes.  Has three meals and sometimes snacks at bedtime if her blood sugar is below 80.  Patient counting carbs: No  Using PUMP: No.  He was on pump before but did not like it.  Does not want to go back on pump.     DM Complications:   Nephropathy: No  Retinopathy: Present.  Followed by ophthalmology.  Proliferative diabetic retinopathy status post laser treatment  ×2.  Last visit treatment was in 2010.  Neuropathy: No  Microalbuminuria: Present, on lisinopril 20 mg  CAD/PAD: No  Gastroparesis: No  Hypoglycemia unawareness: No     2. Hypertension: Blood Pressure today:   BP Readings from Last 3 Encounters:   01/16/19 164/84   01/14/19 144/80   11/23/18 140/78     Blood pressure medications include lisinopril 20 mg      3. Hyperlipidemia:  Takes simvastatin 5 mg    PMH/PSH:  Past Medical History:   Diagnosis Date     Anemia      CKD (chronic kidney disease) stage 3, GFR 30-59 ml/min (H)      GERD (gastroesophageal reflux disease)      Hepatitis 2011    elevated AST due to excess ETOH use (5 drinks/day)     HTN (hypertension)      Hyperlipidemia LDL goal <100 3/7/2013     MGUS (monoclonal gammopathy of unknown significance)      IgG Lambda. Followed by Dr Rogers     Proteinuria 1/22/2015     PVC (premature ventricular contraction)      Retinopathy due to secondary diabetes (H)     s/p laser therapy left     Subclinical hypothyroidism 6/11/2018     Tobacco abuse     20 pack hx. Quit 1991     Type 1 diabetes mellitus with nephropathy (goal A1C<7) 1978     Past Surgical History:   Procedure Laterality Date     HAND SURGERY  2007    contracture release     Family Hx:  Family History   Problem Relation Age of Onset     Diabetes Sister      Diabetes Paternal Grandmother      Diabetes Father      Cerebrovascular Disease Maternal Grandmother      Cancer Mother         peritoneal carcinoma           DM2:  Sister with gestational diabetes and now type 2 diabetes.  On insulin           Social Hx:  Social History     Socioeconomic History     Marital status:      Spouse name: Not on file     Number of children: Not on file     Years of education: Not on file     Highest education level: Not on file   Social Needs     Financial resource strain: Not on file     Food insecurity - worry: Not on file     Food insecurity - inability: Not on file     Transportation needs - medical: Not on  "file     Transportation needs - non-medical: Not on file   Occupational History     Employer: RETIRED     Comment: anesthesiologist   Tobacco Use     Smoking status: Former Smoker     Packs/day: 1.00     Years: 15.00     Pack years: 15.00     Smokeless tobacco: Never Used   Substance and Sexual Activity     Alcohol use: Yes     Comment: occ     Drug use: No     Sexual activity: Yes     Partners: Female   Other Topics Concern     Parent/sibling w/ CABG, MI or angioplasty before 65F 55M? Not Asked   Social History Narrative     Not on file          MEDICATIONS:  has a current medication list which includes the following prescription(s): amlodipine, aspirin, bd insulin syringe ultrafine, blood glucose, accu-chek active glucose cont, blood glucose, blood glucose monitoring, blood glucose, carvedilol, dexcom g5 mob/g4 plat sensor, dexcom g5 mobile transmitter, continuous glucose monitor, epinephrine, fish oil-omega-3 fatty acids, insulin glargine, insulin lispro, insulin pen needle, lisinopril, magnesium, multi vitamin  mens, omeprazole, sildenafil, and simvastatin.    ROS     ROS: 10 point ROS neg other than the symptoms noted above in the HPI.    Physical Exam   VS: /84 (BP Location: Left arm, Patient Position: Chair, Cuff Size: Adult Regular)   Pulse 83   Temp 98.1  F (36.7  C) (Oral)   Ht 1.803 m (5' 11\")   Wt 79.8 kg (176 lb)   SpO2 98%   BMI 24.55 kg/m     GENERAL: AXOX3, NAD, well dressed, answering questions appropriately, appears stated age.  HEENT: No exopthalmous, no proptosis, EOMI, no lig lag, no retraction  NECK: Thyroid normal in size, non tender, no nodules were palpated.  CV: RRR  LUNGS: CTAB  ABDOMEN: +BS  NEUROLOGY: CN grossly intact, no tremors  PSYCH: normal affect and mood    LABS:  A1c:  Lab Results   Component Value Date    A1C 6.2 11/23/2018    A1C 6.0 07/31/2018    A1C 5.9 01/03/2018    A1C 5.9 05/24/2017    A1C 6.2 01/19/2017       BMP:  Creatinine   Date Value Ref Range Status "   01/09/2019 0.94 0.66 - 1.25 mg/dL Final       Urine Micro:  Lab Results   Component Value Date    UMALCR 135.83 07/25/2018      LFTs/Lipids:  Recent Labs   Lab Test  01/03/18   0855  01/19/17   0804   01/16/15   0832  01/20/14   0925   CHOL  163  159   < >  156  163   HDL  82  65   < >  82  60   LDL  65  74   < >  61  88   TRIG  80  100   < >  64  72   CHOLHDLRATIO   --    --    --   1.9  2.7    < > = values in this interval not displayed.     TFTs:  ENDO THYROID LABS-[facility] Latest Ref Rng 6/25/2015 7/15/2014   TSH 0.40 - 4.00 mU/L 2.38 2.17       Blood Glucose and pump data/ Meter reviewed.  CGM data reviewed.    All pertinent notes, labs, and images personally reviewed by me.     A/P  Mr.David RICHAR Perry is a 68 year old here for the evaluation/management of diabetes:    1. DM1 - Under Good control.  A1c 6.2%.  Diabetes complicated by proliferative diabetic retinopathy.  In general BG are optimal. Using dexcom with G5 sensors.  Continue current regimen  Continue Lantus 16units at night  Continue with Humalog at current dose with three units with each meal plus correction scale+ 2 units hs if BG >200  Consider metformin in next visit  He will check with dexcom if G6 sensors are covered.  Labs in 3 months  F/u in 6 months    Recommend checking blood sugars before meals and at bedtime.    If Blood glucose are low more often-> 2-3 times/week- give us a call.  The patient is advised to Make better food choices: reduce carbs, Reduce portion size, weight loss and exercise 3-4 times a week.  Discussed hypoglycemia signs and symptoms as well as management in detail.      2. Hypertension - Under Poor  control.  Continue careg, amlodipine and lisinopril 40 mg.   Patient to follow up with Primary Care provider regarding elevated blood pressure.    3. Hyperlipidemia - Under Good control. Takes simvastatin 5 mg.  LDL 65, HDL 82  4. Prevention  Flu Shot- NA  Pneumovax- 2011  Opthalmology-nov 2018  ASA-81 mg  Smoking- no    5.   Microalbuminuria:  Lab Results   Component Value Date    UMALCR 135.83 07/25/2018    Continue lisinopril 40 mg.      Follow-up:  Follow up in 6 month(s)    Kierra Sexton M.D  Endocrinology  [facility]  CC: Hernan Juárez    More than 50% of face to face time spent with Mr. Perry on counseling / coordinating his care.  25 min.  All questions were answered.  The patient indicates understanding of the above issues and agrees with the plan set forth.     Disclaimer: This note consists of symbols derived from keyboarding, dictation and/or voice recognition software. As a result, there may be errors in the script that have gone undetected. Please consider this when interpreting information found in this chart.          Again, thank you for allowing me to participate in the care of your patient.        Sincerely,        Kierra Sexton MD

## 2019-01-16 NOTE — PROGRESS NOTES
Endocrinology Clinic Note:  Name: Eh Perry  Seen for Diabetes.   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  Is using MDI and Dexcom with G5 sensors.  Using pens now.     1. Type 1 DM:  Orginally diagnosed at the age of: 24  Hospitalization for DKA: no  Current Regimen: Lantus 16 units at night and Humalog 3 units with each meal plus correction scale+ 2 Untis before bedtime if BG >200 (For blood sugar 120-160 take an additional one unit, about 200 takes no more units and for bowel 220 takes three more units).  About 15-20 Untis/day  yes:     Diabetes Medication(s)     Insulin Sig    insulin glargine (LANTUS SOLOSTAR PEN) 100 UNIT/ML pen Inject 16 Units Subcutaneous daily    insulin lispro (HUMALOG PEN) 100 UNIT/ML pen 3 units before meals and sliding scale. Max  20 units/day total          BS checks: 3-4 times a day  Average Meter Download: Patient did not bring glucometer. Without Blood sugar data it is difficult to make adjustment to medical regimen.   Based on DEXCOM average is 158 with SD 38.  BG mostly in range.  No major episodes of hypoglycemia noted/reported.     Symptoms of hypoglycemia (low blood sugar): Get symptoms of hypoglycemia  Episodes of hypoglycemia: No major episodes of hypoglycemia noted/reported.   Fixed meal pattern: Yes.  Has three meals and sometimes snacks at bedtime if her blood sugar is below 80.  Patient counting carbs: No  Using PUMP: No.  He was on pump before but did not like it.  Does not want to go back on pump.     DM Complications:   Nephropathy: No  Retinopathy: Present.  Followed by ophthalmology.  Proliferative diabetic retinopathy status post laser treatment ×2.  Last visit treatment was in 2010.  Neuropathy: No  Microalbuminuria: Present, on lisinopril 20 mg  CAD/PAD: No  Gastroparesis: No  Hypoglycemia unawareness: No     2. Hypertension: Blood Pressure today:   BP Readings from Last 3 Encounters:    01/16/19 164/84   01/14/19 144/80   11/23/18 140/78     Blood pressure medications include lisinopril 20 mg      3. Hyperlipidemia:  Takes simvastatin 5 mg    PMH/PSH:  Past Medical History:   Diagnosis Date     Anemia      CKD (chronic kidney disease) stage 3, GFR 30-59 ml/min (H)      GERD (gastroesophageal reflux disease)      Hepatitis 2011    elevated AST due to excess ETOH use (5 drinks/day)     HTN (hypertension)      Hyperlipidemia LDL goal <100 3/7/2013     MGUS (monoclonal gammopathy of unknown significance)      IgG Lambda. Followed by Dr Rogers     Proteinuria 1/22/2015     PVC (premature ventricular contraction)      Retinopathy due to secondary diabetes (H)     s/p laser therapy left     Subclinical hypothyroidism 6/11/2018     Tobacco abuse     20 pack hx. Quit 1991     Type 1 diabetes mellitus with nephropathy (goal A1C<7) 1978     Past Surgical History:   Procedure Laterality Date     HAND SURGERY  2007    contracture release     Family Hx:  Family History   Problem Relation Age of Onset     Diabetes Sister      Diabetes Paternal Grandmother      Diabetes Father      Cerebrovascular Disease Maternal Grandmother      Cancer Mother         peritoneal carcinoma           DM2:  Sister with gestational diabetes and now type 2 diabetes.  On insulin           Social Hx:  Social History     Socioeconomic History     Marital status:      Spouse name: Not on file     Number of children: Not on file     Years of education: Not on file     Highest education level: Not on file   Social Needs     Financial resource strain: Not on file     Food insecurity - worry: Not on file     Food insecurity - inability: Not on file     Transportation needs - medical: Not on file     Transportation needs - non-medical: Not on file   Occupational History     Employer: RETIRED     Comment: anesthesiologist   Tobacco Use     Smoking status: Former Smoker     Packs/day: 1.00     Years: 15.00     Pack years: 15.00      "Smokeless tobacco: Never Used   Substance and Sexual Activity     Alcohol use: Yes     Comment: occ     Drug use: No     Sexual activity: Yes     Partners: Female   Other Topics Concern     Parent/sibling w/ CABG, MI or angioplasty before 65F 55M? Not Asked   Social History Narrative     Not on file          MEDICATIONS:  has a current medication list which includes the following prescription(s): amlodipine, aspirin, bd insulin syringe ultrafine, blood glucose, accu-chek active glucose cont, blood glucose, blood glucose monitoring, blood glucose, carvedilol, dexcom g5 mob/g4 plat sensor, dexcom g5 mobile transmitter, continuous glucose monitor, epinephrine, fish oil-omega-3 fatty acids, insulin glargine, insulin lispro, insulin pen needle, lisinopril, magnesium, multi vitamin  mens, omeprazole, sildenafil, and simvastatin.    ROS     ROS: 10 point ROS neg other than the symptoms noted above in the HPI.    Physical Exam   VS: /84 (BP Location: Left arm, Patient Position: Chair, Cuff Size: Adult Regular)   Pulse 83   Temp 98.1  F (36.7  C) (Oral)   Ht 1.803 m (5' 11\")   Wt 79.8 kg (176 lb)   SpO2 98%   BMI 24.55 kg/m    GENERAL: AXOX3, NAD, well dressed, answering questions appropriately, appears stated age.  HEENT: No exopthalmous, no proptosis, EOMI, no lig lag, no retraction  NECK: Thyroid normal in size, non tender, no nodules were palpated.  CV: RRR  LUNGS: CTAB  ABDOMEN: +BS  NEUROLOGY: CN grossly intact, no tremors  PSYCH: normal affect and mood    LABS:  A1c:  Lab Results   Component Value Date    A1C 6.2 11/23/2018    A1C 6.0 07/31/2018    A1C 5.9 01/03/2018    A1C 5.9 05/24/2017    A1C 6.2 01/19/2017       BMP:  Creatinine   Date Value Ref Range Status   01/09/2019 0.94 0.66 - 1.25 mg/dL Final       Urine Micro:  Lab Results   Component Value Date    UMALCR 135.83 07/25/2018      LFTs/Lipids:  Recent Labs   Lab Test  01/03/18   0855  01/19/17   0804   01/16/15   0832  01/20/14   0925   CHOL  163  " 159   < >  156  163   HDL  82  65   < >  82  60   LDL  65  74   < >  61  88   TRIG  80  100   < >  64  72   CHOLHDLRATIO   --    --    --   1.9  2.7    < > = values in this interval not displayed.     TFTs:  END[facility]YROID LABS[facility] Latest Ref Rng 6/25/2015 7/15/2014   TSH 0.40 - 4.00 mU/L 2.38 2.17       Blood Glucose and pump data/ Meter reviewed.  CGM data reviewed.    All pertinent notes, labs, and images personally reviewed by me.     A/P  Mr.David RICHAR Perry is a 68 year old here for the evaluation/management of diabetes:    1. DM1 - Under Good control.  A1c 6.2%.  Diabetes complicated by proliferative diabetic retinopathy.  In general BG are optimal. Using dexcom with G5 sensors.  Continue current regimen  Continue Lantus 16units at night  Continue with Humalog at current dose with three units with each meal plus correction scale+ 2 units hs if BG >200  Consider metformin in next visit  He will check with dexcom if G6 sensors are covered.  Labs in 3 months  F/u in 6 months  Check BG 4 or more times/day.    If Blood glucose are low more often-> 2-3 times/week- give us a call.  The patient is advised to Make better food choices: reduce carbs, Reduce portion size, weight loss and exercise 3-4 times a week.  Discussed hypoglycemia signs and symptoms as well as management in detail.      2. Hypertension - Under Poor  control.  Continue careg, amlodipine and lisinopril 40 mg.   Patient to follow up with Primary Care provider regarding elevated blood pressure.    3. Hyperlipidemia - Under Good control. Takes simvastatin 5 mg.  LDL 65, HDL 82  4. Prevention  Flu Shot- NA  Pneumovax- 2011  Opthalmology-nov 2018  ASA-81 mg  Smoking- no    5.  Microalbuminuria:  Lab Results   Component Value Date    UMALCR 135.83 07/25/2018    Continue lisinopril 40 mg.      Follow-up:  Follow up in 6 month(s)    Kierra Sexton M.D  Endocrinology  [facility]  CC: Hernan Juárez    More than 50% of face to face time spent  with Mr. Perry on counseling / coordinating his care.  25 min.  All questions were answered.  The patient indicates understanding of the above issues and agrees with the plan set forth.     Disclaimer: This note consists of symbols derived from keyboarding, dictation and/or voice recognition software. As a result, there may be errors in the script that have gone undetected. Please consider this when interpreting information found in this chart.

## 2019-02-20 ENCOUNTER — TELEPHONE (OUTPATIENT)
Dept: ENDOCRINOLOGY | Facility: CLINIC | Age: 69
End: 2019-02-20

## 2019-02-20 DIAGNOSIS — E10.21 TYPE 1 DIABETES MELLITUS WITH NEPHROPATHY (H): ICD-10-CM

## 2019-02-20 RX ORDER — BLOOD-GLUCOSE TRANSMITTER
1 EACH MISCELLANEOUS
Qty: 1 EACH | Refills: 3 | Status: SHIPPED | OUTPATIENT
Start: 2019-02-20 | End: 2019-08-08

## 2019-02-20 RX ORDER — BLOOD-GLUCOSE SENSOR
1 EACH MISCELLANEOUS
Qty: 12 EACH | Refills: 3 | Status: SHIPPED | OUTPATIENT
Start: 2019-02-20 | End: 2019-08-01

## 2019-02-20 NOTE — TELEPHONE ENCOUNTER
"Your notes say, \"BS checks: 3-4 times a day\" and it needs to say \"BS checks: 4 or more times a day\".    Kellie Doherty [facility]    "

## 2019-02-20 NOTE — TELEPHONE ENCOUNTER
"Pharmacy states that per Medicare the OV notes from 1/16/19 need to be adended to state that the patient is testing \"4 or more times per day\" for the DEXCOM transmitter and sensor. They also need new orders sent for both things after the last OV date (1/16/19), so need to resend. Please fax adended OV notes to  Specialty pharmacy at [phone].  -Evita Stubbs    "

## 2019-02-20 NOTE — TELEPHONE ENCOUNTER
Lab Results   Component Value Date    A1C 6.2 11/23/2018    A1C 6.0 07/31/2018    A1C 5.9 01/03/2018    A1C 5.9 05/24/2017    A1C 6.2 01/19/2017       1. DM1 - Under Good control.  A1c 6.2%.  Diabetes complicated by proliferative diabetic retinopathy.  In general BG are optimal. Using dexcom with G5 sensors.  Continue current regimen  Continue Lantus 16units at night  Continue with Humalog at current dose with three units with each meal plus correction scale+ 2 units hs if BG >200  Consider metformin in next visit  He will check with dexcom if G6 sensors are covered.  Labs in 3 months  F/u in 6 months  Pt need to check BG 4 or more times a day.

## 2019-02-25 ENCOUNTER — HOSPITAL ENCOUNTER (OUTPATIENT)
Dept: ULTRASOUND IMAGING | Facility: CLINIC | Age: 69
Discharge: HOME | End: 2019-02-25
Attending: INTERNAL MEDICINE | Admitting: INTERNAL MEDICINE
Payer: MEDICARE

## 2019-02-25 DIAGNOSIS — Z13.6 SCREENING FOR AAA (ABDOMINAL AORTIC ANEURYSM): ICD-10-CM

## 2019-02-25 PROCEDURE — 76706 US ABDL AORTA SCREEN AAA: CPT

## 2019-05-14 DIAGNOSIS — E10.21 TYPE 1 DIABETES MELLITUS WITH NEPHROPATHY (H): ICD-10-CM

## 2019-06-12 ENCOUNTER — TRANSFERRED RECORDS (OUTPATIENT)
Dept: HEALTH INFORMATION MANAGEMENT | Facility: CLINIC | Age: 69
End: 2019-06-12

## 2019-07-17 DIAGNOSIS — E10.21 TYPE 1 DIABETES MELLITUS WITH NEPHROPATHY (H): ICD-10-CM

## 2019-07-17 LAB — HBA1C MFR BLD: 6.1 % (ref 0–5.6)

## 2019-07-17 PROCEDURE — 83036 HEMOGLOBIN GLYCOSYLATED A1C: CPT | Performed by: INTERNAL MEDICINE

## 2019-07-17 PROCEDURE — 36415 COLL VENOUS BLD VENIPUNCTURE: CPT | Performed by: INTERNAL MEDICINE

## 2019-08-01 DIAGNOSIS — E10.21 TYPE 1 DIABETES MELLITUS WITH NEPHROPATHY (H): ICD-10-CM

## 2019-08-01 NOTE — TELEPHONE ENCOUNTER
Requested Prescriptions   Pending Prescriptions Disp Refills     Continuous Blood Gluc Sensor (DEXCOM G5 MOB/G4 PLAT SENSOR) MISC [Pharmacy Med Name: DEXCOM G5 MOB/G4 PLAT SENSOR  MISC] 4 each 3     Sig: CHANGE EVERY 7 DAYS       There is no refill protocol information for this order        Last Written Prescription Date:  02/20/19  Last Fill Quantity: 12,  # refills: 3   Last office visit: 1/16/2019 with prescribing provider:  yes   Future Office Visit:   Next 5 appointments (look out 90 days)    Aug 09, 2019 11:00 AM CDT  Pre-Op physical with Hernan Juárez MD  [facility] ([facility]) [address] [address] [phone]   Aug 14, 2019 11:00 AM CDT  Return Visit with Kierra Sexton MD  [facility] ([facility]) [address]  [facility] [address] [phone]

## 2019-08-05 RX ORDER — BLOOD-GLUCOSE SENSOR
EACH MISCELLANEOUS
Qty: 4 EACH | Refills: 3 | Status: SHIPPED | OUTPATIENT
Start: 2019-08-05 | End: 2019-12-09

## 2019-08-08 DIAGNOSIS — E10.21 TYPE 1 DIABETES MELLITUS WITH NEPHROPATHY (H): ICD-10-CM

## 2019-08-08 RX ORDER — BLOOD-GLUCOSE TRANSMITTER
EACH MISCELLANEOUS
Qty: 1 EACH | Refills: 3 | Status: SHIPPED | OUTPATIENT
Start: 2019-08-08 | End: 2020-01-14 | Stop reason: ALTCHOICE

## 2019-08-08 NOTE — TELEPHONE ENCOUNTER
Dexcom      Last Written Prescription Date:  2/20/19  Last Fill Quantity: 1,   # refills: 3  Last Office Visit: 1/16/19  Future Office visit:    Next 5 appointments (look out 90 days)    Aug 09, 2019 11:00 AM CDT  Pre-Op physical with Hernan Juárez MD  [facility]) [address] [id number] [phone]   Aug 14, 2019 11:00 AM CDT  [facility] with [facility]  [facility] [address]  [facility] [address] [phone]           Routing refill request to provider for review/approval because:  Drug not on the [facility], [facility] or  Health refill protocol or controlled substance

## 2019-08-12 ENCOUNTER — OFFICE VISIT (OUTPATIENT)
Dept: INTERNAL MEDICINE | Facility: CLINIC | Age: 69
End: 2019-08-12
Payer: MEDICARE

## 2019-08-12 VITALS
OXYGEN SATURATION: 99 % | RESPIRATION RATE: 16 BRPM | WEIGHT: 172 LBS | DIASTOLIC BLOOD PRESSURE: 78 MMHG | TEMPERATURE: 97.6 F | SYSTOLIC BLOOD PRESSURE: 134 MMHG | HEIGHT: 71 IN | HEART RATE: 66 BPM | BODY MASS INDEX: 24.08 KG/M2

## 2019-08-12 DIAGNOSIS — Z01.818 PREOP GENERAL PHYSICAL EXAM: Primary | ICD-10-CM

## 2019-08-12 DIAGNOSIS — H26.9 CATARACT OF RIGHT EYE, UNSPECIFIED CATARACT TYPE: ICD-10-CM

## 2019-08-12 DIAGNOSIS — I10 ESSENTIAL HYPERTENSION: ICD-10-CM

## 2019-08-12 DIAGNOSIS — E78.5 HYPERLIPIDEMIA LDL GOAL <100: ICD-10-CM

## 2019-08-12 DIAGNOSIS — K21.9 GASTROESOPHAGEAL REFLUX DISEASE, ESOPHAGITIS PRESENCE NOT SPECIFIED: ICD-10-CM

## 2019-08-12 DIAGNOSIS — E10.21 TYPE 1 DIABETES MELLITUS WITH NEPHROPATHY (H): ICD-10-CM

## 2019-08-12 PROCEDURE — 93000 ELECTROCARDIOGRAM COMPLETE: CPT | Performed by: INTERNAL MEDICINE

## 2019-08-12 PROCEDURE — 99215 OFFICE O/P EST HI 40 MIN: CPT | Performed by: INTERNAL MEDICINE

## 2019-08-12 SDOH — HEALTH STABILITY: MENTAL HEALTH: HOW OFTEN DO YOU HAVE A DRINK CONTAINING ALCOHOL?: 2-3 TIMES A WEEK

## 2019-08-12 ASSESSMENT — MIFFLIN-ST. JEOR: SCORE: 1572.32

## 2019-08-12 NOTE — PATIENT INSTRUCTIONS
Nothing to eat/drink after midnight prior to surgery except take Amlodipine, Carvedilol the AM of surgery with a smal sip water  Take Lantus 10 units the night before surgery (8/20/19).  Resume usual dose 8/21/19 PM  Hold other usual AM oral meds the day of surgery and restart them the next day  Resume  Humalog insulin after you get home from surgery and eating again  Fasting labs in mid January 2020  I would recommend you receive an influenza (flu) vaccine in the Fall  (October or November)

## 2019-08-12 NOTE — PROGRESS NOTES
[address] [id number] [phone]  Dept: [phone]    PRE-OP EVALUATION:  Today's date: 2019    Eh Perry (: 1950) presents for pre-operative evaluation assessment as requested by Dr. Eugene.  He requires evaluation and anesthesia risk assessment prior to undergoing surgery/procedure for treatment of CATARACT-Right eye  .    Fax number for surgical facility: [facility]   Fax Number:[phone]  Primary Physician: Hernan Juárez  Type of Anesthesia Anticipated: Local with MAC    Patient has a Health Care Directive or Living Will:  NO    Preop Questions 2019   Who is doing your surgery? Dr. Geiger   What are you having done? cataract extraction with IOL R eye   Date of Surgery/Procedure: 2019   Facility or Hospital where procedure/surgery will be performed: [facility]   1.  Do you have a history of Heart attack, stroke, stent, coronary bypass surgery, or other heart surgery? No   2.  Do you ever have any pain or discomfort in your chest? No   3.  Do you have a history of  Heart Failure? No   4.   Are you troubled by shortness of breath when:  walking on a level surface, or up a slight hill, or at night? No   5.  Do you currently have a cold, bronchitis or other respiratory infection? No   6.  Do you have a cough, shortness of breath, or wheezing? No   7.  Do you sometimes get pains in the calves of your legs when you walk? No   8. Do you or anyone in your family have previous history of blood clots? No   9.  Do you or does anyone in your family have a serious bleeding problem such as prolonged bleeding following surgeries or cuts? No   10. Have you ever had problems with anemia or been told to take iron pills? YES - in distant past. No issues with anemia now. May 2017 = Hgb 14.5   11. Have you had any abnormal blood loss such as black, tarry or bloody stools? No   12. Have you  ever had a blood transfusion? No   13. Have you or any of your relatives ever had problems with anesthesia? No   14. Do you have sleep apnea, excessive snoring or daytime drowsiness? No   15. Do you have any prosthetic heart valves? No   16. Do you have prosthetic joints? No         HPI:     HPI related to upcoming procedure:  Has some headlight glare and some redued acuity right eye with cataract. Presents for claearance to undergo surgical corectyion as above. See below for other medical issues  Walking  briskly 45 min/day and doing resistance weight lifting 3x/week. No chest pain or shortness of breath with those activities         MEDICAL HISTORY:     Patient Active Problem List    Diagnosis Date Noted     Subclinical hypothyroidism 06/11/2018     Priority: Medium     Essential hypertension 01/14/2016     Priority: Medium     Gastroesophageal reflux disease, esophagitis presence not specified 01/14/2016     Priority: Medium     Erectile dysfunction, unspecified erectile dysfunction type 01/14/2016     Priority: Medium     Type 1 diabetes mellitus with nephropathy (goal A1C<7) 10/17/2015     Priority: Medium     Hyperlipidemia LDL goal <100 03/07/2013     Priority: Medium     ACP (advance care planning) 10/19/2012     Priority: Medium     Discussed advance care planning with patient; information given to patient to review. 10/19/2012           Past Medical History:   Diagnosis Date     Anemia      CKD (chronic kidney disease) stage 3, GFR 30-59 ml/min (H)      GERD (gastroesophageal reflux disease)      Hepatitis 2011    elevated AST due to excess ETOH use (5 drinks/day)     HTN (hypertension)      Hyperlipidemia LDL goal <100 3/7/2013     MGUS (monoclonal gammopathy of unknown significance)      IgG Lambda. Followed by Dr Rogers     Proteinuria 1/22/2015     PVC (premature ventricular contraction)      Retinopathy due to secondary diabetes (H)     s/p laser therapy left     Subclinical hypothyroidism 6/11/2018  "    Tobacco abuse     20 pack hx. Quit 1991     Type 1 diabetes mellitus with nephropathy (goal A1C<7) 1978     Past Surgical History:   Procedure Laterality Date     HAND SURGERY  2007    contracture release     Current Outpatient Medications   Medication Sig Dispense Refill     amLODIPine (NORVASC) 2.5 MG tablet Take 1 tablet (2.5 mg) by mouth daily 90 tablet 3     aspirin 81 MG tablet Take 1 tablet by mouth daily.       BD INSULIN SYRINGE ULTRAFINE 30G X 1/2\" 0.3 ML USE APPROXIMATELY 4-6 TIMES A  each 3     blood glucose (ACCU-CHEK ERUM PLUS) test strip USE TO TEST BLOOD SUGARS 4 TIMES DAILY OR AS DIRECTED 400 each 3     Blood Glucose Calibration (ACCU-CHEK ACTIVE GLUCOSE CONT) LIQD Calibrate the meter daily 1 Bottle 3     blood glucose monitoring (ACCU-CHEK ERUM PLUS) test strip Test 4 times a day 400 each 4     blood glucose monitoring (ACCU-CHEK FASTCLIX) lancets USE TO TEST 4 TIMES DAILY 306 each 4     blood glucose monitoring (FREESTYLE LITE) test strip Use to test blood sugars 6-8 daily or as directed. 750 each 3     carvedilol (COREG) 6.25 MG tablet Take 1 tablet (6.25 mg) by mouth 2 times daily (with meals) 180 tablet 3     Continuous Blood Gluc Sensor (DEXCOM G5 MOB/G4 PLAT SENSOR) MISC CHANGE EVERY 7 DAYS 4 each 3     Continuous Blood Gluc Transmit (DEXCOM G5 MOBILE TRANSMITTER) MISC CHANGE EVERY 3 MONTHS 1 each 3     Continuous Glucose Monitor EMERSON FreeStyle Lite Glucose Monitor Device-  Check blood sugars 6-8 times daily as directed (Patient taking differently: Accu Chek Erum Connect Glucose Monitor Device-  Check blood sugars 3-4 times daily as directed) 1 each 0     EPINEPHrine (EPIPEN/ADRENACLICK/OR ANY BX GENERIC EQUIV) 0.3 MG/0.3ML injection 2-pack Inject 0.3 mLs (0.3 mg) into the muscle as needed for anaphylaxis 0.6 mL 5     fish oil-omega-3 fatty acids (FISH OIL) 1000 MG capsule Take 1-2 g by mouth daily        insulin glargine (LANTUS SOLOSTAR PEN) 100 UNIT/ML pen Inject 16 Units " Subcutaneous daily 15 mL 2     insulin lispro (HUMALOG PEN) 100 UNIT/ML pen 3 units before meals and sliding scale. Max  20 units/day total 15 mL 2     insulin pen needle (B-D U/F) 31G X 8 MM miscellaneous USE WITH INSULIN PEN FOUR TIMES A DAY AS DIRECTED 100 each 2     lisinopril (PRINIVIL/ZESTRIL) 40 MG tablet Take 1 tablet (40 mg) by mouth daily 90 tablet 3     magnesium 250 MG tablet Take 1 tablet by mouth daily       Multiple Vitamin (MULTI VITAMIN  MENS) TABS Take  by mouth. 30 tablet      omeprazole 20 MG tablet 1 tab daily 90 tablet 3     sildenafil (VIAGRA) 50 MG tablet Take 0.5-1 tablets  by mouth  min before  sex. Max use once a day. Never use with nitroglycerin, terazosin or doxazosin. 30 tablet 3     simvastatin (ZOCOR) 10 MG tablet TAKE 1/2 TABLET (5 MG) BY MOUTH AT BEDTIME 45 tablet 3     OTC products: None, except as noted above    Allergies   Allergen Reactions     Atorvastatin Calcium       Elevated Ck and myalgias     Wasp Venom Protein      Wasp sting      Latex Allergy: NO    Social History     Tobacco Use     Smoking status: Former Smoker     Packs/day: 1.00     Years: 15.00     Pack years: 15.00     Smokeless tobacco: Never Used   Substance Use Topics     Alcohol use: Yes     Comment: occ     History   Drug Use No       REVIEW OF SYSTEMS:   CONSTITUTIONAL: NEGATIVE for fever, chills, change in weight  INTEGUMENTARY/SKIN: NEGATIVE for worrisome rashes, moles or lesions  EYES:  See HPI  ENT/MOUTH: NEGATIVE for ear, mouth and throat problems  RESP: NEGATIVE for significant cough or SOB   CV: NEGATIVE for chest pain, palpitations or peripheral edema  GI: NEGATIVE for nausea, abdominal pain, heartburn, or change in bowel habits  : NEGATIVE for frequency, dysuria, or hematuria. Has ED, on Viagra prn   MUSCULOSKELETAL: NEGATIVE for significant arthralgias or myalgia  NEURO: NEGATIVE for weakness, dizziness or paresthesias  ENDOCRINE: NEGATIVE for temperature intolerance. DM well controlled.  "recent A1C 6.1. On statin  HEME: NEGATIVE for bleeding problems  PSYCHIATRIC: NEGATIVE for changes in mood or affect    EXAM:   /78   Pulse 66   Temp 97.6  F (36.4  C) (Temporal)   Resp 16   Ht 1.803 m (5' 11\")   Wt 78 kg (172 lb)   SpO2 99%   BMI 23.99 kg/m    Eye: PERRL, EOMI. Right cataract  HENT: ear canals and TM's normal and nose and mouth without ulcers or lesions   Neck: no adenopathy. Thyroid normal to palpation. No bruits  Pulm: Lungs clear to auscultation   CV: Regular rates and rhythm  GI: Soft, nontender, Normal active bowel sounds, No hepatosplenomegaly or masses palpable  Ext: Peripheral pulses intact. No edema.  Neuro: Normal strength and tone, sensory exam grossly normal      DIAGNOSTICS:   EKG:  NSR with rate 64. No acute ST/T changes    Recent Labs   Lab Test 07/17/19  0948 01/09/19  0947 11/23/18  0906  07/25/18  1000  05/24/17  1003  06/25/15  0942   HGB  --   --   --   --   --   --  14.5  --  13.0*   PLT  --   --   --   --   --   --  260  --  245   NA  --  136  --   --  137   < > 139   < > 141   POTASSIUM  --  4.8  --   --  5.1   < > 4.9   < > 4.4   CR  --  0.94  --   --  0.94   < > 1.03   < > 0.88   A1C 6.1*  --  6.2*   < >  --    < > 5.9   < > 6.5*    < > = values in this interval not displayed.        IMPRESSION:   Reason for surgery/procedure:  Right cataract  Diagnosis/reason for consult:     1. Type 1 diabetes mellitus with nephropathy (goal A1C<7)  Well controlled. Followed by Kristine (Dr Patel). See instructions below: insulin dosing perioperatively    2. Hyperlipidemia LDL goal <100  At goal with statin therapy    3. Essential hypertension  Controlled with meds. On ACEI    4. Gastroesophageal reflux disease, esophagitis presence not specified  Controlled with PPI    The proposed surgical procedure is considered LOW risk.    REVISED CARDIAC RISK INDEX  The patient has the following serious cardiovascular risks for perioperative complications such as (MI, PE, VFib and 3  AV " Block):  Diabetes Mellitus (on Insulin)  INTERPRETATION: 1 risks: Class II (low risk - 0.9% complication rate)    The patient has the following additional risks for perioperative complications:  No identified additional risks      RECOMMENDATIONS:      APPROVAL GIVEN to proceed with proposed procedure, without further diagnostic evaluation     PLAN:  Nothing to eat/drink after midnight prior to surgery except take Amlodipine, Carvedilol the AM of surgery with a smal sip water  Take Lantus 10 units the night before surgery (8/20/19).  Resume usual dose 8/21/19 PM  Hold other usual AM oral meds the day of surgery and restart them the next day  Resume  Humalog insulin after you get home from surgery and eating again  Fasting labs in mid January 2020  I would recommend you receive an influenza (flu) vaccine in the Fall  (October or November)      Signed Electronically by: Hernan Juárez MD    Copy of this evaluation report is provided to requesting physician.    [facility] Preop Guidelines    Revised Cardiac Risk Index

## 2019-08-14 ENCOUNTER — OFFICE VISIT (OUTPATIENT)
Dept: ENDOCRINOLOGY | Facility: CLINIC | Age: 69
End: 2019-08-14
Payer: MEDICARE

## 2019-08-14 VITALS
HEART RATE: 73 BPM | WEIGHT: 172.7 LBS | BODY MASS INDEX: 24.18 KG/M2 | HEIGHT: 71 IN | RESPIRATION RATE: 16 BRPM | OXYGEN SATURATION: 99 % | SYSTOLIC BLOOD PRESSURE: 142 MMHG | TEMPERATURE: 98.1 F | DIASTOLIC BLOOD PRESSURE: 70 MMHG

## 2019-08-14 DIAGNOSIS — E10.21 TYPE 1 DIABETES MELLITUS WITH NEPHROPATHY (H): Primary | ICD-10-CM

## 2019-08-14 PROCEDURE — 99214 OFFICE O/P EST MOD 30 MIN: CPT | Performed by: INTERNAL MEDICINE

## 2019-08-14 ASSESSMENT — MIFFLIN-ST. JEOR: SCORE: 1575.49

## 2019-08-14 NOTE — PROGRESS NOTES
Endocrinology Clinic Note:  Name: Eh Perry  Seen for Diabetes.   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  Is using MDI and Dexcom with G5 sensors.  Using pens now.     1. Type 1 DM:  Orginally diagnosed at the age of: 24  Hospitalization for DKA: no  Current Regimen: Lantus 16 units at night and Humalog 3 units with each meal plus correction scale+ 2 Untis before bedtime if BG >200 (For blood sugar 120-160 take an additional one unit, about 200 takes no more units and for bowel 220 takes three more units).  About 15-20 Untis/day  yes:     Diabetes Medication(s)     Insulin       insulin glargine (LANTUS SOLOSTAR PEN) 100 UNIT/ML pen    Inject 16 Units Subcutaneous daily     insulin lispro (HUMALOG PEN) 100 UNIT/ML pen    3 units before meals and sliding scale. Max  20 units/day total          BS checks: 3-4 times a day. Using Dexcom.  Average Meter Download:   Based on DEXCOM average is 155 with SD 53.  BG mostly in range.  No major episodes of hypoglycemia noted/reported.     Symptoms of hypoglycemia (low blood sugar): Get symptoms of hypoglycemia  Episodes of hypoglycemia: No major episodes of hypoglycemia noted/reported.   Fixed meal pattern: Yes.  Has three meals and sometimes snacks at bedtime if her blood sugar is below 80.  Patient counting carbs: No  Using PUMP: No.  He was on pump before but did not like it.  Does not want to go back on pump.     DM Complications:   Nephropathy: No  Retinopathy: Present.  Followed by ophthalmology.  Proliferative diabetic retinopathy status post laser treatment ×2.  Last visit treatment was in 2010.  Neuropathy: No  Microalbuminuria: Present, on lisinopril 40 mg  CAD/PAD: No  Gastroparesis: No  Hypoglycemia unawareness: No     2. Hypertension: Blood Pressure today:   BP Readings from Last 3 Encounters:   08/14/19 (!) 149/79   08/12/19 134/78   01/16/19 164/84     Blood pressure medications include  lisinopril 20 mg      3. Hyperlipidemia:  Takes simvastatin 5 mg    PMH/PSH:  Past Medical History:   Diagnosis Date     Anemia      CKD (chronic kidney disease) stage 3, GFR 30-59 ml/min (H)      GERD (gastroesophageal reflux disease)      Hepatitis 2011    elevated AST due to excess ETOH use (5 drinks/day)     HTN (hypertension)      Hyperlipidemia LDL goal <100 3/7/2013     MGUS (monoclonal gammopathy of unknown significance)      IgG Lambda. Followed by Dr Rogers     Proteinuria 1/22/2015     PVC (premature ventricular contraction)      Retinopathy due to secondary diabetes (H)     s/p laser therapy left     Subclinical hypothyroidism 6/11/2018     Tobacco abuse     20 pack hx. Quit 1991     Type 1 diabetes mellitus with nephropathy (goal A1C<7) 1978     Past Surgical History:   Procedure Laterality Date     HAND SURGERY  2007    contracture release     Family Hx:  Family History   Problem Relation Age of Onset     Diabetes Sister      Diabetes Paternal Grandmother      Diabetes Father      Cerebrovascular Disease Maternal Grandmother      Cancer Mother         peritoneal carcinoma           DM2:  Sister with gestational diabetes and now type 2 diabetes.  On insulin.           Social Hx:  Social History     Socioeconomic History     Marital status:      Spouse name: Not on file     Number of children: Not on file     Years of education: Not on file     Highest education level: Not on file   Occupational History     Employer: RETIRED     Comment: anesthesiologist   Social Needs     Financial resource strain: Not on file     Food insecurity:     Worry: Not on file     Inability: Not on file     Transportation needs:     Medical: Not on file     Non-medical: Not on file   Tobacco Use     Smoking status: Former Smoker     Packs/day: 1.00     Years: 15.00     Pack years: 15.00     Smokeless tobacco: Never Used   Substance and Sexual Activity     Alcohol use: Yes     Frequency: 2-3 times a week     Comment:  "occ     Drug use: No     Sexual activity: Yes     Partners: Female   Lifestyle     Physical activity:     Days per week: Not on file     Minutes per session: Not on file     Stress: Not on file   Relationships     Social connections:     Talks on phone: Not on file     Gets together: Not on file     Attends Jainism service: Not on file     Active member of club or organization: Not on file     Attends meetings of clubs or organizations: Not on file     Relationship status: Not on file     Intimate partner violence:     Fear of current or ex partner: Not on file     Emotionally abused: Not on file     Physically abused: Not on file     Forced sexual activity: Not on file   Other Topics Concern     Parent/sibling w/ CABG, MI or angioplasty before 65F 55M? Not Asked   Social History Narrative     Not on file          MEDICATIONS:  has a current medication list which includes the following prescription(s): amlodipine, aspirin, bd insulin syringe ultrafine, blood glucose, accu-chek active glucose cont, blood glucose, blood glucose monitoring, blood glucose, carvedilol, dexcom g5 mob/g4 plat sensor, dexcom g5 mobile transmitter, epinephrine, fish oil-omega-3 fatty acids, insulin glargine, insulin lispro, insulin pen needle, lisinopril, magnesium, multi vitamin  mens, omeprazole, sildenafil, and simvastatin.    ROS     ROS: 10 point ROS neg other than the symptoms noted above in the HPI.    Physical Exam   VS: BP (!) 149/79 (BP Location: Left arm, Patient Position: Chair, Cuff Size: Adult Regular)   Pulse 73   Temp 98.1  F (36.7  C) (Oral)   Resp 16   Ht 1.803 m (5' 11\")   Wt 78.3 kg (172 lb 11.2 oz)   SpO2 99%   BMI 24.09 kg/m    GENERAL: AXOX3, NAD, well dressed, answering questions appropriately, appears stated age.  HEENT: No exopthalmous, no proptosis, EOMI, no lig lag, no retraction  NECK: Thyroid normal in size, non tender, no nodules were palpated.  CV: RRR  LUNGS: CTAB  ABDOMEN: +BS  NEUROLOGY: CN grossly " intact, no tremors  PSYCH: normal affect and mood    LABS:  A1c:  Lab Results   Component Value Date    A1C 6.1 07/17/2019    A1C 6.2 11/23/2018    A1C 6.0 07/31/2018    A1C 5.9 01/03/2018    A1C 5.9 05/24/2017         BMP:  Creatinine   Date Value Ref Range Status   01/09/2019 0.94 0.66 - 1.25 mg/dL Final       Urine Micro:  Lab Results   Component Value Date    UMALCR 181.40 01/09/2019         LFTs/Lipids:  Recent Labs   Lab Test 01/09/19  0947 01/03/18  0855  01/16/15  0832 01/20/14  0925   CHOL 150 163   < > 156 163   HDL 70 82   < > 82 60   LDL 67 65   < > 61 88   TRIG 65 80   < > 64 72   CHOLHDLRATIO  --   --   --  1.9 2.7    < > = values in this interval not displayed.     TFTs:  ENDO THYROID LABS-UMP Latest Ref Rng & Units 7/31/2018   TSH 0.40 - 4.00 mU/L 2.46   T4 FREE 0.76 - 1.46 ng/dL 0.78   THYR PEROXIDASE VERNON <35 IU/mL <10     Blood Glucose and pump data/ Meter reviewed.  CGM data reviewed.    Glucometer/ insulin pump (if applicable)/ CGM data (if applicable) downloaded, Personally reviewed and interpreted.  All Blood sugar data reviewed personally and discussed with pt.  See nursing note from 8/14/2019 for details of BG/CGM log.      All pertinent notes, labs, and images personally reviewed by me.     A/P  Mr.David RICHAR Perry is a 69 year old here for the evaluation/management of diabetes:    1. DM1 - Under Good control.  A1c 6.1%.  Diabetes complicated by proliferative diabetic retinopathy.  In general BG are optimal. Using dexcom with G5 sensors.  Continue current regimen  Continue Lantus 16units at night  Continue with Humalog at current dose with three units with each meal plus correction scale+ 2 units hs if BG >200  Consider metformin in next visit  Labs and f/u in 6 months.  Check BG 4 or more times/day.    If Blood glucose are low more often-> 2-3 times/week- give us a call.  The patient is advised to Make better food choices: reduce carbs, Reduce portion size, weight loss and exercise 3-4 times  a week.  Discussed hypoglycemia signs and symptoms as well as management in detail.      2. Hypertension - Under Poor  control.  On coreg, amlodipine and lisinopril 40 mg.   Patient to follow up with Primary Care provider regarding elevated blood pressure.    3. Hyperlipidemia - Under Good control. On simvastatin 5 mg.  LDL 65, HDL 82  4. Prevention  Flu Shot- NA  Pneumovax- 2011  Opthalmology-nov 2018  ASA-81 mg  Smoking- no    Most Recent Immunizations   Administered Date(s) Administered     Influenza (High Dose) 3 valent vaccine 10/10/2018     Influenza (IIV3) PF 10/02/2014     Influenza Vaccine IM 3yrs+ 4 Valent IIV4 09/28/2015     Pneumo Conj 13-V (2010&after) 01/23/2017     Pneumococcal 23 valent 01/11/2018     TD (ADULT, 7+) 07/03/2015     Tdap (Adacel,Boostrix) 10/19/2005     Zoster vaccine recombinant adjuvanted (SHINGRIX) 10/22/2018     Zoster vaccine, live 11/01/2011       5.  Microalbuminuria:  Lab Results   Component Value Date    UMALCR 135.83 07/25/2018    Continue lisinopril 40 mg.    Recommend strict Bg control.    Follow-up:  Follow up in 6 month(s)    Kierra Sexton M.D  Endocrinology  [facility]  CC: Hernan Juárez    More than 50% of face to face time spent with Mr. Peryr on counseling / coordinating his care.  25 min.  All questions were answered.  The patient indicates understanding of the above issues and agrees with the plan set forth.     Disclaimer: This note consists of symbols derived from keyboarding, dictation and/or voice recognition software. As a result, there may be errors in the script that have gone undetected. Please consider this when interpreting information found in this chart.

## 2019-08-14 NOTE — PATIENT INSTRUCTIONS
Please check the cost coverage and copay with insurance before recommended tests, services and medications (especially if new medications are prescribed).     If ordered, please get blood work done 1 week prior to your next appointment so they will be available to Dr. Sexton at your visit.    To provide the best diabetic care, please bring your blood glucose meter to each and every visit with your Endocrinologist.  Your blood glucose meter/insulin pump will be downloaded at every appointment.    Please arrive 15 minutes before your scheduled appointment.  This will allow for your blood glucose meter/insulin pump to be downloaded.  If you are wearing DEXCOM please bring  or sharing code so that it can be downloaded.  If you are using freestyle bobby personal sensors please bring the reader.  If you are using TANDEM insulin pump please have your username and password to get info from [employer] website.    [facility]   Dr Sexton, Endocrinology Department      [facility]   [address]  [place] [address]  Appointment [phone]  Fax: [phone]  [place]: Monday and Tuesday         [place] [address]  [place] [address]  Appointment [phone]  Fax: [phone]  [place]: Wednesday and Thursday            Continue current regimen for Diabetes.  Labs and follow up in 6 months or sooner if concerns.  Continue to use Dexcom.    Recommend checking blood sugars before meals and at bedtime.    If Blood glucose are low more often-> 2-3 times/week- give us a call.  The patient is advised to Make better food choices: reduce carbs, Reduce portion size, weight loss and exercise 3-4 times a week.  Discussed hypoglycemia signs and symptoms as well as management in detail.

## 2019-08-14 NOTE — NURSING NOTE
ENDOCRINOLOGY INTAKE FORM    Patient Name:  Eh Perry  :  1950    Is patient Diabetic?   Yes: type 1  Does patient have non-diabetic or other endocrine issues?  Yes: subclinical hypothyroidism, ED, hyperlipidemia    Vitals: There were no vitals taken for this visit.  BMI= There is no height or weight on file to calculate BMI.    Flu vaccine:  Yes: 10/10/18  Pneumonia vaccine:  Yes: pneumovax    Smoking and Alcohol use:  Social History     Tobacco Use     Smoking status: Former Smoker     Packs/day: 1.00     Years: 15.00     Pack years: 15.00     Smokeless tobacco: Never Used   Substance Use Topics     Alcohol use: Yes     Frequency: 2-3 times a week     Comment: occ     Drug use: No       Foot Exam: Yes: 19  Eye Exam:  Yes: 18  Dental Exam:  Yes: yesterday  Aspirin Use:  Yes: 81 mg    Lab Results   Component Value Date    A1C 6.1 2019    A1C 6.2 2018    A1C 6.0 2018    A1C 5.9 2018    A1C 5.9 2017       Lab Results   Component Value Date    MICROL 234 2019     No results found for: MICROALBUMIN    Kellie Doherty CMA  [facility]  [place]

## 2019-08-14 NOTE — LETTER
8/14/2019         RE: Eh Perry  [address]  [facility] [address]        Dear Colleague,    Thank you for referring your patient, Eh Perry, to the [facility]. Please see a copy of my visit note below.    Endocrinology Clinic Note:  Name: Eh Perry  Seen for Diabetes.   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  Is using MDI and Dexcom with G5 sensors.  Using pens now.     1. Type 1 DM:  Orginally diagnosed at the age of: 24  Hospitalization for DKA: no  Current Regimen: Lantus 16 units at night and Humalog 3 units with each meal plus correction scale+ 2 Untis before bedtime if BG >200 (For blood sugar 120-160 take an additional one unit, about 200 takes no more units and for bowel 220 takes three more units).  About 15-20 Untis/day  yes:     Diabetes Medication(s)     Insulin       insulin glargine (LANTUS SOLOSTAR PEN) 100 UNIT/ML pen    Inject 16 Units Subcutaneous daily     insulin lispro (HUMALOG PEN) 100 UNIT/ML pen    3 units before meals and sliding scale. Max  20 units/day total          BS checks: 3-4 times a day. Using Dexcom.  Average Meter Download:   Based on DEXCOM average is 155 with SD 53.  BG mostly in range.  No major episodes of hypoglycemia noted/reported.     Symptoms of hypoglycemia (low blood sugar): Get symptoms of hypoglycemia  Episodes of hypoglycemia: No major episodes of hypoglycemia noted/reported.   Fixed meal pattern: Yes.  Has three meals and sometimes snacks at bedtime if her blood sugar is below 80.  Patient counting carbs: No  Using PUMP: No.  He was on pump before but did not like it.  Does not want to go back on pump.     DM Complications:   Nephropathy: No  Retinopathy: Present.  Followed by ophthalmology.  Proliferative diabetic retinopathy status post laser treatment ×2.  Last visit treatment was in 2010.  Neuropathy: No  Microalbuminuria: Present, on  lisinopril 40 mg  CAD/PAD: No  Gastroparesis: No  Hypoglycemia unawareness: No     2. Hypertension: Blood Pressure today:   BP Readings from Last 3 Encounters:   08/14/19 (!) 149/79   08/12/19 134/78   01/16/19 164/84     Blood pressure medications include lisinopril 20 mg      3. Hyperlipidemia:  Takes simvastatin 5 mg    PMH/PSH:  Past Medical History:   Diagnosis Date     Anemia      CKD (chronic kidney disease) stage 3, GFR 30-59 ml/min (H)      GERD (gastroesophageal reflux disease)      Hepatitis 2011    elevated AST due to excess ETOH use (5 drinks/day)     HTN (hypertension)      Hyperlipidemia LDL goal <100 3/7/2013     MGUS (monoclonal gammopathy of unknown significance)      IgG Lambda. Followed by Dr Rogers     Proteinuria 1/22/2015     PVC (premature ventricular contraction)      Retinopathy due to secondary diabetes (H)     s/p laser therapy left     Subclinical hypothyroidism 6/11/2018     Tobacco abuse     20 pack hx. Quit 1991     Type 1 diabetes mellitus with nephropathy (goal A1C<7) 1978     Past Surgical History:   Procedure Laterality Date     HAND SURGERY  2007    contracture release     Family Hx:  Family History   Problem Relation Age of Onset     Diabetes Sister      Diabetes Paternal Grandmother      Diabetes Father      Cerebrovascular Disease Maternal Grandmother      Cancer Mother         peritoneal carcinoma           DM2:  Sister with gestational diabetes and now type 2 diabetes.  On insulin.           Social Hx:  Social History     Socioeconomic History     Marital status:      Spouse name: Not on file     Number of children: Not on file     Years of education: Not on file     Highest education level: Not on file   Occupational History     Employer: RETIRED     Comment: anesthesiologist   Social Needs     Financial resource strain: Not on file     Food insecurity:     Worry: Not on file     Inability: Not on file     Transportation needs:     Medical: Not on file      "Non-medical: Not on file   Tobacco Use     Smoking status: Former Smoker     Packs/day: 1.00     Years: 15.00     Pack years: 15.00     Smokeless tobacco: Never Used   Substance and Sexual Activity     Alcohol use: Yes     Frequency: 2-3 times a week     Comment: occ     Drug use: No     Sexual activity: Yes     Partners: Female   Lifestyle     Physical activity:     Days per week: Not on file     Minutes per session: Not on file     Stress: Not on file   Relationships     Social connections:     Talks on phone: Not on file     Gets together: Not on file     Attends Orthodoxy service: Not on file     Active member of club or organization: Not on file     Attends meetings of clubs or organizations: Not on file     Relationship status: Not on file     Intimate partner violence:     Fear of current or ex partner: Not on file     Emotionally abused: Not on file     Physically abused: Not on file     Forced sexual activity: Not on file   Other Topics Concern     Parent/sibling w/ CABG, MI or angioplasty before 65F 55M? Not Asked   Social History Narrative     Not on file          MEDICATIONS:  has a current medication list which includes the following prescription(s): amlodipine, aspirin, bd insulin syringe ultrafine, blood glucose, accu-chek active glucose cont, blood glucose, blood glucose monitoring, blood glucose, carvedilol, dexcom g5 mob/g4 plat sensor, dexcom g5 mobile transmitter, epinephrine, fish oil-omega-3 fatty acids, insulin glargine, insulin lispro, insulin pen needle, lisinopril, magnesium, multi vitamin  mens, omeprazole, sildenafil, and simvastatin.    ROS     ROS: 10 point ROS neg other than the symptoms noted above in the HPI.    Physical Exam   VS: BP (!) 149/79 (BP Location: Left arm, Patient Position: Chair, Cuff Size: Adult Regular)   Pulse 73   Temp 98.1  F (36.7  C) (Oral)   Resp 16   Ht 1.803 m (5' 11\")   Wt 78.3 kg (172 lb 11.2 oz)   SpO2 99%   BMI 24.09 kg/m     GENERAL: AXOX3, NAD, " well dressed, answering questions appropriately, appears stated age.  HEENT: No exopthalmous, no proptosis, EOMI, no lig lag, no retraction  NECK: Thyroid normal in size, non tender, no nodules were palpated.  CV: RRR  LUNGS: CTAB  ABDOMEN: +BS  NEUROLOGY: CN grossly intact, no tremors  PSYCH: normal affect and mood    LABS:  A1c:  Lab Results   Component Value Date    A1C 6.1 07/17/2019    A1C 6.2 11/23/2018    A1C 6.0 07/31/2018    A1C 5.9 01/03/2018    A1C 5.9 05/24/2017         BMP:  Creatinine   Date Value Ref Range Status   01/09/2019 0.94 0.66 - 1.25 mg/dL Final       Urine Micro:  Lab Results   Component Value Date    UMALCR 181.40 01/09/2019         LFTs/Lipids:  Recent Labs   Lab Test 01/09/19  0947 01/03/18  0855  01/16/15  0832 01/20/14  0925   CHOL 150 163   < > 156 163   HDL 70 82   < > 82 60   LDL 67 65   < > 61 88   TRIG 65 80   < > 64 72   CHOLHDLRATIO  --   --   --  1.9 2.7    < > = values in this interval not displayed.     TFTs:  ENDO THYROID LABS-P Latest Ref Rng & Units 7/31/2018   TSH 0.40 - 4.00 mU/L 2.46   T4 FREE 0.76 - 1.46 ng/dL 0.78   THYR PEROXIDASE VERNON <35 IU/mL <10     Blood Glucose and pump data/ Meter reviewed.  CGM data reviewed.    Glucometer/ insulin pump (if applicable)/ CGM data (if applicable) downloaded, Personally reviewed and interpreted.  All Blood sugar data reviewed personally and discussed with pt.  See nursing note from 8/14/2019 for details of BG/CGM log.      All pertinent notes, labs, and images personally reviewed by me.     A/P  Mr.David RICHAR Perry is a 69 year old here for the evaluation/management of diabetes:    1. DM1 - Under Good control.  A1c 6.1%.  Diabetes complicated by proliferative diabetic retinopathy.  In general BG are optimal. Using dexcom with G5 sensors.  Continue current regimen  Continue Lantus 16units at night  Continue with Humalog at current dose with three units with each meal plus correction scale+ 2 units hs if BG >200  Consider metformin  in next visit  Labs and f/u in 6 months.  Check BG 4 or more times/day.    If Blood glucose are low more often-> 2-3 times/week- give us a call.  The patient is advised to Make better food choices: reduce carbs, Reduce portion size, weight loss and exercise 3-4 times a week.  Discussed hypoglycemia signs and symptoms as well as management in detail.      2. Hypertension - Under Poor  control.  On coreg, amlodipine and lisinopril 40 mg.   Patient to follow up with Primary Care provider regarding elevated blood pressure.    3. Hyperlipidemia - Under Good control. On simvastatin 5 mg.  LDL 65, HDL 82  4. Prevention  Flu Shot- NA  Pneumovax- 2011  Opthalmology-nov 2018  ASA-81 mg  Smoking- no    Most Recent Immunizations   Administered Date(s) Administered     Influenza (High Dose) 3 valent vaccine 10/10/2018     Influenza (IIV3) PF 10/02/2014     Influenza Vaccine IM 3yrs+ 4 Valent IIV4 09/28/2015     Pneumo Conj 13-V (2010&after) 01/23/2017     Pneumococcal 23 valent 01/11/2018     TD (ADULT, 7+) 07/03/2015     Tdap (Adacel,Boostrix) 10/19/2005     Zoster vaccine recombinant adjuvanted (SHINGRIX) 10/22/2018     Zoster vaccine, live 11/01/2011       5.  Microalbuminuria:  Lab Results   Component Value Date    UMALCR 135.83 07/25/2018    Continue lisinopril 40 mg.    Recommend strict Bg control.    Follow-up:  Follow up in 6 month(s)    Kierra Sexton M.D  Endocrinology  [facility]/[place]  CC: Hernan Juárez    More than 50% of face to face time spent with Mr. Perry on counseling / coordinating his care.  25 min.  All questions were answered.  The patient indicates understanding of the above issues and agrees with the plan set forth.     Disclaimer: This note consists of symbols derived from keyboarding, dictation and/or voice recognition software. As a result, there may be errors in the script that have gone undetected. Please consider this when interpreting information found in this chart.          Again,  thank you for allowing me to participate in the care of your patient.        Sincerely,        Kierra Sexton MD

## 2019-08-15 ENCOUNTER — TELEPHONE (OUTPATIENT)
Dept: INTERNAL MEDICINE | Facility: CLINIC | Age: 69
End: 2019-08-15

## 2019-09-09 ENCOUNTER — TRANSFERRED RECORDS (OUTPATIENT)
Dept: HEALTH INFORMATION MANAGEMENT | Facility: CLINIC | Age: 69
End: 2019-09-09

## 2019-09-29 ENCOUNTER — HEALTH MAINTENANCE LETTER (OUTPATIENT)
Age: 69
End: 2019-09-29

## 2019-10-04 DIAGNOSIS — E10.21 TYPE 1 DIABETES MELLITUS WITH NEPHROPATHY (H): ICD-10-CM

## 2019-10-04 NOTE — TELEPHONE ENCOUNTER
"Requested Prescriptions   Pending Prescriptions Disp Refills     insulin pen needle (B-D U/F) 31G X 8 MM miscellaneous 300 each 2     Sig: USE WITH INSULIN PEN FOUR TIMES A DAY AS DIRECTED       Diabetic Supplies Protocol Passed - 10/4/2019  2:10 PM        Passed - Medication is active on med list        Passed - Patient is 18 years of age or older        Passed - Recent (6 mo) or future (30 days) visit within the authorizing provider's specialty     Patient had office visit in the last 6 months or has a visit in the next 30 days with authorizing provider.  See \"Patient Info\" tab in inbasket, or \"Choose Columns\" in Meds & Orders section of the refill encounter.            Last Written Prescription Date:  08/14/19  Last Fill Quantity: 300,  # refills: 2   Last office visit: 8/14/2019 with prescribing provider:  yes   Future Office Visit:      "

## 2019-11-09 DIAGNOSIS — E10.21 TYPE 1 DIABETES MELLITUS WITH NEPHROPATHY (H): ICD-10-CM

## 2019-11-09 NOTE — TELEPHONE ENCOUNTER
"Requested Prescriptions   Pending Prescriptions Disp Refills     insulin lispro (HUMALOG KWIKPEN) 100 UNIT/ML (1 unit dial) pen [Pharmacy Med Name: HUMALOG 100 U/ML KWIK PEN INJ 3ML] 15 mL 0     Sig: INJECT 3 UNITS SUBCUTANEOUSLY BEFORE MEALS AND SLIDING SCALE. MAXIMUM 20 UNITS PER DAY       Short Acting Insulin Protocol Failed - 11/9/2019  9:16 AM        Failed - Blood pressure less than 140/90 in past 6 months     BP Readings from Last 3 Encounters:   08/14/19 (!) 142/70   08/12/19 134/78   01/16/19 164/84                 Passed - LDL on file in past 12 months     Recent Labs   Lab Test 01/09/19  0947   LDL 67             Passed - Microalbumin on file in past 12 months     Recent Labs   Lab Test 01/09/19  0955   MICROL 234   UMALCR 181.40*             Passed - Serum creatinine on file in past 12 months     Recent Labs   Lab Test 01/09/19  0947   CR 0.94             Passed - HgbA1C in past 3 or 6 months     If HgbA1C is 8 or greater, it needs to be on file within the past 3 months.  If less than 8, must be on file within the past 6 months.     Recent Labs   Lab Test 07/17/19  0948   A1C 6.1*             Passed - Medication is active on med list        Passed - Patient is age 18 or older        Passed - Recent (6 mo) or future (30 days) visit within the authorizing provider's specialty     Patient had office visit in the last 6 months or has a visit in the next 30 days with authorizing provider or within the authorizing provider's specialty.  See \"Patient Info\" tab in inbasket, or \"Choose Columns\" in Meds & Orders section of the refill encounter.            LANTUS SOLOSTAR 100 UNIT/ML soln [Pharmacy Med Name: LANTUS SOLOSTAR PEN INJ 3ML] 15 mL 0     Sig: ADMINISTER 16 UNITS UNDER THE SKIN DAILY       Long Acting Insulin Protocol Failed - 11/9/2019  9:16 AM        Failed - Blood pressure less than 140/90 in past 6 months     BP Readings from Last 3 Encounters:   08/14/19 (!) 142/70   08/12/19 134/78   01/16/19 164/84 " "                Passed - LDL on file in past 12 months     Recent Labs   Lab Test 01/09/19  0947   LDL 67             Passed - Microalbumin on file in past 12 months     Recent Labs   Lab Test 01/09/19  0955   MICROL 234   UMALCR 181.40*             Passed - Serum creatinine on file in past 12 months     Recent Labs   Lab Test 01/09/19  0947   CR 0.94             Passed - HgbA1C in past 3 or 6 months     If HgbA1C is 8 or greater, it needs to be on file within the past 3 months.  If less than 8, must be on file within the past 6 months.     Recent Labs   Lab Test 07/17/19  0948   A1C 6.1*             Passed - Medication is active on med list        Passed - Patient is age 18 or older        Passed - Recent (6 mo) or future (30 days) visit within the authorizing provider's specialty     Patient had office visit in the last 6 months or has a visit in the next 30 days with authorizing provider or within the authorizing provider's specialty.  See \"Patient Info\" tab in inbasket, or \"Choose Columns\" in Meds & Orders section of the refill encounter.              "

## 2019-11-11 RX ORDER — INSULIN LISPRO 100 [IU]/ML
INJECTION, SOLUTION INTRAVENOUS; SUBCUTANEOUS
Qty: 15 ML | Refills: 0 | Status: SHIPPED | OUTPATIENT
Start: 2019-11-11 | End: 2020-01-29

## 2019-11-11 RX ORDER — INSULIN GLARGINE 100 [IU]/ML
INJECTION, SOLUTION SUBCUTANEOUS
Qty: 15 ML | Refills: 0 | Status: SHIPPED | OUTPATIENT
Start: 2019-11-11 | End: 2020-02-04

## 2019-11-11 NOTE — TELEPHONE ENCOUNTER
Rx sent.    Lab Results   Component Value Date    A1C 6.1 07/17/2019    A1C 6.2 11/23/2018    A1C 6.0 07/31/2018    A1C 5.9 01/03/2018    A1C 5.9 05/24/2017

## 2019-11-11 NOTE — TELEPHONE ENCOUNTER
Routing refill request to provider for review/approval because:  Blood pressures out of range

## 2019-12-09 DIAGNOSIS — E10.21 TYPE 1 DIABETES MELLITUS WITH NEPHROPATHY (H): ICD-10-CM

## 2019-12-09 NOTE — TELEPHONE ENCOUNTER
Continuous Blood Gluc Sensor (DEXCOM G5 MOB/G4 PLAT SENSOR) MISC     Last Written Prescription Date:  8/5/19  Last Fill Quantity: 4,   # refills: 3  Last Office Visit: 8/14/19  Future Office visit:       Routing refill request to provider for review/approval because:  Drug not on the [facility], [facility] or [facility] refill protocol or controlled substance

## 2019-12-10 RX ORDER — BLOOD-GLUCOSE SENSOR
EACH MISCELLANEOUS
Qty: 4 EACH | Refills: 11 | Status: SHIPPED | OUTPATIENT
Start: 2019-12-10 | End: 2020-01-14 | Stop reason: ALTCHOICE

## 2020-01-02 ENCOUNTER — TELEPHONE (OUTPATIENT)
Dept: ENDOCRINOLOGY | Facility: CLINIC | Age: 70
End: 2020-01-02

## 2020-01-02 NOTE — TELEPHONE ENCOUNTER
Central Prior Authorization Team   Phone: [phone]    Prior Authorization Not Needed per Insurance    Medication: insulin pen needle (B-D U/F) 31G X 8 MM miscellaneous  Insurance Company: [employer] - Phone [phone] Fax [phone]  Expected CoPay:      Pharmacy Filling the Rx: [id number] [address] [facility]  Pharmacy Notified: Yes  Patient Notified: Yes    Drug is covered by current benefit plan. No further PA activity needed.  Instructed pharmacy to call help desk with further processing issues.

## 2020-01-02 NOTE — TELEPHONE ENCOUNTER
Please expedite PA for patient who is leaving the country first of next week.    Prior Authorization Retail Medication Request    Medication/Dose: insulin pen needle (B-D U/F) 31G X 8 MM miscellaneous  ICD code (if different than what is on RX):  E10.21  Previously Tried and Failed:    Rationale:      Insurance Name:    Insurance ID:        Pharmacy Information (if different than what is on RX)  Name:  [facility]  Phone:  [phone]

## 2020-01-03 ENCOUNTER — TELEPHONE (OUTPATIENT)
Dept: INTERNAL MEDICINE | Facility: CLINIC | Age: 70
End: 2020-01-03

## 2020-01-03 DIAGNOSIS — Z12.5 SCREENING FOR PROSTATE CANCER: Primary | ICD-10-CM

## 2020-01-03 NOTE — TELEPHONE ENCOUNTER
I generally would not recommend pt have PSA done at this time given date of previous lab and previous PSA result but will add on per pt request and discuss further at future re:  standard recommendations re: prostate CA screening. Pt to have lab done fasting due to other blood work ordered. Will be blood and urine lab

## 2020-01-03 NOTE — TELEPHONE ENCOUNTER
Patient informed.     Agreed to discuss at upcoming appointment. He will proceed with lab.    Jennie ENG, RN, PHN

## 2020-01-03 NOTE — TELEPHONE ENCOUNTER
Reason for Call: Lab work for physical with Dr. Juárez    Detailed comments: Patient is scheduled for physical with Dr. Juárez on 1/28. Lab work scheduled at the [facility] on 1/22. He wants to know if Dr. Juárez will include a PSA with the blood work orders. Please call to let him know if this is a possibility.     Phone Number Patient can be reached at: Cell number on file:    Telephone Information:   Mobile [phone]       Best Time: anytime    Can we leave a detailed message on this number? YES    Call taken on 1/3/2020 at 9:03 AM by Hetal Campbell

## 2020-01-08 ENCOUNTER — TELEPHONE (OUTPATIENT)
Dept: ENDOCRINOLOGY | Facility: CLINIC | Age: 70
End: 2020-01-08

## 2020-01-08 NOTE — TELEPHONE ENCOUNTER
Form was faxed, copied, sent to scanning.      Kellie Doherty, [facility]  [facility]

## 2020-01-08 NOTE — TELEPHONE ENCOUNTER
Form from [facility] for strips and lancets.    LAST OFFICE/VIRTUAL VISIT:  08/14/19    FUTURE OFFICE/VIRTUAL VISIT:  None    Lab Results   Component Value Date    A1C 6.1 07/17/2019    A1C 6.2 11/23/2018    A1C 6.0 07/31/2018    A1C 5.9 01/03/2018    A1C 5.9 05/24/2017         Kellie Doherty CMA  [facility]  [place]

## 2020-01-10 DIAGNOSIS — E10.21 TYPE 1 DIABETES MELLITUS WITH NEPHROPATHY (H): Primary | ICD-10-CM

## 2020-01-10 NOTE — TELEPHONE ENCOUNTER
"Please send new Rx's for Dexcom G6 transmitter/sensors (MEDICARE B COMPLIANT).  Dexcom G6 Transmitter qty #1 and can have 3 refills, directions MUST read \"CHANGE EVERY 3 MONTHS\".  Dexcom G6 Sensors qty #3 can have 11 refills, directions MUST read \"CHANGE EVERY 10 DAYS\".    Thank you!  Merlyn Engel [facility]      "

## 2020-01-13 ENCOUNTER — TELEPHONE (OUTPATIENT)
Dept: ENDOCRINOLOGY | Facility: CLINIC | Age: 70
End: 2020-01-13

## 2020-01-14 RX ORDER — PROCHLORPERAZINE 25 MG/1
1 SUPPOSITORY RECTAL
Qty: 3 EACH | Refills: 11 | Status: SHIPPED | OUTPATIENT
Start: 2020-01-14 | End: 2020-02-24

## 2020-01-14 RX ORDER — PROCHLORPERAZINE 25 MG/1
1 SUPPOSITORY RECTAL DAILY
Qty: 1 EACH | Refills: 3 | Status: SHIPPED | OUTPATIENT
Start: 2020-01-14 | End: 2020-02-24

## 2020-01-14 RX ORDER — PROCHLORPERAZINE 25 MG/1
1 SUPPOSITORY RECTAL DAILY
Qty: 1 DEVICE | Refills: 0 | Status: SHIPPED | OUTPATIENT
Start: 2020-01-14 | End: 2020-02-24

## 2020-01-21 ENCOUNTER — TELEPHONE (OUTPATIENT)
Dept: ENDOCRINOLOGY | Facility: CLINIC | Age: 70
End: 2020-01-21

## 2020-01-21 NOTE — TELEPHONE ENCOUNTER
Endo staff- can you please take a look into this?  Thank you.    
Form was faxed, copied, sent to scanning.      Kellie Doherty, [facility]  [facility]      
Form was faxed, copied, sent to scanning.      Kellie Doherty, [facility]  [place]      
Forms/paperwork reviewed, completed and signed.  Please fax or send the papers as requested, document in chart and close the encounter.    Thank you.    Kierra Sexton MD      
Forms/paperwork reviewed, completed and signed.  Please fax or send the papers as requested, document in chart and close the encounter.    Thank you.    Kierra Sexton MD      
Need to update form with testing.      Please resign.    Kellie Doherty CMA  [facility]  [facility]    
[facility]  Diabetic supply orders  Dr. Sexton's in basket   Fax    
[facility] for strips/lancets.    LAST OFFICE/VIRTUAL VISIT:  08/14/19    FUTURE OFFICE/VIRTUAL VISIT:  None    Lab Results   Component Value Date    A1C 6.1 07/17/2019    A1C 6.2 11/23/2018    A1C 6.0 07/31/2018    A1C 5.9 01/03/2018    A1C 5.9 05/24/2017         Kellie Doherty CMA  [facility]  [facility]      
no

## 2020-01-21 NOTE — LETTER
[facility]  [address], [address]  [place], [place]  [address]                                            TEL:[phone]  FAX:[phone]      Eh Perry  [address]  [facility] [address]      January 21, 2020    Dear Eh       This letter is being sent on behalf of Dr. Sexton. It is to remind you that your provider expected you to return for a follow up clinic visit. Please make a lab only appointment, and then follow up with a clinic visit one week afterwards to review those results. If this is not done, it may result in your provider not being able to refill your medications.     You may call our office at [phone] ([place]) or [phone] ([place]) to schedule an appointment. You may also see Shelly Campbell in [place] at [phone]    If you have already scheduled these appointments, please disregard this notice.      Sincerely,    [facility],  Dr. Kierra Sexton

## 2020-01-21 NOTE — TELEPHONE ENCOUNTER
Panel Management Review      Patient has the following on his problem list:     Diabetes    ASA: Passed    Last A1C  Lab Results   Component Value Date    A1C 6.1 07/17/2019    A1C 6.2 11/23/2018    A1C 6.0 07/31/2018    A1C 5.9 01/03/2018    A1C 5.9 05/24/2017     A1C tested: Passed    Last LDL:    Lab Results   Component Value Date    CHOL 150 01/09/2019     Lab Results   Component Value Date    HDL 70 01/09/2019     Lab Results   Component Value Date    LDL 67 01/09/2019     Lab Results   Component Value Date    TRIG 65 01/09/2019     Lab Results   Component Value Date    CHOLHDLRATIO 1.9 01/16/2015     Lab Results   Component Value Date    NHDL 80 01/09/2019       Is the patient on a Statin? YES             Is the patient on Aspirin? YES    Medications     HMG CoA Reductase Inhibitors     simvastatin (ZOCOR) 10 MG tablet       Salicylates     aspirin 81 MG tablet             Last three blood pressure readings:  BP Readings from Last 3 Encounters:   08/14/19 (!) 142/70   08/12/19 134/78   01/16/19 164/84       Date of last diabetes office visit: 08/14/19     Tobacco History:     History   Smoking Status     Former Smoker     Packs/day: 1.00     Years: 15.00   Smokeless Tobacco     Never Used           Composite cancer screening  Chart review shows that this patient is due/due soon for the following None  Summary:    Patient is due/failing the following:   A1C and FOLLOW UP    Action needed:   Patient needs office visit for endo.    Type of outreach:    Sent letter.    Questions for provider review:    None                                                                                                                                    Kellie Doherty, JING  [facility]  [facility]       Chart routed to no one .

## 2020-01-22 DIAGNOSIS — I10 ESSENTIAL HYPERTENSION: ICD-10-CM

## 2020-01-22 DIAGNOSIS — E78.5 HYPERLIPIDEMIA LDL GOAL <100: ICD-10-CM

## 2020-01-22 DIAGNOSIS — Z12.5 SCREENING FOR PROSTATE CANCER: ICD-10-CM

## 2020-01-22 DIAGNOSIS — E10.21 TYPE 1 DIABETES MELLITUS WITH NEPHROPATHY (H): ICD-10-CM

## 2020-01-22 LAB
ALBUMIN SERPL-MCNC: 4 G/DL (ref 3.4–5)
ALP SERPL-CCNC: 91 U/L (ref 40–150)
ALT SERPL W P-5'-P-CCNC: 24 U/L (ref 0–70)
ANION GAP SERPL CALCULATED.3IONS-SCNC: 6 MMOL/L (ref 3–14)
AST SERPL W P-5'-P-CCNC: 21 U/L (ref 0–45)
BILIRUB SERPL-MCNC: 0.6 MG/DL (ref 0.2–1.3)
BUN SERPL-MCNC: 15 MG/DL (ref 7–30)
CALCIUM SERPL-MCNC: 9.8 MG/DL (ref 8.5–10.1)
CHLORIDE SERPL-SCNC: 104 MMOL/L (ref 94–109)
CHOLEST SERPL-MCNC: 135 MG/DL
CO2 SERPL-SCNC: 26 MMOL/L (ref 20–32)
CREAT SERPL-MCNC: 0.83 MG/DL (ref 0.66–1.25)
CREAT UR-MCNC: 52 MG/DL
GFR SERPL CREATININE-BSD FRML MDRD: 90 ML/MIN/{1.73_M2}
GLUCOSE SERPL-MCNC: 124 MG/DL (ref 70–99)
HBA1C MFR BLD: 6.1 % (ref 0–5.6)
HDLC SERPL-MCNC: 57 MG/DL
LDLC SERPL CALC-MCNC: 59 MG/DL
MICROALBUMIN UR-MCNC: 109 MG/L
MICROALBUMIN/CREAT UR: 209.62 MG/G CR (ref 0–17)
NONHDLC SERPL-MCNC: 78 MG/DL
POTASSIUM SERPL-SCNC: 4.9 MMOL/L (ref 3.4–5.3)
PROT SERPL-MCNC: 8.1 G/DL (ref 6.8–8.8)
PSA SERPL-ACNC: 0.59 UG/L (ref 0–4)
SODIUM SERPL-SCNC: 136 MMOL/L (ref 133–144)
TRIGL SERPL-MCNC: 95 MG/DL
TSH SERPL DL<=0.005 MIU/L-ACNC: 2.83 MU/L (ref 0.4–4)

## 2020-01-22 PROCEDURE — 36415 COLL VENOUS BLD VENIPUNCTURE: CPT | Performed by: INTERNAL MEDICINE

## 2020-01-22 PROCEDURE — G0103 PSA SCREENING: HCPCS | Performed by: INTERNAL MEDICINE

## 2020-01-22 PROCEDURE — 84443 ASSAY THYROID STIM HORMONE: CPT | Performed by: INTERNAL MEDICINE

## 2020-01-22 PROCEDURE — 80053 COMPREHEN METABOLIC PANEL: CPT | Performed by: INTERNAL MEDICINE

## 2020-01-22 PROCEDURE — 80061 LIPID PANEL: CPT | Performed by: INTERNAL MEDICINE

## 2020-01-22 PROCEDURE — 82043 UR ALBUMIN QUANTITATIVE: CPT | Performed by: INTERNAL MEDICINE

## 2020-01-22 PROCEDURE — 83036 HEMOGLOBIN GLYCOSYLATED A1C: CPT | Performed by: INTERNAL MEDICINE

## 2020-01-22 ASSESSMENT — ACTIVITIES OF DAILY LIVING (ADL): CURRENT_FUNCTION: NO ASSISTANCE NEEDED

## 2020-01-27 DIAGNOSIS — E10.21 TYPE 1 DIABETES MELLITUS WITH NEPHROPATHY (H): ICD-10-CM

## 2020-01-28 ENCOUNTER — OFFICE VISIT (OUTPATIENT)
Dept: INTERNAL MEDICINE | Facility: CLINIC | Age: 70
End: 2020-01-28
Payer: MEDICARE

## 2020-01-28 ENCOUNTER — TELEPHONE (OUTPATIENT)
Dept: INTERNAL MEDICINE | Facility: CLINIC | Age: 70
End: 2020-01-28

## 2020-01-28 VITALS
OXYGEN SATURATION: 98 % | WEIGHT: 170 LBS | DIASTOLIC BLOOD PRESSURE: 74 MMHG | SYSTOLIC BLOOD PRESSURE: 132 MMHG | TEMPERATURE: 98.7 F | HEIGHT: 71 IN | RESPIRATION RATE: 16 BRPM | BODY MASS INDEX: 23.8 KG/M2 | HEART RATE: 76 BPM

## 2020-01-28 DIAGNOSIS — Z12.5 SCREENING FOR PROSTATE CANCER: ICD-10-CM

## 2020-01-28 DIAGNOSIS — Z00.00 MEDICARE ANNUAL WELLNESS VISIT, SUBSEQUENT: Primary | ICD-10-CM

## 2020-01-28 DIAGNOSIS — K21.9 GASTROESOPHAGEAL REFLUX DISEASE, ESOPHAGITIS PRESENCE NOT SPECIFIED: ICD-10-CM

## 2020-01-28 DIAGNOSIS — E10.21 TYPE 1 DIABETES MELLITUS WITH NEPHROPATHY (H): ICD-10-CM

## 2020-01-28 DIAGNOSIS — I10 ESSENTIAL HYPERTENSION: ICD-10-CM

## 2020-01-28 DIAGNOSIS — R80.9 PROTEINURIA, UNSPECIFIED TYPE: ICD-10-CM

## 2020-01-28 DIAGNOSIS — N52.9 ERECTILE DYSFUNCTION, UNSPECIFIED ERECTILE DYSFUNCTION TYPE: ICD-10-CM

## 2020-01-28 DIAGNOSIS — E78.5 HYPERLIPIDEMIA LDL GOAL <100: ICD-10-CM

## 2020-01-28 PROCEDURE — 99213 OFFICE O/P EST LOW 20 MIN: CPT | Mod: 25 | Performed by: INTERNAL MEDICINE

## 2020-01-28 PROCEDURE — 99207 C FOOT EXAM  NO CHARGE: CPT | Mod: 25 | Performed by: INTERNAL MEDICINE

## 2020-01-28 PROCEDURE — G0439 PPPS, SUBSEQ VISIT: HCPCS | Performed by: INTERNAL MEDICINE

## 2020-01-28 RX ORDER — AMLODIPINE BESYLATE 2.5 MG/1
2.5 TABLET ORAL DAILY
Qty: 90 TABLET | Refills: 3 | Status: SHIPPED | OUTPATIENT
Start: 2020-01-28 | End: 2020-12-10

## 2020-01-28 RX ORDER — SILDENAFIL 50 MG/1
TABLET, FILM COATED ORAL
Qty: 30 TABLET | Refills: 3 | Status: SHIPPED | OUTPATIENT
Start: 2020-01-28 | End: 2020-02-05

## 2020-01-28 RX ORDER — LISINOPRIL 40 MG/1
40 TABLET ORAL DAILY
Qty: 90 TABLET | Refills: 3 | Status: SHIPPED | OUTPATIENT
Start: 2020-01-28 | End: 2020-12-10

## 2020-01-28 RX ORDER — LANCETS
EACH MISCELLANEOUS
Qty: 200 EACH | Refills: 4 | Status: SHIPPED | OUTPATIENT
Start: 2020-01-28 | End: 2020-03-18

## 2020-01-28 RX ORDER — CARVEDILOL 6.25 MG/1
6.25 TABLET ORAL 2 TIMES DAILY WITH MEALS
Qty: 180 TABLET | Refills: 3 | Status: SHIPPED | OUTPATIENT
Start: 2020-01-28 | End: 2021-01-08

## 2020-01-28 RX ORDER — NICOTINE POLACRILEX 4 MG/1
GUM, CHEWING ORAL
Qty: 90 TABLET | Refills: 3 | Status: SHIPPED | OUTPATIENT
Start: 2020-01-28 | End: 2020-02-24

## 2020-01-28 RX ORDER — SIMVASTATIN 10 MG
TABLET ORAL
Qty: 45 TABLET | Refills: 3 | Status: SHIPPED | OUTPATIENT
Start: 2020-01-28 | End: 2021-01-08

## 2020-01-28 ASSESSMENT — MIFFLIN-ST. JEOR: SCORE: 1563.24

## 2020-01-28 ASSESSMENT — ACTIVITIES OF DAILY LIVING (ADL): CURRENT_FUNCTION: NO ASSISTANCE NEEDED

## 2020-01-28 NOTE — TELEPHONE ENCOUNTER
Prior Authorization Retail Medication Request    Medication/Dosesildenafil (VIAGRA) 50 MG tablet:   ICD code (if different than what is on RX):  N52.9  Previously Tried and Failed:    Rationale:      Insurance Name:  Medicare  Insurance ID:  [id number]      Pharmacy Information (if different than what is on RX)  Name:  [facility]  Phone:  [phone]

## 2020-01-28 NOTE — TELEPHONE ENCOUNTER
"Requested Prescriptions   Pending Prescriptions Disp Refills     insulin lispro (HUMALOG KWIKPEN) 100 UNIT/ML (1 unit dial) pen [Pharmacy Med Name: HUMALOG 100 U/ML KWIK PEN INJ 3ML] 15 mL 0     Sig: INJECT 3 UNITS UNDER THE SKIN BEFORE MEALS AND SLIDING SCALE. MAX 20 UNITS PER DAY   Last Written Prescription Date:  11/11/19  Last Fill Quantity: 15,  # refills: 0   Last office visit: 8/14/2019 with prescribing provider:  yes   Future Office Visit:   Next 5 appointments (look out 90 days)    Feb 24, 2020 11:30 AM CST  Return Visit with Kierra Sexton MD  [facility] [address]  [address]  [facility] [address] [phone]             Short Acting Insulin Protocol Failed - 1/27/2020 12:28 PM        Failed - Blood pressure less than 140/90 in past 6 months     BP Readings from Last 3 Encounters:   01/28/20 132/74   08/14/19 (!) 142/70   08/12/19 134/78                 Passed - LDL on file in past 12 months     Recent Labs   Lab Test 01/22/20  1015   LDL 59             Passed - Microalbumin on file in past 12 months     Recent Labs   Lab Test 01/22/20  1024   MICROL 109   UMALCR 209.62*             Passed - Serum creatinine on file in past 12 months     Recent Labs   Lab Test 01/22/20  1015   CR 0.83             Passed - HgbA1C in past 3 or 6 months     If HgbA1C is 8 or greater, it needs to be on file within the past 3 months.  If less than 8, must be on file within the past 6 months.     Recent Labs   Lab Test 01/22/20  1016   A1C 6.1*             Passed - Medication is active on med list        Passed - Patient is age 18 or older        Passed - Recent (6 mo) or future (30 days) visit within the authorizing provider's specialty     Patient had office visit in the last 6 months or has a visit in the next 30 days with authorizing provider or within the authorizing provider's specialty.  See \"Patient Info\" tab in inbasket, or \"Choose Columns\" in Meds & " Orders section of the refill encounter.

## 2020-01-28 NOTE — PATIENT INSTRUCTIONS
Continue current meds  Prescriptions refilled.    Call  [phone] or use [employer] to schedule a future lab appointment  in 6 months.   Pt was informed regarding extra E&M billing for management of new or established medical issues not related to today's wellness visit

## 2020-01-28 NOTE — TELEPHONE ENCOUNTER
Patients insurance doesn't cover omeprazole 20 MG tablet they only cover capsules.  Please send change of capsules to pharmacy.

## 2020-01-28 NOTE — PROGRESS NOTES
"SUBJECTIVE:   Eh Perry is a 69 year old male who presents for Preventive Visit.    Are you in the first 12 months of your Medicare coverage?  No    Healthy Habits:     In general, how would you rate your overall health?  Good    Frequency of exercise:  6-7 days/week    Duration of exercise:  45-60 minutes    Do you usually eat at least 4 servings of fruit and vegetables a day, include whole grains    & fiber and avoid regularly eating high fat or \"junk\" foods?  Yes    Taking medications regularly:  Yes    Medication side effects:  None    Ability to successfully perform activities of daily living:  No assistance needed    Home Safety:  No safety concerns identified    Hearing Impairment:  Difficulty following a conversation in a noisy restaurant or crowded room    In the past 6 months, have you been bothered by leaking of urine?  No    In general, how would you rate your overall mental or emotional health?  Good      PHQ-2 Total Score: 0    Additional concerns today:  Yes    Do you feel safe in your environment? Yes    Have you ever done Advance Care Planning? (For example, a Health Directive, POLST, or a discussion with a medical provider or your loved ones about your wishes): Yes, patient states has an Advance Care Planning document and will bring a copy to the clinic.    Fall risk  Fallen 2 or more times in the past year?: No  Any fall with injury in the past year?: No    Cognitive Screening   1) Repeat 3 items (Leader, Season, Table)    2) Clock draw: NORMAL  3) 3 item recall: Recalls 3 objects  Results: 3 items recalled: COGNITIVE IMPAIRMENT LESS LIKELY    Mini-CogTM Copyright JOHN Fried. Licensed by the author for use in [facility]; reprinted with permission (jennie@.[facility]). All rights reserved.      Do you have sleep apnea, excessive snoring or daytime drowsiness?: no    Reviewed and updated as needed this visit by clinical staff  Tobacco  Allergies  Meds         Reviewed and updated as " needed this visit by Provider        Social History     Tobacco Use     Smoking status: Former Smoker     Packs/day: 1.00     Years: 15.00     Pack years: 15.00     Smokeless tobacco: Never Used   Substance Use Topics     Alcohol use: Yes     Frequency: 2-3 times a week     Comment: occ     If you drink alcohol do you typically have >3 drinks per day or >7 drinks per week? No        Current providers sharing in care for this patient include:   Patient Care Team:  Hernan Juárez MD as PCP - General (Internal Medicine)  Hernan Juárez MD as Assigned PCP    The following health maintenance items are reviewed in Epic and correct as of today:  Health Maintenance   Topic Date Due     ADVANCE CARE PLANNING  10/19/2017     EYE EXAM  12/03/2019     DIABETIC FOOT EXAM  01/14/2020     FALL RISK ASSESSMENT  01/14/2020     MEDICARE ANNUAL WELLNESS VISIT  01/14/2020     A1C  07/22/2020     BMP  01/22/2021     LIPID  01/22/2021     MICROALBUMIN  01/22/2021     TSH W/FREE T4 REFLEX  01/22/2022     COLONOSCOPY  06/01/2022     PSA  01/22/2023     DTAP/TDAP/TD IMMUNIZATION (3 - Td) 07/03/2025     HEPATITIS C SCREENING  Completed     PHQ-2  Completed     INFLUENZA VACCINE  Completed     PNEUMOCOCCAL IMMUNIZATION 65+ LOW/MEDIUM RISK  Completed     ZOSTER IMMUNIZATION  Completed     AORTIC ANEURYSM SCREENING (SYSTEM ASSIGNED)  Completed     IPV IMMUNIZATION  Aged Out     MENINGITIS IMMUNIZATION  Aged Out     Labs reviewed in EPIC      Review of Systems  CONSTITUTIONAL: NEGATIVE for fever, chills. Weight down 3 pounds in 1 year  INTEGUMENTARY/SKIN: NEGATIVE for worrisome rashes, moles or lesions  EYES: NEGATIVE for vision changes. Has glasses. Eye exam UTD. Seeing eye doctor monthly for injection left eye for bleeding hx.   ENT/MOUTH: NEGATIVE for ear pain , mouth and throat problems. Mild bilateral tinnitus (L>R). 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  Hearing  mildly reduced bilaterally. Not bothersome enough that wants to see  "ENT/audiology  RESP: NEGATIVE for significant cough or SOB  CV: NEGATIVE for chest pain, palpitations or peripheral edema  GI: NEGATIVE for nausea, abdominal pain, heartburn, or change in bowel habits  : NEGATIVE for frequency, dysuria, or hematuria. Stable proteinuria and ED control with meds  MUSCULOSKELETAL: NEGATIVE for significant arthralgias or myalgia that limit activity  NEURO: NEGATIVE for weakness, dizziness or paresthesias  ENDOCRINE: NEGATIVE for temperature intolerance. DM and lipids well controlled with meds  HEME: NEGATIVE for bleeding problems  PSYCHIATRIC: NEGATIVE for changes in mood or affect      Lab Results   Component Value Date     01/22/2020     Lab Results   Component Value Date    A1C 6.1 01/22/2020     Lab Results   Component Value Date    CHOL 135 01/22/2020     Lab Results   Component Value Date    LDL 59 01/22/2020     Lab Results   Component Value Date    HDL 57 01/22/2020     Lab Results   Component Value Date    TRIG 95 01/22/2020     Lab Results   Component Value Date    CR 0.83 01/22/2020     Lab Results   Component Value Date    ALT 24 01/22/2020     Lab Results   Component Value Date    AST 21 01/22/2020     Lab Results   Component Value Date    MICROL 109 01/22/2020     Lab Results   Component Value Date    TSH 2.83 01/22/2020        OBJECTIVE:   /74   Pulse 76   Temp 98.7  F (37.1  C) (Temporal)   Resp 16   Ht 1.803 m (5' 11\")   Wt 77.1 kg (170 lb)   SpO2 98%   BMI 23.71 kg/m   Estimated body mass index is 23.71 kg/m  as calculated from the following:    Height as of this encounter: 1.803 m (5' 11\").    Weight as of this encounter: 77.1 kg (170 lb).  Physical Exam  General appearance - healthy, alert, no distress  Skin - No rashes or lesions.  Head - normocephalic, atraumatic  Eyes - JULIETTE, EOMI, fundi exam with nondilated pupils negative.  Ears - External ears normal. Canals clear. TM's normal.  Nose/Sinuses - Nares normal. Septum midline. Mucosa normal. " No drainage or sinus tenderness.  Oropharynx - No erythema, no adenopathy, no exudates.  Neck - Supple without adenopathy or thyromegaly. No bruits.  Lungs - Clear to auscultation without wheezes/rhonchi.  Heart - Regular rate and rhythm without murmurs, clicks, or gallops.  Nodes - No supraclavicular, axillary, or inguinal adenopathy palpable.  Abdomen - Abdomen soft, non-tender. BS normal. No masses or hepatosplenomegaly palpable. No bruits.  Extremities -No cyanosis, clubbing or edema.    Musculoskeletal - Spine ROM normal. Muscular strength intact.   Peripheral pulses - radial=4/4, femoral=4/4, posterior tibial=4/4, dorsalis pedis=4/4,  Neuro - Gait normal. Reflexes normal and symmetric. Sensation grossly WNL.  Genital - Normal-appearing male external genitalia. No scrotal masses or inguinal hernia palpable.   Rectal - Guaic negative stool. Normal tone. Prostate normal in size to palpation. No rectal masses or prostate nodularity palpable       ASSESSMENT / PLAN:   1. Medicare annual wellness visit, subsequent   See below for HCM. Otherwise UTD. Declines further ENT/audiollgy w/u at this time    2. Essential hypertension  Controlled with meds  - amLODIPine (NORVASC) 2.5 MG tablet; Take 1 tablet (2.5 mg) by mouth daily  Dispense: 90 tablet; Refill: 3  - carvedilol (COREG) 6.25 MG tablet; Take 1 tablet (6.25 mg) by mouth 2 times daily (with meals)  Dispense: 180 tablet; Refill: 3  - lisinopril (PRINIVIL/ZESTRIL) 40 MG tablet; Take 1 tablet (40 mg) by mouth daily  Dispense: 90 tablet; Refill: 3    3. Type 1 diabetes mellitus with nephropathy (goal A1C<7)  Controlled with meds. Insulin, lab monitoring and  management  per Endo  - blood glucose (ACCU-CHEK ERUM PLUS) test strip; USE TO TEST BLOOD SUGARS 2 TIMES DAILY OR AS DIRECTED  Dispense: 200 each; Refill: 3  - blood glucose monitoring (ACCU-CHEK FASTCLIX) lancets; USE TO TEST 2 TIMES DAILY  Dispense: 200 each; Refill: 4    4. Hyperlipidemia LDL goal  "<100  Controlled with meds  - simvastatin (ZOCOR) 10 MG tablet; TAKE 1/2 TABLET (5 MG) BY MOUTH AT BEDTIME  Dispense: 45 tablet; Refill: 3  - Comprehensive metabolic panel; Future  - Lipid panel reflex to direct LDL Fasting; Future    5. Erectile dysfunction, unspecified erectile dysfunction type  Controlled with meds prn  - sildenafil (VIAGRA) 50 MG tablet; Take 0.5-1 tablets  by mouth  min before  sex. Max use once a day. Never use with nitroglycerin, terazosin or doxazosin.  Dispense: 30 tablet; Refill: 3    6. Gastroesophageal reflux disease, esophagitis presence not specified  Controlled with meds. Has recurrence off of PPI  - omeprazole 20 MG tablet; 1 tab daily  Dispense: 90 tablet; Refill: 3    7. Proteinuria, unspecified type  Mild elevation but stable. Continue ACEI and DM  Management. BP at goal  - Albumin Random Urine Quantitative with Creat Ratio; Future    8. Screening for prostate cancer   PSA ordered for intermittent monitoring after discussion with pt and pt preference. Not doing annual PSA per USPTF recs  - Prostate spec antigen screen; Future      COUNSELING:  Reviewed preventive health counseling, as reflected in patient instructions    Estimated body mass index is 23.71 kg/m  as calculated from the following:    Height as of this encounter: 1.803 m (5' 11\").    Weight as of this encounter: 77.1 kg (170 lb).         reports that he has quit smoking. He has a 15.00 pack-year smoking history. He has never used smokeless tobacco.      Appropriate preventive services were discussed with this patient, including applicable screening as appropriate for cardiovascular disease, diabetes, osteopenia/osteoporosis, and glaucoma.  As appropriate for age/gender, discussed screening for colorectal cancer, prostate cancer, breast cancer, and cervical cancer. Checklist reviewing preventive services available has been given to the patient.    Reviewed patients plan of care and provided an AVS. The Basic Care " Plan (routine screening as documented in Health Maintenance) for Eh meets the Care Plan requirement. This Care Plan has been established and reviewed with the Patient.    Counseling Resources:  ATP IV Guidelines  Pooled Cohorts Equation Calculator  Breast Cancer Risk Calculator  FRAX Risk Assessment  ICSI Preventive Guidelines  Dietary Guidelines for Americans, 2010  USDA's MyPlate  ASA Prophylaxis  Lung CA Screening      PLAN:  Continue current meds  Prescriptions refilled.    Call  [phone] or use [employer] to schedule a future lab appointment  in 6 months.   Pt was informed regarding extra E&M billing for management of new or established medical issues not related to today's wellness visit    Hernan Juárez MD  [facility]

## 2020-01-29 ENCOUNTER — TRANSFERRED RECORDS (OUTPATIENT)
Dept: HEALTH INFORMATION MANAGEMENT | Facility: CLINIC | Age: 70
End: 2020-01-29

## 2020-01-29 LAB — RETINOPATHY: POSITIVE

## 2020-01-29 RX ORDER — INSULIN LISPRO 100 [IU]/ML
INJECTION, SOLUTION INTRAVENOUS; SUBCUTANEOUS
Qty: 15 ML | Refills: 1 | Status: SHIPPED | OUTPATIENT
Start: 2020-01-29 | End: 2020-04-28

## 2020-01-29 NOTE — TELEPHONE ENCOUNTER
Routing refill request to provider for review/approval because:  Blood pressure elevated x1 at last office visit with provider.   Last office visit was 8/14/19  Last refilled on 11/11/19

## 2020-01-31 NOTE — TELEPHONE ENCOUNTER
Central Prior Authorization Team   Phone: [phone]      PA -PLAN EXCLUSION    Medication: sildenafil (VIAGRA) 50 MG tablet  Insurance Company: [employer] - Phone [phone] Fax [phone]  Pharmacy Filling the Rx: [id number] - [place], [place] - [address] [facility] [address]  Filling Pharmacy Phone: [phone]  Filling Pharmacy Fax:    Start Date: 1/31/2020    Started PA on CMM and a response of This medication may be excluded from the patient's benefit. For more information, please reach out to [employer] directly.  Called and spoke with Samir at [employer].  The medication is a plan exclusion an there is not an option to complete a PA.

## 2020-02-04 DIAGNOSIS — E10.21 TYPE 1 DIABETES MELLITUS WITH NEPHROPATHY (H): ICD-10-CM

## 2020-02-04 RX ORDER — INSULIN GLARGINE 100 [IU]/ML
INJECTION, SOLUTION SUBCUTANEOUS
Qty: 15 ML | Refills: 0 | Status: SHIPPED | OUTPATIENT
Start: 2020-02-04 | End: 2020-04-28

## 2020-02-04 NOTE — TELEPHONE ENCOUNTER
"Requested Prescriptions   Pending Prescriptions Disp Refills     LANTUS SOLOSTAR 100 UNIT/ML soln [Pharmacy Med Name: LANTUS SOLOSTAR PEN INJ 3ML] 15 mL 0     Sig: ADMINISTER 16 UNITS UNDER THE SKIN DAILY   Last Written Prescription Date:  11/11/19  Last Fill Quantity: 15,  # refills: 0   Last office visit: 8/14/2019 with prescribing provider:  yes   Future Office Visit:   Next 5 appointments (look out 90 days)    Feb 24, 2020 11:30 AM CST  Return Visit with Kierra Sexton MD  [facility] [address]  [address]  [facility] [place] [facility] [place] [address]  [phone]             Long Acting Insulin Protocol Passed - 2/4/2020  8:36 AM        Passed - Blood pressure less than 140/90 in past 6 months     BP Readings from Last 3 Encounters:   01/28/20 132/74   08/14/19 (!) 142/70   08/12/19 134/78                 Passed - LDL on file in past 12 months     Recent Labs   Lab Test 01/22/20  1015   LDL 59             Passed - Microalbumin on file in past 12 months     Recent Labs   Lab Test 01/22/20  1024   MICROL 109   UMALCR 209.62*             Passed - Serum creatinine on file in past 12 months     Recent Labs   Lab Test 01/22/20  1015   CR 0.83             Passed - HgbA1C in past 3 or 6 months     If HgbA1C is 8 or greater, it needs to be on file within the past 3 months.  If less than 8, must be on file within the past 6 months.     Recent Labs   Lab Test 01/22/20  1016   A1C 6.1*             Passed - Medication is active on med list        Passed - Patient is age 18 or older        Passed - Recent (6 mo) or future (30 days) visit within the authorizing provider's specialty     Patient had office visit in the last 6 months or has a visit in the next 30 days with authorizing provider or within the authorizing provider's specialty.  See \"Patient Info\" tab in inbasket, or \"Choose Columns\" in Meds & Orders section of the refill encounter.              "

## 2020-02-04 NOTE — TELEPHONE ENCOUNTER
Prescription approved per [facility] Refill Protocol.    Last refilled on 11/11/19  Last office visit was 8/14/19    Next 5 appointments (look out 90 days)    Feb 24, 2020 11:30 AM CST  [facility] [address] [place] [id number] [phone]

## 2020-02-06 RX ORDER — SILDENAFIL 50 MG/1
TABLET, FILM COATED ORAL
Qty: 30 TABLET | Refills: 3 | Status: SHIPPED | OUTPATIENT
Start: 2020-02-06 | End: 2021-01-08

## 2020-02-06 NOTE — TELEPHONE ENCOUNTER
Spoke with pt re: no coverage. Will mail paper Rx for Viagra  tomorrow to pt and he will go to [employer] and get coupon to take to [employer], [employer] or [employer] for significant cost savings and turn in coupon with Rx

## 2020-02-24 ENCOUNTER — OFFICE VISIT (OUTPATIENT)
Dept: ENDOCRINOLOGY | Facility: CLINIC | Age: 70
End: 2020-02-24
Payer: MEDICARE

## 2020-02-24 ENCOUNTER — TELEPHONE (OUTPATIENT)
Dept: ENDOCRINOLOGY | Facility: CLINIC | Age: 70
End: 2020-02-24

## 2020-02-24 ENCOUNTER — MYC MEDICAL ADVICE (OUTPATIENT)
Dept: EDUCATION SERVICES | Facility: CLINIC | Age: 70
End: 2020-02-24

## 2020-02-24 VITALS
BODY MASS INDEX: 24.42 KG/M2 | SYSTOLIC BLOOD PRESSURE: 164 MMHG | HEART RATE: 81 BPM | HEIGHT: 71 IN | DIASTOLIC BLOOD PRESSURE: 83 MMHG | WEIGHT: 174.4 LBS | TEMPERATURE: 97.6 F | OXYGEN SATURATION: 99 % | RESPIRATION RATE: 16 BRPM

## 2020-02-24 DIAGNOSIS — E10.21 TYPE 1 DIABETES MELLITUS WITH NEPHROPATHY (H): Primary | ICD-10-CM

## 2020-02-24 DIAGNOSIS — E10.21 TYPE 1 DIABETES MELLITUS WITH NEPHROPATHY (H): ICD-10-CM

## 2020-02-24 PROCEDURE — 99213 OFFICE O/P EST LOW 20 MIN: CPT | Performed by: INTERNAL MEDICINE

## 2020-02-24 RX ORDER — PROCHLORPERAZINE 25 MG/1
1 SUPPOSITORY RECTAL
Qty: 3 EACH | Refills: 11 | Status: SHIPPED | OUTPATIENT
Start: 2020-02-24 | End: 2020-08-19

## 2020-02-24 RX ORDER — PROCHLORPERAZINE 25 MG/1
1 SUPPOSITORY RECTAL DAILY
Qty: 1 EACH | Refills: 3 | Status: SHIPPED | OUTPATIENT
Start: 2020-02-24 | End: 2020-08-19

## 2020-02-24 RX ORDER — PROCHLORPERAZINE 25 MG/1
1 SUPPOSITORY RECTAL DAILY
Qty: 1 DEVICE | Refills: 0 | Status: SHIPPED | OUTPATIENT
Start: 2020-02-24 | End: 2020-03-27

## 2020-02-24 ASSESSMENT — MIFFLIN-ST. JEOR: SCORE: 1583.2

## 2020-02-24 NOTE — LETTER
2/24/2020         RE: Eh Perry  [address]  [facility] [address]        Dear Colleague,    Thank you for referring your patient, Eh Perry, to the [facility]. Please see a copy of my visit note below.    Endocrinology Clinic Note:  Name: Eh Perry  Seen for Diabetes.   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  Is using MDI and Dexcom with G5 sensors.  Using pens now.    Working to get G6 sensors. Working on paperwork.     1. Type 1 DM:  Orginally diagnosed at the age of: 24  Hospitalization for DKA: no  Current Regimen: Lantus 16 units at night and Humalog 3 units with each meal plus correction scale+ 2 Untis before bedtime if BG >200 (For blood sugar 120-160 take an additional one unit, about 200 takes no more units and for bowel 220 takes three more units).  About 15-20 Untis/day  yes:     Diabetes Medication(s)     Insulin       insulin lispro (HUMALOG KWIKPEN) 100 UNIT/ML (1 unit dial) pen    INJECT 3 UNITS UNDER THE SKIN BEFORE MEALS AND SLIDING SCALE. MAX 20 UNITS PER DAY     LANTUS SOLOSTAR 100 UNIT/ML soln    ADMINISTER 16 UNITS UNDER THE SKIN DAILY          BS checks: 3-4 times a day. Using Dexcom.  Average Meter Download:   Based on DEXCOM average is 145 with SD 53. Difficulty downloading the BG form clarity.  BG mostly in range.  No major episodes of hypoglycemia noted/reported.     Symptoms of hypoglycemia (low blood sugar): Get symptoms of hypoglycemia  Episodes of hypoglycemia: No major episodes of hypoglycemia noted/reported.   Fixed meal pattern: Yes.  Has three meals and sometimes snacks at bedtime if her blood sugar is below 80.  Patient counting carbs: No  Using PUMP: No.  He was on pump before but did not like it.  Does not want to go back on pump.     DM Complications:   Nephropathy: No  Retinopathy: Present.  Followed by ophthalmology.  Proliferative diabetic retinopathy status  post laser treatment ×2.  Last visit treatment was in 2010.  Neuropathy: No  Microalbuminuria: Present, on lisinopril 40 mg  CAD/PAD: No  Gastroparesis: No  Hypoglycemia unawareness: No     2. Hypertension: Blood Pressure today:   BP Readings from Last 3 Encounters:   02/24/20 (!) 164/83   01/28/20 132/74   08/14/19 (!) 142/70     Blood pressure medications include lisinopril 20 mg and amlodipine.    3. Hyperlipidemia:  Takes simvastatin 5 mg    PMH/PSH:  Past Medical History:   Diagnosis Date     Anemia      CKD (chronic kidney disease) stage 3, GFR 30-59 ml/min (H)      GERD (gastroesophageal reflux disease)      Hepatitis 2011    elevated AST due to excess ETOH use (5 drinks/day)     HTN (hypertension)      Hyperlipidemia LDL goal <100 3/7/2013     MGUS (monoclonal gammopathy of unknown significance)      IgG Lambda. Followed by Dr Rogers     Proteinuria 1/22/2015     PVC (premature ventricular contraction)      Retinopathy due to secondary diabetes (H)     s/p laser therapy left     Subclinical hypothyroidism 6/11/2018     Tobacco abuse     20 pack hx. Quit 1991     Type 1 diabetes mellitus with nephropathy (goal A1C<7) 1978     Past Surgical History:   Procedure Laterality Date     HAND SURGERY  2007    contracture release     Family Hx:  Family History   Problem Relation Age of Onset     Diabetes Sister      Diabetes Paternal Grandmother      Diabetes Father      Cerebrovascular Disease Maternal Grandmother      Cancer Mother         peritoneal carcinoma           DM2:  Sister with gestational diabetes and now type 2 diabetes.  On insulin.           Social Hx:  Social History     Socioeconomic History     Marital status:      Spouse name: Not on file     Number of children: Not on file     Years of education: Not on file     Highest education level: Not on file   Occupational History     Employer: RETIRED     Comment: anesthesiologist   Social Needs     Financial resource strain: Not on file     Food  "insecurity:     Worry: Not on file     Inability: Not on file     Transportation needs:     Medical: Not on file     Non-medical: Not on file   Tobacco Use     Smoking status: Former Smoker     Packs/day: 1.00     Years: 15.00     Pack years: 15.00     Smokeless tobacco: Never Used   Substance and Sexual Activity     Alcohol use: Yes     Frequency: 2-3 times a week     Comment: occ     Drug use: No     Sexual activity: Yes     Partners: Female   Lifestyle     Physical activity:     Days per week: Not on file     Minutes per session: Not on file     Stress: Not on file   Relationships     Social connections:     Talks on phone: Not on file     Gets together: Not on file     Attends Synagogue service: Not on file     Active member of club or organization: Not on file     Attends meetings of clubs or organizations: Not on file     Relationship status: Not on file     Intimate partner violence:     Fear of current or ex partner: Not on file     Emotionally abused: Not on file     Physically abused: Not on file     Forced sexual activity: Not on file   Other Topics Concern     Parent/sibling w/ CABG, MI or angioplasty before 65F 55M? Not Asked   Social History Narrative     Not on file          MEDICATIONS:  has a current medication list which includes the following prescription(s): amlodipine, aspirin, blood glucose, accu-chek active glucose cont, blood glucose monitoring, carvedilol, epinephrine, fish oil-omega-3 fatty acids, insulin lispro, insulin pen needle, lantus solostar, lisinopril, multi vitamin  mens, omeprazole, sildenafil, simvastatin, dexcom g6 , dexcom g6 sensor, and dexcom g6 transmitter.    ROS     ROS: 10 point ROS neg other than the symptoms noted above in the HPI.    Physical Exam   VS: BP (!) 164/83 (BP Location: Right arm, Patient Position: Chair, Cuff Size: Adult Regular)   Pulse 81   Temp 97.6  F (36.4  C) (Oral)   Resp 16   Ht 1.803 m (5' 11\")   Wt 79.1 kg (174 lb 6.4 oz)   SpO2 99% "   BMI 24.32 kg/m     GENERAL: AXOX3, NAD, well dressed, answering questions appropriately, appears stated age.  NEUROLOGY: CN grossly intact, no tremors  MSK: grossly intact  Psych: normal mood      LABS:  A1c:  Lab Results   Component Value Date    A1C 6.1 01/22/2020    A1C 6.1 07/17/2019    A1C 6.2 11/23/2018    A1C 6.0 07/31/2018    A1C 5.9 01/03/2018         BMP:  Creatinine   Date Value Ref Range Status   01/22/2020 0.83 0.66 - 1.25 mg/dL Final       Urine Micro:  Lab Results   Component Value Date    UMALCR 181.40 01/09/2019         LFTs/Lipids:  Recent Labs   Lab Test 01/09/19  0947 01/03/18  0855  01/16/15  0832 01/20/14  0925   CHOL 150 163   < > 156 163   HDL 70 82   < > 82 60   LDL 67 65   < > 61 88   TRIG 65 80   < > 64 72   CHOLHDLRATIO  --   --   --  1.9 2.7    < > = values in this interval not displayed.     TFTs:  ENDO THYROID LABS-UMP Latest Ref Rng & Units 7/31/2018   TSH 0.40 - 4.00 mU/L 2.46   T4 FREE 0.76 - 1.46 ng/dL 0.78   THYR PEROXIDASE VERNON <35 IU/mL <10     Blood Glucose and pump data/ Meter reviewed.  CGM data reviewed.      Glucometer/ insulin pump (if applicable)/ CGM data (if applicable) downloaded, Personally reviewed and interpreted.  All Blood sugar data reviewed personally and discussed with pt.  See nursing note from today's clinic visit for details of BG/CGM log.      All pertinent notes, labs, and images personally reviewed by me.     A/P  Mr.David RICHAR Perry is a 69 year old here for the evaluation/management of diabetes:    1. DM1 - Under Good control.  A1c 6.1%.  Diabetes complicated by proliferative diabetic retinopathy.  In general BG are optimal. Using dexcom with G5 sensors.  Working to get G6 sensors.  Continue current regimen  Continue Lantus 16units at night  Continue with Humalog at current dose with 3 units with each meal plus correction scale+ 2 units hs if BG >200  Consider metformin in next visit  Lab only in 3 months.  Labs and f/u in 6 months.  Continue to use  Dexcom.  He will let us know if nay paperwork is needed from Dexcom.    If Blood glucose are low more often-> 2-3 times/week- give us a call.  The patient is advised to Make better food choices: reduce carbs, Reduce portion size, weight loss and exercise 3-4 times a week.  Discussed hypoglycemia signs and symptoms as well as management in detail.      2. Hypertension - Under Poor  control.  On coreg, amlodipine and lisinopril 40 mg.   Patient to follow up with Primary Care provider regarding elevated blood pressure.    3. Hyperlipidemia - Under Good control. On simvastatin 5 mg.  LDL 65, HDL 82  4. Prevention  Flu Shot- NA  Pneumovax- 2011  Opthalmology-nov 2018  ASA-81 mg  Smoking- no    Most Recent Immunizations   Administered Date(s) Administered     Influenza (High Dose) 3 valent vaccine 09/25/2019     Influenza (IIV3) PF 10/02/2014     Influenza Vaccine IM > 6 months Valent IIV4 09/28/2015     Pneumo Conj 13-V (2010&after) 01/23/2017     Pneumococcal 23 valent 01/11/2018     TD (ADULT, 7+) 07/03/2015     Tdap (Adacel,Boostrix) 10/19/2005     Zoster vaccine recombinant adjuvanted (SHINGRIX) 10/22/2018     Zoster vaccine, live 11/01/2011       5.  Microalbuminuria:  Lab Results   Component Value Date    UMALCR 135.83 07/25/2018    Continue lisinopril 40 mg.    Recommend strict Bg control.    Follow-up:  Follow up in 6 month(s)    Kierra Sexton M.D  Endocrinology  [facility]  CC: Hernan Juárez    All questions were answered.  The patient indicates understanding of the above issues and agrees with the plan set forth.     Disclaimer: This note consists of symbols derived from keyboarding, dictation and/or voice recognition software. As a result, there may be errors in the script that have gone undetected. Please consider this when interpreting information found in this chart.          Again, thank you for allowing me to participate in the care of your patient.        Sincerely,        Kierra  MD Darshan

## 2020-02-24 NOTE — TELEPHONE ENCOUNTER
NEED MEDICARE COMPLIANT SCRIPTS WRITTEN ON OR AFTER 02/24/2020 for Dexcom G6 /transmitter/sensors  Call and speak to one of our DME team members if you have questions [phone]    Thank you!  Merlyn Engel CPhT  [facility]

## 2020-02-24 NOTE — PATIENT INSTRUCTIONS
[facility] & [place] locations   Dr Sexton, Endocrinology Department      [facility]   [address]  [place] [address]  Appointment [phone]  Fax: [phone]  [place]: Monday and Tuesday         [facility]   [address]  [place] [address]  Appointment [phone]  Fax: [phone]  [place]: Wednesday and Thursday            Please check the cost coverage and copay with insurance before recommended tests, services and medications (especially if new medications are prescribed).     If ordered, please get blood work done 1 week prior to your next appointment so they will be available to Dr. Sexton at your visit.    To provide the best diabetic care, please bring your blood glucose meter to each and every visit with your  Endocrinologist. Your blood glucose meter/insulin pump will be downloaded at every appointment.  Please arrive 15 minutes before your scheduled appointment. This will allow for your blood glucose meter/insulin pump  to be downloaded.  If you are wearing DEXCOM please bring  or sharing code from the Dexcom Clarity Appt so that it can be downloaded.  If you are using freestyle bobby personal sensors please bring the reader.  If you are using TANDEM insulin pump please have your username and password to get info from Tandem website.    Continue current regimen.  Labs and follow up in 6 months.  Let us know about the paperwork needed for Dexcom g6 sensors.    Recommend checking blood sugars before meals and at bedtime.    If Blood glucose are low more often-> 2-3 times/week- give us a call.  The patient is advised to Make better food choices: reduce carbs, Reduce portion size, weight loss and exercise 3-4 times a week.  Discussed hypoglycemia signs and symptoms as well as management in detail.

## 2020-02-24 NOTE — TELEPHONE ENCOUNTER
CDE:  Do we have a clinic code?    Kellie Doherty, JING  [facility]  [facility]

## 2020-02-24 NOTE — TELEPHONE ENCOUNTER
Sent pt a [employer] message with a a share code for the clinic dexcom clarity account.    Jany Waite RN, CDE

## 2020-02-24 NOTE — TELEPHONE ENCOUNTER
We do not have a clinic sharing code.  Instead we create a clarity profile through our  [employer] account, then send him an invite, if he accepts we are linked.  His values would only be reviewed by Dr. Sexton if he requested, appears he is not established with our team.    Dari Ang MS, RD, LD, CDE

## 2020-02-24 NOTE — PROGRESS NOTES
Endocrinology Clinic Note:  Name: Eh Perry  Seen for Diabetes.   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  Is using MDI and Dexcom with G5 sensors.  Using pens now.    Working to get G6 sensors. Working on paperwork.     1. Type 1 DM:  Orginally diagnosed at the age of: 24  Hospitalization for DKA: no  Current Regimen: Lantus 16 units at night and Humalog 3 units with each meal plus correction scale+ 2 Untis before bedtime if BG >200 (For blood sugar 120-160 take an additional one unit, about 200 takes no more units and for bowel 220 takes three more units).  About 15-20 Untis/day  yes:     Diabetes Medication(s)     Insulin       insulin lispro (HUMALOG KWIKPEN) 100 UNIT/ML (1 unit dial) pen    INJECT 3 UNITS UNDER THE SKIN BEFORE MEALS AND SLIDING SCALE. MAX 20 UNITS PER DAY     LANTUS SOLOSTAR 100 UNIT/ML soln    ADMINISTER 16 UNITS UNDER THE SKIN DAILY          BS checks: 3-4 times a day. Using Dexcom.  Average Meter Download:   Based on DEXCOM average is 145 with SD 53. Difficulty downloading the BG form clarity.  BG mostly in range.  No major episodes of hypoglycemia noted/reported.     Symptoms of hypoglycemia (low blood sugar): Get symptoms of hypoglycemia  Episodes of hypoglycemia: No major episodes of hypoglycemia noted/reported.   Fixed meal pattern: Yes.  Has three meals and sometimes snacks at bedtime if her blood sugar is below 80.  Patient counting carbs: No  Using PUMP: No.  He was on pump before but did not like it.  Does not want to go back on pump.     DM Complications:   Nephropathy: No  Retinopathy: Present.  Followed by ophthalmology.  Proliferative diabetic retinopathy status post laser treatment ×2.  Last visit treatment was in 2010.  Neuropathy: No  Microalbuminuria: Present, on lisinopril 40 mg  CAD/PAD: No  Gastroparesis: No  Hypoglycemia unawareness: No     2. Hypertension: Blood Pressure today:   BP Readings from  Last 3 Encounters:   02/24/20 (!) 164/83   01/28/20 132/74   08/14/19 (!) 142/70     Blood pressure medications include lisinopril 20 mg and amlodipine.    3. Hyperlipidemia:  Takes simvastatin 5 mg    PMH/PSH:  Past Medical History:   Diagnosis Date     Anemia      CKD (chronic kidney disease) stage 3, GFR 30-59 ml/min (H)      GERD (gastroesophageal reflux disease)      Hepatitis 2011    elevated AST due to excess ETOH use (5 drinks/day)     HTN (hypertension)      Hyperlipidemia LDL goal <100 3/7/2013     MGUS (monoclonal gammopathy of unknown significance)      IgG Lambda. Followed by Dr Rogers     Proteinuria 1/22/2015     PVC (premature ventricular contraction)      Retinopathy due to secondary diabetes (H)     s/p laser therapy left     Subclinical hypothyroidism 6/11/2018     Tobacco abuse     20 pack hx. Quit 1991     Type 1 diabetes mellitus with nephropathy (goal A1C<7) 1978     Past Surgical History:   Procedure Laterality Date     HAND SURGERY  2007    contracture release     Family Hx:  Family History   Problem Relation Age of Onset     Diabetes Sister      Diabetes Paternal Grandmother      Diabetes Father      Cerebrovascular Disease Maternal Grandmother      Cancer Mother         peritoneal carcinoma           DM2:  Sister with gestational diabetes and now type 2 diabetes.  On insulin.           Social Hx:  Social History     Socioeconomic History     Marital status:      Spouse name: Not on file     Number of children: Not on file     Years of education: Not on file     Highest education level: Not on file   Occupational History     Employer: RETIRED     Comment: anesthesiologist   Social Needs     Financial resource strain: Not on file     Food insecurity:     Worry: Not on file     Inability: Not on file     Transportation needs:     Medical: Not on file     Non-medical: Not on file   Tobacco Use     Smoking status: Former Smoker     Packs/day: 1.00     Years: 15.00     Pack years: 15.00  "    Smokeless tobacco: Never Used   Substance and Sexual Activity     Alcohol use: Yes     Frequency: 2-3 times a week     Comment: occ     Drug use: No     Sexual activity: Yes     Partners: Female   Lifestyle     Physical activity:     Days per week: Not on file     Minutes per session: Not on file     Stress: Not on file   Relationships     Social connections:     Talks on phone: Not on file     Gets together: Not on file     Attends Uatsdin service: Not on file     Active member of club or organization: Not on file     Attends meetings of clubs or organizations: Not on file     Relationship status: Not on file     Intimate partner violence:     Fear of current or ex partner: Not on file     Emotionally abused: Not on file     Physically abused: Not on file     Forced sexual activity: Not on file   Other Topics Concern     Parent/sibling w/ CABG, MI or angioplasty before 65F 55M? Not Asked   Social History Narrative     Not on file          MEDICATIONS:  has a current medication list which includes the following prescription(s): amlodipine, aspirin, blood glucose, accu-chek active glucose cont, blood glucose monitoring, carvedilol, epinephrine, fish oil-omega-3 fatty acids, insulin lispro, insulin pen needle, lantus solostar, lisinopril, multi vitamin  mens, omeprazole, sildenafil, simvastatin, dexcom g6 , dexcom g6 sensor, and dexcom g6 transmitter.    ROS     ROS: 10 point ROS neg other than the symptoms noted above in the HPI.    Physical Exam   VS: BP (!) 164/83 (BP Location: Right arm, Patient Position: Chair, Cuff Size: Adult Regular)   Pulse 81   Temp 97.6  F (36.4  C) (Oral)   Resp 16   Ht 1.803 m (5' 11\")   Wt 79.1 kg (174 lb 6.4 oz)   SpO2 99%   BMI 24.32 kg/m    GENERAL: AXOX3, NAD, well dressed, answering questions appropriately, appears stated age.  NEUROLOGY: CN grossly intact, no tremors  MSK: grossly intact  Psych: normal mood      LABS:  A1c:  Lab Results   Component Value Date "    A1C 6.1 01/22/2020    A1C 6.1 07/17/2019    A1C 6.2 11/23/2018    A1C 6.0 07/31/2018    A1C 5.9 01/03/2018         BMP:  Creatinine   Date Value Ref Range Status   01/22/2020 0.83 0.66 - 1.25 mg/dL Final       Urine Micro:  Lab Results   Component Value Date    UMALCR 181.40 01/09/2019         LFTs/Lipids:  Recent Labs   Lab Test 01/09/19  0947 01/03/18  0855  01/16/15  0832 01/20/14  0925   CHOL 150 163   < > 156 163   HDL 70 82   < > 82 60   LDL 67 65   < > 61 88   TRIG 65 80   < > 64 72   CHOLHDLRATIO  --   --   --  1.9 2.7    < > = values in this interval not displayed.     TFTs:  ENDO THYROID LABS-UMP Latest Ref Rng & Units 7/31/2018   TSH 0.40 - 4.00 mU/L 2.46   T4 FREE 0.76 - 1.46 ng/dL 0.78   THYR PEROXIDASE VERNON <35 IU/mL <10     Blood Glucose and pump data/ Meter reviewed.  CGM data reviewed.      Glucometer/ insulin pump (if applicable)/ CGM data (if applicable) downloaded, Personally reviewed and interpreted.  All Blood sugar data reviewed personally and discussed with pt.  See nursing note from today's clinic visit for details of BG/CGM log.      All pertinent notes, labs, and images personally reviewed by me.     A/P  Mr.David RICHAR Perry is a 69 year old here for the evaluation/management of diabetes:    1. DM1 - Under Good control.  A1c 6.1%.  Diabetes complicated by proliferative diabetic retinopathy.  In general BG are optimal. Using dexcom with G5 sensors.  Working to get G6 sensors.  Continue current regimen  Continue Lantus 16units at night  Continue with Humalog at current dose with 3 units with each meal plus correction scale+ 2 units hs if BG >200  Consider metformin in next visit  Lab only in 3 months.  Labs and f/u in 6 months.  Continue to use Dexcom.  He will let us know if nay paperwork is needed from Dexcom.    If Blood glucose are low more often-> 2-3 times/week- give us a call.  The patient is advised to Make better food choices: reduce carbs, Reduce portion size, weight loss and  exercise 3-4 times a week.  Discussed hypoglycemia signs and symptoms as well as management in detail.      2. Hypertension - Under Poor  control.  On coreg, amlodipine and lisinopril 40 mg.   Patient to follow up with Primary Care provider regarding elevated blood pressure.    3. Hyperlipidemia - Under Good control. On simvastatin 5 mg.  LDL 65, HDL 82  4. Prevention  Flu Shot- NA  Pneumovax- 2011  Opthalmology-nov 2018  ASA-81 mg  Smoking- no    Most Recent Immunizations   Administered Date(s) Administered     Influenza (High Dose) 3 valent vaccine 09/25/2019     Influenza (IIV3) PF 10/02/2014     Influenza Vaccine IM > 6 months Valent IIV4 09/28/2015     Pneumo Conj 13-V (2010&after) 01/23/2017     Pneumococcal 23 valent 01/11/2018     TD (ADULT, 7+) 07/03/2015     Tdap (Adacel,Boostrix) 10/19/2005     Zoster vaccine recombinant adjuvanted (SHINGRIX) 10/22/2018     Zoster vaccine, live 11/01/2011       5.  Microalbuminuria:  Lab Results   Component Value Date    UMALCR 135.83 07/25/2018    Continue lisinopril 40 mg.    Recommend strict Bg control.    Follow-up:  Follow up in 6 month(s)    Kierra Sexton M.D  Endocrinology  [facility]  CC: Hernan Juárez    All questions were answered.  The patient indicates understanding of the above issues and agrees with the plan set forth.     Disclaimer: This note consists of symbols derived from keyboarding, dictation and/or voice recognition software. As a result, there may be errors in the script that have gone undetected. Please consider this when interpreting information found in this chart.

## 2020-02-24 NOTE — TELEPHONE ENCOUNTER
Reason for call:  Other   Patient called regarding (reason for call): call back regarding the dexcom sharing vicki, needs a clinic code?    Additional comments: none    Phone number to reach patient:  Cell number on file:    Telephone Information:   Mobile [phone]       Best Time:  any    Can we leave a detailed message on this number?  YES    Travel screening: Not Applicable       Sindhu Lind on 2/24/2020 at 3:05 PM

## 2020-02-24 NOTE — NURSING NOTE
ENDOCRINOLOGY INTAKE FORM    Patient Name:  Eh Perry  :  1950    Is patient Diabetic?   Yes: type 1  Does patient have non-diabetic or other endocrine issues?  Yes: subclinical hypothyroidism, hyperlipidemia    Vitals: There were no vitals taken for this visit.  BMI= There is no height or weight on file to calculate BMI.    Flu vaccine:  Yes: 19  Pneumonia vaccine:  Yes: both    Smoking and Alcohol use:  Social History     Tobacco Use     Smoking status: Former Smoker     Packs/day: 1.00     Years: 15.00     Pack years: 15.00     Smokeless tobacco: Never Used   Substance Use Topics     Alcohol use: Yes     Frequency: 2-3 times a week     Comment: occ     Drug use: No       Foot Exam: Yes: 20  Eye Exam:  Yes: 20  Dental Exam:  Yes: every 3 mos  Aspirin Use:  Yes: 81 mg    Lab Results   Component Value Date    A1C 6.1 2020    A1C 6.1 2019    A1C 6.2 2018    A1C 6.0 2018    A1C 5.9 2018       Lab Results   Component Value Date    MICROL 109 2020     No results found for: MICROALBUMIN    Kellie Doherty CMA  [facility]  [facility]

## 2020-02-25 NOTE — TELEPHONE ENCOUNTER
Responded to uMentioned message.        Bradley Stauffer,    I just checked on our DexCom Clarity site and you are sharing your data with the clinic, so it worked! This will be great for your next appt or if you need Dr. Sexton to review the data before your next appt.    Thanks so much!    Jany Waite RN, CDE

## 2020-02-28 ENCOUNTER — MYC MEDICAL ADVICE (OUTPATIENT)
Dept: INTERNAL MEDICINE | Facility: CLINIC | Age: 70
End: 2020-02-28

## 2020-02-28 NOTE — TELEPHONE ENCOUNTER
Please see PhoneTell message and advise.      Thank you,  Hetal REYNOSORN BSN  [facility]  [phone]

## 2020-03-10 ENCOUNTER — TELEPHONE (OUTPATIENT)
Dept: ENDOCRINOLOGY | Facility: CLINIC | Age: 70
End: 2020-03-10

## 2020-03-10 ENCOUNTER — MEDICAL CORRESPONDENCE (OUTPATIENT)
Dept: HEALTH INFORMATION MANAGEMENT | Facility: CLINIC | Age: 70
End: 2020-03-10

## 2020-03-11 NOTE — TELEPHONE ENCOUNTER
Forms/paperwork reviewed, completed and signed.  Please fax or send the papers as requested, document in chart and close the encounter.    Thank you.    Kierra Sexton MD

## 2020-03-11 NOTE — TELEPHONE ENCOUNTER
LAST OFFICE/VIRTUAL VISIT:  02/24/20    FUTURE OFFICE/VIRTUAL VISIT:  None    Lab Results   Component Value Date    A1C 6.1 01/22/2020    A1C 6.1 07/17/2019    A1C 6.2 11/23/2018    A1C 6.0 07/31/2018    A1C 5.9 01/03/2018         Kellie Doherty CMA  [facility]  [place]

## 2020-03-11 NOTE — TELEPHONE ENCOUNTER
Form was faxed, copied, sent to scanning.      Kellie Doherty, [facility]  [place]

## 2020-03-14 DIAGNOSIS — E10.21 TYPE 1 DIABETES MELLITUS WITH NEPHROPATHY (H): ICD-10-CM

## 2020-03-17 NOTE — TELEPHONE ENCOUNTER
"Requested Prescriptions   Pending Prescriptions Disp Refills     blood glucose monitoring (ACCU-CHEK FASTCLIX) lancets [Pharmacy Med Name: ACCU-CHEK FASTCLIX LANCETS 102'S] 102 each      Sig: USE TO TEST FOUR TIMES DAILY   Last Written Prescription Date:  01/28/20  Last Fill Quantity: 200,  # refills: 4   Last office visit: 2/24/2020 with prescribing provider:  yes   Future Office Visit:        Diabetic Supplies Protocol Passed - 3/14/2020 12:00 PM        Passed - Medication is active on med list        Passed - Patient is 18 years of age or older        Passed - Recent (6 mo) or future (30 days) visit within the authorizing provider's specialty     Patient had office visit in the last 6 months or has a visit in the next 30 days with authorizing provider.  See \"Patient Info\" tab in inbasket, or \"Choose Columns\" in Meds & Orders section of the refill encounter.                 "

## 2020-03-18 RX ORDER — LANCETS
EACH MISCELLANEOUS
Qty: 102 EACH | Refills: 6 | Status: SHIPPED | OUTPATIENT
Start: 2020-03-18 | End: 2021-03-04

## 2020-03-18 NOTE — TELEPHONE ENCOUNTER
Spoke with patient to verify he is still using the lancets (has Dexcom but still using lancets).    Prescription approved per [facility] [place].

## 2020-03-25 ENCOUNTER — TRANSFERRED RECORDS (OUTPATIENT)
Dept: HEALTH INFORMATION MANAGEMENT | Facility: CLINIC | Age: 70
End: 2020-03-25

## 2020-03-25 DIAGNOSIS — E10.21 TYPE 1 DIABETES MELLITUS WITH NEPHROPATHY (H): ICD-10-CM

## 2020-03-25 LAB — RETINOPATHY: POSITIVE

## 2020-03-25 RX ORDER — PROCHLORPERAZINE 25 MG/1
1 SUPPOSITORY RECTAL DAILY
Qty: 1 DEVICE | Refills: 0 | Status: CANCELLED | OUTPATIENT
Start: 2020-03-25

## 2020-03-26 ENCOUNTER — MYC MEDICAL ADVICE (OUTPATIENT)
Dept: INTERNAL MEDICINE | Facility: CLINIC | Age: 70
End: 2020-03-26

## 2020-03-26 NOTE — TELEPHONE ENCOUNTER
Called patient, we discussed  was sent last month, he will follow up with the pharmacy and call me back if they do not have it.    Dougie Jean BaptisteD, [facility]  Medication Therapy Management Provider  Phone: [phone]  [email or web address]

## 2020-03-27 DIAGNOSIS — E10.21 TYPE 1 DIABETES MELLITUS WITH NEPHROPATHY (H): ICD-10-CM

## 2020-03-27 RX ORDER — PROCHLORPERAZINE 25 MG/1
SUPPOSITORY RECTAL
Qty: 1 DEVICE | Refills: 0 | Status: SHIPPED | OUTPATIENT
Start: 2020-03-27 | End: 2021-02-02

## 2020-03-27 NOTE — TELEPHONE ENCOUNTER
Requested Prescriptions   Pending Prescriptions Disp Refills     Continuous Blood Gluc  (DEXCOM G6 ) EMERSON 1 Device 0     Si Device daily       There is no refill protocol information for this order

## 2020-03-30 RX ORDER — BLOOD SUGAR DIAGNOSTIC
STRIP MISCELLANEOUS
Qty: 400 STRIP | OUTPATIENT
Start: 2020-03-30

## 2020-03-30 NOTE — TELEPHONE ENCOUNTER
"Requested Prescriptions   Pending Prescriptions Disp Refills     blood glucose (ACCU-CHEK ERUM PLUS) test strip [Pharmacy Med Name: ACCU-CHEK ERUM PLUS STRIPS 100'S] 400 strip      Sig: USE TO TEST BLOOD SUGARS FOUR TIMES DAILY OR AS DIRECTED   Last Written Prescription Date:  01/28/20  Last Fill Quantity: 200,  # refills: 3   Last office visit: 2/24/2020 with prescribing provider:  yes   Future Office Visit:        Diabetic Supplies Protocol Passed - 3/27/2020  1:17 PM        Passed - Medication is active on med list        Passed - Patient is 18 years of age or older        Passed - Recent (6 mo) or future (30 days) visit within the authorizing provider's specialty     Patient had office visit in the last 6 months or has a visit in the next 30 days with authorizing provider.  See \"Patient Info\" tab in inbasket, or \"Choose Columns\" in Meds & Orders section of the refill encounter.                 "

## 2020-04-24 DIAGNOSIS — E10.21 TYPE 1 DIABETES MELLITUS WITH NEPHROPATHY (H): ICD-10-CM

## 2020-04-27 NOTE — TELEPHONE ENCOUNTER
"Requested Prescriptions   Pending Prescriptions Disp Refills     LANTUS SOLOSTAR 100 UNIT/ML soln [Pharmacy Med Name: LANTUS SOLOSTAR PEN INJ 3ML] 15 mL 0     Sig: ADMINISTER 16 UNITS UNDER THE SKIN DAILY       Long Acting Insulin Protocol Passed - 4/24/2020  9:29 AM        Passed - Serum creatinine on file in past 12 months     Recent Labs   Lab Test 01/22/20  1015   CR 0.83       Ok to refill medication if creatinine is low          Passed - HgbA1C in past 3 or 6 months     If HgbA1C is 8 or greater, it needs to be on file within the past 3 months.  If less than 8, must be on file within the past 6 months.     Recent Labs   Lab Test 01/22/20  1016   A1C 6.1*             Passed - Medication is active on med list        Passed - Patient is age 18 or older        Passed - Recent (6 mo) or future (30 days) visit within the authorizing provider's specialty     Patient had office visit in the last 6 months or has a visit in the next 30 days with authorizing provider or within the authorizing provider's specialty.  See \"Patient Info\" tab in inbasket, or \"Choose Columns\" in Meds & Orders section of the refill encounter.               insulin lispro (HUMALOG KWIKPEN) 100 UNIT/ML (1 unit dial) KWIKPEN [Pharmacy Med Name: HUMALOG 100 U/ML KWIK PEN INJ 3ML] 18 mL      Sig: INJECT 3 UNITS UNDER THE SKIN BEFORE MEALS AND SLIDING SCALE. MAX OF 20 UNITS PER DAY.       Short Acting Insulin Protocol Passed - 4/24/2020  9:29 AM        Passed - Serum creatinine on file in past 12 months     Recent Labs   Lab Test 01/22/20  1015   CR 0.83       Ok to refill medication if creatinine is low          Passed - HgbA1C in past 3 or 6 months     If HgbA1C is 8 or greater, it needs to be on file within the past 3 months.  If less than 8, must be on file within the past 6 months.     Recent Labs   Lab Test 01/22/20  1016   A1C 6.1*             Passed - Medication is active on med list        Passed - Patient is age 18 or older        Passed - " "Recent (6 mo) or future (30 days) visit within the authorizing provider's specialty     Patient had office visit in the last 6 months or has a visit in the next 30 days with authorizing provider or within the authorizing provider's specialty.  See \"Patient Info\" tab in inbasket, or \"Choose Columns\" in Meds & Orders section of the refill encounter.                 "

## 2020-04-28 RX ORDER — INSULIN GLARGINE 100 [IU]/ML
INJECTION, SOLUTION SUBCUTANEOUS
Qty: 15 ML | Refills: 1 | Status: SHIPPED | OUTPATIENT
Start: 2020-04-28 | End: 2020-08-13

## 2020-04-28 RX ORDER — INSULIN LISPRO 100 [IU]/ML
INJECTION, SOLUTION INTRAVENOUS; SUBCUTANEOUS
Qty: 18 ML | Refills: 1 | Status: SHIPPED | OUTPATIENT
Start: 2020-04-28 | End: 2020-08-13

## 2020-05-05 ENCOUNTER — MYC MEDICAL ADVICE (OUTPATIENT)
Dept: INTERNAL MEDICINE | Facility: CLINIC | Age: 70
End: 2020-05-05

## 2020-05-05 DIAGNOSIS — Z20.822 CLOSE EXPOSURE TO COVID-19 VIRUS: Primary | ICD-10-CM

## 2020-05-06 NOTE — TELEPHONE ENCOUNTER
"PCP please advise of MyChart message regarding COVID-19 \"most likely\" exposure and request for serology testing, if appropriate.    Jennie ROGERS BSN, RN, PHN      "

## 2020-05-07 DIAGNOSIS — Z20.822 CLOSE EXPOSURE TO COVID-19 VIRUS: ICD-10-CM

## 2020-05-07 PROCEDURE — 99000 SPECIMEN HANDLING OFFICE-LAB: CPT | Performed by: INTERNAL MEDICINE

## 2020-05-07 PROCEDURE — 86769 SARS-COV-2 COVID-19 ANTIBODY: CPT | Mod: 90 | Performed by: INTERNAL MEDICINE

## 2020-05-07 PROCEDURE — 36415 COLL VENOUS BLD VENIPUNCTURE: CPT | Performed by: INTERNAL MEDICINE

## 2020-05-08 LAB
COVID-19 SPIKE RBD ABY TITER: NORMAL
COVID-19 SPIKE RBD ABY: NEGATIVE

## 2020-07-10 ENCOUNTER — TRANSFERRED RECORDS (OUTPATIENT)
Dept: HEALTH INFORMATION MANAGEMENT | Facility: CLINIC | Age: 70
End: 2020-07-10

## 2020-07-29 ENCOUNTER — MYC MEDICAL ADVICE (OUTPATIENT)
Dept: ENDOCRINOLOGY | Facility: CLINIC | Age: 70
End: 2020-07-29

## 2020-07-30 NOTE — TELEPHONE ENCOUNTER
appt changed.    Message sent via [employer].      [facility]  [place]

## 2020-08-05 DIAGNOSIS — E10.21 TYPE 1 DIABETES MELLITUS WITH NEPHROPATHY (H): ICD-10-CM

## 2020-08-05 DIAGNOSIS — Z12.5 SCREENING FOR PROSTATE CANCER: ICD-10-CM

## 2020-08-05 DIAGNOSIS — E78.5 HYPERLIPIDEMIA LDL GOAL <100: ICD-10-CM

## 2020-08-05 DIAGNOSIS — I10 ESSENTIAL HYPERTENSION: ICD-10-CM

## 2020-08-05 DIAGNOSIS — R80.9 PROTEINURIA, UNSPECIFIED TYPE: ICD-10-CM

## 2020-08-05 LAB
ALBUMIN SERPL-MCNC: 3.8 G/DL (ref 3.4–5)
ALP SERPL-CCNC: 63 U/L (ref 40–150)
ALT SERPL W P-5'-P-CCNC: 34 U/L (ref 0–70)
ANION GAP SERPL CALCULATED.3IONS-SCNC: 8 MMOL/L (ref 3–14)
AST SERPL W P-5'-P-CCNC: 25 U/L (ref 0–45)
BILIRUB SERPL-MCNC: 0.7 MG/DL (ref 0.2–1.3)
BUN SERPL-MCNC: 16 MG/DL (ref 7–30)
CALCIUM SERPL-MCNC: 8.6 MG/DL (ref 8.5–10.1)
CHLORIDE SERPL-SCNC: 107 MMOL/L (ref 94–109)
CHOLEST SERPL-MCNC: 146 MG/DL
CO2 SERPL-SCNC: 20 MMOL/L (ref 20–32)
CREAT SERPL-MCNC: 0.81 MG/DL (ref 0.66–1.25)
CREAT UR-MCNC: 125 MG/DL
GFR SERPL CREATININE-BSD FRML MDRD: >90 ML/MIN/{1.73_M2}
GLUCOSE SERPL-MCNC: 275 MG/DL (ref 70–99)
HBA1C MFR BLD: 6 % (ref 0–5.6)
HDLC SERPL-MCNC: 57 MG/DL
LDLC SERPL CALC-MCNC: 72 MG/DL
MICROALBUMIN UR-MCNC: 66 MG/L
MICROALBUMIN/CREAT UR: 52.48 MG/G CR (ref 0–17)
NONHDLC SERPL-MCNC: 89 MG/DL
POTASSIUM SERPL-SCNC: 4.4 MMOL/L (ref 3.4–5.3)
PROT SERPL-MCNC: 7.4 G/DL (ref 6.8–8.8)
PSA SERPL-ACNC: 0.8 UG/L (ref 0–4)
SODIUM SERPL-SCNC: 135 MMOL/L (ref 133–144)
TRIGL SERPL-MCNC: 83 MG/DL

## 2020-08-05 PROCEDURE — G0103 PSA SCREENING: HCPCS | Performed by: INTERNAL MEDICINE

## 2020-08-05 PROCEDURE — 83036 HEMOGLOBIN GLYCOSYLATED A1C: CPT | Performed by: INTERNAL MEDICINE

## 2020-08-05 PROCEDURE — 80053 COMPREHEN METABOLIC PANEL: CPT | Performed by: INTERNAL MEDICINE

## 2020-08-05 PROCEDURE — 80061 LIPID PANEL: CPT | Performed by: INTERNAL MEDICINE

## 2020-08-05 PROCEDURE — 36415 COLL VENOUS BLD VENIPUNCTURE: CPT | Performed by: INTERNAL MEDICINE

## 2020-08-05 PROCEDURE — 82043 UR ALBUMIN QUANTITATIVE: CPT | Performed by: INTERNAL MEDICINE

## 2020-08-13 ENCOUNTER — VIRTUAL VISIT (OUTPATIENT)
Dept: ENDOCRINOLOGY | Facility: CLINIC | Age: 70
End: 2020-08-13
Payer: MEDICARE

## 2020-08-13 DIAGNOSIS — E10.21 TYPE 1 DIABETES MELLITUS WITH NEPHROPATHY (H): ICD-10-CM

## 2020-08-13 PROCEDURE — 95251 CONT GLUC MNTR ANALYSIS I&R: CPT | Performed by: INTERNAL MEDICINE

## 2020-08-13 PROCEDURE — 99213 OFFICE O/P EST LOW 20 MIN: CPT | Mod: 95 | Performed by: INTERNAL MEDICINE

## 2020-08-13 RX ORDER — INSULIN LISPRO 100 [IU]/ML
INJECTION, SOLUTION INTRAVENOUS; SUBCUTANEOUS
Qty: 18 ML | OUTPATIENT
Start: 2020-08-13

## 2020-08-13 RX ORDER — INSULIN LISPRO 100 [IU]/ML
INJECTION, SOLUTION INTRAVENOUS; SUBCUTANEOUS
Qty: 15 ML | Refills: 1 | Status: SHIPPED | OUTPATIENT
Start: 2020-08-13 | End: 2020-10-15

## 2020-08-13 RX ORDER — BLOOD SUGAR DIAGNOSTIC
STRIP MISCELLANEOUS
Qty: 200 EACH | Refills: 3 | Status: SHIPPED | OUTPATIENT
Start: 2020-08-13 | End: 2021-03-04

## 2020-08-13 NOTE — PROGRESS NOTES
"THIS IS A VIDEO VISIT:    Phone call visit/virtual visit encounter:  Part of visit was done by telephone.    Name of patient: Eh Perry    Date of encounter: 8/13/2020    Time of start of video visit: 12:59    Video started: 1:09    Video ended: 1:24    Time visit video ended: 1:30    Provider location: working from [place]/ [facility]    Patient location: patients home.    Mode of transmission: video/ Doximity    Verbal consent: obtained before starting visit. Pt is agreeable.      The patient has been notified of following:      \"This VIDEO visit will be conducted via a call between you and your physician/provider. We have found that certain health care needs can be provided without the need for a physical exam.  This service lets us provide the care you need with a short phone conversation.  If a prescription is necessary we can send it directly to your pharmacy.  If lab work is needed we can place an order for that and you can then stop by our lab to have the test done at a later time.     With new updates with corona virus patient might be billed as clinic visit.     If during the course of the call the physician/provider feels a telephone visit is not appropriate, you will not be charged for this service.\"      Past medical history, social history, family history, allergy and medications were reviewed and updated as appropriate.  Reviewed pertinent labs, notes, imaging studies personally.    Endocrinology Clinic Note:  Name: Eh Perry  Seen for Diabetes.   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  Is using MDI and Dexcom with G6 sensors.  Using pens now.       1. Type 1 DM:  Orginally diagnosed at the age of: 24  Hospitalization for DKA: no  Current Regimen: Lantus 16 units at night and Humalog 3 units with each meal plus correction scale+ 2 Untis before bedtime if BG >200 (For blood sugar 120-160 take an additional one unit, " about 200 takes 2 more units and for  takes three more units).  About 15-20 Untis/day  yes:     Diabetes Medication(s)     Insulin       insulin lispro (HUMALOG KWIKPEN) 100 UNIT/ML (1 unit dial) KWIKPEN    INJECT 3 UNITS UNDER THE SKIN BEFORE MEALS AND SLIDING SCALE. MAX OF 20 UNITS PER DAY.     LANTUS SOLOSTAR 100 UNIT/ML soln    ADMINISTER 16 UNITS UNDER THE SKIN DAILY          BS checks: 3-4 times a day. Using Dexcom.  Average Meter Download: 160 based on Dexcom with SD 56.  Blood glucose data reviewed personally. See nursing note from this encounter for details.  BG mostly in range.  No major episodes of hypoglycemia noted/reported.   Symptoms of hypoglycemia (low blood sugar): Get symptoms of hypoglycemia  Episodes of hypoglycemia: No major episodes of hypoglycemia noted/reported.   Fixed meal pattern: Yes.  Has three meals and sometimes snacks at bedtime if her blood sugar is below 80.  Patient counting carbs: No  Using PUMP: No.  He was on pump before but did not like it.  Does not want to go back on pump.     DM Complications:   Nephropathy: No  Retinopathy: Present.  Followed by ophthalmology.  Proliferative diabetic retinopathy status post laser treatment ×2.  Last visit treatment was in 2010.  Neuropathy: No  Microalbuminuria: Present, on lisinopril 40 mg  CAD/PAD: No  Gastroparesis: No  Hypoglycemia unawareness: No     2. Hypertension: Blood Pressure today:   BP Readings from Last 3 Encounters:   02/24/20 (!) 164/83   01/28/20 132/74   08/14/19 (!) 142/70     Blood pressure medications include lisinopril 20 mg and amlodipine.    3. Hyperlipidemia:  Takes simvastatin 5 mg    PMH/PSH:  Past Medical History:   Diagnosis Date     Anemia      CKD (chronic kidney disease) stage 3, GFR 30-59 ml/min (H)      GERD (gastroesophageal reflux disease)      Hepatitis 2011    elevated AST due to excess ETOH use (5 drinks/day)     HTN (hypertension)      Hyperlipidemia LDL goal <100 3/7/2013     MGUS (monoclonal  gammopathy of unknown significance)      IgG Lambda. Followed by Dr Rogers     Proteinuria 1/22/2015     PVC (premature ventricular contraction)      Retinopathy due to secondary diabetes (H)     s/p laser therapy left     Subclinical hypothyroidism 6/11/2018     Tobacco abuse     20 pack hx. Quit 1991     Type 1 diabetes mellitus with nephropathy (goal A1C<7) 1978     Past Surgical History:   Procedure Laterality Date     HAND SURGERY  2007    contracture release     Family Hx:  Family History   Problem Relation Age of Onset     Diabetes Sister      Diabetes Paternal Grandmother      Diabetes Father      Cerebrovascular Disease Maternal Grandmother      Cancer Mother         peritoneal carcinoma           DM2:  Sister with gestational diabetes and now type 2 diabetes.  On insulin.           Social Hx:  Social History     Socioeconomic History     Marital status:      Spouse name: Not on file     Number of children: Not on file     Years of education: Not on file     Highest education level: Not on file   Occupational History     Employer: RETIRED     Comment: anesthesiologist   Social Needs     Financial resource strain: Not on file     Food insecurity     Worry: Not on file     Inability: Not on file     Transportation needs     Medical: Not on file     Non-medical: Not on file   Tobacco Use     Smoking status: Former Smoker     Packs/day: 1.00     Years: 15.00     Pack years: 15.00     Smokeless tobacco: Never Used   Substance and Sexual Activity     Alcohol use: Yes     Frequency: 2-3 times a week     Comment: occ     Drug use: No     Sexual activity: Yes     Partners: Female   Lifestyle     Physical activity     Days per week: Not on file     Minutes per session: Not on file     Stress: Not on file   Relationships     Social connections     Talks on phone: Not on file     Gets together: Not on file     Attends [employer]al service: Not on file     Active member of club or organization: Not on file      Attends meetings of clubs or organizations: Not on file     Relationship status: Not on file     Intimate partner violence     Fear of current or ex partner: Not on file     Emotionally abused: Not on file     Physically abused: Not on file     Forced sexual activity: Not on file   Other Topics Concern     Parent/sibling w/ CABG, MI or angioplasty before 65F 55M? Not Asked   Social History Narrative     Not on file          MEDICATIONS:  has a current medication list which includes the following prescription(s): amlodipine, aspirin, blood glucose, accu-chek active glucose cont, blood glucose monitoring, carvedilol, dexcom g6 , dexcom g6 sensor, dexcom g6 transmitter, epinephrine, fish oil-omega-3 fatty acids, insulin lispro, insulin pen needle, lantus solostar, lisinopril, multi vitamin  mens, omeprazole, sildenafil, and simvastatin.    ROS     ROS: 10 point ROS neg other than the symptoms noted above in the HPI.    Physical Exam   VS: There were no vitals taken for this visit.  GENERAL: healthy, alert and no distress  EYES: Eyes grossly normal to inspection, conjunctivae and sclerae normal  RESP: no audible wheeze, cough, or visible cyanosis.  No visible retractions or increased work of breathing.  Able to speak fully in complete sentences.  NEURO: Cranial nerves grossly intact, mentation intact and speech normal  PSYCH: mentation appears normal, affect normal/bright, judgement and insight intact, normal speech and appearance well-groomed      LABS:  A1c:  Lab Results   Component Value Date    A1C 6.0 08/05/2020    A1C 6.1 01/22/2020    A1C 6.1 07/17/2019    A1C 6.2 11/23/2018    A1C 6.0 07/31/2018       BMP:  Creatinine   Date Value Ref Range Status   08/05/2020 0.81 0.66 - 1.25 mg/dL Final       Urine Micro:  Lab Results   Component Value Date    UMALCR 52.48 08/05/2020         LFTs/Lipids:  Recent Labs   Lab Test 08/05/20  1000 01/22/20  1015  01/16/15  0832 01/20/14  0925   CHOL 146 135   < > 156 163    HDL 57 57   < > 82 60   LDL 72 59   < > 61 88   TRIG 83 95   < > 64 72   CHOLHDLRATIO  --   --   --  1.9 2.7    < > = values in this interval not displayed.     TFTs:  TSH   Date Value Ref Range Status   01/22/2020 2.83 0.40 - 4.00 mU/L Final     [facility] Latest Ref Rng & Units 7/31/2018   TSH 0.40 - 4.00 mU/L 2.46   T4 FREE 0.76 - 1.46 ng/dL 0.78   THYR PEROXIDASE VERNON <35 IU/mL <10     Blood Glucose and pump data/ Meter reviewed.  CGM data reviewed.      Glucometer/ insulin pump (if applicable)/ CGM data (if applicable) downloaded, Personally reviewed and interpreted.  All Blood sugar data reviewed personally and discussed with pt.  See nursing note from today's clinic visit for details of BG/CGM log.      All pertinent notes, labs, and images personally reviewed by me.     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 DM1 - Under Good control.  A1c 6.0%.  Diabetes complicated by proliferative diabetic retinopathy.  In general BG are optimal. Using dexcom with G6 sensors.  No major episodes of hypoglycemia noted/reported.   Overnight BG appears to be on higher side.  Plan:  Increase Lantus to 17 units at night.  Continue with Humalog at current dose with 3 units with each meal plus correction scale+ 2 units at night if BG >200.  Consider metformin in next visit  Labs and f/u in 3 months.  Continue to use Dexcom.      If Blood glucose are low more often-> 2-3 times/week- give us a call.  The patient is advised to Make better food choices: reduce carbs, Reduce portion size, weight loss and exercise 3-4 times a week.  Discussed hypoglycemia signs and symptoms as well as management in detail.      2. Hypertension - Is on coreg, amlodipine and lisinopril 40 mg.     3. Hyperlipidemia - Under Good control. On simvastatin 5 mg.  LDL 65, HDL 82  4. Prevention  Flu Shot- NA  Pneumovax- 2011  Opthalmology-nov 2018  ASA-81 mg  Smoking- no    Most Recent  Immunizations   Administered Date(s) Administered     Influenza (High Dose) 3 valent vaccine 09/25/2019     Influenza (IIV3) PF 10/02/2014     Influenza Vaccine IM > 6 months Valent IIV4 09/28/2015     Pneumo Conj 13-V (2010&after) 01/23/2017     Pneumococcal 23 valent 01/11/2018     TD (ADULT, 7+) 07/03/2015     Tdap (Adacel,Boostrix) 10/19/2005     Zoster vaccine recombinant adjuvanted (SHINGRIX) 10/22/2018     Zoster vaccine, live 11/01/2011       5.  Microalbuminuria:  Lab Results   Component Value Date    UMALCR 135.83 07/25/2018    Continue lisinopril 40 mg.    Recommend strict Bg control.    Follow-up:  Follow up in 3 month(s)    Kierra Sexton M.D  [facility]  CC: Hernan Juárez    All questions were answered.  The patient indicates understanding of the above issues and agrees with the plan set forth.     Disclaimer: This note consists of symbols derived from keyboarding, dictation and/or voice recognition software. As a result, there may be errors in the script that have gone undetected. Please consider this when interpreting information found in this chart.

## 2020-08-13 NOTE — PATIENT INSTRUCTIONS
[facility] & [place] locations   Dr Sexton, Endocrinology Department      [facility]   [address]  [place] [address]  Appointment [phone]  Fax: [phone]  [place]: Monday and Tuesday         [facility]   [address]  [place] [address]  Appointment [phone]  Fax: [phone]  [place]: Wednesday and Thursday            Please check the cost coverage and copay with insurance before recommended tests, services and medications (especially if new medications are prescribed).     If ordered, please get blood work done 1 week prior to your next appointment so they will be available to Dr. Sexton at your visit.    To provide the best diabetic care, please bring your blood glucose meter to each and every visit with your  Endocrinologist. Your blood glucose meter/insulin pump will be downloaded at every appointment.  Please arrive 15 minutes before your scheduled appointment. This will allow for your blood glucose meter/insulin pump  to be downloaded.  If you are wearing DEXCOM please bring  or sharing code from the Dexcom Clarity Appt so that it can be downloaded.  If you are using freestyle bobby personal sensors please bring the reader.  If you are using TANDEM insulin pump please have your username and password to get info from [employer] website.    Increase lantus to 17 units as baseline.  Continue Novolog at current dose.  Labs and follow up in 3 months.  Continue to use Dexcom with G6 sensors.    Recommend checking blood sugars before meals and at bedtime.    If Blood glucose are low more often-> 2-3 times/week- give us a call.  The patient is advised to Make better food choices: reduce carbs, Reduce portion size, weight loss and exercise 3-4 times a week.  Discussed hypoglycemia signs and symptoms as well as management in detail.

## 2020-08-13 NOTE — LETTER
"    8/13/2020         RE: Eh Perry  [address]  [facility] [address]        Dear Colleague,    [facility] Please see a copy of my visit note below.    THIS IS A VIDEO VISIT:    Phone call visit/virtual visit encounter:  Part of visit was done by telephone.    Name of patient: Eh Perry    Date of encounter: 8/13/2020    Time of start of video visit: 12:59    Video started: 1:09    Video ended: 1:24    Time visit video ended: 1:30    Provider location: working from home/ [facility]    Patient location: patients home.    Mode of transmission: video/ Doximity    Verbal consent: obtained before starting visit. Pt is agreeable.      The patient has been notified of following:      \"This VIDEO visit will be conducted via a call between you and your physician/provider. We have found that certain health care needs can be provided without the need for a physical exam.  This service lets us provide the care you need with a short phone conversation.  If a prescription is necessary we can send it directly to your pharmacy.  If lab work is needed we can place an order for that and you can then stop by our lab to have the test done at a later time.     With new updates with corona virus patient might be billed as clinic visit.     If during the course of the call the physician/provider feels a telephone visit is not appropriate, you will not be charged for this service.\"      Past medical history, social history, family history, allergy and medications were reviewed and updated as appropriate.  Reviewed pertinent labs, notes, imaging studies personally.    Endocrinology Clinic Note:  Name: Eh Perry  Seen for Diabetes.   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  Is using MDI and Dexcom with G6 sensors.  Using pens now.       1. Type 1 " DM:  Orginally diagnosed at the age of: 24  Hospitalization for DKA: no  Current Regimen: Lantus 16 units at night and Humalog 3 units with each meal plus correction scale+ 2 Untis before bedtime if BG >200 (For blood sugar 120-160 take an additional one unit, about 200 takes 2 more units and for  takes three more units).  About 15-20 Untis/day  yes:     Diabetes Medication(s)     Insulin       insulin lispro (HUMALOG KWIKPEN) 100 UNIT/ML (1 unit dial) KWIKPEN    INJECT 3 UNITS UNDER THE SKIN BEFORE MEALS AND SLIDING SCALE. MAX OF 20 UNITS PER DAY.     LANTUS SOLOSTAR 100 UNIT/ML soln    ADMINISTER 16 UNITS UNDER THE SKIN DAILY          BS checks: 3-4 times a day. Using Dexcom.  Average Meter Download: 160 based on Dexcom with SD 56.  Blood glucose data reviewed personally. See nursing note from this encounter for details.  BG mostly in range.  No major episodes of hypoglycemia noted/reported.   Symptoms of hypoglycemia (low blood sugar): Get symptoms of hypoglycemia  Episodes of hypoglycemia: No major episodes of hypoglycemia noted/reported.   Fixed meal pattern: Yes.  Has three meals and sometimes snacks at bedtime if her blood sugar is below 80.  Patient counting carbs: No  Using PUMP: No.  He was on pump before but did not like it.  Does not want to go back on pump.     DM Complications:   Nephropathy: No  Retinopathy: Present.  Followed by ophthalmology.  Proliferative diabetic retinopathy status post laser treatment ×2.  Last visit treatment was in 2010.  Neuropathy: No  Microalbuminuria: Present, on lisinopril 40 mg  CAD/PAD: No  Gastroparesis: No  Hypoglycemia unawareness: No     2. Hypertension: Blood Pressure today:   BP Readings from Last 3 Encounters:   02/24/20 (!) 164/83   01/28/20 132/74   08/14/19 (!) 142/70     Blood pressure medications include lisinopril 20 mg and amlodipine.    3. Hyperlipidemia:  Takes simvastatin 5 mg    PMH/PSH:  Past Medical History:   Diagnosis Date     Anemia      CKD  (chronic kidney disease) stage 3, GFR 30-59 ml/min (H)      GERD (gastroesophageal reflux disease)      Hepatitis 2011    elevated AST due to excess ETOH use (5 drinks/day)     HTN (hypertension)      Hyperlipidemia LDL goal <100 3/7/2013     MGUS (monoclonal gammopathy of unknown significance)      IgG Lambda. Followed by Dr Rogers     Proteinuria 1/22/2015     PVC (premature ventricular contraction)      Retinopathy due to secondary diabetes (H)     s/p laser therapy left     Subclinical hypothyroidism 6/11/2018     Tobacco abuse     20 pack hx. Quit 1991     Type 1 diabetes mellitus with nephropathy (goal A1C<7) 1978     Past Surgical History:   Procedure Laterality Date     HAND SURGERY  2007    contracture release     Family Hx:  Family History   Problem Relation Age of Onset     Diabetes Sister      Diabetes Paternal Grandmother      Diabetes Father      Cerebrovascular Disease Maternal Grandmother      Cancer Mother         peritoneal carcinoma           DM2:  Sister with gestational diabetes and now type 2 diabetes.  On insulin.           Social Hx:  Social History     Socioeconomic History     Marital status:      Spouse name: Not on file     Number of children: Not on file     Years of education: Not on file     Highest education level: Not on file   Occupational History     Employer: RETIRED     Comment: anesthesiologist   Social Needs     Financial resource strain: Not on file     Food insecurity     Worry: Not on file     Inability: Not on file     Transportation needs     Medical: Not on file     Non-medical: Not on file   Tobacco Use     Smoking status: Former Smoker     Packs/day: 1.00     Years: 15.00     Pack years: 15.00     Smokeless tobacco: Never Used   Substance and Sexual Activity     Alcohol use: Yes     Frequency: 2-3 times a week     Comment: occ     Drug use: No     Sexual activity: Yes     Partners: Female   Lifestyle     Physical activity     Days per week: Not on file      Minutes per session: Not on file     Stress: Not on file   Relationships     Social connections     Talks on phone: Not on file     Gets together: Not on file     Attends Sabianism service: Not on file     Active member of club or organization: Not on file     Attends meetings of clubs or organizations: Not on file     Relationship status: Not on file     Intimate partner violence     Fear of current or ex partner: Not on file     Emotionally abused: Not on file     Physically abused: Not on file     Forced sexual activity: Not on file   Other Topics Concern     Parent/sibling w/ CABG, MI or angioplasty before 65F 55M? Not Asked   Social History Narrative     Not on file          MEDICATIONS:  has a current medication list which includes the following prescription(s): amlodipine, aspirin, blood glucose, accu-chek active glucose cont, blood glucose monitoring, carvedilol, dexcom g6 , dexcom g6 sensor, dexcom g6 transmitter, epinephrine, fish oil-omega-3 fatty acids, insulin lispro, insulin pen needle, lantus solostar, lisinopril, multi vitamin  mens, omeprazole, sildenafil, and simvastatin.    ROS     ROS: 10 point ROS neg other than the symptoms noted above in the HPI.    Physical Exam   VS: There were no vitals taken for this visit.  GENERAL: healthy, alert and no distress  EYES: Eyes grossly normal to inspection, conjunctivae and sclerae normal  RESP: no audible wheeze, cough, or visible cyanosis.  No visible retractions or increased work of breathing.  Able to speak fully in complete sentences.  NEURO: Cranial nerves grossly intact, mentation intact and speech normal  PSYCH: mentation appears normal, affect normal/bright, judgement and insight intact, normal speech and appearance well-groomed      LABS:  A1c:  Lab Results   Component Value Date    A1C 6.0 08/05/2020    A1C 6.1 01/22/2020    A1C 6.1 07/17/2019    A1C 6.2 11/23/2018    A1C 6.0 07/31/2018       BMP:  Creatinine   Date Value Ref Range Status    08/05/2020 0.81 0.66 - 1.25 mg/dL Final       Urine Micro:  Lab Results   Component Value Date    UMALCR 52.48 08/05/2020         LFTs/Lipids:  Recent Labs   Lab Test 08/05/20  1000 01/22/20  1015  01/16/15  0832 01/20/14  0925   CHOL 146 135   < > 156 163   HDL 57 57   < > 82 60   LDL 72 59   < > 61 88   TRIG 83 95   < > 64 72   CHOLHDLRATIO  --   --   --  1.9 2.7    < > = values in this interval not displayed.     TFTs:  TSH   Date Value Ref Range Status   01/22/2020 2.83 0.40 - 4.00 mU/L Final     ENDO THYROID [facility]S-[facility] Latest Ref Rng & Units 7/31/2018   TSH 0.40 - 4.00 mU/L 2.46   T4 FREE 0.76 - 1.46 ng/dL 0.78   THYR PEROXIDASE VERNON <35 IU/mL <10     Blood Glucose and pump data/ Meter reviewed.  CGM data reviewed.      Glucometer/ insulin pump (if applicable)/ CGM data (if applicable) downloaded, Personally reviewed and interpreted.  All Blood sugar data reviewed personally and discussed with pt.  See nursing note from today's clinic visit for details of BG/CGM log.      All pertinent notes, labs, and images personally reviewed by me.     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 DM1 - Under Good control.  A1c 6.0%.  Diabetes complicated by proliferative diabetic retinopathy.  In general BG are optimal. Using dexcom with G6 sensors.  No major episodes of hypoglycemia noted/reported.   Overnight BG appears to be on higher side.  Plan:  Increase Lantus to 17 units at night.  Continue with Humalog at current dose with 3 units with each meal plus correction scale+ 2 units at night if BG >200.  Consider metformin in next visit  Labs and f/u in 3 months.  Continue to use Dexcom.      If Blood glucose are low more often-> 2-3 times/week- give us a call.  The patient is advised to Make better food choices: reduce carbs, Reduce portion size, weight loss and exercise 3-4 times a week.  Discussed hypoglycemia signs and symptoms as well as management in detail.      2.  Hypertension - Is on coreg, amlodipine and lisinopril 40 mg.     3. Hyperlipidemia - Under Good control. On simvastatin 5 mg.  LDL 65, HDL 82  4. Prevention  Flu Shot- NA  Pneumovax- 2011  Opthalmology-nov 2018  ASA-81 mg  Smoking- no    Most Recent Immunizations   Administered Date(s) Administered     Influenza (High Dose) 3 valent vaccine 09/25/2019     Influenza (IIV3) PF 10/02/2014     Influenza Vaccine IM > 6 months Valent IIV4 09/28/2015     Pneumo Conj 13-V (2010&after) 01/23/2017     Pneumococcal 23 valent 01/11/2018     TD (ADULT, 7+) 07/03/2015     Tdap (Adacel,Boostrix) 10/19/2005     Zoster vaccine recombinant adjuvanted (SHINGRIX) 10/22/2018     Zoster vaccine, live 11/01/2011       5.  Microalbuminuria:  Lab Results   Component Value Date    UMALCR 135.83 07/25/2018    Continue lisinopril 40 mg.    Recommend strict Bg control.    Follow-up:  Follow up in 3 month(s)    Kierra Sexton M.D  Endocrinology  [facility]/[place]  CC: Hernan Juárez    All questions were answered.  The patient indicates understanding of the above issues and agrees with the plan set forth.     Disclaimer: This note consists of symbols derived from keyboarding, dictation and/or voice recognition software. As a result, there may be errors in the script that have gone undetected. Please consider this when interpreting information found in this chart.          Again, thank you for allowing me to participate in the care of your patient.        Sincerely,        Kierra Sexton MD

## 2020-08-14 DIAGNOSIS — E10.21 TYPE 1 DIABETES MELLITUS WITH NEPHROPATHY (H): ICD-10-CM

## 2020-08-19 RX ORDER — PROCHLORPERAZINE 25 MG/1
1 SUPPOSITORY RECTAL DAILY
Qty: 1 EACH | Refills: 3 | Status: SHIPPED | OUTPATIENT
Start: 2020-08-19 | End: 2021-02-02

## 2020-08-19 RX ORDER — PROCHLORPERAZINE 25 MG/1
1 SUPPOSITORY RECTAL
Qty: 3 EACH | Refills: 11 | Status: SHIPPED | OUTPATIENT
Start: 2020-08-19 | End: 2021-02-02

## 2020-09-08 DIAGNOSIS — E10.21 TYPE 1 DIABETES MELLITUS WITH NEPHROPATHY (H): ICD-10-CM

## 2020-09-10 RX ORDER — PEN NEEDLE, DIABETIC 31 GX5/16"
NEEDLE, DISPOSABLE MISCELLANEOUS
Qty: 400 EACH | Refills: 1 | Status: SHIPPED | OUTPATIENT
Start: 2020-09-10 | End: 2021-03-04

## 2020-10-12 DIAGNOSIS — E10.21 TYPE 1 DIABETES MELLITUS WITH NEPHROPATHY (H): ICD-10-CM

## 2020-10-15 RX ORDER — INSULIN LISPRO 100 [IU]/ML
INJECTION, SOLUTION INTRAVENOUS; SUBCUTANEOUS
Qty: 18 ML | Refills: 0 | Status: SHIPPED | OUTPATIENT
Start: 2020-10-15 | End: 2021-01-06

## 2020-12-03 ENCOUNTER — MYC MEDICAL ADVICE (OUTPATIENT)
Dept: INTERNAL MEDICINE | Facility: CLINIC | Age: 70
End: 2020-12-03

## 2020-12-09 DIAGNOSIS — I10 ESSENTIAL HYPERTENSION: ICD-10-CM

## 2020-12-10 RX ORDER — LISINOPRIL 40 MG/1
40 TABLET ORAL DAILY
Qty: 90 TABLET | Refills: 0 | Status: SHIPPED | OUTPATIENT
Start: 2020-12-10 | End: 2021-01-08

## 2020-12-10 RX ORDER — AMLODIPINE BESYLATE 2.5 MG/1
2.5 TABLET ORAL DAILY
Qty: 90 TABLET | Refills: 0 | Status: SHIPPED | OUTPATIENT
Start: 2020-12-10 | End: 2021-01-08

## 2020-12-14 ENCOUNTER — TELEPHONE (OUTPATIENT)
Dept: INTERNAL MEDICINE | Facility: CLINIC | Age: 70
End: 2020-12-14

## 2020-12-14 NOTE — TELEPHONE ENCOUNTER
Reason for Call:  Form, our goal is to have forms completed with 72 hours, however, some forms may require a visit or additional information.    Type of letter, form or note:  Insulin-Treated Diabetes Mellitus Report    Who is the form from?: [place] [employer] (if other please explain)    Where did the form come from: Patient or family brought in       What clinic location was the form placed at?: Internal Medicine    Where the form was placed: Pcp's Folder    What number is listed as a contact on the form?: [phone]       Additional comments: Please mail back in envelope provided.    Call taken on 12/14/2020 at 1:53 PM by Aixa Shannon

## 2020-12-16 NOTE — TELEPHONE ENCOUNTER
Form faxed to [place] [employer] at [phone], copy made and sent to be scanned into patients chart, original mailed back to patient in envelope provided.

## 2021-01-04 DIAGNOSIS — E10.21 TYPE 1 DIABETES MELLITUS WITH NEPHROPATHY (H): ICD-10-CM

## 2021-01-05 ASSESSMENT — ACTIVITIES OF DAILY LIVING (ADL): CURRENT_FUNCTION: NO ASSISTANCE NEEDED

## 2021-01-06 RX ORDER — INSULIN LISPRO 100 [IU]/ML
INJECTION, SOLUTION INTRAVENOUS; SUBCUTANEOUS
Qty: 18 ML | Refills: 0 | Status: SHIPPED | OUTPATIENT
Start: 2021-01-06 | End: 2021-01-19

## 2021-01-06 NOTE — TELEPHONE ENCOUNTER
"Requested Prescriptions   Pending Prescriptions Disp Refills     insulin lispro (HUMALOG KWIKPEN) 100 UNIT/ML (1 unit dial) KWIKPEN [Pharmacy Med Name: INSULIN LISPRO 100U/ML KWIKPEN 3ML] 18 mL 0     Sig: ADMINISTER 3 UNITS UNDER THE SKIN BEFORE MEALS PER SLIDING SCALE. MAX OF 20 UNITS PER DAY       Short Acting Insulin Protocol Passed - 1/4/2021  8:55 AM        Passed - Serum creatinine on file in past 12 months     Recent Labs   Lab Test 08/05/20  1000   CR 0.81       Ok to refill medication if creatinine is low          Passed - HgbA1C in past 3 or 6 months     If HgbA1C is 8 or greater, it needs to be on file within the past 3 months.  If less than 8, must be on file within the past 6 months.     Recent Labs   Lab Test 08/05/20  1000   A1C 6.0*             Passed - Medication is active on med list        Passed - Patient is age 18 or older        Passed - Recent (6 mo) or future (30 days) visit within the authorizing provider's specialty     Patient had office visit in the last 6 months or has a visit in the next 30 days with authorizing provider or within the authorizing provider's specialty.  See \"Patient Info\" tab in inbasket, or \"Choose Columns\" in Meds & Orders section of the refill encounter.                 "

## 2021-01-08 ENCOUNTER — OFFICE VISIT (OUTPATIENT)
Dept: INTERNAL MEDICINE | Facility: CLINIC | Age: 71
End: 2021-01-08
Payer: MEDICARE

## 2021-01-08 VITALS
OXYGEN SATURATION: 99 % | DIASTOLIC BLOOD PRESSURE: 66 MMHG | RESPIRATION RATE: 16 BRPM | HEIGHT: 71 IN | BODY MASS INDEX: 23.94 KG/M2 | SYSTOLIC BLOOD PRESSURE: 130 MMHG | TEMPERATURE: 97.1 F | WEIGHT: 171 LBS | HEART RATE: 77 BPM

## 2021-01-08 DIAGNOSIS — Z00.00 MEDICARE ANNUAL WELLNESS VISIT, SUBSEQUENT: ICD-10-CM

## 2021-01-08 DIAGNOSIS — N52.9 ERECTILE DYSFUNCTION, UNSPECIFIED ERECTILE DYSFUNCTION TYPE: ICD-10-CM

## 2021-01-08 DIAGNOSIS — T78.2XXD ANAPHYLACTIC REACTION TO WASP STING, ACCIDENTAL OR UNINTENTIONAL, SUBSEQUENT ENCOUNTER: ICD-10-CM

## 2021-01-08 DIAGNOSIS — E78.5 HYPERLIPIDEMIA LDL GOAL <100: ICD-10-CM

## 2021-01-08 DIAGNOSIS — T63.461D ANAPHYLACTIC REACTION TO WASP STING, ACCIDENTAL OR UNINTENTIONAL, SUBSEQUENT ENCOUNTER: ICD-10-CM

## 2021-01-08 DIAGNOSIS — E10.21 TYPE 1 DIABETES MELLITUS WITH NEPHROPATHY (H): ICD-10-CM

## 2021-01-08 DIAGNOSIS — I10 ESSENTIAL HYPERTENSION: ICD-10-CM

## 2021-01-08 DIAGNOSIS — K21.9 GASTROESOPHAGEAL REFLUX DISEASE WITHOUT ESOPHAGITIS: ICD-10-CM

## 2021-01-08 PROCEDURE — 99213 OFFICE O/P EST LOW 20 MIN: CPT | Mod: 25 | Performed by: INTERNAL MEDICINE

## 2021-01-08 PROCEDURE — G0439 PPPS, SUBSEQ VISIT: HCPCS | Performed by: INTERNAL MEDICINE

## 2021-01-08 RX ORDER — SILDENAFIL 50 MG/1
TABLET, FILM COATED ORAL
Qty: 30 TABLET | Refills: 3 | Status: SHIPPED | OUTPATIENT
Start: 2021-01-08 | End: 2022-01-20

## 2021-01-08 RX ORDER — EPINEPHRINE 0.3 MG/.3ML
0.3 INJECTION SUBCUTANEOUS PRN
Qty: 0.6 ML | Refills: 5 | Status: SHIPPED | OUTPATIENT
Start: 2021-01-08 | End: 2022-01-20

## 2021-01-08 RX ORDER — AMLODIPINE BESYLATE 2.5 MG/1
2.5 TABLET ORAL DAILY
Qty: 90 TABLET | Refills: 3 | Status: SHIPPED | OUTPATIENT
Start: 2021-01-08 | End: 2022-01-20

## 2021-01-08 RX ORDER — SIMVASTATIN 10 MG
TABLET ORAL
Qty: 45 TABLET | Refills: 3 | Status: SHIPPED | OUTPATIENT
Start: 2021-01-08 | End: 2022-01-13

## 2021-01-08 RX ORDER — CARVEDILOL 6.25 MG/1
6.25 TABLET ORAL 2 TIMES DAILY WITH MEALS
Qty: 180 TABLET | Refills: 3 | Status: SHIPPED | OUTPATIENT
Start: 2021-01-08 | End: 2022-01-20

## 2021-01-08 RX ORDER — LISINOPRIL 40 MG/1
40 TABLET ORAL DAILY
Qty: 90 TABLET | Refills: 3 | Status: SHIPPED | OUTPATIENT
Start: 2021-01-08 | End: 2022-01-20

## 2021-01-08 ASSESSMENT — ACTIVITIES OF DAILY LIVING (ADL): CURRENT_FUNCTION: NO ASSISTANCE NEEDED

## 2021-01-08 ASSESSMENT — MIFFLIN-ST. JEOR: SCORE: 1562.78

## 2021-01-08 NOTE — PROGRESS NOTES
"SUBJECTIVE:   Eh Perry is a 70 year old male who presents for Preventive Visit and follow-up regarding established medical issues including hypertension, hyperlipidemia, GERD, erectile dysfunction.  Diabetes managed by endocrinology      Healthy Habits:     In general, how would you rate your overall health?  Good    Frequency of exercise:  6-7 days/week    Duration of exercise:  30-45 minutes    Do you usually eat at least 4 servings of fruit and vegetables a day, include whole grains    & fiber and avoid regularly eating high fat or \"junk\" foods?  Yes    Taking medications regularly:  Yes    Medication side effects:  None    Ability to successfully perform activities of daily living:  No assistance needed    Home Safety:  Lack of grab bars in the bathroom    Hearing Impairment:  No hearing concerns    In the past 6 months, have you been bothered by leaking of urine?  No    In general, how would you rate your overall mental or emotional health?  Good      PHQ-2 Total Score: 0    Additional concerns today:  No    Do you feel safe in your environment? Yes    Have you ever done Advance Care Planning? (For example, a Health Directive, POLST, or a discussion with a medical provider or your loved ones about your wishes): Yes, patient states has an Advance Care Planning document and will bring a copy to the clinic.      Fall risk  Fallen 2 or more times in the past year?: No  Any fall with injury in the past year?: No    Cognitive Screening   1) Repeat 3 items (Leader, Season, Table)    2) Clock draw: NORMAL  3) 3 item recall: Recalls 3 objects  Results: Normal Clock, 3 items recalled    Mini-CogTM Copyright JONH Fried. Licensed by the author for use in [facility]; reprinted with permission ([email or web address]). All rights reserved.      Do you have sleep apnea, excessive snoring or daytime drowsiness?: no    Reviewed and updated as needed this visit by clinical staff                 Reviewed and updated as " needed this visit by Provider                Social History     Tobacco Use     Smoking status: Former Smoker     Packs/day: 1.00     Years: 15.00     Pack years: 15.00     Smokeless tobacco: Never Used   Substance Use Topics     Alcohol use: Yes     Frequency: 2-3 times a week     Comment: occ     If you drink alcohol do you typically have >3 drinks per day or >7 drinks per week? Yes    Alcohol Use 1/5/2021   Prescreen: >3 drinks/day or >7 drinks/week? No   Prescreen: >3 drinks/day or >7 drinks/week? -        Standardized Alcohol Screening Questionnaire  AUDIT   Questions 0 1 2 3 4 Score   1. How often do you have a drink  containing alcohol? Never Monthly or less 2 to 4  times a  month 2 to 3  times a  week 4 or more  times a  week 4   2. How many drinks containing alcohol  do you have on a typical day when you are drinking? 1 or 2 3 or 4 5 or 6 7 to 9 10 or more 0   3. How often do you have more than five  or more drinks on one occasion? Never Less  than  monthly Monthly Weekly Daily or  almost  daily 0   4. How often during the last year have  you found that you were not able to stop drinking once you had started? Never Less  than  monthly Monthly Weekly Daily or  almost  daily 0   5. How often during the last year have  you failed to do what was normally expected of you because of drinking? Never Less  than  monthly Monthly Weekly Daily or  almost  daily 0   6. How often during the last year have  you needed a first drink in the morning to get yourself going after a heavy drinking session? Never Less  than  monthly Monthly Weekly Daily or  almost  daily 0   7. How often during the last year have you had a feeling of guilt or remorse after drinking? Never Less  than  monthly Monthly Weekly Daily or  almost  daily 0   8. How often during the last year have  you been unable to remember what happened the night before because of your drinking? Never Less  than  monthly Monthly Weekly Daily or  almost  daily 0    9. Have you or someone else been  injured because of your drinking? No  Yes, but not in the last year  Yes,  during the  last year 0   10. Has a relative, friend, doctor or other health care worker been concerned about your drinking or suggested you cut down? No  Yes, but not in the last year  Yes,  during the  last year 0   Total 4   Scoring: A score of 7 for adult men is an indication of hazardous drinking (risk for physical or physiological harm); a score of 8 or more is an indication of an alcohol use disorder. A score of 5 or more for adult women  is an indication of hazardous drinking or an alcohol use disorder.         Current providers sharing in care for this patient include:   Patient Care Team:  Hernan Juárez MD as PCP - General (Internal Medicine)  Hernan Juárez MD as Assigned PCP  Kierra Sexton MD as Assigned Endocrinology Provider    The following health maintenance items are reviewed in Epic and correct as of today:  Health Maintenance   Topic Date Due     MEDICARE ANNUAL WELLNESS VISIT  01/28/2021     FALL RISK ASSESSMENT  01/28/2021     DIABETIC FOOT EXAM  01/29/2021     A1C  02/05/2021     EYE EXAM  03/25/2021     BMP  08/05/2021     LIPID  08/05/2021     MICROALBUMIN  08/05/2021     COLORECTAL CANCER SCREENING  06/01/2022     PSA  08/05/2023     ADVANCE CARE PLANNING  01/29/2025     DTAP/TDAP/TD IMMUNIZATION (3 - Td) 07/03/2025     HEPATITIS C SCREENING  Completed     PHQ-2  Completed     INFLUENZA VACCINE  Completed     Pneumococcal Vaccine: 65+ Years  Completed     ZOSTER IMMUNIZATION  Completed     AORTIC ANEURYSM SCREENING (SYSTEM ASSIGNED)  Completed     Pneumococcal Vaccine: Pediatrics (0 to 5 Years) and At-Risk Patients (6 to 64 Years)  Aged Out     IPV IMMUNIZATION  Aged Out     MENINGITIS IMMUNIZATION  Aged Out     Labs reviewed in EPIC    Component      Latest Ref Rng & Units 1/22/2020 8/5/2020   Sodium      133 - 144 mmol/L 136 135   Potassium      3.4 - 5.3 mmol/L 4.9 4.4    Chloride      94 - 109 mmol/L 104 107   Carbon Dioxide      20 - 32 mmol/L 26 20   Anion Gap      3 - 14 mmol/L 6 8   Glucose      70 - 99 mg/dL 124 (H) 275 (H)   Urea Nitrogen      7 - 30 mg/dL 15 16   Creatinine      0.66 - 1.25 mg/dL 0.83 0.81   GFR Estimate      >60 mL/min/1.73:m2 90 >90   GFR Estimate If Black      >60 mL/min/1.73:m2 >90 >90   Calcium      8.5 - 10.1 mg/dL 9.8 8.6   Bilirubin Total      0.2 - 1.3 mg/dL 0.6 0.7   Albumin      3.4 - 5.0 g/dL 4.0 3.8   Protein Total      6.8 - 8.8 g/dL 8.1 7.4   Alkaline Phosphatase      40 - 150 U/L 91 63   ALT      0 - 70 U/L 24 34   AST      0 - 45 U/L 21 25   Cholesterol      <200 mg/dL 135 146   Triglycerides      <150 mg/dL 95 83   HDL Cholesterol      >39 mg/dL 57 57   LDL Cholesterol Calculated      <100 mg/dL 59 72   Non HDL Cholesterol      <130 mg/dL 78 89   Creatinine Urine      mg/dL 52 125   Albumin Urine mg/L      mg/L 109 66   Albumin Urine mg/g Cr      0 - 17 mg/g Cr 209.62 (H) 52.48 (H)   Hemoglobin A1C      0 - 5.6 % 6.1 (H) 6.0 (H)   PSA      0 - 4 ug/L  0.80       Review of Systems  CONSTITUTIONAL: NEGATIVE for fever, chills, change in weight  INTEGUMENTARY/SKIN: NEGATIVE for worrisome rashes, moles or lesions  EYES: NEGATIVE for  irritation Right  Eye acuity slightly reduced and pt states weill have some laser treatment soon by eye doctor Eye exam Dec 2020  ENT/MOUTH: NEGATIVE for ear, mouth and throat problems  RESP: NEGATIVE for significant cough or SOB  CV: NEGATIVE for chest pain, palpitations or peripheral edema  GI: NEGATIVE for nausea, abdominal pain, heartburn, or change in bowel habits  : NEGATIVE for frequency, dysuria, or hematuria. Has ED. On Viagra with moderate response. Improved proteinuria  MUSCULOSKELETAL: NEGATIVE for significant arthralgias or myalgia except for  right chronic palmar contracture  NEURO: NEGATIVE for weakness, dizziness or paresthesias  ENDOCRINE: NEGATIVE for temperature intolerance. DM and lipids well  "controlled. DM managed by endo  HEME: NEGATIVE for bleeding problems  PSYCHIATRIC: NEGATIVE for changes in mood or affect    OBJECTIVE:   /66   Pulse 77   Temp 97.1  F (36.2  C) (Temporal)   Resp 16   Ht 1.803 m (5' 11\")   Wt 77.6 kg (171 lb)   SpO2 99%   BMI 23.85 kg/m   Estimated body mass index is 24.32 kg/m  as calculated from the following:    Height as of 2/24/20: 1.803 m (5' 11\").    Weight as of 2/24/20: 79.1 kg (174 lb 6.4 oz).  Physical Exam  General appearance - healthy, alert, no distress  Skin - No rashes or lesions.  Head - normocephalic, atraumatic  Eyes - JULIETTE, EOMI, fundi exam with nondilated pupils negative.  Ears - External ears normal. Canals clear. TM's normal.  Nose/Sinuses - Nares normal. Septum midline. Mucosa normal. No drainage or sinus tenderness.  Oropharynx - No erythema, no adenopathy, no exudates.  Neck - Supple without adenopathy or thyromegaly. No bruits.  Lungs - Clear to auscultation without wheezes/rhonchi.  Heart - Regular rate and rhythm without murmurs, clicks, or gallops.  Nodes - No supraclavicular, axillary, or inguinal adenopathy palpable.  Abdomen - Abdomen soft, non-tender. BS normal. No masses or hepatosplenomegaly palpable. No bruits.  Extremities -No cyanosis, clubbing or edema.    Musculoskeletal - Spine ROM normal. Muscular strength intact.   Peripheral pulses - radial=4/4, femoral=4/4, posterior tibial=4/4, dorsalis pedis=4/4,  Neuro - Gait normal. Reflexes normal and symmetric. Sensation grossly WNL.  Genital - Normal-appearing male external genitalia. No scrotal masses or inguinal hernia palpable.   Rectal - Guaic negative stool. Normal tone. Prostate normal in size to palpation. No rectal masses or prostate nodularity palpable      ASSESSMENT / PLAN:   1. Medicare annual wellness visit, subsequent  Healthcare maintenance up-to-date.  Will be due for colonoscopy screening again in 1 year.     2. Essential hypertension  Controlled.  Continue current " medication  - amLODIPine (NORVASC) 2.5 MG tablet; Take 1 tablet (2.5 mg) by mouth daily  Dispense: 90 tablet; Refill: 3  - carvedilol (COREG) 6.25 MG tablet; Take 1 tablet (6.25 mg) by mouth 2 times daily (with meals)  Dispense: 180 tablet; Refill: 3  - lisinopril (ZESTRIL) 40 MG tablet; Take 1 tablet (40 mg) by mouth daily  Dispense: 90 tablet; Refill: 3  - Comprehensive metabolic panel; Future    3. Type 1 diabetes mellitus with nephropathy (H)  Diabetes well controlled.  Continue management per cardiology.  Mild proteinuria still but improved.  On max dose lisinopril.  Blood pressure overall at goal.  If increasing in the future, will then increase amlodipine dosage.  Patient repeat when seen on statin 1 month as reported by endocrinology.  Repeat urine protein level year  - Albumin Random Urine Quantitative with Creat Ratio; Future    4. Hyperlipidemia LDL goal <100  Controlled.  Continue medication.  Labs ordered  - simvastatin (ZOCOR) 10 MG tablet; TAKE 1/2 TABLET (5 MG) BY MOUTH AT BEDTIME  Dispense: 45 tablet; Refill: 3  - Comprehensive metabolic panel; Future  - Lipid panel reflex to direct LDL Fasting; Future    5. Erectile dysfunction, unspecified erectile dysfunction type  Controlled.  Continue as needed medication  - sildenafil (VIAGRA) 50 MG tablet; Take 0.5-1 tablets  by mouth  min before  sex. Max use once a day. Never use with nitroglycerin, terazosin or doxazosin.  Dispense: 30 tablet; Refill: 3    6. 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  EpiPen renewed to use later this year as needed  - EPINEPHrine (ANY BX GENERIC EQUIV) 0.3 MG/0.3ML injection 2-pack; Inject 0.3 mLs (0.3 mg) into the muscle as needed for anaphylaxis  Dispense: 0.6 mL; Refill: 5    7. Gastroesophageal reflux disease without esophagitis  Symptoms well controlled.  Has recurrence off of PPI therapy.  Continue omeprazole  - omeprazole  "(PRILOSEC) 20 MG DR capsule; Take 1 capsule (20 mg) by mouth daily  Dispense: 90 capsule; Refill: 3      Patient has been advised of split billing requirements and indicates understanding: Yes     COUNSELING:  Reviewed preventive health counseling, as reflected in patient instructions    Estimated body mass index is 24.32 kg/m  as calculated from the following:    Height as of 2/24/20: 1.803 m (5' 11\").    Weight as of 2/24/20: 79.1 kg (174 lb 6.4 oz).        He reports that he has quit smoking. He has a 15.00 pack-year smoking history. He has never used smokeless tobacco.      Appropriate preventive services were discussed with this patient, including applicable screening as appropriate for cardiovascular disease, diabetes, osteopenia/osteoporosis, and glaucoma.  As appropriate for age/gender, discussed screening for colorectal cancer, prostate cancer, breast cancer, and cervical cancer. Checklist reviewing preventive services available has been given to the patient.    Reviewed patients plan of care and provided an AVS. The Basic Care Plan (routine screening as documented in Health Maintenance) for Eh meets the Care Plan requirement. This Care Plan has been established and reviewed with the Patient.    Counseling Resources:  ATP IV Guidelines  Pooled Cohorts Equation Calculator  Breast Cancer Risk Calculator  Breast Cancer: Medication to Reduce Risk  FRAX Risk Assessment  ICSI Preventive Guidelines  Dietary Guidelines for Americans, 2010  USDA's MyPlate  ASA Prophylaxis  Lung CA Screening      PLAN:  Continue current meds  Prescriptions refilled.    Nonfasting A1C lab in 1 month   Follow-up with  Dr Patel  a few days later re: diabetes  See me  1 year with fasting labs 1 week prior or earlier as needed  Pt was informed regarding extra E&M billing for management of new or established medical issues not related to today's wellness visit  Would recommend you receive a coronavirus/COVID-19 vaccination when it " becomes available    Hernna Juárez MD  [facility]

## 2021-01-08 NOTE — PATIENT INSTRUCTIONS
Continue current meds  Prescriptions refilled.    Nonfasting A1C lab in 1 month   Follow-up with  Dr Patel  a few days later re: diabetes  See me  1 year with fasting labs 1 week prior or earlier as needed  Pt was informed regarding extra E&M billing for management of new or established medical issues not related to today's wellness visit  Would recommend you receive a coronavirus/COVID-19 vaccination when it becomes available

## 2021-01-11 ENCOUNTER — MYC MEDICAL ADVICE (OUTPATIENT)
Dept: ENDOCRINOLOGY | Facility: CLINIC | Age: 71
End: 2021-01-11

## 2021-01-13 ENCOUNTER — TELEPHONE (OUTPATIENT)
Dept: INTERNAL MEDICINE | Facility: CLINIC | Age: 71
End: 2021-01-13

## 2021-01-13 DIAGNOSIS — E10.21 TYPE 1 DIABETES MELLITUS WITH NEPHROPATHY (H): ICD-10-CM

## 2021-01-13 LAB
CREAT SERPL-MCNC: 1.01 MG/DL (ref 0.7–1.3)
GFR SERPL CREATININE-BSD FRML MDRD: 73 ML/MIN/{1.73_M2}
HBA1C MFR BLD: 6 % (ref 4–6)

## 2021-01-13 PROCEDURE — 36415 COLL VENOUS BLD VENIPUNCTURE: CPT | Mod: ZL | Performed by: INTERNAL MEDICINE

## 2021-01-13 PROCEDURE — 82565 ASSAY OF CREATININE: CPT | Mod: ZL | Performed by: INTERNAL MEDICINE

## 2021-01-13 PROCEDURE — 83036 HEMOGLOBIN GLYCOSYLATED A1C: CPT | Mod: ZL | Performed by: INTERNAL MEDICINE

## 2021-01-13 NOTE — TELEPHONE ENCOUNTER
Prior Authorization Retail Medication Request    Medication/Dose: EPINEPHrine (ANY BX GENERIC EQUIV) 0.3 MG/0.3ML injection 2-pack  ICD code (if different than what is on RX):  T63.464D  Previously Tried and Failed:    Rationale:      Insurance Name:  Medicare  Insurance ID:  [id number]        Pharmacy Information (if different than what is on RX)  Name:  [facility]  Phone:  [phone]

## 2021-01-17 DIAGNOSIS — E10.21 TYPE 1 DIABETES MELLITUS WITH NEPHROPATHY (H): ICD-10-CM

## 2021-01-19 RX ORDER — INSULIN LISPRO 100 [IU]/ML
INJECTION, SOLUTION INTRAVENOUS; SUBCUTANEOUS
Qty: 18 ML | Refills: 0 | Status: SHIPPED | OUTPATIENT
Start: 2021-01-19 | End: 2021-03-04

## 2021-02-02 ENCOUNTER — MYC MEDICAL ADVICE (OUTPATIENT)
Dept: ENDOCRINOLOGY | Facility: CLINIC | Age: 71
End: 2021-02-02

## 2021-02-02 DIAGNOSIS — E10.21 TYPE 1 DIABETES MELLITUS WITH NEPHROPATHY (H): ICD-10-CM

## 2021-02-02 RX ORDER — PROCHLORPERAZINE 25 MG/1
SUPPOSITORY RECTAL
Qty: 1 EACH | Refills: 0 | Status: SHIPPED | OUTPATIENT
Start: 2021-02-02 | End: 2021-03-04

## 2021-02-02 RX ORDER — PROCHLORPERAZINE 25 MG/1
1 SUPPOSITORY RECTAL
Qty: 3 EACH | Refills: 11 | Status: SHIPPED | OUTPATIENT
Start: 2021-02-02 | End: 2021-03-04

## 2021-02-02 RX ORDER — PROCHLORPERAZINE 25 MG/1
1 SUPPOSITORY RECTAL DAILY
Qty: 1 EACH | Refills: 3 | Status: SHIPPED | OUTPATIENT
Start: 2021-02-02 | End: 2021-03-04

## 2021-02-02 NOTE — TELEPHONE ENCOUNTER
dexcom pended.  Will wait for form.    Kellie Doherty CMA  [facility]  [place]

## 2021-02-03 ENCOUNTER — MYC MEDICAL ADVICE (OUTPATIENT)
Dept: ENDOCRINOLOGY | Facility: CLINIC | Age: 71
End: 2021-02-03

## 2021-02-03 ENCOUNTER — TELEPHONE (OUTPATIENT)
Dept: ENDOCRINOLOGY | Facility: CLINIC | Age: 71
End: 2021-02-03

## 2021-02-03 NOTE — TELEPHONE ENCOUNTER
Please see message below and advise.  Thanks    Are you able to work patient in at an earlier date?

## 2021-02-03 NOTE — TELEPHONE ENCOUNTER
Hello,    We are reaching out to let you know the patient is due for their Medicare renewal. Their prescriptions and clinic notes will  on 21. We see the patient has a visit in March... are you able to have a visit with the patient sooner?     Please let us know.     Thank you,  [facility] Team  [phone]

## 2021-02-03 NOTE — TELEPHONE ENCOUNTER
Endo staff- can you please take a look into this?  Try to see if we have any opening in next few weeks/cancellation.  Thank you.

## 2021-02-04 NOTE — TELEPHONE ENCOUNTER
See my chart message.    Kellie Doherty [facility]  [place]

## 2021-02-08 NOTE — TELEPHONE ENCOUNTER
Endo staff- can you please take a look into this?  Please see if we have any openings or cancellations.  Thank you.

## 2021-03-04 ENCOUNTER — VIRTUAL VISIT (OUTPATIENT)
Dept: ENDOCRINOLOGY | Facility: CLINIC | Age: 71
End: 2021-03-04
Payer: MEDICARE

## 2021-03-04 DIAGNOSIS — E10.21 TYPE 1 DIABETES MELLITUS WITH NEPHROPATHY (H): Primary | ICD-10-CM

## 2021-03-04 PROCEDURE — 99213 OFFICE O/P EST LOW 20 MIN: CPT | Mod: 95 | Performed by: INTERNAL MEDICINE

## 2021-03-04 PROCEDURE — 95251 CONT GLUC MNTR ANALYSIS I&R: CPT | Performed by: INTERNAL MEDICINE

## 2021-03-04 RX ORDER — BLOOD-GLUCOSE CONTROL, NORMAL
EACH MISCELLANEOUS
Qty: 1 BOTTLE | Refills: 1 | Status: SHIPPED | OUTPATIENT
Start: 2021-03-04 | End: 2022-12-06

## 2021-03-04 RX ORDER — PROCHLORPERAZINE 25 MG/1
1 SUPPOSITORY RECTAL
Qty: 3 EACH | Refills: 11 | Status: SHIPPED | OUTPATIENT
Start: 2021-03-04 | End: 2021-09-02

## 2021-03-04 RX ORDER — LANCETS
EACH MISCELLANEOUS
Qty: 102 EACH | Refills: 6 | Status: SHIPPED | OUTPATIENT
Start: 2021-03-04 | End: 2022-12-06

## 2021-03-04 RX ORDER — INSULIN LISPRO 100 [IU]/ML
INJECTION, SOLUTION INTRAVENOUS; SUBCUTANEOUS
Qty: 15 ML | Refills: 3 | Status: SHIPPED | OUTPATIENT
Start: 2021-03-04 | End: 2022-01-20

## 2021-03-04 RX ORDER — PEN NEEDLE, DIABETIC 31 GX5/16"
NEEDLE, DISPOSABLE MISCELLANEOUS
Qty: 400 EACH | Refills: 3 | Status: SHIPPED | OUTPATIENT
Start: 2021-03-04 | End: 2022-01-20

## 2021-03-04 RX ORDER — BLOOD SUGAR DIAGNOSTIC
STRIP MISCELLANEOUS
Qty: 200 STRIP | Refills: 1 | Status: SHIPPED | OUTPATIENT
Start: 2021-03-04 | End: 2021-09-01

## 2021-03-04 RX ORDER — PROCHLORPERAZINE 25 MG/1
SUPPOSITORY RECTAL
Qty: 1 EACH | Refills: 0 | Status: SHIPPED | OUTPATIENT
Start: 2021-03-04 | End: 2021-09-02

## 2021-03-04 RX ORDER — PROCHLORPERAZINE 25 MG/1
1 SUPPOSITORY RECTAL DAILY
Qty: 1 EACH | Refills: 3 | Status: SHIPPED | OUTPATIENT
Start: 2021-03-04 | End: 2021-09-02

## 2021-03-04 NOTE — LETTER
"    3/4/2021         RE: Eh Alvarezfrancisco  [address]  [facility] [address]        Dear Colleague,    Thank you for referring your patient, Eh Perry, to the [facility]. Please see a copy of my visit note below.    THIS IS A VIDEO VISIT:    Phone call visit/virtual visit encounter:    Name of patient: Eh Perry    Date of encounter: 3/4/2021    Time of start of video visit: 9:00    Video started: 9:10    Video ended: 9:25    Time visit video ended: 9:30    Provider location: working from home/ [facility]    Patient location: patients home.    Mode of transmission: video/ Doximity    Verbal consent: obtained before starting visit. Pt is agreeable.      The patient has been notified of following:      \"This VIDEO visit will be conducted via a call between you and your physician/provider. We have found that certain health care needs can be provided without the need for a physical exam.  This service lets us provide the care you need with a short phone conversation.  If a prescription is necessary we can send it directly to your pharmacy.  If lab work is needed we can place an order for that and you can then stop by our lab to have the test done at a later time.     With new updates with corona virus patient might be billed as clinic visit.     If during the course of the call the physician/provider feels a telephone visit is not appropriate, you will not be charged for this service.\"      Past medical history, social history, family history, allergy and medications were reviewed and updated as appropriate.  Reviewed pertinent labs, notes, imaging studies personally.    Endocrinology Clinic Note:  Name: Eh Alvarezfrancisco  Seen for Diabetes.   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   has a past medical history of Anemia, CKD (chronic kidney disease) stage 3, GFR 30-59 ml/min, GERD (gastroesophageal reflux " disease), Hepatitis (2011), HTN (hypertension), Hyperlipidemia LDL goal <100 (3/7/2013), MGUS (monoclonal gammopathy of unknown significance), Proteinuria (1/22/2015), PVC (premature ventricular contraction), Retinopathy due to secondary diabetes (H), Subclinical hypothyroidism (6/11/2018), Tobacco abuse, and Type 1 diabetes mellitus with nephropathy (goal A1C<7) (1978).    Is using MDI and Dexcom with G6 sensors.  Using pens.       1. Type 1 DM:  Orginally diagnosed at the age of: 24  Hospitalization for DKA: no  Current Regimen: Lantus 16-17 units at night and Humalog 3 units with each meal plus correction scale+ 2 Untis before bedtime if BG >200 (For blood sugar 120-160 take an additional one unit, about 200 takes 2 more units and for  takes three more units).  About 15-20 Untis/day. ( about 3-4 untis TID with meals)  yes:     Diabetes Medication(s)     Insulin       insulin glargine (LANTUS SOLOSTAR) 100 UNIT/ML pen    ADMINISTER 17 UNITS UNDER THE SKIN DAILY     insulin lispro (HUMALOG KWIKPEN) 100 UNIT/ML (1 unit dial) KWIKPEN    ADMINISTER 3 UNITS UNDER THE SKIN BEFORE MEALS PER SLIDING SCALE. MAX OF 20 UNITS PER DAY          BS checks: 3-4 times a day. Using Dexcom.  Average Meter Download: 149 based on Dexcom with SD 35.  Blood glucose data reviewed personally. See nursing note from this encounter for details.  BG mostly in range.  No major episodes of hypoglycemia noted/reported.   Symptoms of hypoglycemia (low blood sugar): Get symptoms of hypoglycemia  Episodes of hypoglycemia: No major episodes of hypoglycemia noted/reported.   Fixed meal pattern: Yes.  Has three meals and sometimes snacks at bedtime if her blood sugar is below 80.  Patient counting carbs: No  Using PUMP: No.  He was on pump before but did not like it.  Does not want to go back on pump.     DM Complications:   Nephropathy: No  Retinopathy: Present.  Followed by ophthalmology.  Proliferative diabetic retinopathy status post laser  treatment ×2.  Last visit treatment was in 2010.  Neuropathy: No  Microalbuminuria: Present, on lisinopril 40 mg  CAD/PAD: No  Gastroparesis: No  Hypoglycemia unawareness: No     2. Hypertension: Blood Pressure today:   BP Readings from Last 3 Encounters:   01/08/21 130/66   02/24/20 (!) 164/83   01/28/20 132/74     Blood pressure medications include lisinopril 20 mg and amlodipine.    3. Hyperlipidemia:  Takes simvastatin 5 mg    PMH/PSH:  Past Medical History:   Diagnosis Date     Anemia      CKD (chronic kidney disease) stage 3, GFR 30-59 ml/min      GERD (gastroesophageal reflux disease)      Hepatitis 2011    elevated AST due to excess ETOH use (5 drinks/day)     HTN (hypertension)      Hyperlipidemia LDL goal <100 3/7/2013     MGUS (monoclonal gammopathy of unknown significance)      IgG Lambda. Followed by Dr Rogers     Proteinuria 1/22/2015     PVC (premature ventricular contraction)      Retinopathy due to secondary diabetes (H)     s/p laser therapy left     Subclinical hypothyroidism 6/11/2018     Tobacco abuse     20 pack hx. Quit 1991     Type 1 diabetes mellitus with nephropathy (goal A1C<7) 1978     Past Surgical History:   Procedure Laterality Date     HAND SURGERY  2007    contracture release     Family Hx:  Family History   Problem Relation Age of Onset     Diabetes Sister      Diabetes Paternal Grandmother      Diabetes Father      Cerebrovascular Disease Maternal Grandmother      Cancer Mother         peritoneal carcinoma           DM2:  Sister with gestational diabetes and now type 2 diabetes.  On insulin.           Social Hx:  Social History     Socioeconomic History     Marital status:      Spouse name: Not on file     Number of children: Not on file     Years of education: Not on file     Highest education level: Not on file   Occupational History     Employer: RETIRED     Comment: anesthesiologist   Social Needs     Financial resource strain: Not on file     Food insecurity      Worry: Not on file     Inability: Not on file     Transportation needs     Medical: Not on file     Non-medical: Not on file   Tobacco Use     Smoking status: Former Smoker     Packs/day: 1.00     Years: 15.00     Pack years: 15.00     Smokeless tobacco: Never Used   Substance and Sexual Activity     Alcohol use: Yes     Frequency: 2-3 times a week     Comment: occ     Drug use: No     Sexual activity: Yes     Partners: Female   Lifestyle     Physical activity     Days per week: Not on file     Minutes per session: Not on file     Stress: Not on file   Relationships     Social connections     Talks on phone: Not on file     Gets together: Not on file     Attends [employer]ism service: Not on file     Active member of club or organization: Not on file     Attends meetings of clubs or organizations: Not on file     Relationship status: Not on file     Intimate partner violence     Fear of current or ex partner: Not on file     Emotionally abused: Not on file     Physically abused: Not on file     Forced sexual activity: Not on file   Other Topics Concern     Parent/sibling w/ CABG, MI or angioplasty before 65F 55M? Not Asked   Social History Narrative     Not on file          MEDICATIONS:  has a current medication list which includes the following prescription(s): amlodipine, aspirin, accu-chek ulysses plus, accu-chek active glucose cont, blood glucose monitoring, carvedilol, dexcom g6 , dexcom g6 sensor, dexcom g6 transmitter, epinephrine, fish oil-omega-3 fatty acids, insulin glargine, insulin lispro, b-d u/f, lisinopril, multi vitamin  mens, omeprazole, sildenafil, and simvastatin.    ROS     ROS: 10 point ROS neg other than the symptoms noted above in the HPI.    Physical Exam   VS: There were no vitals taken for this visit.  GENERAL: healthy, alert and no distress  EYES: Eyes grossly normal to inspection, conjunctivae and sclerae normal  ENT: no nose swelling, nasal discharge.  Thyroid: no apparent thyroid  nodules  RESP: no audible wheeze, cough, or visible cyanosis.  No visible retractions or increased work of breathing.  Able to speak fully in complete sentences.  ABDO: not evaluated.  EXTREMITIES: no hand tremors.  NEURO: Cranial nerves grossly intact, mentation intact and speech normal  SKIN: No apparent skin lesions, rash or edema seen   PSYCH: mentation appears normal, affect normal/bright, judgement and insight intact, normal speech and appearance well-groomed      LABS:  A1c:  Lab Results   Component Value Date    A1C 6.0 01/13/2021    A1C 6.0 08/05/2020    A1C 6.1 01/22/2020    A1C 6.1 07/17/2019    A1C 6.2 11/23/2018       BMP:  Creatinine   Date Value Ref Range Status   01/13/2021 1.01 0.70 - 1.30 mg/dL Final       Urine Micro:  Lab Results   Component Value Date    UMALCR 52.48 08/05/2020         LFTs/Lipids:  Recent Labs   Lab Test 08/05/20  1000 01/22/20  1015  01/16/15  0832 01/20/14  0925   CHOL 146 135   < > 156 163   HDL 57 57   < > 82 60   LDL 72 59   < > 61 88   TRIG 83 95   < > 64 72   CHOLHDLRATIO  --   --   --  1.9 2.7    < > = values in this interval not displayed.     TFTs:  TSH   Date Value Ref Range Status   01/22/2020 2.83 0.40 - 4.00 mU/L Final     ENDO THYROID LABS-[facility] Latest Ref Rng & Units 7/31/2018   TSH 0.40 - 4.00 mU/L 2.46   T4 FREE 0.76 - 1.46 ng/dL 0.78   THYR PEROXIDASE VERNON <35 IU/mL <10     Blood Glucose and pump data/ Meter reviewed.  CGM data reviewed.      Glucometer/ insulin pump (if applicable)/ CGM data (if applicable) downloaded, Personally reviewed and interpreted.  All Blood sugar data reviewed personally and discussed with pt.  See nursing note from today's clinic visit for details of BG/CGM log.      All pertinent notes, labs, and images personally reviewed by me.     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 DM1 - Under Good control.  A1c 6.0%.  Diabetes complicated by proliferative diabetic retinopathy.  In general  BG are optimal. Using dexcom with G6 sensors.  No major episodes of hypoglycemia noted/reported.   Plan:  Continue current regimen.  Continue Lantus 16-17 units/day.  Continue with Humalog at current dose with 3 units with each meal plus correction scale+ 2 units at night if BG >200.  Consider metformin in next visit  Labs and f/u in 3-6 months.  Continue to use Dexcom.  Rx sent.    If Blood glucose are low more often-> 2-3 times/week- give us a call.  The patient is advised to Make better food choices: reduce carbs, Reduce portion size, weight loss and exercise 3-4 times a week.  Discussed hypoglycemia signs and symptoms as well as management in detail.      2. Hypertension - Is on coreg, amlodipine and lisinopril 40 mg.     3. Hyperlipidemia - Under Good control. On simvastatin 5 mg.  LDL 65, HDL 82  4. Prevention  Flu Shot- NA  Pneumovax- 2011  Opthalmology-nov 2018  ASA-81 mg  Smoking- no    Most Recent Immunizations   Administered Date(s) Administered     COVID-19,PF,Pfizer 02/25/2021     FLUAD -HD 65+ QUAD ([facility] ONLY) 09/25/2020     Influenza (High Dose) 3 valent vaccine 09/25/2019     Influenza (IIV3) PF 10/02/2014     Influenza Vaccine IM > 6 months Valent IIV4 09/28/2015     Pneumo Conj 13-V (2010&after) 01/23/2017     Pneumococcal 23 valent 01/11/2018     TD (ADULT, 7+) 07/03/2015     Tdap (Adacel,Boostrix) 10/19/2005     Zoster vaccine recombinant adjuvanted (SHINGRIX) 10/22/2018     Zoster vaccine, live 11/01/2011       5.  Microalbuminuria:  Lab Results   Component Value Date    UMALCR 135.83 07/25/2018    Continue lisinopril 40 mg.    Recommend strict Bg control.    Follow-up:  Follow up in 3-6 month(s)    Kierra Sexton M.D  Endocrinology  [facility]  CC: Hernan Juárez    All questions were answered.  The patient indicates understanding of the above issues and agrees with the plan set forth.     Disclaimer: This note consists of symbols derived from keyboarding, dictation  and/or voice recognition software. As a result, there may be errors in the script that have gone undetected. Please consider this when interpreting information found in this chart.            Again, thank you for allowing me to participate in the care of your patient.        Sincerely,        Kierra Sexton MD

## 2021-03-04 NOTE — PATIENT INSTRUCTIONS
[facility] [place] locations   Dr Sexton, Endocrinology Department      [facility]   [address]  [place] [address]  Appointment [phone]  Fax: [phone]  [place]: Monday and Tuesday         [facility]   [address]  [place] [address]  Appointment [phone]  Fax: [phone]  [place]: Wednesday and Thursday            Please check the cost coverage and copay with insurance before recommended tests, services and medications (especially if new medications are prescribed).     If ordered, please get blood work done 1 week prior to your next appointment so they will be available to Dr. Sexton at your visit.    To provide the best diabetic care, please bring your blood glucose meter to each and every visit with your  Endocrinologist. Your blood glucose meter/insulin pump will be downloaded at every appointment.  Please arrive 15 minutes before your scheduled appointment. This will allow for your blood glucose meter/insulin pump  to be downloaded.  If you are wearing DEXCOM please bring  or sharing code from the Dexcom Clarity Appt so that it can be downloaded.  If you are using freestyle bobby personal sensors please bring the reader.  If you are using TANDEM insulin pump please have your username and password to get info from [employer]andem website.      Continue current regimen.  Continue Lantus 16-17 units/day.  Continue with Humalog at current dose.  Continue to use Dexcom.  Rx sent.  Labs and f/u in 3-6 months.  Please make a lab appointment for blood work and follow up clinic appointment in 1 week after that to discuss results.    Recommend checking blood sugars before meals and at bedtime.    If Blood glucose are low more often-> 2-3 times/week- give us a call.  The patient is advised to Make better food choices: reduce carbs, Reduce portion size, weight loss and exercise  3-4 times a week.  Discussed hypoglycemia signs and symptoms as well as management in detail.

## 2021-03-04 NOTE — PROGRESS NOTES
"THIS IS A VIDEO VISIT:    Phone call visit/virtual visit encounter:    Name of patient: Eh Perry    Date of encounter: 3/4/2021    Time of start of video visit: 9:00    Video started: 9:10    Video ended: 9:25    Time visit video ended: 9:30    Provider location: working from [place]    Patient location: patients home.    Mode of transmission: video/ Doximity    Verbal consent: obtained before starting visit. Pt is agreeable.      The patient has been notified of following:      \"This VIDEO visit will be conducted via a call between you and your physician/provider. We have found that certain health care needs can be provided without the need for a physical exam.  This service lets us provide the care you need with a short phone conversation.  If a prescription is necessary we can send it directly to your pharmacy.  If lab work is needed we can place an order for that and you can then stop by our lab to have the test done at a later time.     With new updates with corona virus patient might be billed as clinic visit.     If during the course of the call the physician/provider feels a telephone visit is not appropriate, you will not be charged for this service.\"      Past medical history, social history, family history, allergy and medications were reviewed and updated as appropriate.  Reviewed pertinent labs, notes, imaging studies personally.    Endocrinology Clinic Note:  Name: Eh Alvarezfrancisco  Seen for Diabetes.   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   has a past medical history of Anemia, CKD (chronic kidney disease) stage 3, GFR 30-59 ml/min, GERD (gastroesophageal reflux disease), Hepatitis (2011), HTN (hypertension), Hyperlipidemia LDL goal <100 (3/7/2013), MGUS (monoclonal gammopathy of unknown significance), Proteinuria (1/22/2015), PVC (premature ventricular contraction), Retinopathy due to secondary diabetes (H), " Subclinical hypothyroidism (6/11/2018), Tobacco abuse, and Type 1 diabetes mellitus with nephropathy (goal A1C<7) (1978).    Is using MDI and Dexcom with G6 sensors.  Using pens.       1. Type 1 DM:  Orginally diagnosed at the age of: 24  Hospitalization for DKA: no  Current Regimen: Lantus 16-17 units at night and Humalog 3 units with each meal plus correction scale+ 2 Untis before bedtime if BG >200 (For blood sugar 120-160 take an additional one unit, about 200 takes 2 more units and for  takes three more units).  About 15-20 Untis/day. ( about 3-4 untis TID with meals)  yes:     Diabetes Medication(s)     Insulin       insulin glargine (LANTUS SOLOSTAR) 100 UNIT/ML pen    ADMINISTER 17 UNITS UNDER THE SKIN DAILY     insulin lispro (HUMALOG KWIKPEN) 100 UNIT/ML (1 unit dial) KWIKPEN    ADMINISTER 3 UNITS UNDER THE SKIN BEFORE MEALS PER SLIDING SCALE. MAX OF 20 UNITS PER DAY          BS checks: 3-4 times a day. Using Dexcom.  Average Meter Download: 149 based on Dexcom with SD 35.  Blood glucose data reviewed personally. See nursing note from this encounter for details.  BG mostly in range.  No major episodes of hypoglycemia noted/reported.   Symptoms of hypoglycemia (low blood sugar): Get symptoms of hypoglycemia  Episodes of hypoglycemia: No major episodes of hypoglycemia noted/reported.   Fixed meal pattern: Yes.  Has three meals and sometimes snacks at bedtime if her blood sugar is below 80.  Patient counting carbs: No  Using PUMP: No.  He was on pump before but did not like it.  Does not want to go back on pump.     DM Complications:   Nephropathy: No  Retinopathy: Present.  Followed by ophthalmology.  Proliferative diabetic retinopathy status post laser treatment ×2.  Last visit treatment was in 2010.  Neuropathy: No  Microalbuminuria: Present, on lisinopril 40 mg  CAD/PAD: No  Gastroparesis: No  Hypoglycemia unawareness: No     2. Hypertension: Blood Pressure today:   BP Readings from Last 3  Encounters:   01/08/21 130/66   02/24/20 (!) 164/83   01/28/20 132/74     Blood pressure medications include lisinopril 20 mg and amlodipine.    3. Hyperlipidemia:  Takes simvastatin 5 mg    PMH/PSH:  Past Medical History:   Diagnosis Date     Anemia      CKD (chronic kidney disease) stage 3, GFR 30-59 ml/min      GERD (gastroesophageal reflux disease)      Hepatitis 2011    elevated AST due to excess ETOH use (5 drinks/day)     HTN (hypertension)      Hyperlipidemia LDL goal <100 3/7/2013     MGUS (monoclonal gammopathy of unknown significance)      IgG Lambda. Followed by Dr Rogers     Proteinuria 1/22/2015     PVC (premature ventricular contraction)      Retinopathy due to secondary diabetes (H)     s/p laser therapy left     Subclinical hypothyroidism 6/11/2018     Tobacco abuse     20 pack hx. Quit 1991     Type 1 diabetes mellitus with nephropathy (goal A1C<7) 1978     Past Surgical History:   Procedure Laterality Date     HAND SURGERY  2007    contracture release     Family Hx:  Family History   Problem Relation Age of Onset     Diabetes Sister      Diabetes Paternal Grandmother      Diabetes Father      Cerebrovascular Disease Maternal Grandmother      Cancer Mother         peritoneal carcinoma           DM2:  Sister with gestational diabetes and now type 2 diabetes.  On insulin.           Social Hx:  Social History     Socioeconomic History     Marital status:      Spouse name: Not on file     Number of children: Not on file     Years of education: Not on file     Highest education level: Not on file   Occupational History     Employer: RETIRED     Comment: anesthesiologist   Social Needs     Financial resource strain: Not on file     Food insecurity     Worry: Not on file     Inability: Not on file     Transportation needs     Medical: Not on file     Non-medical: Not on file   Tobacco Use     Smoking status: Former Smoker     Packs/day: 1.00     Years: 15.00     Pack years: 15.00     Smokeless  tobacco: Never Used   Substance and Sexual Activity     Alcohol use: Yes     Frequency: 2-3 times a week     Comment: occ     Drug use: No     Sexual activity: Yes     Partners: Female   Lifestyle     Physical activity     Days per week: Not on file     Minutes per session: Not on file     Stress: Not on file   Relationships     Social connections     Talks on phone: Not on file     Gets together: Not on file     Attends Sabianist service: Not on file     Active member of club or organization: Not on file     Attends meetings of clubs or organizations: Not on file     Relationship status: Not on file     Intimate partner violence     Fear of current or ex partner: Not on file     Emotionally abused: Not on file     Physically abused: Not on file     Forced sexual activity: Not on file   Other Topics Concern     Parent/sibling w/ CABG, MI or angioplasty before 65F 55M? Not Asked   Social History Narrative     Not on file          MEDICATIONS:  has a current medication list which includes the following prescription(s): amlodipine, aspirin, accu-chek ulysses plus, accu-chek active glucose cont, blood glucose monitoring, carvedilol, dexcom g6 , dexcom g6 sensor, dexcom g6 transmitter, epinephrine, fish oil-omega-3 fatty acids, insulin glargine, insulin lispro, b-d u/f, lisinopril, multi vitamin  mens, omeprazole, sildenafil, and simvastatin.    ROS     ROS: 10 point ROS neg other than the symptoms noted above in the HPI.    Physical Exam   VS: There were no vitals taken for this visit.  GENERAL: healthy, alert and no distress  EYES: Eyes grossly normal to inspection, conjunctivae and sclerae normal  ENT: no nose swelling, nasal discharge.  Thyroid: no apparent thyroid nodules  RESP: no audible wheeze, cough, or visible cyanosis.  No visible retractions or increased work of breathing.  Able to speak fully in complete sentences.  ABDO: not evaluated.  EXTREMITIES: no hand tremors.  NEURO: Cranial nerves grossly  intact, mentation intact and speech normal  SKIN: No apparent skin lesions, rash or edema seen   PSYCH: mentation appears normal, affect normal/bright, judgement and insight intact, normal speech and appearance well-groomed      LABS:  A1c:  Lab Results   Component Value Date    A1C 6.0 01/13/2021    A1C 6.0 08/05/2020    A1C 6.1 01/22/2020    A1C 6.1 07/17/2019    A1C 6.2 11/23/2018       BMP:  Creatinine   Date Value Ref Range Status   01/13/2021 1.01 0.70 - 1.30 mg/dL Final       Urine Micro:  Lab Results   Component Value Date    UMALCR 52.48 08/05/2020         LFTs/Lipids:  Recent Labs   Lab Test 08/05/20  1000 01/22/20  1015  01/16/15  0832 01/20/14  0925   CHOL 146 135   < > 156 163   HDL 57 57   < > 82 60   LDL 72 59   < > 61 88   TRIG 83 95   < > 64 72   CHOLHDLRATIO  --   --   --  1.9 2.7    < > = values in this interval not displayed.     TFTs:  TSH   Date Value Ref Range Status   01/22/2020 2.83 0.40 - 4.00 mU/L Final     ENDO THYROID LABS-P Latest Ref Rng & Units 7/31/2018   TSH 0.40 - 4.00 mU/L 2.46   T4 FREE 0.76 - 1.46 ng/dL 0.78   THYR PEROXIDASE VERNON <35 IU/mL <10     Blood Glucose and pump data/ Meter reviewed.  CGM data reviewed.      Glucometer/ insulin pump (if applicable)/ CGM data (if applicable) downloaded, Personally reviewed and interpreted.  All Blood sugar data reviewed personally and discussed with pt.  See nursing note from today's clinic visit for details of BG/CGM log.      All pertinent notes, labs, and images personally reviewed by me.     A/P  .Eh Perry is a 70 year old here for the evaluation/management of diabetes:    1. DM1 - Under Good control.  A1c 6.0%.  Diabetes complicated by proliferative diabetic retinopathy.  In general BG are optimal. Using dexcom with G6 sensors.  No major episodes of hypoglycemia noted/reported.   Plan:  Continue current regimen.  Continue Lantus 16-17 units/day.  Continue with Humalog at current dose with 3 units with each meal plus  correction scale+ 2 units at night if BG >200.  Consider metformin in next visit  Labs and f/u in 3-6 months.  Continue to use Dexcom.  Rx sent.    If Blood glucose are low more often-> 2-3 times/week- give us a call.  The patient is advised to Make better food choices: reduce carbs, Reduce portion size, weight loss and exercise 3-4 times a week.  Discussed hypoglycemia signs and symptoms as well as management in detail.      2. Hypertension - Is on coreg, amlodipine and lisinopril 40 mg.     3. Hyperlipidemia - Under Good control. On simvastatin 5 mg.  LDL 65, HDL 82  4. Prevention  Flu Shot- NA  Pneumovax- 2011  Opthalmology-nov 2018  ASA-81 mg  Smoking- no    Most Recent Immunizations   Administered Date(s) Administered     COVID-19,PF,Pfizer 02/25/2021     FLUAD -HD 65+ QUAD ([facility]) 09/25/2020     Influenza (High Dose) 3 valent vaccine 09/25/2019     Influenza (IIV3) PF 10/02/2014     Influenza Vaccine IM > 6 months Valent IIV4 09/28/2015     Pneumo Conj 13-V (2010&after) 01/23/2017     Pneumococcal 23 valent 01/11/2018     TD (ADULT, 7+) 07/03/2015     Tdap (Adacel,Boostrix) 10/19/2005     Zoster vaccine recombinant adjuvanted (SHINGRIX) 10/22/2018     Zoster vaccine, live 11/01/2011       5.  Microalbuminuria:  Lab Results   Component Value Date    UMALCR 135.83 07/25/2018    Continue lisinopril 40 mg.    Recommend strict Bg control.    Follow-up:  Follow up in 3-6 month(s)    Kierra Sexton M.D  Endocrinology  [facility]/[place]  CC: Hernan Juárez    All questions were answered.  The patient indicates understanding of the above issues and agrees with the plan set forth.     Disclaimer: This note consists of symbols derived from keyboarding, dictation and/or voice recognition software. As a result, there may be errors in the script that have gone undetected. Please consider this when interpreting information found in this chart.

## 2021-03-09 ENCOUNTER — MYC MEDICAL ADVICE (OUTPATIENT)
Dept: ENDOCRINOLOGY | Facility: CLINIC | Age: 71
End: 2021-03-09

## 2021-03-09 NOTE — TELEPHONE ENCOUNTER
[facility] form for strips and lancets.    LAST OFFICE/VIRTUAL VISIT:  03/04/21    FUTURE OFFICE/VIRTUAL VISIT:  None    Lab Results   Component Value Date    A1C 6.0 01/13/2021    A1C 6.0 08/05/2020    A1C 6.1 01/22/2020    A1C 6.1 07/17/2019    A1C 6.2 11/23/2018         Kellie Doherty CMA  [facility]  [place]

## 2021-03-11 ENCOUNTER — MYC MEDICAL ADVICE (OUTPATIENT)
Dept: ENDOCRINOLOGY | Facility: CLINIC | Age: 71
End: 2021-03-11

## 2021-03-11 ENCOUNTER — TELEPHONE (OUTPATIENT)
Dept: ENDOCRINOLOGY | Facility: CLINIC | Age: 71
End: 2021-03-11

## 2021-03-11 NOTE — TELEPHONE ENCOUNTER
Form was faxed and sent to scanning.      Kellie Doherty, [facility]  [place]

## 2021-03-12 ENCOUNTER — MYC MEDICAL ADVICE (OUTPATIENT)
Dept: ENDOCRINOLOGY | Facility: CLINIC | Age: 71
End: 2021-03-12

## 2021-03-12 NOTE — TELEPHONE ENCOUNTER
The Pt called in for an update on which forms have been sent. I went over what I could via chart review. Please reach out to the Pt to discuss further. Thank you.     Isela Stallings RN   [facility] -- Triage Nurse

## 2021-03-16 ENCOUNTER — MYC MEDICAL ADVICE (OUTPATIENT)
Dept: ENDOCRINOLOGY | Facility: CLINIC | Age: 71
End: 2021-03-16

## 2021-03-17 NOTE — TELEPHONE ENCOUNTER
Form was faxed and sent to scanning.      Kellie Doherty, [facility]  [place]

## 2021-03-19 ENCOUNTER — MYC MEDICAL ADVICE (OUTPATIENT)
Dept: ENDOCRINOLOGY | Facility: CLINIC | Age: 71
End: 2021-03-19

## 2021-03-22 ENCOUNTER — TRANSFERRED RECORDS (OUTPATIENT)
Dept: HEALTH INFORMATION MANAGEMENT | Facility: CLINIC | Age: 71
End: 2021-03-22

## 2021-03-22 LAB — RETINOPATHY: POSITIVE

## 2021-03-23 NOTE — TELEPHONE ENCOUNTER
Signed notes with ICD code faxed.    Kellie Doherty CMA  [facility]  [place]

## 2021-03-23 NOTE — TELEPHONE ENCOUNTER
Call from patients pharmacy [phone]  The clinic notes needs to be signed with the ICD code    Form faxed to [phone] Attention Medicare CGM

## 2021-03-25 ENCOUNTER — MYC MEDICAL ADVICE (OUTPATIENT)
Dept: ENDOCRINOLOGY | Facility: CLINIC | Age: 71
End: 2021-03-25

## 2021-05-03 DIAGNOSIS — E10.21 TYPE 1 DIABETES MELLITUS WITH NEPHROPATHY (H): ICD-10-CM

## 2021-05-05 NOTE — TELEPHONE ENCOUNTER
Pending Prescriptions:                       Disp   Refills    insulin glargine (LANTUS SOLOSTAR) 100 UN*15 mL  1            Sig: ADMINISTER 17 UNITS UNDER THE SKIN DAILY    Prescription approved per [facility] Refill Protocol.

## 2021-05-07 ENCOUNTER — OFFICE VISIT (OUTPATIENT)
Dept: ORTHOPEDICS | Facility: OTHER | Age: 71
End: 2021-05-07
Attending: SPECIALIST
Payer: MEDICARE

## 2021-05-07 VITALS
WEIGHT: 170 LBS | HEART RATE: 72 BPM | DIASTOLIC BLOOD PRESSURE: 72 MMHG | BODY MASS INDEX: 23.71 KG/M2 | SYSTOLIC BLOOD PRESSURE: 144 MMHG

## 2021-05-07 DIAGNOSIS — M72.0 DUPUYTREN'S CONTRACTURE OF RIGHT HAND: Primary | ICD-10-CM

## 2021-05-07 PROCEDURE — 99203 OFFICE O/P NEW LOW 30 MIN: CPT | Performed by: SPECIALIST

## 2021-05-07 PROCEDURE — G0463 HOSPITAL OUTPT CLINIC VISIT: HCPCS

## 2021-05-07 NOTE — PROGRESS NOTES
Patient is here for consult on his right hand dupuytren's contracture.   Marissa Wall LPN .....................5/7/2021 8:38 AM

## 2021-05-07 NOTE — PROGRESS NOTES
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  The patient had previous left digital and palmar fasciectomy x 2.  He is here today because of progressive contracture of the right hand.  He denies laderhosen or peyronie .    PAST MEDICAL HISTORY, PAST SURGICAL HISTORY, MEDICATIONS AND ALLERGIES:  Reviewed.    PHYSICAL EXAMINATION:  Exam shows a 71-year-old male.  He is alert and oriented x 3 and appropriate.  Gait and station are appropriate, he is well-groomed and well kempt.  Examination of both upper extremities reveals full and symmetric range of motion of shoulders, elbows.  Examination of the left hand reveals well-healed scars consistent with previous digital and palmar fasciectomies.  The right long finger shows a spiral cord with rotation of the digit and a 40-degree MCP joint contracture.  The ring finger shows a pretendinous cord with about a 30-40 degree MP contracture.    IMAGING:  None were obtained.    IMPRESSION AND PLAN:  Dupuytren's contracture, which meet surgical criteria.  We discussed options at length.  We discussed proceeding with digital and palmar fasciectomies.  Risks, complications, and benefits were reviewed and this can be scheduled at his convenience.      Time spent with the patient was 30 minutes.    Sebastien Rowe MD        D: 2021   T: 2021   MT: [id number]    Name:     REFUGIO PERRYFeli  MRN:      [id number]        Account:    [id number]   :      1950           Visit Date: 2021     Document: [id number]

## 2021-06-21 ENCOUNTER — OFFICE VISIT (OUTPATIENT)
Dept: PEDIATRICS | Facility: OTHER | Age: 71
End: 2021-06-21
Attending: INTERNAL MEDICINE
Payer: MEDICARE

## 2021-06-21 VITALS
TEMPERATURE: 97.9 F | RESPIRATION RATE: 18 BRPM | BODY MASS INDEX: 24.34 KG/M2 | OXYGEN SATURATION: 99 % | WEIGHT: 170 LBS | HEIGHT: 70 IN | HEART RATE: 71 BPM | SYSTOLIC BLOOD PRESSURE: 138 MMHG | DIASTOLIC BLOOD PRESSURE: 80 MMHG

## 2021-06-21 DIAGNOSIS — M72.0 DUPUYTREN'S CONTRACTURE: ICD-10-CM

## 2021-06-21 DIAGNOSIS — E03.8 SUBCLINICAL HYPOTHYROIDISM: ICD-10-CM

## 2021-06-21 DIAGNOSIS — E10.21 TYPE 1 DIABETES MELLITUS WITH NEPHROPATHY (H): ICD-10-CM

## 2021-06-21 DIAGNOSIS — I10 ESSENTIAL HYPERTENSION: ICD-10-CM

## 2021-06-21 DIAGNOSIS — Z01.818 PREOPERATIVE EXAMINATION: Primary | ICD-10-CM

## 2021-06-21 LAB — INTERPRETATION ECG - MUSE: NORMAL

## 2021-06-21 PROCEDURE — 93005 ELECTROCARDIOGRAM TRACING: CPT

## 2021-06-21 PROCEDURE — 93010 ELECTROCARDIOGRAM REPORT: CPT | Performed by: INTERNAL MEDICINE

## 2021-06-21 PROCEDURE — 99203 OFFICE O/P NEW LOW 30 MIN: CPT | Performed by: INTERNAL MEDICINE

## 2021-06-21 PROCEDURE — G0463 HOSPITAL OUTPT CLINIC VISIT: HCPCS

## 2021-06-21 RX ORDER — KETOROLAC TROMETHAMINE 5 MG/ML
SOLUTION OPHTHALMIC
COMMUNITY
Start: 2021-05-12 | End: 2022-01-20

## 2021-06-21 RX ORDER — MOXIFLOXACIN 5 MG/ML
SOLUTION/ DROPS OPHTHALMIC
COMMUNITY
Start: 2021-05-12 | End: 2021-09-02

## 2021-06-21 RX ORDER — PREDNISOLONE ACETATE 10 MG/ML
SUSPENSION/ DROPS OPHTHALMIC
COMMUNITY
Start: 2021-05-12 | End: 2022-01-20

## 2021-06-21 ASSESSMENT — MIFFLIN-ST. JEOR: SCORE: 1533.39

## 2021-06-21 ASSESSMENT — PAIN SCALES - GENERAL: PAINLEVEL: NO PAIN (0)

## 2021-06-21 NOTE — Clinical Note
HIM: Please send a copy of my H&P/note, last EKG scan and report.    Signed, Hemanth Cunningham MD, FAAP, FACP  Internal Medicine & Pediatrics

## 2021-06-21 NOTE — PROGRESS NOTES
"[facility]  [address]  [place] [address]  Phone: [phone]  Fax: [phone]  Primary Provider: Hernan Juárez        PREOPERATIVE EVALUATION:  Today's date: 6/21/2021    Eh Perry is a 71 year old male who presents for a preoperative evaluation.    Surgical Information:  Surgery/Procedure: Cataract surgery on Left eye  Surgery Location: [facility]  Surgeon: Dr. Moses  Surgery Date: 7/14/21  Time of Surgery:   Where patient plans to recover: At home with family  Fax number for surgical facility: [phone]     PREOPERATIVE HISTORY & PHYSICAL  Date of Exam: 6/21/2021  Chief Complaint   Patient presents with     Pre-Op Exam     Pre-op left eye Dr. Moses; 7/14/21       Nursing Notes:   Na Kim LPN  6/21/2021 11:34 AM  Signed  Chief Complaint   Patient presents with     Pre-Op Exam     Pre-op left eye Dr. Moses; 7/14/21       Initial /80 (BP Location: Right arm, Patient Position: Sitting, Cuff Size: Adult Regular)   Pulse 71   Temp 97.9  F (36.6  C) (Tympanic)   Resp 18   Ht 1.772 m (5' 9.75\")   Wt 77.1 kg (170 lb)   SpO2 99%   BMI 24.57 kg/m   Estimated body mass index is 24.57 kg/m  as calculated from the following:    Height as of this encounter: 1.772 m (5' 9.75\").    Weight as of this encounter: 77.1 kg (170 lb).  Medication Reconciliation: nolan Kim LPN        HPI:  I was asked to see Mr. Eh Perry by Dr. Moses for preoperative management of DM1.    Eh Perry is a 71 year old male with a history of DM1 here for preoperative examination.  The most physically active he has been over the past 2 weeks was: biking, walking without chest pain.    Patient Active Problem List    Diagnosis Date Noted     Dupuytren's contracture 06/21/2021     Priority: Medium     Subclinical hypothyroidism 06/11/2018     Priority: Medium     Essential hypertension 01/14/2016     Priority: Medium     Gastroesophageal reflux " disease, esophagitis presence not specified 01/14/2016     Priority: Medium     IMO Regulatory Load OCT 2020       Erectile dysfunction, unspecified erectile dysfunction type 01/14/2016     Priority: Medium     Type 1 diabetes mellitus with nephropathy (goal A1C<7) 10/17/2015     Priority: Medium     Hyperlipidemia LDL goal <100 03/07/2013     Priority: Medium     ACP (advance care planning) 10/19/2012     Priority: Medium     Discussed advance care planning with patient; information given to patient to review. 10/19/2012          Past Medical History:   Diagnosis Date     Anemia      CKD (chronic kidney disease) stage 3, GFR 30-59 ml/min      GERD (gastroesophageal reflux disease)      Hepatitis 2011    elevated AST due to excess ETOH use (5 drinks/day)     HTN (hypertension)      Hyperlipidemia LDL goal <100 3/7/2013     MGUS (monoclonal gammopathy of unknown significance)      IgG Lambda. Followed by Dr Rogers     Proteinuria 1/22/2015     PVC (premature ventricular contraction)      Retinopathy due to secondary diabetes (H)     s/p laser therapy left     Subclinical hypothyroidism 6/11/2018     Tobacco abuse     20 pack hx. Quit 1991     Type 1 diabetes mellitus with nephropathy (goal A1C<7) 1978     Past Surgical History:   Procedure Laterality Date     HAND SURGERY  01/01/2007    contracture release     VOCAL CORD INJECTION      vocal cord nodules     Current Outpatient Medications   Medication Sig Dispense Refill     amLODIPine (NORVASC) 2.5 MG tablet Take 1 tablet (2.5 mg) by mouth daily 90 tablet 3     aspirin 81 MG tablet Take 1 tablet by mouth daily.       blood glucose (ACCU-CHEK ERUM PLUS) test strip USE TO TEST BLOOD SUGARS 1- 2 TIMES DAILY OR AS DIRECTED 200 strip 1     blood glucose calibration (NO BRAND SPECIFIED) solution Use to calibrate blood glucose monitor as needed as directed. 1 Bottle 1     blood glucose monitoring (ACCU-CHEK FASTCLIX) lancets USE TO TEST 1-2 TIMES DAILY 102 each 6      carvedilol (COREG) 6.25 MG tablet Take 1 tablet (6.25 mg) by mouth 2 times daily (with meals) 180 tablet 3     Continuous Blood Gluc  (DEXCOM G6 ) EMERSON Use to monitor blood sugars  continuously through the day 1 each 0     Continuous Blood Gluc Sensor (DEXCOM G6 SENSOR) MISC 1 Device every 10 days 3 each 11     Continuous Blood Gluc Transmit (DEXCOM G6 TRANSMITTER) MISC 1 Device daily , change every 3 months 1 each 3     EPINEPHrine (ANY BX GENERIC EQUIV) 0.3 MG/0.3ML injection 2-pack Inject 0.3 mLs (0.3 mg) into the muscle as needed for anaphylaxis 0.6 mL 5     fish oil-omega-3 fatty acids (FISH OIL) 1000 MG capsule Take 1-2 g by mouth daily        insulin glargine (LANTUS SOLOSTAR) 100 UNIT/ML pen ADMINISTER 17 UNITS UNDER THE SKIN DAILY 15 mL 1     insulin lispro (HUMALOG KWIKPEN) 100 UNIT/ML (1 unit dial) KWIKPEN ADMINISTER 3 UNITS UNDER THE SKIN BEFORE MEALS PER SLIDING SCALE. MAX OF 20 UNITS PER DAY 15 mL 3     insulin pen needle (B-D U/F) 31G X 8 MM miscellaneous USE WITH INSULIN PEN FOUR TIMES DAILY AS DIRECTED 400 each 3     ketorolac (ACULAR) 0.5 % ophthalmic solution        lisinopril (ZESTRIL) 40 MG tablet Take 1 tablet (40 mg) by mouth daily 90 tablet 3     moxifloxacin (VIGAMOX) 0.5 % ophthalmic solution        Multiple Vitamin (MULTI VITAMIN  MENS) TABS Take  by mouth. 30 tablet      omeprazole (PRILOSEC) 20 MG DR capsule Take 1 capsule (20 mg) by mouth daily 90 capsule 3     prednisoLONE acetate (PRED FORTE) 1 % ophthalmic suspension        sildenafil (VIAGRA) 50 MG tablet Take 0.5-1 tablets  by mouth  min before  sex. Max use once a day. Never use with nitroglycerin, terazosin or doxazosin. 30 tablet 3     simvastatin (ZOCOR) 10 MG tablet TAKE 1/2 TABLET (5 MG) BY MOUTH AT BEDTIME 45 tablet 3     Allergies   Allergen Reactions     Lipitor [Atorvastatin Calcium]       Elevated Ck and myalgias     Wasp Venom Protein      Wasp sting     Family History   Problem Relation Age of  Onset     Diabetes Sister      Diabetes Paternal Grandmother      Diabetes Father      Cerebrovascular Disease Maternal Grandmother      Cancer Mother         peritoneal carcinoma     Anesthesia Reaction No family hx of      Bleeding Diathesis No family hx of      Social History     Socioeconomic History     Marital status:      Spouse name: None     Number of children: None     Years of education: None     Highest education level: None   Occupational History     Employer: RETIRED     Comment: anesthesiologist   Social Needs     Financial resource strain: None     Food insecurity     Worry: None     Inability: None     Transportation needs     Medical: None     Non-medical: None   Tobacco Use     Smoking status: Former Smoker     Packs/day: 1.00     Years: 15.00     Pack years: 15.00     Quit date: 1980     Years since quittin.4     Smokeless tobacco: Never Used   Substance and Sexual Activity     Alcohol use: Yes     Frequency: 2-3 times a week     Comment: occ     Drug use: No     Sexual activity: Yes     Partners: Female   Lifestyle     Physical activity     Days per week: None     Minutes per session: None     Stress: None   Relationships     Social connections     Talks on phone: None     Gets together: None     Attends Shinto service: None     Active member of club or organization: None     Attends meetings of clubs or organizations: None     Relationship status: None     Intimate partner violence     Fear of current or ex partner: None     Emotionally abused: None     Physically abused: None     Forced sexual activity: None   Other Topics Concern     Parent/sibling w/ CABG, MI or angioplasty before 65F 55M? Not Asked   Social History Narrative     None       REVIEW OF SYSTEMS:  Review of Systems:  Skin: Negative  Eyes: See HPI  Ears/Nose/Throat: Negative  Respiratory: Negative  Cardiovascular: Negative  Gastrointestinal: Negative  Genitourinary: Negative  Musculoskeletal:  "Negative  Neurologic: Negative  Psychiatric: Negative  Hematologic/Lymphatic/Immunologic: Negative  Endocrine: Negative      EXAM:   Vitals: reviewed in EMR.  /80 (BP Location: Right arm, Patient Position: Sitting, Cuff Size: Adult Regular)   Pulse 71   Temp 97.9  F (36.6  C) (Tympanic)   Resp 18   Ht 1.772 m (5' 9.75\")   Wt 77.1 kg (170 lb)   SpO2 99%   BMI 24.57 kg/m      Gen: Pleasant male, NAD.  HEENT: MMM, no OP erythema.   Neck: Supple, no JVD, no bruits.  CV: RRR no m/r/g.   Pulm: CTAB no w/r/r  GI: Soft, NT, ND. No HSM. No masses. Bowel sounds present.  Neuro: Grossly intact  Msk: No lower extremity edema.  Skin: No concerning lesions.  Psychiatric: Normal affect and insight. Does not appear anxious or depressed.    DIAGNOSTICS:   EKG  No acute ischemic changes    Labs:  Lab Results   Component Value Date    WBC 4.4 05/24/2017    HGB 14.5 05/24/2017    HCT 40.7 05/24/2017     05/24/2017    CHOL 146 08/05/2020    TRIG 83 08/05/2020    HDL 57 08/05/2020    ALT 34 08/05/2020    AST 25 08/05/2020     08/05/2020    BUN 16 08/05/2020    CO2 20 08/05/2020    TSH 2.83 01/22/2020    PSA 0.80 08/05/2020       Glucose   Date Value Ref Range Status   08/05/2020 275 (H) 70 - 99 mg/dL Final   01/22/2020 124 (H) 70 - 99 mg/dL Final     Comment:     Non Fasting     Hemoglobin A1C   Date Value Ref Range Status   01/13/2021 6.0 4.0 - 6.0 % Final   08/05/2020 6.0 (H) 0 - 5.6 % Final     Comment:     Normal <5.7% Prediabetes 5.7-6.4%  Diabetes 6.5% or higher - adopted from ADA   consensus guidelines.       Creatinine   Date Value Ref Range Status   01/13/2021 1.01 0.70 - 1.30 mg/dL Final   08/05/2020 0.81 0.66 - 1.25 mg/dL Final     LDL Cholesterol Calculated   Date Value Ref Range Status   08/05/2020 72 <100 mg/dL Final     Comment:     Desirable:       <100 mg/dl     No results found for: GHTSH                IMPRESSION:     ICD-10-CM    1. Preoperative examination  Z01.818 EKG 12-lead, tracing only "   2. Type 1 diabetes mellitus with nephropathy (goal A1C<7)  E10.21    3. Essential hypertension  I10    4. Subclinical hypothyroidism  E03.9    5. Dupuytren's contracture  M72.0        For above listed surgery and anesthesia:   Patient is at increased risk for perioperative complications based on DM1, hypertension, age.    RECOMMENDATIONS:   APPROVAL GIVEN to proceed with proposed procedure, without further diagnostic evaluation    Patient is on chronic pain medications: No  Patient is on antiplatelet/anticoagulation: Yes  Other medications that need adjustment perioperatively: Yes  Patient Instructions    -- Reduce Lantus to 8 or 10 units night before surgery   -- May needs more correction day of based on sugar levels     -- Hold aspirin for 5-7 days before surgery, unless you have had a stent then continue aspirin.   -- Hold ibuprofen/Advil for 2-3 days before surgery   -- Hold naproxen/Aleve for 5-7 days before surgery   -- Acetaminophen (Tylenol) is okay   -- Hold vitamins and herbal remedies for 7 days before surgery        Signed, Hemanth Cunningham MD, FAAP, FACP  Internal Medicine & Pediatrics    [facility], Dr. Moses fax [phone]

## 2021-06-21 NOTE — NURSING NOTE
"Chief Complaint   Patient presents with     Pre-Op Exam     Pre-op left eye Dr. Moses; 7/14/21       Initial /80 (BP Location: Right arm, Patient Position: Sitting, Cuff Size: Adult Regular)   Pulse 71   Temp 97.9  F (36.6  C) (Tympanic)   Resp 18   Ht 1.772 m (5' 9.75\")   Wt 77.1 kg (170 lb)   SpO2 99%   BMI 24.57 kg/m   Estimated body mass index is 24.57 kg/m  as calculated from the following:    Height as of this encounter: 1.772 m (5' 9.75\").    Weight as of this encounter: 77.1 kg (170 lb).  Medication Reconciliation: complete    Na Kim LPN    "

## 2021-06-28 ENCOUNTER — TRANSFERRED RECORDS (OUTPATIENT)
Dept: HEALTH INFORMATION MANAGEMENT | Facility: CLINIC | Age: 71
End: 2021-06-28

## 2021-06-28 LAB — RETINOPATHY: POSITIVE

## 2021-07-08 ENCOUNTER — MYC MEDICAL ADVICE (OUTPATIENT)
Dept: FAMILY MEDICINE | Facility: OTHER | Age: 71
End: 2021-07-08

## 2021-07-08 ENCOUNTER — TELEPHONE (OUTPATIENT)
Dept: PEDIATRICS | Facility: OTHER | Age: 71
End: 2021-07-08

## 2021-07-08 NOTE — TELEPHONE ENCOUNTER
Patient was calling in regards to how to send continuous glucose monitoring readings, trends, and graphs to Hemanth Cunningham MD.  Sent patient a [employer] message with a link to Dexcom G6 instruction video on sharing/following.  Patient was satisfied with this and will notify Hemanth Cunningham MD if he decides to share his readings this way.  Estrellita Vora LPN  7/8/2021 11:21 AM

## 2021-07-08 NOTE — TELEPHONE ENCOUNTER
The patient called about his CGM.  He wanted to know how he could get codes and trends on a graph to you so you would have that for controlling his diabetes.

## 2021-07-12 NOTE — TELEPHONE ENCOUNTER
Patient called, message left, he would like to share his glucose data with [facility].  Phoned patient, invitation sent with share code to enter into his Clarity vicki and begin sharing his Dexcom G6 CGM data with [facility].    Margaux Castellon RN, BSN, [facility]  7/12/2021 12:14 PM

## 2021-07-13 ENCOUNTER — TRANSFERRED RECORDS (OUTPATIENT)
Dept: HEALTH INFORMATION MANAGEMENT | Facility: OTHER | Age: 71
End: 2021-07-13

## 2021-08-17 ENCOUNTER — TRANSFERRED RECORDS (OUTPATIENT)
Dept: HEALTH INFORMATION MANAGEMENT | Facility: OTHER | Age: 71
End: 2021-08-17

## 2021-08-19 ENCOUNTER — TRANSFERRED RECORDS (OUTPATIENT)
Dept: HEALTH INFORMATION MANAGEMENT | Facility: OTHER | Age: 71
End: 2021-08-19

## 2021-08-25 ENCOUNTER — LAB (OUTPATIENT)
Dept: LAB | Facility: OTHER | Age: 71
End: 2021-08-25
Attending: INTERNAL MEDICINE
Payer: MEDICARE

## 2021-08-25 DIAGNOSIS — E10.21 TYPE 1 DIABETES MELLITUS WITH NEPHROPATHY (H): ICD-10-CM

## 2021-08-25 DIAGNOSIS — E03.8 SUBCLINICAL HYPOTHYROIDISM: ICD-10-CM

## 2021-08-25 DIAGNOSIS — E78.5 HYPERLIPIDEMIA LDL GOAL <100: ICD-10-CM

## 2021-08-25 DIAGNOSIS — I10 ESSENTIAL HYPERTENSION: ICD-10-CM

## 2021-08-25 LAB
ANION GAP SERPL CALCULATED.3IONS-SCNC: 6 MMOL/L (ref 3–14)
BUN SERPL-MCNC: 22 MG/DL (ref 7–25)
CALCIUM SERPL-MCNC: 9.5 MG/DL (ref 8.6–10.3)
CHLORIDE BLD-SCNC: 103 MMOL/L (ref 98–107)
CHOLEST SERPL-MCNC: 146 MG/DL
CO2 SERPL-SCNC: 26 MMOL/L (ref 21–31)
CREAT SERPL-MCNC: 1 MG/DL (ref 0.7–1.3)
CREAT UR-MCNC: 57 MG/DL
FASTING STATUS PATIENT QL REPORTED: YES
GFR SERPL CREATININE-BSD FRML MDRD: 76 ML/MIN/1.73M2
GLUCOSE BLD-MCNC: 130 MG/DL (ref 70–105)
HBA1C MFR BLD: 6.1 % (ref 4–6.2)
HDLC SERPL-MCNC: 66 MG/DL (ref 23–92)
LDLC SERPL CALC-MCNC: 62 MG/DL
MICROALBUMIN UR-MCNC: 289 MG/L
MICROALBUMIN/CREAT UR: 507.02 MG/G CR (ref 0–17)
NONHDLC SERPL-MCNC: 80 MG/DL
POTASSIUM BLD-SCNC: 4.4 MMOL/L (ref 3.5–5.1)
SODIUM SERPL-SCNC: 135 MMOL/L (ref 134–144)
TRIGL SERPL-MCNC: 91 MG/DL
TSH SERPL DL<=0.005 MIU/L-ACNC: 2.5 MU/L (ref 0.4–4)

## 2021-08-25 PROCEDURE — 83036 HEMOGLOBIN GLYCOSYLATED A1C: CPT | Mod: ZL

## 2021-08-25 PROCEDURE — 80048 BASIC METABOLIC PNL TOTAL CA: CPT | Mod: ZL

## 2021-08-25 PROCEDURE — 80061 LIPID PANEL: CPT | Mod: ZL

## 2021-08-25 PROCEDURE — 84443 ASSAY THYROID STIM HORMONE: CPT | Mod: ZL

## 2021-08-25 PROCEDURE — 82043 UR ALBUMIN QUANTITATIVE: CPT | Mod: ZL

## 2021-08-25 PROCEDURE — 36415 COLL VENOUS BLD VENIPUNCTURE: CPT | Mod: ZL

## 2021-08-30 DIAGNOSIS — E10.21 TYPE 1 DIABETES MELLITUS WITH NEPHROPATHY (H): ICD-10-CM

## 2021-08-31 ENCOUNTER — TELEPHONE (OUTPATIENT)
Dept: PEDIATRICS | Facility: OTHER | Age: 71
End: 2021-08-31

## 2021-08-31 NOTE — TELEPHONE ENCOUNTER
Patient is on Medicare and has a Dexcom. He stated that Medicare needs clinical notes every 6 months for them to cover the Dexcom. He has an appointment with  on September 2nd and Medicare won't cover the Dexcom after September 4th. He wants to know if this is something  can do for him at his appointment on September 2nd?  Sindhu Yen LPN

## 2021-09-01 RX ORDER — BLOOD SUGAR DIAGNOSTIC
STRIP MISCELLANEOUS
Qty: 200 STRIP | Refills: 1 | Status: SHIPPED | OUTPATIENT
Start: 2021-09-01 | End: 2022-03-10

## 2021-09-02 ENCOUNTER — OFFICE VISIT (OUTPATIENT)
Dept: PEDIATRICS | Facility: OTHER | Age: 71
End: 2021-09-02
Attending: INTERNAL MEDICINE
Payer: MEDICARE

## 2021-09-02 VITALS
RESPIRATION RATE: 16 BRPM | BODY MASS INDEX: 24.31 KG/M2 | HEIGHT: 70 IN | DIASTOLIC BLOOD PRESSURE: 82 MMHG | WEIGHT: 169.8 LBS | SYSTOLIC BLOOD PRESSURE: 120 MMHG | HEART RATE: 72 BPM | TEMPERATURE: 97.8 F | OXYGEN SATURATION: 97 %

## 2021-09-02 DIAGNOSIS — E10.21 TYPE 1 DIABETES MELLITUS WITH NEPHROPATHY (H): Primary | ICD-10-CM

## 2021-09-02 PROCEDURE — 99214 OFFICE O/P EST MOD 30 MIN: CPT | Performed by: INTERNAL MEDICINE

## 2021-09-02 PROCEDURE — G0463 HOSPITAL OUTPT CLINIC VISIT: HCPCS

## 2021-09-02 RX ORDER — PROCHLORPERAZINE 25 MG/1
1 SUPPOSITORY RECTAL
Qty: 3 EACH | Refills: 11 | Status: SHIPPED | OUTPATIENT
Start: 2021-09-02 | End: 2022-06-09

## 2021-09-02 RX ORDER — PROCHLORPERAZINE 25 MG/1
1 SUPPOSITORY RECTAL DAILY
Qty: 1 EACH | Refills: 11 | Status: SHIPPED | OUTPATIENT
Start: 2021-09-02 | End: 2022-06-09

## 2021-09-02 RX ORDER — PROCHLORPERAZINE 25 MG/1
SUPPOSITORY RECTAL
Qty: 1 EACH | Refills: 11 | Status: SHIPPED | OUTPATIENT
Start: 2021-09-02 | End: 2022-06-09

## 2021-09-02 ASSESSMENT — PAIN SCALES - GENERAL: PAINLEVEL: NO PAIN (0)

## 2021-09-02 ASSESSMENT — MIFFLIN-ST. JEOR: SCORE: 1536.46

## 2021-09-02 NOTE — PROGRESS NOTES
"Subjective   Eh Perry is a 71 year old male who presents for diabetes visit.    Objective   Vitals: /82 (BP Location: Right arm, Patient Position: Sitting, Cuff Size: Adult Regular)   Pulse 72   Temp 97.8  F (36.6  C) (Tympanic)   Resp 16   Ht 1.778 m (5' 10\")   Wt 77 kg (169 lb 12.8 oz)   SpO2 97%   BMI 24.36 kg/m      Foot Exam:  9/2/2021  Intact to monofilament bilaterally.  Skin intact without erythema.      Review and Analysis of Data   I personally reviewed the following:  External notes: No  Results: Yes Most recent diabetic labs are reviewed  Use of an independent historian: No  Independent review of a test performed by another physician: No  Discussion of management with another physician: No  Low risk of morbidity from additional diagnostic testing and/or treatment.    Assessment & Plan   1. Type 1 diabetes mellitus with nephropathy (goal A1C<7)  At goal, no change.  - Continuous Blood Gluc  (DEXCOM G6 ) EMERSON; Use to monitor blood sugars  continuously through the day  Dispense: 1 each; Refill: 11  - Continuous Blood Gluc Sensor (DEXCOM G6 SENSOR) MISC; 1 Device every 10 days  Dispense: 3 each; Refill: 11  - Continuous Blood Gluc Transmit (DEXCOM G6 TRANSMITTER) MISC; 1 Device daily , change every 3 months  Dispense: 1 each; Refill: 11    Patient currently uses a Dexcom G6 CGM for continuous monitoring of glucose.   Patient injects or boluses insulin 3 or more times daily before breakfast, before lunch, before dinner, and bedtime.  Patient requires frequent adjustments to their insulin treatment regimen on the basis of therapeutic CGM testing results.    Signed, Hemanth Cunningham MD, FAAP, FACP  Internal Medicine & Pediatrics    "

## 2021-09-02 NOTE — Clinical Note
Margaux,    Can you please help him with paperwork for dexcom?    Thanks,  Signed, Hemanth Cunningham MD, FAAP, FACP  Internal Medicine & Pediatrics

## 2021-09-02 NOTE — NURSING NOTE
Patient presents for diabetic follow up.  Anni Mederos LPN.........................9/2/2021  3:21 PM

## 2021-09-02 NOTE — PATIENT INSTRUCTIONS
Aspects of Diabetes we can improve:  Hemoglobin A1c Lab Results   Component Value Date    A1C 6.1 08/25/2021    A1C 6.0 01/13/2021    A1C 6.0 08/05/2020    A1C 6.1 01/22/2020    A1C 6.1 07/17/2019    A1C 6.2 11/23/2018    Goal range is under 8. Best is 6.5 to 7   Blood Pressure 120/82 Goal to keep less than 140/90   Tobacco  reports that he quit smoking about 41 years ago. He has a 15.00 pack-year smoking history. He has never used smokeless tobacco. Goal to abstain from tobacco   Aspirin yes Aspirin reduces risk of heart disease and stroke   ACE/ARB yes These medications reduce risk of kidney disease   Cholesterol yes Statins reduce risk of heart disease and stroke   Eye Exam annual Annual diabetic eye exam   Healthy weight Body mass index is 24.36 kg/m . Goal BMI under 30, best is under 25.      -- I'm trying to exercise daily (goal at least 20 min/day) with moderate aerobic activity   -- Eat healthy (resources from ADA at http://www.diabetes.org/)   -- I'm taking good care of my feet. Consider seeing the Podiatrist   -- Check blood sugars as directed, record in log book and bring to every appointment   -- Goal sugar before breakfast: under 140   -- Goal sugar 2 hours after supper: under 170   -- Next diabetes lab draw: 6 months   -- Next diabetes office visit: 6 months

## 2021-09-08 ENCOUNTER — TELEPHONE (OUTPATIENT)
Dept: ENDOCRINOLOGY | Facility: CLINIC | Age: 71
End: 2021-09-08

## 2021-09-08 NOTE — TELEPHONE ENCOUNTER
[facility] form for strips and lancets    LAST OFFICE/VIRTUAL VISIT:  03/04/21    FUTURE OFFICE/VIRTUAL VISIT:  None    Lab Results   Component Value Date    A1C 6.1 08/25/2021    A1C 6.0 01/13/2021    A1C 6.0 08/05/2020    A1C 6.1 01/22/2020    A1C 6.1 07/17/2019    A1C 6.2 11/23/2018         Kellie Doherty CMA  [facility]  [place]

## 2021-09-08 NOTE — TELEPHONE ENCOUNTER
Fax received from [facility] for review and signature.  Put in Dr. Sexton's in basket.

## 2021-09-13 NOTE — TELEPHONE ENCOUNTER
[facility] needs signed chart notes.    Kellie Doherty [facility] [place]  [phone]

## 2021-09-28 ENCOUNTER — TELEPHONE (OUTPATIENT)
Dept: ENDOCRINOLOGY | Facility: CLINIC | Age: 71
End: 2021-09-28

## 2021-09-28 NOTE — TELEPHONE ENCOUNTER
Completed form received, stamped, then faxed to: [facility] Dept [phone]. Placed in Kellie's filing folder.

## 2021-09-28 NOTE — TELEPHONE ENCOUNTER
Fax received from [employer] - Final Returned Request for review and signature.  Put in Dr. Sexton's in basket.

## 2021-10-02 DIAGNOSIS — E10.21 TYPE 1 DIABETES MELLITUS WITH NEPHROPATHY (H): ICD-10-CM

## 2021-10-23 ENCOUNTER — HEALTH MAINTENANCE LETTER (OUTPATIENT)
Age: 71
End: 2021-10-23

## 2021-11-29 ENCOUNTER — TRANSFERRED RECORDS (OUTPATIENT)
Dept: MULTI SPECIALTY CLINIC | Facility: CLINIC | Age: 71
End: 2021-11-29
Payer: MEDICARE

## 2021-11-29 LAB — RETINOPATHY: NORMAL

## 2021-12-01 ENCOUNTER — HOSPITAL ENCOUNTER (OUTPATIENT)
Dept: OCCUPATIONAL THERAPY | Facility: OTHER | Age: 71
Setting detail: THERAPIES SERIES
End: 2021-12-01
Attending: FAMILY MEDICINE
Payer: MEDICARE

## 2021-12-01 ENCOUNTER — HOSPITAL ENCOUNTER (OUTPATIENT)
Dept: GENERAL RADIOLOGY | Facility: OTHER | Age: 71
End: 2021-12-01
Attending: FAMILY MEDICINE
Payer: MEDICARE

## 2021-12-01 ENCOUNTER — OFFICE VISIT (OUTPATIENT)
Dept: FAMILY MEDICINE | Facility: OTHER | Age: 71
End: 2021-12-01
Attending: FAMILY MEDICINE
Payer: MEDICARE

## 2021-12-01 VITALS
TEMPERATURE: 96.9 F | BODY MASS INDEX: 24.42 KG/M2 | HEART RATE: 80 BPM | OXYGEN SATURATION: 98 % | RESPIRATION RATE: 14 BRPM | SYSTOLIC BLOOD PRESSURE: 134 MMHG | WEIGHT: 174.4 LBS | HEIGHT: 71 IN | DIASTOLIC BLOOD PRESSURE: 72 MMHG

## 2021-12-01 DIAGNOSIS — M77.11 LATERAL EPICONDYLITIS OF RIGHT ELBOW: ICD-10-CM

## 2021-12-01 DIAGNOSIS — G56.01 CARPAL TUNNEL SYNDROME OF RIGHT WRIST: ICD-10-CM

## 2021-12-01 DIAGNOSIS — M77.11 LATERAL EPICONDYLITIS OF RIGHT ELBOW: Primary | ICD-10-CM

## 2021-12-01 PROCEDURE — 97165 OT EVAL LOW COMPLEX 30 MIN: CPT | Mod: GO

## 2021-12-01 PROCEDURE — 97035 APP MDLTY 1+ULTRASOUND EA 15: CPT | Mod: GO

## 2021-12-01 PROCEDURE — 97140 MANUAL THERAPY 1/> REGIONS: CPT | Mod: GO

## 2021-12-01 PROCEDURE — G0463 HOSPITAL OUTPT CLINIC VISIT: HCPCS

## 2021-12-01 PROCEDURE — 99214 OFFICE O/P EST MOD 30 MIN: CPT | Performed by: FAMILY MEDICINE

## 2021-12-01 PROCEDURE — G0463 HOSPITAL OUTPT CLINIC VISIT: HCPCS | Mod: 25

## 2021-12-01 PROCEDURE — 73080 X-RAY EXAM OF ELBOW: CPT | Mod: RT

## 2021-12-01 ASSESSMENT — MIFFLIN-ST. JEOR: SCORE: 1565.26

## 2021-12-01 ASSESSMENT — PAIN SCALES - GENERAL: PAINLEVEL: MODERATE PAIN (4)

## 2021-12-01 NOTE — NURSING NOTE
"Chief Complaint   Patient presents with     Elbow Pain     right elbow pain ongoing for 2 years     Patient presents to clinic today for right elbow pain ongoing for 2 years. Pain 4/10. No known specific injury, could have been from using a large  2 years ago.     Initial /72 (BP Location: Right arm, Patient Position: Sitting, Cuff Size: Adult Regular)   Pulse 80   Temp 96.9  F (36.1  C) (Tympanic)   Resp 14   Ht 1.791 m (5' 10.5\")   Wt 79.1 kg (174 lb 6.4 oz)   SpO2 98%   BMI 24.67 kg/m   Estimated body mass index is 24.67 kg/m  as calculated from the following:    Height as of this encounter: 1.791 m (5' 10.5\").    Weight as of this encounter: 79.1 kg (174 lb 6.4 oz).     FOOD SECURITY SCREENING QUESTIONS  Hunger Vital Signs:  Within the past 12 months we worried whether our food would run out before we got money to buy more. Never  Within the past 12 months the food we bought just didn't last and we didn't have money to get more. Never      Medication Reconciliation: Complete      Jennifer Peña LPN   "

## 2021-12-01 NOTE — PROGRESS NOTES
"HPI:  71-year-old male coming in for evaluation of right elbow pain.  His pain is been present for approximately 2 years.  It came on without inciting event.  He localizes the pain to the lateral aspect of the elbow.  He rates his pain as 4/10.  He characterizes the pain as dull and achy.  He has some associated numbness and tingling but this is more in the hand and he feels that it is separate from his wrist.  He has not tried any interventions to help with his pain.  His pain seems to be worse after a prolonged period of immobility and will lessen with activity.    Patient also reports that he has some numbness/tingling in the \"median nerve distribution\" of the right hand that mostly bothers him at night, occasionally wakes him up.  He feels like he has carpal tunnel.  He has symptoms that involve the first-third digits of the right hand and radial aspect of the fourth digit.  He has not tried any interventions.  No reported symptoms throughout the day.      EXAM:  /72 (BP Location: Right arm, Patient Position: Sitting, Cuff Size: Adult Regular)   Pulse 80   Temp 96.9  F (36.1  C) (Tympanic)   Resp 14   Ht 1.791 m (5' 10.5\")   Wt 79.1 kg (174 lb 6.4 oz)   SpO2 98%   BMI 24.67 kg/m    MUSCULOSKELETAL EXAM:  RIGHT ELBOW  Inspection:  -No gross deformity  -No bruising or swelling  -Scars:  None    Tenderness to palpation of the:  -Shoulder:  Negative  -Biceps musculature:  Negative  -Triceps musculature:  Negative  -Antecubital fossa:  Negative  -Distal biceps tendon:  Negative  -Lateral epicondyle:  Negative  -Medial epicondyle:  Negative  -Radial head:  Negative  -Olecranon:  Negative    Range of Motion:  -Passive flexion:  140  -Passive extension:  0  -Pronation:  90  -Supination:  90    Strength:  -Biceps:  5/5  -Triceps:  5/5  -Wrist extension:  5/5  -Wrist flexion:  5/5    Sensation:  -Intact in the C5-T1 dermatomes    Motor:  -Intact AIN, PIN, and IO    Special Tests:  -Resisted wrist extension:  " Nonpainful  -Middle finger resistance test:  Negative  -Hook test:  Negative  -Valgus laxity:  Negative  -Milking maneuver:  Negative  -Push up from seated position (PLRI test):  Negative  -Tinel's test at cubital tunnel:  Negative    Other:  -No signs of cyanosis. Normal skin temperature of the upper extremity.  -Hand/wrist:  No gross deformity. Full range of motion.  -Left upper extremity:  No gross deformity. No palpable tenderness. Normal strength and ROM.      IMAGIN2021: 3 view right elbow x-ray  -No fracture, dislocation, or bony lesion.  Subtle enthesophyte off of the lateral epicondyle.      ASSESSMENT/PLAN:  Diagnoses and all orders for this visit:  Lateral epicondylitis of right elbow  -     XR Elbow Right G/E 3 Views  -     Occupational Therapy Referral; Future  -     Wrist/Arm/Hand Supplies Order for DME - ONLY FOR DME  Carpal tunnel syndrome of right wrist  -     Wrist/Arm/Hand Supplies Order for DME - ONLY FOR DME    71-year-old male with right lateral epicondylosis.  X-rays from  were personally reviewed in the office with the findings as demonstrated above by my interpretation.  We discussed treatment options for this condition to include activity modification, rest, bracing, home exercises, hand therapy, nitroglycerin patches, trephination, or surgical referral.  He also has some underlying intermittent carpal tunnel symptoms that sound mild and only occur at night.  For this I feel he would benefit from a neutral wrist brace.  -Patient fitted for a neutral wrist brace  -Handouts given for exercises to do at home for carpal tunnel syndrome and lateral epicondylosis  -Referral placed to occupational therapy for lateral epicondylosis  -Activities as tolerated, allow pain to guide  -Follow-up as needed      Mayank Srivastava MD  2021  9:55 AM    Total time spent with this patient was 39 minutes which included chart review, visualization and interpretation of images, time spent with the  patient, and documentation.

## 2021-12-02 NOTE — PROGRESS NOTES
12/01/21 1200   General Information/History   Start Of Care Date 12/01/21   Referring Physician Dr. Srivastava   Orders Evaluate And Treat As Indicated   Medical Diagnosis lateral epicondylitis    Additional Occupational Profile Info/Pertinent history of current problem Eh is a 71 year old male who arrives for OT evaluation of  right dominant lateral epicondylitis.  He was seen by Dr. Srivastava earlier today who referred him over for therapy.   Eh reports that he has had this pain on and off for a while now, probably about 2 years.  He seems to remember it starting after snowblowing a lot of snow.   he also reports mild symptoms of carpal tunnel. He reports he was provided with a splint for wrist today with Dr. Srivastava and will wear that at night for the carpal tunnel symptoms.    How/Where did it occur Other  (snow blowing)   Onset date of current episode/exacerbation   (2 years ago )   Affected side Right   Functional limitations perform desired leisure / sports activities;perform activities of daily living   Reported Symptoms Pain;Loss of Motion/Stiffness   Prior level of function Independent ADL;Independent IADL   Important Activities golf, walking, playing piano and base [employer]   Living environment [place]   Patient role/Employment history Retired   Fall Risk Screen   Fall screen completed by OT   Have you fallen 2 or more times in the past year? No   Have you fallen and had an injury in the past year? No   Is patient a fall risk? No   Abuse Screen (yes response referral indicated)   Feels Unsafe at Home or Work/School no   Feels Threatened by Someone no   Does Anyone Try to Keep You From Having Contact with Others or Doing Things Outside Your Home? no   Physical Signs of Abuse Present no   Pain   Pain Primary Pain Report   Primary Pain Report   Location Right elbow    Radiation Does Not Radiate   Pain Quality Sharp;Aching   Frequency Intermittent   Scale 2/10;7/10   Pain Is Exacerbated By  Carrying;Gripping;Pinching;Lifting   Pain Is Relieved By Rest   Tenderness   Tenderness Lateral Elbow   Lateral Elbow   Right +;Moderate  (point tender over right lateral epicondyle.  localized )   ROM   ROM AROM   AROM   AROM Wrist;Elbow   Wrist   Wrist Extension - Left 50   Wrist Extension - Right 45   Wrist Flexion- Left 84   Wrist Flexion - Right 80   Elbow   Elbow Extension- Left 0   Elbow Extension - Right -10   Special Tests   Special Tests Assessed   Right Hand Positive For The Following Special Tests Resisted Long Finger   Strength   Strength Strength    Avg - Left 85    Avg - Right 84    Avg Comments pain with R hand testing    Lateral Pinch - Left 18   Lateral Pinch - Right 20   3 Point Pinch - Left 18   3 Point Pinch - Right 16   3 Point Pinch Comments pain with right hand testing    Education Assessment   Preferred Learning Style Listening;Reading   Barriers to Learning No barriers   Therapy Interventions   Planned Therapy Interventions Cryotherapy;Ultrasound;Light Therapy;Paraffin;Manual Therapy;Education of splint wear, care, fit and precautions;Splinting;Stretching;Strengthening;Home Program;Edema Management;Ergonomic Patient Education;ROM   Clinical Impression   Criteria for Skilled Therapeutic Interventions Met yes   OT Diagnosis Decreased ADL/IADL performance    Influenced by the following impairments Pain;Decreased range of motion;Decreased strength   Assessment of Occupational Performance 1-3 Performance Deficits   Identified Performance Deficits home management, leisure activities    Clinical Decision Making (Complexity) Low complexity   Therapy Frequency 1-2x week    Predicted Duration of Therapy Intervention (days/wks) 6 weeks    Risks and Benefits of Treatment have been explained. Yes   Patient, Family & other staff in agreement with plan of care Yes   Clinical Impression Comments evolving/changing.  Good rehab potential   Hand Eval Goals   Hand Eval Goals 1;2;3   Hand Goal 1    Goal Identifier carrying    Goal Description Eh will report improved ability to carry a shopping bag or briefcase from current rating of 4 (severe difficulty) to at least 2 (mild difficulty) as measured by the QuickDash.    Target Date 01/12/22   Hand Goal 2   Goal Identifier  strength    Goal Description Eh will have improved pain free  strength by at least 5# in order to return to [facility] with home management tasks.    Target Date 01/12/21   Hand Goal 3   Goal Identifier Prolonged grasp    Goal Description Eh will be able to hold a sustained  for 2 minutes without pain for improved ability to complete outdoor chores requiring grasping rake/shovel/snowblower etc.    Target Date 01/12/21   Total Evaluation Time   OT Eval, Low Complexity Minutes (79960) 20

## 2021-12-06 NOTE — PROGRESS NOTES
[facility]      OUTPATIENT OCCUPATIONAL THERAPY HAND EVALUATION  PLAN OF TREATMENT FOR OUTPATIENT REHABILITATION  (COMPLETE FOR INITIAL CLAIMS ONLY)  Patient's Last Name, First Name, M.I.  YOB: 1950  Eh Perry                        Provider s Name: [facility] Medical Record No.  [id number]     Onset Date:  (2 years ago )    Start of Care Date: 12/01/21   Type:     ___PT  _X_OT   ___SLP    Medical Diagnosis:  Lateral epicondylitis    Occupational Therapy Diagnosis:  Decreased ADL/IADL performance     Visits from SOC: 1      _________________________________________________________________________________  Plan of Treatment/Functional Goals:  Planned Therapy Interventions:  Cryotherapy,Ultrasound,Light Therapy,Paraffin,Manual Therapy,Education of splint wear, care, fit and precautions,Splinting,Stretching,Strengthening,Home Program,Edema Management,Ergonomic Patient Education,ROM      10% Ketoprofen/4% Dexamethasone as needed for inflammation/pain control      Goals  1.  Goal Identifier: carrying        Goal Description: Eh will report improved ability to carry a shopping bag or briefcase from current rating of 4 (severe difficulty) to at least 2 (mild difficulty) as measured by the QuickDash.        Target Date: 01/12/22   2. Goal Identifier:  strength        Goal Description: Eh will have improved pain free  strength by at least 5# in order to return to [facility] with home management tasks.        Target Date: 01/12/21   3. Goal Identifier: Prolonged grasp        Goal Description: Eh will be able to hold a sustained  for 2 minutes without pain for improved ability to complete outdoor chores requiring grasping rake/shovel/snowblower etc.        Target Date: 01/12/21                       Treatment Frequency: Therapy Frequency: 1-2x week    Predicated Duration of Therapy Intervention:  Predicted Duration of Therapy Intervention (days/wks): 6 weeks     Kathleen Bosch OTR/L          I CERTIFY THE NEED FOR THESE SERVICES FURNISHED UNDER        THIS PLAN OF TREATMENT AND WHILE UNDER MY CARE     (Physician co-signature of this document indicates review and certification of the therapy plan).                Certification Period:  12/1/2021  to  1/12/2021            Referring Physician:  Dr. Srivastava    Initial Assessment        See Epic Evaluation Start of Care Date: 12/01/21

## 2021-12-08 ENCOUNTER — HOSPITAL ENCOUNTER (OUTPATIENT)
Dept: OCCUPATIONAL THERAPY | Facility: OTHER | Age: 71
Setting detail: THERAPIES SERIES
End: 2021-12-08
Attending: FAMILY MEDICINE
Payer: MEDICARE

## 2021-12-08 PROCEDURE — 97140 MANUAL THERAPY 1/> REGIONS: CPT | Mod: GO

## 2021-12-08 PROCEDURE — 97110 THERAPEUTIC EXERCISES: CPT | Mod: GO

## 2021-12-08 PROCEDURE — 97035 APP MDLTY 1+ULTRASOUND EA 15: CPT | Mod: GO

## 2021-12-10 ENCOUNTER — TELEPHONE (OUTPATIENT)
Dept: PEDIATRICS | Facility: OTHER | Age: 71
End: 2021-12-10
Payer: MEDICARE

## 2021-12-10 ENCOUNTER — HOSPITAL ENCOUNTER (OUTPATIENT)
Dept: OCCUPATIONAL THERAPY | Facility: OTHER | Age: 71
Setting detail: THERAPIES SERIES
End: 2021-12-10
Attending: FAMILY MEDICINE
Payer: MEDICARE

## 2021-12-10 DIAGNOSIS — Z12.5 ENCOUNTER FOR SCREENING FOR MALIGNANT NEOPLASM OF PROSTATE: ICD-10-CM

## 2021-12-10 DIAGNOSIS — E10.21 TYPE 1 DIABETES MELLITUS WITH NEPHROPATHY (H): Primary | ICD-10-CM

## 2021-12-10 PROCEDURE — 97140 MANUAL THERAPY 1/> REGIONS: CPT | Mod: GO

## 2021-12-10 PROCEDURE — 97035 APP MDLTY 1+ULTRASOUND EA 15: CPT | Mod: GO

## 2021-12-10 PROCEDURE — 97110 THERAPEUTIC EXERCISES: CPT | Mod: GO

## 2021-12-10 NOTE — TELEPHONE ENCOUNTER
Patient needs orders for his labs on 01/17/22 for his physical scheduled for 01/20/22.  He is also requesting to get a  PSA and Microalbuninuria.    Shena Mendoza on 12/10/2021 at 9:55 AM

## 2021-12-14 ENCOUNTER — HOSPITAL ENCOUNTER (OUTPATIENT)
Dept: OCCUPATIONAL THERAPY | Facility: OTHER | Age: 71
Setting detail: THERAPIES SERIES
End: 2021-12-14
Attending: FAMILY MEDICINE
Payer: MEDICARE

## 2021-12-14 PROCEDURE — 97035 APP MDLTY 1+ULTRASOUND EA 15: CPT | Mod: GO

## 2021-12-14 PROCEDURE — 97140 MANUAL THERAPY 1/> REGIONS: CPT | Mod: GO

## 2021-12-14 PROCEDURE — 97110 THERAPEUTIC EXERCISES: CPT | Mod: GO

## 2022-01-11 DIAGNOSIS — E78.5 HYPERLIPIDEMIA LDL GOAL <100: ICD-10-CM

## 2022-01-11 DIAGNOSIS — K21.9 GASTROESOPHAGEAL REFLUX DISEASE WITHOUT ESOPHAGITIS: ICD-10-CM

## 2022-01-12 NOTE — TELEPHONE ENCOUNTER
Routing refill request to provider for review/approval because:  Forwarding to PCP as previously prescribed by another provider.     Myriam Eugene RN

## 2022-01-13 RX ORDER — SIMVASTATIN 10 MG
TABLET ORAL
Qty: 45 TABLET | Refills: 3 | Status: SHIPPED | OUTPATIENT
Start: 2022-01-13 | End: 2022-01-20

## 2022-01-17 ENCOUNTER — LAB (OUTPATIENT)
Dept: LAB | Facility: OTHER | Age: 72
End: 2022-01-17
Attending: INTERNAL MEDICINE
Payer: MEDICARE

## 2022-01-17 DIAGNOSIS — I10 ESSENTIAL HYPERTENSION: ICD-10-CM

## 2022-01-17 DIAGNOSIS — E10.21 TYPE 1 DIABETES MELLITUS WITH NEPHROPATHY (H): ICD-10-CM

## 2022-01-17 DIAGNOSIS — Z12.5 ENCOUNTER FOR SCREENING FOR MALIGNANT NEOPLASM OF PROSTATE: ICD-10-CM

## 2022-01-17 DIAGNOSIS — E78.5 HYPERLIPIDEMIA LDL GOAL <100: ICD-10-CM

## 2022-01-17 LAB
ALBUMIN SERPL-MCNC: 4.8 G/DL (ref 3.5–5.7)
ALP SERPL-CCNC: 48 U/L (ref 34–104)
ALT SERPL W P-5'-P-CCNC: 20 U/L (ref 7–52)
ANION GAP SERPL CALCULATED.3IONS-SCNC: 5 MMOL/L (ref 3–14)
AST SERPL W P-5'-P-CCNC: 21 U/L (ref 13–39)
BILIRUB SERPL-MCNC: 0.9 MG/DL (ref 0.3–1)
BUN SERPL-MCNC: 19 MG/DL (ref 7–25)
CALCIUM SERPL-MCNC: 10.1 MG/DL (ref 8.6–10.3)
CHLORIDE BLD-SCNC: 104 MMOL/L (ref 98–107)
CHOLEST SERPL-MCNC: 146 MG/DL
CO2 SERPL-SCNC: 29 MMOL/L (ref 21–31)
CREAT SERPL-MCNC: 0.99 MG/DL (ref 0.7–1.3)
CREAT UR-MCNC: 121 MG/DL
FASTING STATUS PATIENT QL REPORTED: YES
GFR SERPL CREATININE-BSD FRML MDRD: 82 ML/MIN/1.73M2
GLUCOSE BLD-MCNC: 129 MG/DL (ref 70–105)
HBA1C MFR BLD: 6 % (ref 4–6.2)
HDLC SERPL-MCNC: 67 MG/DL (ref 23–92)
LDLC SERPL CALC-MCNC: 68 MG/DL
MICROALBUMIN UR-MCNC: 244 MG/L
MICROALBUMIN/CREAT UR: 201.65 MG/G CR (ref 0–17)
NONHDLC SERPL-MCNC: 79 MG/DL
POTASSIUM BLD-SCNC: 4.9 MMOL/L (ref 3.5–5.1)
PROT SERPL-MCNC: 7.8 G/DL (ref 6.4–8.9)
PSA SERPL-MCNC: 0.5 UG/L (ref 0–4)
SODIUM SERPL-SCNC: 138 MMOL/L (ref 134–144)
TRIGL SERPL-MCNC: 53 MG/DL

## 2022-01-17 PROCEDURE — 82043 UR ALBUMIN QUANTITATIVE: CPT | Mod: ZL

## 2022-01-17 PROCEDURE — 36415 COLL VENOUS BLD VENIPUNCTURE: CPT | Mod: ZL

## 2022-01-17 PROCEDURE — 83036 HEMOGLOBIN GLYCOSYLATED A1C: CPT | Mod: ZL

## 2022-01-17 PROCEDURE — G0103 PSA SCREENING: HCPCS | Mod: ZL

## 2022-01-17 PROCEDURE — 80061 LIPID PANEL: CPT | Mod: ZL

## 2022-01-17 PROCEDURE — 82040 ASSAY OF SERUM ALBUMIN: CPT | Mod: ZL

## 2022-01-17 PROCEDURE — 80053 COMPREHEN METABOLIC PANEL: CPT | Mod: ZL

## 2022-01-17 ASSESSMENT — ACTIVITIES OF DAILY LIVING (ADL): CURRENT_FUNCTION: NO ASSISTANCE NEEDED

## 2022-01-17 NOTE — RESULT ENCOUNTER NOTE
Eh    Recently done endocrinology lab test/ imaging test showed:  Lab Results       Component                Value               Date                       A1C                      6.0                 01/17/2022                 A1C                      6.1                 08/25/2021              Here is a copy for your records.    Follow up as discussed in last clinic visit.    Please call endocrinology clinic (nurse line: [phone]) if questions.    Kierra Sexton MD  Endocrinology   [facility]  January 17, 2022

## 2022-01-20 ENCOUNTER — OFFICE VISIT (OUTPATIENT)
Dept: PEDIATRICS | Facility: OTHER | Age: 72
End: 2022-01-20
Attending: INTERNAL MEDICINE
Payer: MEDICARE

## 2022-01-20 VITALS
BODY MASS INDEX: 24.38 KG/M2 | RESPIRATION RATE: 16 BRPM | TEMPERATURE: 97.6 F | OXYGEN SATURATION: 99 % | DIASTOLIC BLOOD PRESSURE: 76 MMHG | HEIGHT: 70 IN | HEART RATE: 79 BPM | SYSTOLIC BLOOD PRESSURE: 136 MMHG | WEIGHT: 170.3 LBS

## 2022-01-20 DIAGNOSIS — K21.9 GASTROESOPHAGEAL REFLUX DISEASE WITHOUT ESOPHAGITIS: ICD-10-CM

## 2022-01-20 DIAGNOSIS — N52.9 ERECTILE DYSFUNCTION, UNSPECIFIED ERECTILE DYSFUNCTION TYPE: ICD-10-CM

## 2022-01-20 DIAGNOSIS — E78.5 HYPERLIPIDEMIA LDL GOAL <100: ICD-10-CM

## 2022-01-20 DIAGNOSIS — E10.21 TYPE 1 DIABETES MELLITUS WITH NEPHROPATHY (H): ICD-10-CM

## 2022-01-20 DIAGNOSIS — Z00.00 ENCOUNTER FOR MEDICARE ANNUAL WELLNESS EXAM: Primary | ICD-10-CM

## 2022-01-20 DIAGNOSIS — I10 ESSENTIAL HYPERTENSION: ICD-10-CM

## 2022-01-20 DIAGNOSIS — T63.461D ANAPHYLACTIC REACTION TO WASP STING, ACCIDENTAL OR UNINTENTIONAL, SUBSEQUENT ENCOUNTER: ICD-10-CM

## 2022-01-20 DIAGNOSIS — T78.2XXD ANAPHYLACTIC REACTION TO WASP STING, ACCIDENTAL OR UNINTENTIONAL, SUBSEQUENT ENCOUNTER: ICD-10-CM

## 2022-01-20 DIAGNOSIS — Z12.11 COLON CANCER SCREENING: ICD-10-CM

## 2022-01-20 PROCEDURE — G0439 PPPS, SUBSEQ VISIT: HCPCS | Performed by: INTERNAL MEDICINE

## 2022-01-20 RX ORDER — SILDENAFIL 50 MG/1
TABLET, FILM COATED ORAL
Qty: 30 TABLET | Refills: 11 | Status: SHIPPED | OUTPATIENT
Start: 2022-01-20 | End: 2022-12-06

## 2022-01-20 RX ORDER — EPINEPHRINE 0.3 MG/.3ML
0.3 INJECTION SUBCUTANEOUS ONCE
Qty: 0.6 ML | Refills: 5 | Status: SHIPPED | OUTPATIENT
Start: 2022-01-20 | End: 2022-01-20

## 2022-01-20 RX ORDER — CARVEDILOL 6.25 MG/1
6.25 TABLET ORAL 2 TIMES DAILY WITH MEALS
Qty: 180 TABLET | Refills: 3 | Status: SHIPPED | OUTPATIENT
Start: 2022-01-20 | End: 2022-12-06

## 2022-01-20 RX ORDER — SIMVASTATIN 10 MG
TABLET ORAL
Qty: 45 TABLET | Refills: 3 | Status: SHIPPED | OUTPATIENT
Start: 2022-01-20 | End: 2022-12-06

## 2022-01-20 RX ORDER — INSULIN GLARGINE 100 [IU]/ML
INJECTION, SOLUTION SUBCUTANEOUS
Qty: 15 ML | Refills: 11 | Status: SHIPPED | OUTPATIENT
Start: 2022-01-20 | End: 2022-12-06

## 2022-01-20 RX ORDER — LISINOPRIL 40 MG/1
40 TABLET ORAL DAILY
Qty: 90 TABLET | Refills: 3 | Status: SHIPPED | OUTPATIENT
Start: 2022-01-20 | End: 2022-12-06

## 2022-01-20 RX ORDER — INSULIN LISPRO 100 [IU]/ML
INJECTION, SOLUTION INTRAVENOUS; SUBCUTANEOUS
Qty: 15 ML | Refills: 11 | Status: SHIPPED | OUTPATIENT
Start: 2022-01-20 | End: 2022-12-06

## 2022-01-20 RX ORDER — PEN NEEDLE, DIABETIC 31 GX5/16"
NEEDLE, DISPOSABLE MISCELLANEOUS
Qty: 400 EACH | Refills: 11 | Status: SHIPPED | OUTPATIENT
Start: 2022-01-20 | End: 2022-12-06

## 2022-01-20 RX ORDER — AMLODIPINE BESYLATE 2.5 MG/1
2.5 TABLET ORAL DAILY
Qty: 90 TABLET | Refills: 3 | Status: SHIPPED | OUTPATIENT
Start: 2022-01-20 | End: 2022-12-06

## 2022-01-20 ASSESSMENT — PAIN SCALES - GENERAL: PAINLEVEL: NO PAIN (0)

## 2022-01-20 ASSESSMENT — ACTIVITIES OF DAILY LIVING (ADL): CURRENT_FUNCTION: NO ASSISTANCE NEEDED

## 2022-01-20 ASSESSMENT — MIFFLIN-ST. JEOR: SCORE: 1538.73

## 2022-01-20 NOTE — NURSING NOTE
"Chief Complaint   Patient presents with     Medicare Visit     Patient is here for Medicare exam     Initial /76 (BP Location: Right arm, Patient Position: Sitting, Cuff Size: Adult Regular)   Pulse 79   Temp 97.6  F (36.4  C) (Tympanic)   Resp 16   Ht 1.778 m (5' 10\")   Wt 77.2 kg (170 lb 4.8 oz)   SpO2 99%   BMI 24.44 kg/m   Estimated body mass index is 24.44 kg/m  as calculated from the following:    Height as of this encounter: 1.778 m (5' 10\").    Weight as of this encounter: 77.2 kg (170 lb 4.8 oz).  Medication Reconciliation: complete    Isela Morejon MA       FOOD SECURITY SCREENING QUESTIONS:    The next two questions are to help us understand your food security.  If you are feeling you need any assistance in this area, we have resources available to support you today.    Hunger Vital Signs:  Within the past 12 months we worried whether our food would run out before we got money to buy more. Never  Within the past 12 months the food we bought just didn't last and we didn't have money to get more. Never  Isela Morejon MA,LPN on 1/20/2022 at 8:55 AM      "

## 2022-01-20 NOTE — PATIENT INSTRUCTIONS
Patient Education   Personalized Prevention Plan  You are due for the preventive services outlined below.  Your care team is available to assist you in scheduling these services.  If you have already completed any of these items, please share that information with your care team to update in your medical record.  Health Maintenance Due   Topic Date Due     Diabetic Foot Exam  01/29/2021       Signs of Hearing Loss      Hearing much better with one ear can be a sign of hearing loss.   Hearing loss is a problem shared by many people. In fact, it is one of the most common health problems, particularly as people age. Most people age 65 and older have some hearing loss. By age 80, almost everyone does. Hearing loss often occurs slowly over the years. So you may not realize your hearing has gotten worse.  Have your hearing checked  Call your healthcare provider if you:    Have to strain to hear normal conversation    Have to watch other people s faces very carefully to follow what they re saying    Need to ask people to repeat what they ve said    Often misunderstand what people are saying    Turn the volume of the television or radio up so high that others complain    Feel that people are mumbling when they re talking to you    Find that the effort to hear leaves you feeling tired and irritated    Notice, when using the phone, that you hear better with one ear than the other  [employer] last reviewed this educational content on 1/1/2020 2000-2021 [employer]. All rights reserved. This information is not intended as a substitute for professional medical care. Always follow your healthcare professional's instructions.

## 2022-01-20 NOTE — PROGRESS NOTES
"SUBJECTIVE:   Eh Perry is a 71 year old male who presents for Preventive Visit.        Are you in the first 12 months of your Medicare coverage?      Healthy Habits:     In general, how would you rate your overall health?  Good    Frequency of exercise:  6-7 days/week    Duration of exercise:  45-60 minutes    Do you usually eat at least 4 servings of fruit and vegetables a day, include whole grains    & fiber and avoid regularly eating high fat or \"junk\" foods?  Yes    Taking medications regularly:  Yes    Medication side effects:  None    Ability to successfully perform activities of daily living:  No assistance needed    Home Safety:  Lack of grab bars in the bathroom    Hearing Impairment:  Difficulty following a conversation in a noisy restaurant or crowded room    In the past 6 months, have you been bothered by leaking of urine?  No    In general, how would you rate your overall mental or emotional health?  Good      PHQ-2 Total Score: 0    Additional concerns today:  No    Do you feel safe in your environment? Yes    Have you ever done Advance Care Planning? (For example, a Health Directive, POLST, or a discussion with a medical provider or your loved ones about your wishes): Yes, patient states has an Advance Care Planning document and will bring a copy to the clinic.       Fall risk  Fallen 2 or more times in the past year?: No  Any fall with injury in the past year?: No    Cognitive Screening   1) Repeat 3 items (Leader, Season, Table)    2) Clock draw: NORMAL  3) 3 item recall: Recalls 3 objects  Results: 3 items recalled: COGNITIVE IMPAIRMENT LESS LIKELY    Mini-CogTM Copyright JOHN Fried. Licensed by the author for use in [facility]; reprinted with permission ([email or web address]@.[email or web address]). All rights reserved.          Reviewed and updated as needed this visit by clinical staff  Tobacco  Allergies  Meds      Soc Hx       Reviewed and updated as needed this visit by Provider               Social " "History     Tobacco Use     Smoking status: Former Smoker     Packs/day: 1.00     Years: 15.00     Pack years: 15.00     Quit date: 1980     Years since quittin.0     Smokeless tobacco: Never Used   Substance Use Topics     Alcohol use: Yes     Comment: occ         Alcohol Use 2022   Prescreen: >3 drinks/day or >7 drinks/week? No   Prescreen: >3 drinks/day or >7 drinks/week? -   AUDIT SCORE  -               Current providers sharing in care for this patient include:   Patient Care Team:  Hemanth Cunningham MD as PCP - General (Internal Medicine)  Kierra Sexton MD as Assigned Endocrinology Provider  Sebastien Rowe MD as Assigned Musculoskeletal Provider  Hemanth Cunningham MD as Assigned PCP    The following health maintenance items are reviewed in Epic and correct as of today:  Health Maintenance Due   Topic Date Due     DIABETIC FOOT EXAM  2021     MEDICARE ANNUAL WELLNESS VISIT  2022     Labs reviewed in [facility]  Immunizations are reviewed and up-to-date        Review of Systems  Constitutional, HEENT, cardiovascular, pulmonary, gi and gu systems are negative, except as otherwise noted.    OBJECTIVE:   /76 (BP Location: Right arm, Patient Position: Sitting, Cuff Size: Adult Regular)   Pulse 79   Temp 97.6  F (36.4  C) (Tympanic)   Resp 16   Ht 1.778 m (5' 10\")   Wt 77.2 kg (170 lb 4.8 oz)   SpO2 99%   BMI 24.44 kg/m   Estimated body mass index is 24.44 kg/m  as calculated from the following:    Height as of this encounter: 1.778 m (5' 10\").    Weight as of this encounter: 77.2 kg (170 lb 4.8 oz).  Physical Exam  HEENT: No carotid bruits  Cardiovascular: Regular rate and rhythm, no murmur  Pulmonary: Clear  MSK no edema    Diagnostic Test Results:  Labs reviewed in Epic    ASSESSMENT / PLAN:       ICD-10-CM    1. Encounter for Medicare annual wellness exam  Z00.00    2. Essential hypertension  I10 amLODIPine (NORVASC) 2.5 MG tablet     carvedilol (COREG) 6.25 " "MG tablet     lisinopril (ZESTRIL) 40 MG tablet   3. Type 1 diabetes mellitus with nephropathy (goal A1C<7)  E10.21 insulin glargine (LANTUS SOLOSTAR) 100 UNIT/ML pen     insulin lispro (HUMALOG KWIKPEN) 100 UNIT/ML (1 unit dial) KWIKPEN     insulin pen needle (B-D U/F) 31G X 8 MM miscellaneous   4. Erectile dysfunction, unspecified erectile dysfunction type  N52.9 sildenafil (VIAGRA) 50 MG tablet   5. Anaphylactic reaction to wasp sting, accidental or unintentional, subsequent encounter  T63.461D EPINEPHrine (ANY BX GENERIC EQUIV) 0.3 MG/0.3ML injection 2-pack   6. Gastroesophageal reflux disease without esophagitis  K21.9 omeprazole (PRILOSEC) 20 MG DR capsule   7. Hyperlipidemia LDL goal <100  E78.5 simvastatin (ZOCOR) 10 MG tablet   8. Colon cancer screening  Z12.11 LEONARD([employer])       Patient currently uses a Dexcom G6 CGM for continuous monitoring of glucose.   Patient injects or boluses insulin 3 or more times daily before breakfast, before lunch, before dinner, and bedtime.  Patient requires frequent adjustments to their insulin treatment regimen on the basis of therapeutic CGM testing results.        COUNSELING:  Reviewed preventive health counseling, as reflected in patient instructions    Estimated body mass index is 24.44 kg/m  as calculated from the following:    Height as of this encounter: 1.778 m (5' 10\").    Weight as of this encounter: 77.2 kg (170 lb 4.8 oz).        He reports that he quit smoking about 42 years ago. He has a 15.00 pack-year smoking history. He has never used smokeless tobacco.      Appropriate preventive services were discussed with this patient, including applicable screening as appropriate for cardiovascular disease, diabetes, osteopenia/osteoporosis, and glaucoma.  As appropriate for age/gender, discussed screening for colorectal cancer, prostate cancer, breast cancer, and cervical cancer. Checklist reviewing preventive services available has been given to the " patient.    Reviewed patients plan of care and provided an AVS. The Basic Care Plan (routine screening as documented in Health Maintenance) for Eh meets the Care Plan requirement. This Care Plan has been established and reviewed with the Patient.    Counseling Resources:  ATP IV Guidelines  Pooled Cohorts Equation Calculator  Breast Cancer Risk Calculator  Breast Cancer: Medication to Reduce Risk  FRAX Risk Assessment  ICSI Preventive Guidelines  Dietary Guidelines for Americans, 2010  USDA's MyPlate  ASA Prophylaxis  Lung CA Screening    Hemanth Cunningham MD  [facility]    Identified Health Risks:    The patient was provided with written information regarding signs of hearing loss.

## 2022-01-31 LAB — COLOGUARD-ABSTRACT: NEGATIVE

## 2022-03-01 NOTE — TELEPHONE ENCOUNTER
Forms/paperwork reviewed, completed and signed.  Please fax or send the papers as requested, document in chart and close the encounter.    Thank you.    Kierra Sexton MD       no

## 2022-04-14 ENCOUNTER — TELEPHONE (OUTPATIENT)
Dept: PEDIATRICS | Facility: OTHER | Age: 72
End: 2022-04-14
Payer: MEDICARE

## 2022-04-14 NOTE — TELEPHONE ENCOUNTER
Patient needs clinical notes from his visit in January regarding his continuous glucose monitor.  [facility] needs them for medicare.Please call patient for more information.     Leigha Greer on 4/14/2022 at 9:01 AM

## 2022-04-14 NOTE — TELEPHONE ENCOUNTER
Patient notified to call [facility] at [phone], in order to fax Medicare-[facility] [phone].      Marisol Pedro LPN 4/14/2022 11:01 AM

## 2022-05-19 DIAGNOSIS — G56.01 CARPAL TUNNEL SYNDROME OF RIGHT WRIST: Primary | ICD-10-CM

## 2022-05-20 ENCOUNTER — OFFICE VISIT (OUTPATIENT)
Dept: ORTHOPEDICS | Facility: OTHER | Age: 72
End: 2022-05-20
Attending: SPECIALIST
Payer: MEDICARE

## 2022-05-20 ENCOUNTER — HOSPITAL ENCOUNTER (OUTPATIENT)
Dept: GENERAL RADIOLOGY | Facility: OTHER | Age: 72
Discharge: HOME | End: 2022-05-20
Attending: SPECIALIST
Payer: MEDICARE

## 2022-05-20 VITALS
DIASTOLIC BLOOD PRESSURE: 70 MMHG | HEIGHT: 70 IN | SYSTOLIC BLOOD PRESSURE: 134 MMHG | WEIGHT: 170 LBS | BODY MASS INDEX: 24.34 KG/M2

## 2022-05-20 DIAGNOSIS — G56.01 CARPAL TUNNEL SYNDROME OF RIGHT WRIST: ICD-10-CM

## 2022-05-20 DIAGNOSIS — G56.01 CARPAL TUNNEL SYNDROME OF RIGHT WRIST: Primary | ICD-10-CM

## 2022-05-20 PROCEDURE — 99214 OFFICE O/P EST MOD 30 MIN: CPT | Performed by: SPECIALIST

## 2022-05-20 PROCEDURE — G0463 HOSPITAL OUTPT CLINIC VISIT: HCPCS

## 2022-05-20 PROCEDURE — 73110 X-RAY EXAM OF WRIST: CPT | Mod: RT

## 2022-05-20 ASSESSMENT — PAIN SCALES - GENERAL: PAINLEVEL: NO PAIN (0)

## 2022-05-20 NOTE — PROGRESS NOTES
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  The patient has classic carpal tunnel syndrome.  He has nocturnal symptoms.  He has difficulty on the computer as well as riding his bike.  He describes numbness in the thumb, index, long and radial side of the ring finger.  He is not a diabetic.  He does have a history of Dupuytren's.  The patient has mild symptoms on the left.    PAST MEDICAL HISTORY, PAST SURGICAL HISTORY, MEDICATIONS AND ALLERGIES:  Reviewed.     PHYSICAL EXAMINATION:  A 72-year-old male.  He is alert and oriented x3 and appropriate.  Gait and station are appropriate.  He is well groomed and well kempt.  Examination of both upper extremities reveals full and symmetric range of motion of elbows, wrists.  Examination of both hands shows no evidence of trophic skin change, adenopathy, or focal weakness.  No x-rays are needed.  He does have numbness with provocative maneuvers, which compressed the median nerve.  He does have Dupuytren's of the long and ring finger on the right.  This does not quite meet surgical criteria.    IMAGING:  X-rays, AP, lateral and oblique and carpal tunnel views obtained in the office today show uniform mineralization, well maintained joint spaces, no evidence of fracture and/or dislocation given.      ASSESSMENT AND PLAN:      1.  Right carpal tunnel syndrome.  We discussed options at length.  His symptoms are classic and I do not think an EMG is necessary for diagnosis.  We will proceed with right endoscopic carpal tunnel release.  Risks, complications, and benefits were reviewed.  2.  Flexion contracture, which does not meet surgical criteria.  We discussed observation of this.  He is in agreement.        Sebastien Rowe MD        D: 05/20/2022   T: 05/20/2022   MT: [id number]    Name:     EH GU  KELLIE  MRN:      [id number]        Account:    [id number]   :      1950           Visit Date: 2022     Document: [id number]

## 2022-05-20 NOTE — NURSING NOTE
"Chief Complaint   Patient presents with     Consult     Right Carpal Tunnel      Patient is here today for a consult for right ctr.     Lucrecia Yu LPN on 5/20/2022 at 10:08 AM       Initial /70 (BP Location: Right arm, Patient Position: Sitting, Cuff Size: Adult Regular)   Ht 1.778 m (5' 10\")   Wt 77.1 kg (170 lb)   BMI 24.39 kg/m   Estimated body mass index is 24.39 kg/m  as calculated from the following:    Height as of this encounter: 1.778 m (5' 10\").    Weight as of this encounter: 77.1 kg (170 lb).  Medication Reconciliation: complete    Lucrecia Yu LPN  "

## 2022-05-20 NOTE — PROGRESS NOTES
Patient is here today for evaluation of right carpal tunnel.     Lucrecia Yu LPN on 5/20/2022 at 10:10 AM

## 2022-06-09 ENCOUNTER — OFFICE VISIT (OUTPATIENT)
Dept: PEDIATRICS | Facility: OTHER | Age: 72
End: 2022-06-09
Attending: INTERNAL MEDICINE
Payer: MEDICARE

## 2022-06-09 VITALS
DIASTOLIC BLOOD PRESSURE: 80 MMHG | OXYGEN SATURATION: 98 % | SYSTOLIC BLOOD PRESSURE: 148 MMHG | RESPIRATION RATE: 18 BRPM | WEIGHT: 174 LBS | TEMPERATURE: 97.5 F | HEART RATE: 74 BPM | BODY MASS INDEX: 24.97 KG/M2

## 2022-06-09 DIAGNOSIS — I10 ESSENTIAL HYPERTENSION: ICD-10-CM

## 2022-06-09 DIAGNOSIS — E10.21 TYPE 1 DIABETES MELLITUS WITH NEPHROPATHY (H): Primary | ICD-10-CM

## 2022-06-09 DIAGNOSIS — E78.5 HYPERLIPIDEMIA LDL GOAL <100: ICD-10-CM

## 2022-06-09 LAB
ANION GAP SERPL CALCULATED.3IONS-SCNC: 6 MMOL/L (ref 3–14)
BUN SERPL-MCNC: 15 MG/DL (ref 7–25)
CALCIUM SERPL-MCNC: 9.2 MG/DL (ref 8.6–10.3)
CHLORIDE BLD-SCNC: 102 MMOL/L (ref 98–107)
CO2 SERPL-SCNC: 27 MMOL/L (ref 21–31)
CREAT SERPL-MCNC: 0.97 MG/DL (ref 0.7–1.3)
GFR SERPL CREATININE-BSD FRML MDRD: 83 ML/MIN/1.73M2
GLUCOSE BLD-MCNC: 164 MG/DL (ref 70–105)
HBA1C MFR BLD: 5.9 % (ref 4–6.2)
POTASSIUM BLD-SCNC: 4.2 MMOL/L (ref 3.5–5.1)
SODIUM SERPL-SCNC: 135 MMOL/L (ref 134–144)

## 2022-06-09 PROCEDURE — 36415 COLL VENOUS BLD VENIPUNCTURE: CPT | Mod: ZL | Performed by: INTERNAL MEDICINE

## 2022-06-09 PROCEDURE — 99214 OFFICE O/P EST MOD 30 MIN: CPT | Performed by: INTERNAL MEDICINE

## 2022-06-09 PROCEDURE — 80048 BASIC METABOLIC PNL TOTAL CA: CPT | Mod: ZL | Performed by: INTERNAL MEDICINE

## 2022-06-09 PROCEDURE — 83036 HEMOGLOBIN GLYCOSYLATED A1C: CPT | Mod: ZL | Performed by: INTERNAL MEDICINE

## 2022-06-09 PROCEDURE — G0463 HOSPITAL OUTPT CLINIC VISIT: HCPCS

## 2022-06-09 RX ORDER — PROCHLORPERAZINE 25 MG/1
1 SUPPOSITORY RECTAL DAILY
Qty: 1 EACH | Refills: 11 | Status: SHIPPED | OUTPATIENT
Start: 2022-06-09 | End: 2022-12-06

## 2022-06-09 RX ORDER — PROCHLORPERAZINE 25 MG/1
1 SUPPOSITORY RECTAL
Qty: 3 EACH | Refills: 11 | Status: SHIPPED | OUTPATIENT
Start: 2022-06-09 | End: 2022-11-28

## 2022-06-09 RX ORDER — PROCHLORPERAZINE 25 MG/1
SUPPOSITORY RECTAL
Qty: 1 EACH | Refills: 11 | Status: SHIPPED | OUTPATIENT
Start: 2022-06-09 | End: 2022-12-06

## 2022-06-09 ASSESSMENT — PAIN SCALES - GENERAL: PAINLEVEL: MILD PAIN (3)

## 2022-06-09 NOTE — PATIENT INSTRUCTIONS
Check your Blood Pressure   -- Sit in a chair, feet flat on the floor for 5 minutes   -- Avoid caffeine, exercise and smoking for 30 minutes before checking   -- Have your arm at the level of your heart   -- Make sure you have the correct size cuff   -- Write them down, bring your log book in to your appointment     -- Goal blood pressure 120/70   -- Learn about DASH Diet for dietary ways to reduce blood pressure  Google: [facility] DASH diet  NIH site: https://www.nhlbi.nih.gov/health-topics/dash-eating-plan  PDF from [facility]: https://www.nhlbi.nih.gov/files/docs/public/heart/new_dash.pdf   -- Work on 5% weight loss   -- Daily physical exercise (eg. 30 min brisk walk)

## 2022-06-09 NOTE — PROGRESS NOTES
Assessment & Plan   1. Type 1 diabetes mellitus with nephropathy (goal A1C<7)  His diabetes has been well controlled.  He does continue to have microscopic albuminuria.  He appears to have maximal risk factor modification at this time.  SGLT2 inhibitors are not currently indicated in type 1 diabetes.  - Continuous Blood Gluc  (DEXCOM G6 ) EMERSON; Use to monitor blood sugars  continuously through the day  Dispense: 1 each; Refill: 11  - Continuous Blood Gluc Sensor (DEXCOM G6 SENSOR) MISC; 1 Device every 10 days  Dispense: 3 each; Refill: 11  - Continuous Blood Gluc Transmit (DEXCOM G6 TRANSMITTER) MISC; 1 Device daily , change every 3 months  Dispense: 1 each; Refill: 11  - Basic metabolic panel; Future  - Hemoglobin A1c; Future  - Basic metabolic panel  - Hemoglobin A1c    2. Hyperlipidemia LDL goal <100  At goal, no change.    3. Essential hypertension  We did discuss management of hypertension.  I offered increasing amlodipine or carvedilol however he prefers observation at this time.  I have asked him to send me a blood pressure log from home in a couple weeks.        Signed, Hemanth Cunningham MD, FAAP, FACP  Internal Medicine & Pediatrics    Subjective   Eh Perry is a 72 year old male who presents for diabetes checkup.  His blood sugars have been in the 130s and 140s at home.  He likes using his Dexcom.  His home blood pressures have been mainly in the 130s over 60s with a few in the 140s.  He is up-to-date on eye exams.    Objective   Vitals: BP (!) 146/76   Pulse 74   Temp 97.5  F (36.4  C) (Tympanic)   Resp 18   Wt 78.9 kg (174 lb)   SpO2 98%   BMI 24.97 kg/m      Cardiovascular: Regular, no murmur  Pulmonary: Clear  MSK: Trace pitting edema lower extremities bilaterally.    Foot Exam:  6/9/2022  Intact to monofilament bilaterally.  Skin intact without erythema.      Review and Analysis of Data   I personally reviewed the following:  External notes: No  Results: Yes Diabetic labs  reviewed  Use of an independent historian: No  Independent review of a test performed by another physician: No  Discussion of management with another physician: No  Moderate risk of morbidity from additional diagnostic testing and/or treatment.    Patient currently uses a Dexcom G6 CGM for continuous monitoring of glucose.   Patient injects or boluses insulin 3 or more times daily before breakfast, before lunch, before dinner, and bedtime.  Patient requires frequent adjustments to their insulin treatment regimen on the basis of therapeutic CGM testing results.

## 2022-06-09 NOTE — NURSING NOTE
Patient is here for a diabetic check, is needing to have records sent for glucose monitoring per medicare.     Previous A1C is at goal of <8  Lab Results   Component Value Date    A1C 6.0 01/17/2022    A1C 6.1 08/25/2021    A1C 6.0 01/13/2021    A1C 6.0 08/05/2020    A1C 6.1 01/22/2020    A1C 6.1 07/17/2019    A1C 6.2 11/23/2018     Urine Microalbumin/Creat:    Albumin Urine mg/L   Date Value Ref Range Status   01/17/2022 244 mg/L Final   08/05/2020 66 mg/L Final     Albumin Urine mg/g Cr   Date Value Ref Range Status   01/17/2022 201.65 (H) 0.00 - 17.00 mg/g Cr Final   08/05/2020 52.48 (H) 0 - 17 mg/g Cr Final     Creatinine Urine   Date Value Ref Range Status   08/05/2020 125 mg/dL Final     Creatinine Urine mg/dL   Date Value Ref Range Status   01/17/2022 121 mg/dL Final     Foot exam due, 1/29/20  Eye exam 6/28/21    Tobacco User no  Patient is on a daily aspirin  Patient is on a Statin.  Blood pressure today of:     BP Readings from Last 1 Encounters:   06/09/22 (!) 146/76      is not at the goal of <139/89 for diabetics.    Zuly Marie LPN on 6/9/2022 at 9:03 AM        Medication Reconciliation: complete    FOOD SECURITY SCREENING QUESTIONS  Hunger Vital Signs:  Within the past 12 months we worried whether our food would run out before we got money to buy more. Never  Within the past 12 months the food we bought just didn't last and we didn't have money to get more. Never  Zuly Marie LPN 6/9/2022 9:03 AM       Advance care directive on file? no  Advance care directive provided to patient? Has will bring in a copy       Zuly Marie LPN

## 2022-06-13 ENCOUNTER — TRANSFERRED RECORDS (OUTPATIENT)
Dept: HEALTH INFORMATION MANAGEMENT | Facility: CLINIC | Age: 72
End: 2022-06-13

## 2022-06-13 LAB — RETINOPATHY: POSITIVE

## 2022-06-23 ENCOUNTER — MYC MEDICAL ADVICE (OUTPATIENT)
Dept: PEDIATRICS | Facility: OTHER | Age: 72
End: 2022-06-23

## 2022-07-07 ENCOUNTER — OFFICE VISIT (OUTPATIENT)
Dept: FAMILY MEDICINE | Facility: OTHER | Age: 72
End: 2022-07-07
Attending: PHYSICIAN ASSISTANT
Payer: MEDICARE

## 2022-07-07 VITALS
RESPIRATION RATE: 16 BRPM | HEIGHT: 70 IN | SYSTOLIC BLOOD PRESSURE: 126 MMHG | WEIGHT: 172.2 LBS | DIASTOLIC BLOOD PRESSURE: 80 MMHG | HEART RATE: 76 BPM | OXYGEN SATURATION: 97 % | BODY MASS INDEX: 24.65 KG/M2 | TEMPERATURE: 97 F

## 2022-07-07 DIAGNOSIS — S66.519A STRAIN OF INTRINSIC MUSCLE OF FINGER: Primary | ICD-10-CM

## 2022-07-07 PROCEDURE — G0463 HOSPITAL OUTPT CLINIC VISIT: HCPCS

## 2022-07-07 PROCEDURE — 99213 OFFICE O/P EST LOW 20 MIN: CPT | Performed by: NURSE PRACTITIONER

## 2022-07-07 ASSESSMENT — PAIN SCALES - GENERAL: PAINLEVEL: MODERATE PAIN (5)

## 2022-07-07 NOTE — PROGRESS NOTES
ASSESSMENT/PLAN:    I have reviewed the nursing notes.  I have reviewed the findings, diagnosis, plan and need for follow up with the patient.    1. Strain of intrinsic muscle of finger  - Miscellaneous Order for DME - ONLY FOR DME  Finger splint aluminum - pre made splint applied paper tape wrapped. Ice, ibuprofen/tylenol. Mild edema.     Discussed warning signs/symptoms indicative of need to f/u    Follow up if symptoms persist or worsen or concerns    I explained my diagnostic considerations and recommendations to the patient, who voiced understanding and agreement with the treatment plan. All questions were answered. We discussed potential side effects of any prescribed or recommended therapies, as well as expectations for response to treatments.    Isela Campbell NP  7/7/2022  10:30 AM    HPI:  Eh Perry is a 72 year old male who presents to [facility] today for concerns of  left hand middle finger pain and mild swelling.  Patient states it has been hurting for about a week. Hurts to bend it. Patient states no known injury, but it could have been caused by swinging his golf club. Middle phalanx point tender on medial side, slight swelling and bruising. 1 week. Notices it while golfing/swinging. No clicking/locking with flexing.     Past Medical History:   Diagnosis Date     Anemia      CKD (chronic kidney disease) stage 3, GFR 30-59 ml/min (H)      GERD (gastroesophageal reflux disease)      Hepatitis 2011    elevated AST due to excess ETOH use (5 drinks/day)     HTN (hypertension)      Hyperlipidemia LDL goal <100 3/7/2013     MGUS (monoclonal gammopathy of unknown significance)      IgG Lambda. Followed by Dr Rogers     Proteinuria 1/22/2015     PVC (premature ventricular contraction)      Retinopathy due to secondary diabetes (H)     s/p laser therapy left     Subclinical hypothyroidism 6/11/2018     Tobacco abuse     20 pack hx. Quit 1991     Type 1 diabetes mellitus with nephropathy (goal A1C<7)       Past Surgical History:   Procedure Laterality Date     HAND SURGERY  2007    contracture release     VOCAL CORD INJECTION      vocal cord nodules     Social History     Tobacco Use     Smoking status: Former Smoker     Packs/day: 1.00     Years: 15.00     Pack years: 15.00     Quit date: 1980     Years since quittin.5     Smokeless tobacco: Never Used   Substance Use Topics     Alcohol use: Yes     Comment: occ, 1-2 glass of wine     Current Outpatient Medications   Medication Sig Dispense Refill     ACCU-CHEK ERUM PLUS test strip USE TO TEST BLOOD SUGAR 1-2 TIMES DAILY OR AS DIRECTED. 200 strip 11     amLODIPine (NORVASC) 2.5 MG tablet Take 1 tablet (2.5 mg) by mouth daily 90 tablet 3     aspirin 81 MG tablet Take 1 tablet by mouth daily.       blood glucose calibration (NO BRAND SPECIFIED) solution Use to calibrate blood glucose monitor as needed as directed. 1 Bottle 1     blood glucose monitoring (ACCU-CHEK FASTCLIX) lancets USE TO TEST 1-2 TIMES DAILY 102 each 6     Carboxymethylcellulose Sodium (EYE DROPS OP) For dry eyes       carvedilol (COREG) 6.25 MG tablet Take 1 tablet (6.25 mg) by mouth 2 times daily (with meals) 180 tablet 3     Continuous Blood Gluc  (DEXCOM G6 ) EMERSON Use to monitor blood sugars  continuously through the day 1 each 11     Continuous Blood Gluc Sensor (DEXCOM G6 SENSOR) MISC 1 Device every 10 days 3 each 11     Continuous Blood Gluc Transmit (DEXCOM G6 TRANSMITTER) MISC 1 Device daily , change every 3 months 1 each 11     fish oil-omega-3 fatty acids 1000 MG capsule Take 1-2 g by mouth daily        insulin glargine (LANTUS SOLOSTAR) 100 UNIT/ML pen ADMINISTER 17 UNITS UNDER THE SKIN DAILY 15 mL 11     insulin lispro (HUMALOG KWIKPEN) 100 UNIT/ML (1 unit dial) KWIKPEN ADMINISTER 3 UNITS UNDER THE SKIN BEFORE MEALS PER SLIDING SCALE. MAX OF 20 UNITS PER DAY 15 mL 11     insulin pen needle (B-D U/F) 31G X 8 MM miscellaneous USE WITH INSULIN PEN  "FOUR TIMES DAILY AS DIRECTED 400 each 11     lisinopril (ZESTRIL) 40 MG tablet Take 1 tablet (40 mg) by mouth daily 90 tablet 3     Multiple Vitamin (MULTI VITAMIN  MENS) TABS Take  by mouth. 30 tablet      omeprazole (PRILOSEC) 20 MG DR capsule Take 1 capsule (20 mg) by mouth daily 90 capsule 3     sildenafil (VIAGRA) 50 MG tablet Take 0.5-1 tablets  by mouth  min before  sex. Max use once a day. Never use with nitroglycerin, terazosin or doxazosin. 30 tablet 11     simvastatin (ZOCOR) 10 MG tablet TAKE 1/2 TABLET(5 MG) BY MOUTH AT BEDTIME 45 tablet 3     Allergies   Allergen Reactions     Lipitor [Atorvastatin Calcium]       Elevated Ck and myalgias     Wasp Venom Protein      Wasp sting     Past medical history, past surgical history, current medications and allergies reviewed and accurate to the best of my knowledge.      ROS:  Refer to HPI    /80 (BP Location: Right arm, Patient Position: Sitting, Cuff Size: Adult Regular)   Pulse 76   Temp 97  F (36.1  C) (Tympanic)   Resp 16   Ht 1.778 m (5' 10\")   Wt 78.1 kg (172 lb 3.2 oz)   SpO2 97%   BMI 24.71 kg/m      EXAM:  General Appearance: Well appearing 72 year old male, appropriate appearance for age. No acute distress   Respiratory:  No increased work of breathing.  No cough appreciated.  Musculoskeletal:    Left hand middle finger: mild edema is observed. No locking/clicking with flexion of finger. Full range of motion but no significant erythema, ecchymosis, or swelling observed. Sensation intact. Distal pulses are intact.   Psychological: normal affect, alert, oriented, and pleasant.     "

## 2022-07-07 NOTE — PATIENT INSTRUCTIONS
Suspect tendonitis of the flexor tendon of the left middle finger, likely from overuse/inflammation.   Treating with a finger splint.   May try nsaids, ice, rest the finger. May be sore for a couple weeks but should start to improve with rest.     Does not seem to be fractured, dislocated, or trigger finger, or gout.

## 2022-07-07 NOTE — NURSING NOTE
"Patient presents to the clinic today for left hand middle finger pain.  Patient states it has been hurting for about a week.  Patient states no known injury, but it could have been caused by swinging his golf club.  Rahel Bello LPN 7/7/2022   10:17 AM    Chief Complaint   Patient presents with     Hand Pain       Initial /80 (BP Location: Right arm, Patient Position: Sitting, Cuff Size: Adult Regular)   Pulse 76   Temp 97  F (36.1  C) (Tympanic)   Resp 16   Ht 1.778 m (5' 10\")   Wt 78.1 kg (172 lb 3.2 oz)   SpO2 97%   BMI 24.71 kg/m   Estimated body mass index is 24.71 kg/m  as calculated from the following:    Height as of this encounter: 1.778 m (5' 10\").    Weight as of this encounter: 78.1 kg (172 lb 3.2 oz).  Medication Reconciliation: complete  Rahel Bello LPN    "

## 2022-08-05 ENCOUNTER — VIRTUAL VISIT (OUTPATIENT)
Dept: FAMILY MEDICINE | Facility: OTHER | Age: 72
End: 2022-08-05
Attending: INTERNAL MEDICINE
Payer: MEDICARE

## 2022-08-05 ENCOUNTER — TELEPHONE (OUTPATIENT)
Dept: PEDIATRICS | Facility: OTHER | Age: 72
End: 2022-08-05

## 2022-08-05 VITALS — HEIGHT: 70 IN | WEIGHT: 170 LBS | BODY MASS INDEX: 24.34 KG/M2

## 2022-08-05 DIAGNOSIS — U07.1 INFECTION DUE TO 2019 NOVEL CORONAVIRUS: Primary | ICD-10-CM

## 2022-08-05 PROCEDURE — 99441 PR PHYSICIAN TELEPHONE EVALUATION 5-10 MIN: CPT | Mod: 95 | Performed by: PHYSICIAN ASSISTANT

## 2022-08-05 NOTE — PROGRESS NOTES
Shay is a 72 year old who is being evaluated via a billable telephone visit.      What phone number would you like to be contacted at? [phone]  How would you like to obtain your AVS? [facility]    Assessment & Plan     1. Infection due to 2019 novel coronavirus  Recent development of COVID related symptoms on Tuesday, home positive test today. Discussed antiviral medication. Patient is feeling well at this time but would like to have medication on hand in case symptoms worsen over the weekend. Prescription as below. Hold statin, minimal dose of amlodipine so no change, no use of sildenafil while on antiviral. Resume medications as previously prescribed once antiviral course if completed. Follow up as needed.   - nirmatrelvir and ritonavir (PAXLOVID) therapy pack; Take 3 tablets by mouth 2 times daily for 5 days (Take 2 Nirmatrelvir tablets and 1 Ritonavir tablet twice daily for 5 days)  Dispense: 30 each; Refill: 0      No follow-ups on file.    Eulalia Dillon PA-C  [facility]    Subjective   Shay is a 72 year old, presenting for the following health issues:  Covid Concern      HPI     COVID-19 Symptom Review  How many days ago did these symptoms start? Tested positive this morning/ Symptoms started Tuesday    Are any of the following symptoms significant for you?    New or worsening difficulty breathing? No    Worsening cough? Yes, it's a dry cough.     Fever or chills? Yes, the highest temperature was 99.4    Headache: No    Sore throat: No    Chest pain: No    Diarrhea: YES    Body aches? YES    What treatments has patient tried? Ibuprofen   Does patient live in a nursing home, group home, or shelter? No  Does patient have a way to get food/medications during quarantined? Yes, I have a friend or family member who can help me.      Contacted via telephone to discuss COVID. Reports developed symptoms on Tuesday and tested positive at home this morning. Awoke not feeling well but since he  has been up and moving around he is feeling much improved. No difficulties breathing, oxygen sats at home 99%. Patient is fully vaccinated for COVID. Does have history of Type 1 DM, hypertension, PVCs.       PAST MEDICAL HISTORY:   Past Medical History:   Diagnosis Date     Anemia      CKD (chronic kidney disease) stage 3, GFR 30-59 ml/min (H)      GERD (gastroesophageal reflux disease)      Hepatitis     elevated AST due to excess ETOH use (5 drinks/day)     HTN (hypertension)      Hyperlipidemia LDL goal <100 3/7/2013     MGUS (monoclonal gammopathy of unknown significance)      IgG Lambda. Followed by Dr Rogers     Proteinuria 2015     PVC (premature ventricular contraction)      Retinopathy due to secondary diabetes (H)     s/p laser therapy left     Subclinical hypothyroidism 2018     Tobacco abuse     20 pack hx. Quit      Type 1 diabetes mellitus with nephropathy (goal A1C<7)        PAST SURGICAL HISTORY:   Past Surgical History:   Procedure Laterality Date     HAND SURGERY  2007    contracture release     VOCAL CORD INJECTION      vocal cord nodules       FAMILY HISTORY:   Family History   Problem Relation Age of Onset     Cancer Mother         peritoneal carcinoma     Diabetes Father      Diabetes Sister      Cerebrovascular Disease Maternal Grandmother      Diabetes Paternal Grandmother      Prostate Cancer Cousin      Anesthesia Reaction No family hx of      Bleeding Diathesis No family hx of      Colon Cancer No family hx of        SOCIAL HISTORY:   Social History     Tobacco Use     Smoking status: Former Smoker     Packs/day: 1.00     Years: 15.00     Pack years: 15.00     Quit date: 1980     Years since quittin.6     Smokeless tobacco: Never Used   Substance Use Topics     Alcohol use: Yes     Comment: occ, 1-2 glass of wine        Allergies   Allergen Reactions     Lipitor [Atorvastatin Calcium]       Elevated Ck and myalgias     Wasp Venom Protein      Wasp  sting     Current Outpatient Medications   Medication     ACCU-CHEK ERUM PLUS test strip     amLODIPine (NORVASC) 2.5 MG tablet     aspirin 81 MG tablet     blood glucose calibration (NO BRAND SPECIFIED) solution     blood glucose monitoring (ACCU-CHEK FASTCLIX) lancets     Carboxymethylcellulose Sodium (EYE DROPS OP)     carvedilol (COREG) 6.25 MG tablet     Continuous Blood Gluc  (DEXCOM G6 ) EMERSON     Continuous Blood Gluc Sensor (DEXCOM G6 SENSOR) MISC     Continuous Blood Gluc Transmit (DEXCOM G6 TRANSMITTER) MISC     fish oil-omega-3 fatty acids 1000 MG capsule     insulin glargine (LANTUS SOLOSTAR) 100 UNIT/ML pen     insulin lispro (HUMALOG KWIKPEN) 100 UNIT/ML (1 unit dial) KWIKPEN     insulin pen needle (B-D U/F) 31G X 8 MM miscellaneous     lisinopril (ZESTRIL) 40 MG tablet     Multiple Vitamin (MULTI VITAMIN  MENS) TABS     omeprazole (PRILOSEC) 20 MG DR capsule     sildenafil (VIAGRA) 50 MG tablet     simvastatin (ZOCOR) 10 MG tablet     No current facility-administered medications for this visit.         Review of Systems   Per HPI        Objective           Vitals:  No vitals were obtained today due to virtual visit.    Physical Exam   healthy, alert and no distress  PSYCH: Alert and oriented times 3; coherent speech, normal   rate and volume, able to articulate logical thoughts, able   to abstract reason, no tangential thoughts, no hallucinations   or delusions  His affect is normal  RESP: No cough, no audible wheezing, able to talk in full sentences  Remainder of exam unable to be completed due to telephone visits            Phone call duration: 7 minutes    .  ..

## 2022-08-05 NOTE — TELEPHONE ENCOUNTER
Called and spoke to Patient after verifying last name and date of birth.    Sx: sore throat, cough  Onset: Tuesday 8/2  Tests: negative 8/2, positive this morning (8/5)  Vaccines:  COVID-19  02/25/2021  1 of 3  Comirnaty-PFR 30mcg  Full    No    COVID-19  03/25/2021  2 of 3  Comirnaty-PFR 30mcg  Full    No    COVID-19  10/12/2021  3 of 3  Comirnaty-PFR 30mcg  Full    No    COVID-19  04/28/2022  4 of 3  Comirnaty-PFR t-s 30mcg  Full    No      Age: 72  Insulin-resistant diabetic    Have you been diagnosed with COVID-19 in the last two days? Yes    Did your symptoms start in the last five days? Yes    Are you a patient at risk of being very sick from Covid-19 because you are age 65 or older? Yes    Member of the BIPOC (Black, Indigenous, and people of color) community? No    Do you have Cancer, Chronic Liver Disease, Chronic Lung Disease, Chronic Kidney Disease, Cystic Fibrosis, Dementia, or other neurological conditions; Diabetes, Disabilities, Heart conditions, Hypertension, HIV, Immunocompromised, Mental Health Conditions, Overweight ( BMI >25), physically inactivity, pregnancy, peggy cell disease, thalassemia, smoking ( current or former), Solid organ or blood cell transplant, Stroke Substance use disorders, or Tuberculosis?  Yes     Patient transferred to scheduling line, to set up virtual visit with available provider, to discuss antiviral treatment:    Next 5 appointments (look out 90 days)    Aug 05, 2022 10:00 AM  Telephone Visit with [facility] NURSE  [facility] ([facility] ) [address]   [place] [id number] [phone]     Lucrecia Lechuga RN .............. 8/5/2022  8:48 AM

## 2022-08-05 NOTE — TELEPHONE ENCOUNTER
Patient tested neg Tues for covid and took it yesterday and it was positive.  He wants to talk to a nurse about it.  I let him know about anti-viral, but does not know if he needs it.  Just wants to talk to a nurse.  Please call    Shena Mendoza on 8/5/2022 at 7:27 AM

## 2022-08-29 ENCOUNTER — OFFICE VISIT (OUTPATIENT)
Dept: UROLOGY | Facility: OTHER | Age: 72
End: 2022-08-29
Attending: UROLOGY
Payer: MEDICARE

## 2022-08-29 VITALS — HEART RATE: 71 BPM | DIASTOLIC BLOOD PRESSURE: 72 MMHG | RESPIRATION RATE: 16 BRPM | SYSTOLIC BLOOD PRESSURE: 150 MMHG

## 2022-08-29 DIAGNOSIS — N52.9 ED (ERECTILE DYSFUNCTION) OF ORGANIC ORIGIN: ICD-10-CM

## 2022-08-29 DIAGNOSIS — N40.1 BENIGN PROSTATIC HYPERPLASIA WITH URINARY FREQUENCY: Primary | ICD-10-CM

## 2022-08-29 DIAGNOSIS — N43.3 LEFT HYDROCELE: ICD-10-CM

## 2022-08-29 DIAGNOSIS — R35.0 BENIGN PROSTATIC HYPERPLASIA WITH URINARY FREQUENCY: Primary | ICD-10-CM

## 2022-08-29 LAB
ALBUMIN UR-MCNC: NEGATIVE MG/DL
APPEARANCE UR: CLEAR
BILIRUB UR QL STRIP: NEGATIVE
COLOR UR AUTO: YELLOW
GLUCOSE UR STRIP-MCNC: NEGATIVE MG/DL
HGB UR QL STRIP: NEGATIVE
KETONES UR STRIP-MCNC: NEGATIVE MG/DL
LEUKOCYTE ESTERASE UR QL STRIP: NEGATIVE
NITRATE UR QL: NEGATIVE
PH UR STRIP: 6.5 [PH] (ref 5–9)
SP GR UR STRIP: 1.01 (ref 1–1.03)
UROBILINOGEN UR STRIP-MCNC: NORMAL MG/DL

## 2022-08-29 PROCEDURE — G0463 HOSPITAL OUTPT CLINIC VISIT: HCPCS | Performed by: UROLOGY

## 2022-08-29 PROCEDURE — 99204 OFFICE O/P NEW MOD 45 MIN: CPT | Performed by: UROLOGY

## 2022-08-29 PROCEDURE — 81003 URINALYSIS AUTO W/O SCOPE: CPT | Mod: ZL | Performed by: UROLOGY

## 2022-08-29 RX ORDER — SILDENAFIL 100 MG/1
TABLET, FILM COATED ORAL
Qty: 30 TABLET | Refills: 11 | Status: SHIPPED | OUTPATIENT
Start: 2022-08-29 | End: 2022-12-06

## 2022-08-29 ASSESSMENT — PAIN SCALES - GENERAL: PAINLEVEL: NO PAIN (0)

## 2022-08-29 NOTE — PROGRESS NOTES
Type of Visit  NPV    Chief Complaint  BPH  ED  Left hydrocele    HPI  Mr. Perry is a 72 year old male who presents with urinary complaints, ED and a left hydrocele.  The patient has moved to the area and permanently resides in [place].  Previously he was living in the [place] and had an established urologist monitoring these issues.  The patient has no family history of prostate cancer.  He has never undergone a biopsy.  He has a valued annual PSA checks and all have been quite low.    The patient also has ED.  He successfully treats this with sildenafil 50 mg.  He denies side effects from the medication.  He does not have a current prescription.  He would like to continue this course of management.    In addition the patient has a left hydrocele.  This has been managed nonsurgically.  He reports minimal symptoms from the hydrocele.      Past Medical History  He  has a past medical history of Anemia, CKD (chronic kidney disease) stage 3, GFR 30-59 ml/min (H), GERD (gastroesophageal reflux disease), Hepatitis (2011), HTN (hypertension), Hyperlipidemia LDL goal <100 (3/7/2013), MGUS (monoclonal gammopathy of unknown significance), Proteinuria (1/22/2015), PVC (premature ventricular contraction), Retinopathy due to secondary diabetes (H), Subclinical hypothyroidism (6/11/2018), Tobacco abuse, and Type 1 diabetes mellitus with nephropathy (goal A1C<7) (1978).  Patient Active Problem List   Diagnosis     ACP (advance care planning)     Hyperlipidemia LDL goal <100     Type 1 diabetes mellitus with nephropathy (goal A1C<7)     Essential hypertension     Gastroesophageal reflux disease, esophagitis presence not specified     Erectile dysfunction, unspecified erectile dysfunction type     Subclinical hypothyroidism     Dupuytren's contracture       Past Surgical History  He  has a past surgical history that includes Hand surgery (01/01/2007) and Vocal Cord Injection.    Medications  He has a current medication  list which includes the following prescription(s): accu-chek ulysses plus, amlodipine, aspirin, blood glucose calibration, blood glucose monitoring, carboxymethylcellulose sodium, carvedilol, dexcom g6 , dexcom g6 sensor, dexcom g6 transmitter, fish oil-omega-3 fatty acids, lantus solostar, insulin lispro, b-d u/f, lisinopril, multi vitamin  mens, omeprazole, sildenafil, and simvastatin.    Allergies  Allergies   Allergen Reactions     Lipitor [Atorvastatin Calcium]       Elevated Ck and myalgias     Wasp Venom Protein      Wasp sting       Social History  He  reports that he quit smoking about 42 years ago. He has a 15.00 pack-year smoking history. He has never used smokeless tobacco. He reports current alcohol use. He reports that he does not use drugs.  No drug abuse.    Family History  Family History   Problem Relation Age of Onset     Cancer Mother         peritoneal carcinoma     Diabetes Father      Diabetes Sister      Cerebrovascular Disease Maternal Grandmother      Diabetes Paternal Grandmother      Prostate Cancer Cousin      Anesthesia Reaction No family hx of      Bleeding Diathesis No family hx of      Colon Cancer No family hx of      Review of Systems  I personally reviewed the ROS with the patient.    Nursing Notes:   Arabella Garcia LPN  8/29/2022 11:16 AM  Addendum  Pt presents for BPH, right hydrocele and ED    Review of Systems:    Weight loss:    No     Recent fever/chills:  No   Night sweats:   No  Current skin rash:  No   Recent hair loss:  No  Heat intolerance:  No   Cold intolerance:  No  Chest pain:   No   Palpitations:   No  Shortness of breath:  No   Wheezing:   No  Constipation:    No   Diarrhea:   No   Nausea:   No   Vomiting:   No   Kidney/side pain:  No   Back pain:   No  Frequent headaches:  No   Dizziness:     No  Leg swelling:   No   Calf pain:    No    Parents, brothers or sisters with history of kidney cancer:   No  Parents, brothers or sisters with history of  bladder cancer: No  Father or brother with history of prostate cancer:  No    Post-Void Residual  A post-void residual was measured by ultrasonic bladder scanner.  26 mL        Physical Exam  Vitals:    08/29/22 1116   BP: (!) 150/72   BP Location: Left arm   Patient Position: Sitting   Cuff Size: Adult Regular   Pulse: 71   Resp: 16     Constitutional: NAD, WDWN.   Head: NCAT  Eyes: Conjunctivae normal  Pulmonary/Chest: Respirations are even and non-labored bilaterally.  Abdominal: Soft. No distension. No CVA tenderness.  Extremities: WERO x 4, Warm. No clubbing.  No cyanosis.    Skin: Pink, warm and dry.  No rashes noted.  Genitourinary:  Nonpalpable bladder    Labs  Results for orders placed or performed in visit on 08/29/22   UA reflex to Microscopic     Status: Normal   Result Value Ref Range    Color Urine Yellow Colorless, Straw, Light Yellow, Yellow    Appearance Urine Clear Clear    Glucose Urine Negative Negative mg/dL    Bilirubin Urine Negative Negative    Ketones Urine Negative Negative mg/dL    Specific Gravity Urine 1.006 1.000 - 1.030    Blood Urine Negative Negative    pH Urine 6.5 5.0 - 9.0    Protein Albumin Urine Negative Negative mg/dL    Urobilinogen Urine Normal Normal, 2.0 mg/dL    Nitrite Urine Negative Negative    Leukocyte Esterase Urine Negative Negative    Narrative    Microscopic not indicated      01/17/22 08:51   PSA 0.50     Post-Void Residual  A post-void residual was measured by ultrasonic bladder scanner.  26 mL today    Assessment  Mr. Perry is a 72 year old male who presents with lower urinary tract symptoms, PSA screening, left hydrocele, ED.    We discussed all of these issues separately.    His UA is clear and PVR is low.  No acute symptoms today recommended observation.    Regarding PSA screening we discussed the fact that the primary screening population of men were aged 55-69.  We discussed the diminishing benefit of PSA screening with age.  The patient prioritizes PSA  screening.  I explained it would be reasonable to continue with once annual PSA.    The left hydrocele has not bothersome.  Recommend observation.    Regarding ED the patient would like to continue sildenafil    Plan  Observation of left hydrocele  Observation of lower urinary tract symptoms  Continue sildenafil 50-100mg 1 hour prior to activity  Annual PSA per patient preference would be reasonable  Follow-up with me as needed

## 2022-08-29 NOTE — NURSING NOTE
Pt presents for BPH, right hydrocele and ED    Review of Systems:    Weight loss:    No     Recent fever/chills:  No   Night sweats:   No  Current skin rash:  No   Recent hair loss:  No  Heat intolerance:  No   Cold intolerance:  No  Chest pain:   No   Palpitations:   No  Shortness of breath:  No   Wheezing:   No  Constipation:    No   Diarrhea:   No   Nausea:   No   Vomiting:   No   Kidney/side pain:  No   Back pain:   No  Frequent headaches:  No   Dizziness:     No  Leg swelling:   No   Calf pain:    No    Parents, brothers or sisters with history of kidney cancer:   No  Parents, brothers or sisters with history of bladder cancer: No  Father or brother with history of prostate cancer:  No    Post-Void Residual  A post-void residual was measured by ultrasonic bladder scanner.  26 mL

## 2022-08-30 NOTE — TELEPHONE ENCOUNTER
Prior Authorization Not Needed per Insurance    Medication: EPINEPHrine (ANY BX GENERIC EQUIV) 0.3 MG/0.3ML injection 2-pack  Insurance Company: [employer] - Phone [phone] Fax [phone]  Expected CoPay:      Pharmacy Filling the Rx: [id number] - [place], [place] - [address]  Pharmacy Notified: Yes  Patient Notified: Yes               No risk alerts present No risk alerts present No risk alerts present No risk alerts present

## 2022-09-16 ENCOUNTER — HOSPITAL ENCOUNTER (OUTPATIENT)
Dept: GENERAL RADIOLOGY | Facility: OTHER | Age: 72
Discharge: HOME | End: 2022-09-16
Attending: SPECIALIST
Payer: MEDICARE

## 2022-09-16 ENCOUNTER — OFFICE VISIT (OUTPATIENT)
Dept: ORTHOPEDICS | Facility: OTHER | Age: 72
End: 2022-09-16
Attending: SPECIALIST
Payer: MEDICARE

## 2022-09-16 DIAGNOSIS — M25.542 ARTHRALGIA OF BOTH HANDS: ICD-10-CM

## 2022-09-16 DIAGNOSIS — M25.541 ARTHRALGIA OF BOTH HANDS: ICD-10-CM

## 2022-09-16 DIAGNOSIS — M25.541 ARTHRALGIA OF BOTH HANDS: Primary | Chronic | ICD-10-CM

## 2022-09-16 DIAGNOSIS — M25.542 ARTHRALGIA OF BOTH HANDS: Primary | Chronic | ICD-10-CM

## 2022-09-16 PROCEDURE — 99213 OFFICE O/P EST LOW 20 MIN: CPT | Performed by: SPECIALIST

## 2022-09-16 PROCEDURE — 73130 X-RAY EXAM OF HAND: CPT | Mod: RT

## 2022-09-16 PROCEDURE — G0463 HOSPITAL OUTPT CLINIC VISIT: HCPCS

## 2022-09-16 NOTE — PROGRESS NOTES
Visit Date: 2022    Mr. Perry returns for followup.  He is scheduled for a right endoscopic carpal tunnel release.  He came in today.  His date of surgery is 10/14/2022.  He came in today because he was wondering about his Dupuytren's.  The patient has also been noticing some discomfort, left greater than right in the ring digits.    PHYSICAL EXAMINATION:  Today reveals a 72-year-old male.  He is alert and oriented x 3 and appropriate.  Gait and station are appropriate.  He is well groomed, well kempt.  Examination of both upper extremities shows full and symmetric range of motion of elbows, wrists.  He does have a Dupuytren's contracture of the right long and ring finger.  We discussed this at length.    IMAGING:  X-rays were obtained today including AP, lateral, and oblique views of the hand dated today.  The plain film radiographs reveal mild flexion contractures of the digits as noted secondary to his Dupuytren's.  The left hand shows a flexion deformity of the small finger.  The right hand shows mild degenerative changes of several IP joints as well as the basilar joint.    IMPRESSION AND PLAN:    1.  Carpal tunnel syndrome, which meets criteria.    2.  Dupuytren's contracture, not yet meeting criteria.      We discussed this at length.  I will follow him clinically.  We will proceed with endoscopic carpal tunnel release as planned.    Sebastien Rowe MD        D: 2022   T: 2022   MT: JACKELYN    Name:     REFUGIO PERRY  MRN:      [id number]        Account:    [id number]   :      1950           Visit Date: 2022     Document: [id number]

## 2022-09-16 NOTE — PROGRESS NOTES
Patient is here for consult on his fingers.   Marissa Wall LPN .....................9/16/2022 9:57 AM

## 2022-09-28 ASSESSMENT — PATIENT HEALTH QUESTIONNAIRE - PHQ9
10. IF YOU CHECKED OFF ANY PROBLEMS, HOW DIFFICULT HAVE THESE PROBLEMS MADE IT FOR YOU TO DO YOUR WORK, TAKE CARE OF THINGS AT HOME, OR GET ALONG WITH OTHER PEOPLE: NOT DIFFICULT AT ALL
SUM OF ALL RESPONSES TO PHQ QUESTIONS 1-9: 2
SUM OF ALL RESPONSES TO PHQ QUESTIONS 1-9: 2

## 2022-10-04 ENCOUNTER — OFFICE VISIT (OUTPATIENT)
Dept: INTERNAL MEDICINE | Facility: OTHER | Age: 72
End: 2022-10-04
Attending: STUDENT IN AN ORGANIZED HEALTH CARE EDUCATION/TRAINING PROGRAM
Payer: MEDICARE

## 2022-10-04 VITALS
TEMPERATURE: 98.2 F | BODY MASS INDEX: 24.92 KG/M2 | WEIGHT: 173.7 LBS | DIASTOLIC BLOOD PRESSURE: 70 MMHG | HEART RATE: 77 BPM | OXYGEN SATURATION: 97 % | SYSTOLIC BLOOD PRESSURE: 150 MMHG | RESPIRATION RATE: 16 BRPM

## 2022-10-04 DIAGNOSIS — Z01.818 PREOPERATIVE EXAMINATION: Primary | ICD-10-CM

## 2022-10-04 DIAGNOSIS — E10.21 TYPE 1 DIABETES MELLITUS WITH NEPHROPATHY (H): ICD-10-CM

## 2022-10-04 DIAGNOSIS — G56.01 CARPAL TUNNEL SYNDROME OF RIGHT WRIST: ICD-10-CM

## 2022-10-04 DIAGNOSIS — I10 ESSENTIAL HYPERTENSION: ICD-10-CM

## 2022-10-04 PROCEDURE — G0463 HOSPITAL OUTPT CLINIC VISIT: HCPCS

## 2022-10-04 PROCEDURE — 99214 OFFICE O/P EST MOD 30 MIN: CPT | Performed by: STUDENT IN AN ORGANIZED HEALTH CARE EDUCATION/TRAINING PROGRAM

## 2022-10-04 ASSESSMENT — PAIN SCALES - GENERAL: PAINLEVEL: NO PAIN (0)

## 2022-10-04 NOTE — H&P (VIEW-ONLY)
[facility]  [address]  [place] [address]  Phone: [phone]  Primary Provider: Hemanth Cunningham  Pre-op Performing Provider: CHEKO KELLER      PREOPERATIVE EVALUATION:  Today's date: 10/4/2022    Eh Perry is a 72 year old male who presents for a preoperative evaluation.    Surgical Information:  Surgery/Procedure: right CTR  Surgery Location: [facility]  Surgeon: Dr. Rowe  Surgery Date: 10-14-22  Time of Surgery: TBD  Where patient plans to recover: At home with family  Fax number for surgical facility: Note does not need to be faxed, will be available electronically in Epic.    Type of Anesthesia Anticipated:  Local with MAC    Assessment & Plan         ICD-10-CM    1. Preoperative examination  Z01.818    2. Carpal tunnel syndrome of right wrist  G56.01    3. Type 1 diabetes mellitus with nephropathy (goal A1C<7)  E10.21    4. Essential hypertension  I10      Preoperative examination: Patient is a fairly healthy 72-year-old gentleman with type 1 diabetes who presents today for preoperative evaluation prior to carpal tunnel release.  Patient is a retired anesthesiologist.  He has no active cardiopulmonary symptoms.  He reports no clotting or bleeding disorder in himself or his family.  He is currently holding his aspirin. He will otherwise continue all of his medications as scheduled other than holding his lisinopril the morning of surgery and decreasing his insulin glargine the evening prior to his procedure from 17units to 10 units.  Of note he is a type I diabetic and has very well controlled diabetes with a hemoglobin A1c of 7.  Printed out and reviewed his CGM data with him today.  Occasional mild lows but otherwise very good control.  EKG from last year normal sinus rhythm, normal electrolytes and kidney function on laboratory testing in June.    Hypertension: Blood pressure slightly elevated 150/70 today.  On recheck it is 148/70.  He will continue to  take his blood pressure at home and if persistently elevated he will contact me via [employer] and we will likely increase his carvedilol.  He also will have a follow-up with his PCP in approximately 1 to 2 months for repeat of his diabetes labs and renewal of his diabetic medications and CGM.      Follow-up with PCP in 2 months for diabetes recheck.     Chun Padilla MD  [facility]      Subjective     HPI related to upcoming procedure:     No complications of anesthesia to his knowledge.  No family history of anesthesia complications.  Retired Anesthesiologist.     No active cardiopulmonary symptoms.  No chest pain or shortness of breath.    No personal or family history of clotting or bleeding disorders.  He is on aspirin.  He is currently holding his aspirin in anticipation of surgery.      Preop Questions 9/28/2022   1. Have you ever had a heart attack or stroke? No   2. Have you ever had surgery on your heart or blood vessels, such as a stent placement, a coronary artery bypass, or surgery on an artery in your head, neck, heart, or legs? No   3. Do you have chest pain with activity? No   4. Do you have a history of  heart failure? No   5. Do you currently have a cold, bronchitis or symptoms of other infection? No   6. Do you have a cough, shortness of breath, or wheezing? No   7. Do you or anyone in your family have previous history of blood clots? No   8. Do you or does anyone in your family have a serious bleeding problem such as prolonged bleeding following surgeries or cuts? No   9. Have you ever had problems with anemia or been told to take iron pills? No   10. Have you had any abnormal blood loss such as black, tarry or bloody stools? No   11. Have you ever had a blood transfusion? No   12. Are you willing to have a blood transfusion if it is medically needed before, during, or after your surgery? Yes   13. Have you or any of your relatives ever had problems with anesthesia? No   14. Do  you have sleep apnea, excessive snoring or daytime drowsiness? No   15. Do you have any artifical heart valves or other implanted medical devices like a pacemaker, defibrillator, or continuous glucose monitor? No   15a. What type of device do you have? -   15b. Name of the clinic that manages your device:  -   16. Do you have artificial joints? No   17. Are you allergic to latex? No       Health Care Directive:  Patient does not have a Health Care Directive or Living Will:     Preoperative Review of :   reviewed - no record of controlled substances prescribed.          Review of Systems  Constitutional, neuro, ENT, endocrine, pulmonary, cardiac, gastrointestinal, genitourinary, musculoskeletal, integument and psychiatric systems are negative, except as otherwise noted.    Patient Active Problem List    Diagnosis Date Noted     Dupuytren's contracture 06/21/2021     Priority: Medium     Subclinical hypothyroidism 06/11/2018     Priority: Medium     Essential hypertension 01/14/2016     Priority: Medium     Gastroesophageal reflux disease, esophagitis presence not specified 01/14/2016     Priority: Medium     IMO Regulatory Load OCT 2020       Erectile dysfunction, unspecified erectile dysfunction type 01/14/2016     Priority: Medium     Type 1 diabetes mellitus with nephropathy (goal A1C<7) 10/17/2015     Priority: Medium     Hyperlipidemia LDL goal <100 03/07/2013     Priority: Medium     ACP (advance care planning) 10/19/2012     Priority: Medium     Discussed advance care planning with patient; information given to patient to review. 10/19/2012           Past Medical History:   Diagnosis Date     Anemia      CKD (chronic kidney disease) stage 3, GFR 30-59 ml/min (H)      GERD (gastroesophageal reflux disease)      Hepatitis 2011    elevated AST due to excess ETOH use (5 drinks/day)     HTN (hypertension)      Hyperlipidemia LDL goal <100 3/7/2013     MGUS (monoclonal gammopathy of unknown significance)       IgG Lambda. Followed by Dr Rogers     Proteinuria 1/22/2015     PVC (premature ventricular contraction)      Retinopathy due to secondary diabetes (H)     s/p laser therapy left     Subclinical hypothyroidism 6/11/2018     Tobacco abuse     20 pack hx. Quit 1991     Type 1 diabetes mellitus with nephropathy (goal A1C<7) 1978     Past Surgical History:   Procedure Laterality Date     HAND SURGERY  01/01/2007    contracture release     VOCAL CORD INJECTION      vocal cord nodules     Current Outpatient Medications   Medication Sig Dispense Refill     ACCU-CHEK ERUM PLUS test strip USE TO TEST BLOOD SUGAR 1-2 TIMES DAILY OR AS DIRECTED. 200 strip 11     amLODIPine (NORVASC) 2.5 MG tablet Take 1 tablet (2.5 mg) by mouth daily 90 tablet 3     aspirin 81 MG tablet Take 1 tablet by mouth daily.       blood glucose calibration (NO BRAND SPECIFIED) solution Use to calibrate blood glucose monitor as needed as directed. 1 Bottle 1     blood glucose monitoring (ACCU-CHEK FASTCLIX) lancets USE TO TEST 1-2 TIMES DAILY 102 each 6     Carboxymethylcellulose Sodium (EYE DROPS OP) For dry eyes       carvedilol (COREG) 6.25 MG tablet Take 1 tablet (6.25 mg) by mouth 2 times daily (with meals) 180 tablet 3     Continuous Blood Gluc  (DEXCOM G6 ) EMERSON Use to monitor blood sugars  continuously through the day 1 each 11     Continuous Blood Gluc Sensor (DEXCOM G6 SENSOR) MISC 1 Device every 10 days 3 each 11     Continuous Blood Gluc Transmit (DEXCOM G6 TRANSMITTER) MISC 1 Device daily , change every 3 months 1 each 11     fish oil-omega-3 fatty acids 1000 MG capsule Take 1-2 g by mouth daily        insulin glargine (LANTUS SOLOSTAR) 100 UNIT/ML pen ADMINISTER 17 UNITS UNDER THE SKIN DAILY 15 mL 11     insulin lispro (HUMALOG KWIKPEN) 100 UNIT/ML (1 unit dial) KWIKPEN ADMINISTER 3 UNITS UNDER THE SKIN BEFORE MEALS PER SLIDING SCALE. MAX OF 20 UNITS PER DAY 15 mL 11     insulin pen needle (B-D U/F) 31G X 8 MM  miscellaneous USE WITH INSULIN PEN FOUR TIMES DAILY AS DIRECTED 400 each 11     lisinopril (ZESTRIL) 40 MG tablet Take 1 tablet (40 mg) by mouth daily 90 tablet 3     Multiple Vitamin (MULTI VITAMIN  MENS) TABS Take  by mouth. 30 tablet      omeprazole (PRILOSEC) 20 MG DR capsule Take 1 capsule (20 mg) by mouth daily 90 capsule 3     sildenafil (VIAGRA) 100 MG tablet Take 1/2-1 tablet by mouth 1 hour prior to activity 30 tablet 11     sildenafil (VIAGRA) 50 MG tablet Take 0.5-1 tablets  by mouth  min before  sex. Max use once a day. Never use with nitroglycerin, terazosin or doxazosin. 30 tablet 11     simvastatin (ZOCOR) 10 MG tablet TAKE 1/2 TABLET(5 MG) BY MOUTH AT BEDTIME 45 tablet 3       Allergies   Allergen Reactions     Lipitor [Atorvastatin Calcium]       Elevated Ck and myalgias     Wasp Venom Protein      Wasp sting        Social History     Tobacco Use     Smoking status: Former Smoker     Packs/day: 1.00     Years: 15.00     Pack years: 15.00     Quit date: 1980     Years since quittin.7     Smokeless tobacco: Never Used   Substance Use Topics     Alcohol use: Yes     Comment: occ, 1-2 glass of wine     Family History   Problem Relation Age of Onset     Cancer Mother         peritoneal carcinoma     Diabetes Father      Diabetes Sister      Cerebrovascular Disease Maternal Grandmother      Diabetes Paternal Grandmother      Prostate Cancer Cousin      Anesthesia Reaction No family hx of      Bleeding Diathesis No family hx of      Colon Cancer No family hx of      History   Drug Use No         Objective     BP (!) 150/70 (BP Location: Right arm, Patient Position: Sitting, Cuff Size: Adult Regular)   Pulse 77   Temp 98.2  F (36.8  C) (Temporal)   Resp 16   Wt 78.8 kg (173 lb 11.2 oz)   SpO2 97%   BMI 24.92 kg/m      Physical Exam  Vitals and nursing note reviewed.   Constitutional:       General: He is not in acute distress.     Appearance: He is well-developed.   HENT:      Right  Ear: Tympanic membrane and external ear normal.      Left Ear: Tympanic membrane and external ear normal.      Nose: Nose normal.      Mouth/Throat:      Mouth: No oral lesions.      Pharynx: No oropharyngeal exudate.   Eyes:      General:         Right eye: No discharge.         Left eye: No discharge.      Conjunctiva/sclera: Conjunctivae normal.      Pupils: Pupils are equal, round, and reactive to light.   Neck:      Thyroid: No thyromegaly.      Trachea: No tracheal deviation.   Cardiovascular:      Rate and Rhythm: Normal rate and regular rhythm.      Pulses: Normal pulses.      Heart sounds: Normal heart sounds, S1 normal and S2 normal. No murmur heard.    No S3 or S4 sounds.   Pulmonary:      Effort: Pulmonary effort is normal. No respiratory distress.      Breath sounds: Normal breath sounds. No wheezing or rales.   Abdominal:      General: Bowel sounds are normal.      Palpations: Abdomen is soft. There is no mass.      Tenderness: There is no abdominal tenderness.   Musculoskeletal:         General: No deformity. Normal range of motion.      Cervical back: Neck supple.   Lymphadenopathy:      Cervical: No cervical adenopathy.   Skin:     General: Skin is warm and dry.      Findings: No lesion or rash.   Neurological:      Mental Status: He is alert and oriented to person, place, and time.      Motor: No abnormal muscle tone.      Deep Tendon Reflexes: Reflexes are normal and symmetric.   Psychiatric:         Speech: Speech normal.         Thought Content: Thought content normal.         Judgment: Judgment normal.           Recent Labs   Lab Test 06/09/22  0933 01/17/22  0852 01/17/22  0851     --  138   POTASSIUM 4.2  --  4.9   CR 0.97  --  0.99   A1C 5.9 6.0  --         Diagnostics:  No labs were ordered during this visit.   Reviewed EKG from 2021, normal sinus rhythm without evidence of ischemia    Revised Cardiac Risk Index (RCRI):  The patient has the following serious cardiovascular risks for  perioperative complications:   - Diabetes Mellitus (on Insulin) = 1 point     RCRI Interpretation: 1 point: Class II (low risk - 0.9% complication rate)           Signed Electronically by: Chun Padilla MD  Copy of this evaluation report is provided to requesting physician.      Answers for HPI/ROS submitted by the patient on 9/28/2022  If you checked off any problems, how difficult have these problems made it for you to do your work, take care of things at home, or get along with other people?: Not difficult at all  PHQ9 TOTAL SCORE: 2  Frequency of checking blood sugars:: continous glucose monitor  What time of day are you checking your blood sugars : before and after meals, at bedtime  Have you had any blood sugars above 200?: Yes  Have you had any blood sugars below 70?: Yes  Hypoglycemia symptoms:: shakiness, weakness, confusion  Diabetic concerns:: none  Paraesthesia present:: none of these symptoms

## 2022-10-04 NOTE — NURSING NOTE
"Chief Complaint   Patient presents with     Pre-Op Exam       10-14-22  GICH  right CTR       Medication reconciliation completed.    FOOD SECURITY SCREENING QUESTIONS:    The next two questions are to help us understand your food security.  If you are feeling you need any assistance in this area, we have resources available to support you today.    Hunger Vital Signs:  Within the past 12 months we worried whether our food would run out before we got money to buy more. Never  Within the past 12 months the food we bought just didn't last and we didn't have money to get more. Never    Initial BP (!) 150/70 (BP Location: Right arm, Patient Position: Sitting, Cuff Size: Adult Regular)   Pulse 77   Temp 98.2  F (36.8  C) (Temporal)   Resp 16   Wt 78.8 kg (173 lb 11.2 oz)   SpO2 97%   BMI 24.92 kg/m   Estimated body mass index is 24.92 kg/m  as calculated from the following:    Height as of 8/5/22: 1.778 m (5' 10\").    Weight as of this encounter: 78.8 kg (173 lb 11.2 oz).       Valery Lawrence LPN .......  10/4/2022  9:41 AM  "

## 2022-10-04 NOTE — PROGRESS NOTES
[facility] AND [facility]  [address]  [place] [address]  Phone: [phone]  Primary Provider: Hemanth Cunningham  Pre-op Performing Provider: CHEKO KELLER      PREOPERATIVE EVALUATION:  Today's date: 10/4/2022    Eh Perry is a 72 year old male who presents for a preoperative evaluation.    Surgical Information:  Surgery/Procedure: right CTR  Surgery Location: [facility]  Surgeon: Dr. Rowe  Surgery Date: 10-14-22  Time of Surgery: TBD  Where patient plans to recover: At home with family  Fax number for surgical facility: Note does not need to be faxed, will be available electronically in Epic.    Type of Anesthesia Anticipated:  Local with MAC    Assessment & Plan         ICD-10-CM    1. Preoperative examination  Z01.818    2. Carpal tunnel syndrome of right wrist  G56.01    3. Type 1 diabetes mellitus with nephropathy (goal A1C<7)  E10.21    4. Essential hypertension  I10      Preoperative examination: Patient is a fairly healthy 72-year-old gentleman with type 1 diabetes who presents today for preoperative evaluation prior to carpal tunnel release.  Patient is a retired anesthesiologist.  He has no active cardiopulmonary symptoms.  He reports no clotting or bleeding disorder in himself or his family.  He is currently holding his aspirin. He will otherwise continue all of his medications as scheduled other than holding his lisinopril the morning of surgery and decreasing his insulin glargine the evening prior to his procedure from 17units to 10 units.  Of note he is a type I diabetic and has very well controlled diabetes with a hemoglobin A1c of 7.  Printed out and reviewed his CGM data with him today.  Occasional mild lows but otherwise very good control.  EKG from last year normal sinus rhythm, normal electrolytes and kidney function on laboratory testing in June.    Hypertension: Blood pressure slightly elevated 150/70 today.  On recheck it is 148/70.  He will continue to  take his blood pressure at home and if persistently elevated he will contact me via [employer] and we will likely increase his carvedilol.  He also will have a follow-up with his PCP in approximately 1 to 2 months for repeat of his diabetes labs and renewal of his diabetic medications and CGM.      Follow-up with PCP in 2 months for diabetes recheck.     Chun Padilla MD  [facility]      Subjective     HPI related to upcoming procedure:     No complications of anesthesia to his knowledge.  No family history of anesthesia complications.  Retired Anesthesiologist.     No active cardiopulmonary symptoms.  No chest pain or shortness of breath.    No personal or family history of clotting or bleeding disorders.  He is on aspirin.  He is currently holding his aspirin in anticipation of surgery.      Preop Questions 9/28/2022   1. Have you ever had a heart attack or stroke? No   2. Have you ever had surgery on your heart or blood vessels, such as a stent placement, a coronary artery bypass, or surgery on an artery in your head, neck, heart, or legs? No   3. Do you have chest pain with activity? No   4. Do you have a history of  heart failure? No   5. Do you currently have a cold, bronchitis or symptoms of other infection? No   6. Do you have a cough, shortness of breath, or wheezing? No   7. Do you or anyone in your family have previous history of blood clots? No   8. Do you or does anyone in your family have a serious bleeding problem such as prolonged bleeding following surgeries or cuts? No   9. Have you ever had problems with anemia or been told to take iron pills? No   10. Have you had any abnormal blood loss such as black, tarry or bloody stools? No   11. Have you ever had a blood transfusion? No   12. Are you willing to have a blood transfusion if it is medically needed before, during, or after your surgery? Yes   13. Have you or any of your relatives ever had problems with anesthesia? No   14. Do  you have sleep apnea, excessive snoring or daytime drowsiness? No   15. Do you have any artifical heart valves or other implanted medical devices like a pacemaker, defibrillator, or continuous glucose monitor? No   15a. What type of device do you have? -   15b. Name of the clinic that manages your device:  -   16. Do you have artificial joints? No   17. Are you allergic to latex? No       Health Care Directive:  Patient does not have a Health Care Directive or Living Will:     Preoperative Review of :   reviewed - no record of controlled substances prescribed.          Review of Systems  Constitutional, neuro, ENT, endocrine, pulmonary, cardiac, gastrointestinal, genitourinary, musculoskeletal, integument and psychiatric systems are negative, except as otherwise noted.    Patient Active Problem List    Diagnosis Date Noted     Dupuytren's contracture 06/21/2021     Priority: Medium     Subclinical hypothyroidism 06/11/2018     Priority: Medium     Essential hypertension 01/14/2016     Priority: Medium     Gastroesophageal reflux disease, esophagitis presence not specified 01/14/2016     Priority: Medium     IMO Regulatory Load OCT 2020       Erectile dysfunction, unspecified erectile dysfunction type 01/14/2016     Priority: Medium     Type 1 diabetes mellitus with nephropathy (goal A1C<7) 10/17/2015     Priority: Medium     Hyperlipidemia LDL goal <100 03/07/2013     Priority: Medium     ACP (advance care planning) 10/19/2012     Priority: Medium     Discussed advance care planning with patient; information given to patient to review. 10/19/2012           Past Medical History:   Diagnosis Date     Anemia      CKD (chronic kidney disease) stage 3, GFR 30-59 ml/min (H)      GERD (gastroesophageal reflux disease)      Hepatitis 2011    elevated AST due to excess ETOH use (5 drinks/day)     HTN (hypertension)      Hyperlipidemia LDL goal <100 3/7/2013     MGUS (monoclonal gammopathy of unknown significance)       IgG Lambda. Followed by Dr Rogers     Proteinuria 1/22/2015     PVC (premature ventricular contraction)      Retinopathy due to secondary diabetes (H)     s/p laser therapy left     Subclinical hypothyroidism 6/11/2018     Tobacco abuse     20 pack hx. Quit 1991     Type 1 diabetes mellitus with nephropathy (goal A1C<7) 1978     Past Surgical History:   Procedure Laterality Date     HAND SURGERY  01/01/2007    contracture release     VOCAL CORD INJECTION      vocal cord nodules     Current Outpatient Medications   Medication Sig Dispense Refill     ACCU-CHEK ERUM PLUS test strip USE TO TEST BLOOD SUGAR 1-2 TIMES DAILY OR AS DIRECTED. 200 strip 11     amLODIPine (NORVASC) 2.5 MG tablet Take 1 tablet (2.5 mg) by mouth daily 90 tablet 3     aspirin 81 MG tablet Take 1 tablet by mouth daily.       blood glucose calibration (NO BRAND SPECIFIED) solution Use to calibrate blood glucose monitor as needed as directed. 1 Bottle 1     blood glucose monitoring (ACCU-CHEK FASTCLIX) lancets USE TO TEST 1-2 TIMES DAILY 102 each 6     Carboxymethylcellulose Sodium (EYE DROPS OP) For dry eyes       carvedilol (COREG) 6.25 MG tablet Take 1 tablet (6.25 mg) by mouth 2 times daily (with meals) 180 tablet 3     Continuous Blood Gluc  (DEXCOM G6 ) EMERSON Use to monitor blood sugars  continuously through the day 1 each 11     Continuous Blood Gluc Sensor (DEXCOM G6 SENSOR) MISC 1 Device every 10 days 3 each 11     Continuous Blood Gluc Transmit (DEXCOM G6 TRANSMITTER) MISC 1 Device daily , change every 3 months 1 each 11     fish oil-omega-3 fatty acids 1000 MG capsule Take 1-2 g by mouth daily        insulin glargine (LANTUS SOLOSTAR) 100 UNIT/ML pen ADMINISTER 17 UNITS UNDER THE SKIN DAILY 15 mL 11     insulin lispro (HUMALOG KWIKPEN) 100 UNIT/ML (1 unit dial) KWIKPEN ADMINISTER 3 UNITS UNDER THE SKIN BEFORE MEALS PER SLIDING SCALE. MAX OF 20 UNITS PER DAY 15 mL 11     insulin pen needle (B-D U/F) 31G X 8 MM  miscellaneous USE WITH INSULIN PEN FOUR TIMES DAILY AS DIRECTED 400 each 11     lisinopril (ZESTRIL) 40 MG tablet Take 1 tablet (40 mg) by mouth daily 90 tablet 3     Multiple Vitamin (MULTI VITAMIN  MENS) TABS Take  by mouth. 30 tablet      omeprazole (PRILOSEC) 20 MG DR capsule Take 1 capsule (20 mg) by mouth daily 90 capsule 3     sildenafil (VIAGRA) 100 MG tablet Take 1/2-1 tablet by mouth 1 hour prior to activity 30 tablet 11     sildenafil (VIAGRA) 50 MG tablet Take 0.5-1 tablets  by mouth  min before  sex. Max use once a day. Never use with nitroglycerin, terazosin or doxazosin. 30 tablet 11     simvastatin (ZOCOR) 10 MG tablet TAKE 1/2 TABLET(5 MG) BY MOUTH AT BEDTIME 45 tablet 3       Allergies   Allergen Reactions     Lipitor [Atorvastatin Calcium]       Elevated Ck and myalgias     Wasp Venom Protein      Wasp sting        Social History     Tobacco Use     Smoking status: Former Smoker     Packs/day: 1.00     Years: 15.00     Pack years: 15.00     Quit date: 1980     Years since quittin.7     Smokeless tobacco: Never Used   Substance Use Topics     Alcohol use: Yes     Comment: occ, 1-2 glass of wine     Family History   Problem Relation Age of Onset     Cancer Mother         peritoneal carcinoma     Diabetes Father      Diabetes Sister      Cerebrovascular Disease Maternal Grandmother      Diabetes Paternal Grandmother      Prostate Cancer Cousin      Anesthesia Reaction No family hx of      Bleeding Diathesis No family hx of      Colon Cancer No family hx of      History   Drug Use No         Objective     BP (!) 150/70 (BP Location: Right arm, Patient Position: Sitting, Cuff Size: Adult Regular)   Pulse 77   Temp 98.2  F (36.8  C) (Temporal)   Resp 16   Wt 78.8 kg (173 lb 11.2 oz)   SpO2 97%   BMI 24.92 kg/m      Physical Exam  Vitals and nursing note reviewed.   Constitutional:       General: He is not in acute distress.     Appearance: He is well-developed.   HENT:      Right  Ear: Tympanic membrane and external ear normal.      Left Ear: Tympanic membrane and external ear normal.      Nose: Nose normal.      Mouth/Throat:      Mouth: No oral lesions.      Pharynx: No oropharyngeal exudate.   Eyes:      General:         Right eye: No discharge.         Left eye: No discharge.      Conjunctiva/sclera: Conjunctivae normal.      Pupils: Pupils are equal, round, and reactive to light.   Neck:      Thyroid: No thyromegaly.      Trachea: No tracheal deviation.   Cardiovascular:      Rate and Rhythm: Normal rate and regular rhythm.      Pulses: Normal pulses.      Heart sounds: Normal heart sounds, S1 normal and S2 normal. No murmur heard.    No S3 or S4 sounds.   Pulmonary:      Effort: Pulmonary effort is normal. No respiratory distress.      Breath sounds: Normal breath sounds. No wheezing or rales.   Abdominal:      General: Bowel sounds are normal.      Palpations: Abdomen is soft. There is no mass.      Tenderness: There is no abdominal tenderness.   Musculoskeletal:         General: No deformity. Normal range of motion.      Cervical back: Neck supple.   Lymphadenopathy:      Cervical: No cervical adenopathy.   Skin:     General: Skin is warm and dry.      Findings: No lesion or rash.   Neurological:      Mental Status: He is alert and oriented to person, place, and time.      Motor: No abnormal muscle tone.      Deep Tendon Reflexes: Reflexes are normal and symmetric.   Psychiatric:         Speech: Speech normal.         Thought Content: Thought content normal.         Judgment: Judgment normal.           Recent Labs   Lab Test 06/09/22  0933 01/17/22  0852 01/17/22  0851     --  138   POTASSIUM 4.2  --  4.9   CR 0.97  --  0.99   A1C 5.9 6.0  --         Diagnostics:  No labs were ordered during this visit.   Reviewed EKG from 2021, normal sinus rhythm without evidence of ischemia    Revised Cardiac Risk Index (RCRI):  The patient has the following serious cardiovascular risks for  perioperative complications:   - Diabetes Mellitus (on Insulin) = 1 point     RCRI Interpretation: 1 point: Class II (low risk - 0.9% complication rate)           Signed Electronically by: Chun Padilla MD  Copy of this evaluation report is provided to requesting physician.      Answers for HPI/ROS submitted by the patient on 9/28/2022  If you checked off any problems, how difficult have these problems made it for you to do your work, take care of things at home, or get along with other people?: Not difficult at all  PHQ9 TOTAL SCORE: 2  Frequency of checking blood sugars:: continous glucose monitor  What time of day are you checking your blood sugars : before and after meals, at bedtime  Have you had any blood sugars above 200?: Yes  Have you had any blood sugars below 70?: Yes  Hypoglycemia symptoms:: shakiness, weakness, confusion  Diabetic concerns:: none  Paraesthesia present:: none of these symptoms

## 2022-10-10 ENCOUNTER — HEALTH MAINTENANCE LETTER (OUTPATIENT)
Age: 72
End: 2022-10-10

## 2022-10-13 ENCOUNTER — ANESTHESIA EVENT (OUTPATIENT)
Dept: SURGERY | Facility: OTHER | Age: 72
End: 2022-10-13
Payer: MEDICARE

## 2022-10-14 ENCOUNTER — ANESTHESIA (OUTPATIENT)
Dept: SURGERY | Facility: OTHER | Age: 72
End: 2022-10-14
Payer: MEDICARE

## 2022-10-14 ENCOUNTER — HOSPITAL ENCOUNTER (OUTPATIENT)
Facility: OTHER | Age: 72
Discharge: HOME | End: 2022-10-14
Attending: SPECIALIST | Admitting: SPECIALIST
Payer: MEDICARE

## 2022-10-14 VITALS
TEMPERATURE: 97.9 F | HEART RATE: 89 BPM | HEIGHT: 70 IN | OXYGEN SATURATION: 96 % | BODY MASS INDEX: 24.77 KG/M2 | RESPIRATION RATE: 16 BRPM | SYSTOLIC BLOOD PRESSURE: 149 MMHG | WEIGHT: 173 LBS | DIASTOLIC BLOOD PRESSURE: 81 MMHG

## 2022-10-14 LAB — GLUCOSE BLDC GLUCOMTR-MCNC: 148 MG/DL (ref 70–99)

## 2022-10-14 PROCEDURE — 82962 GLUCOSE BLOOD TEST: CPT

## 2022-10-14 PROCEDURE — 29848 WRIST ENDOSCOPY/SURGERY: CPT | Mod: RT | Performed by: SPECIALIST

## 2022-10-14 PROCEDURE — 250N000009 HC RX 250: Performed by: NURSE ANESTHETIST, CERTIFIED REGISTERED

## 2022-10-14 PROCEDURE — 250N000011 HC RX IP 250 OP 636: Performed by: SPECIALIST

## 2022-10-14 PROCEDURE — 999N000141 HC STATISTIC PRE-PROCEDURE NURSING ASSESSMENT: Performed by: SPECIALIST

## 2022-10-14 PROCEDURE — 258N000003 HC RX IP 258 OP 636: Performed by: NURSE ANESTHETIST, CERTIFIED REGISTERED

## 2022-10-14 PROCEDURE — 29848 WRIST ENDOSCOPY/SURGERY: CPT | Performed by: NURSE ANESTHETIST, CERTIFIED REGISTERED

## 2022-10-14 PROCEDURE — 370N000017 HC ANESTHESIA TECHNICAL FEE, PER MIN: Performed by: SPECIALIST

## 2022-10-14 PROCEDURE — 99100 ANES PT EXTEME AGE<1 YR&>70: CPT | Performed by: NURSE ANESTHETIST, CERTIFIED REGISTERED

## 2022-10-14 PROCEDURE — 272N000001 HC OR GENERAL SUPPLY STERILE: Performed by: SPECIALIST

## 2022-10-14 PROCEDURE — 710N000012 HC RECOVERY PHASE 2, PER MINUTE: Performed by: SPECIALIST

## 2022-10-14 PROCEDURE — 360N000076 HC SURGERY LEVEL 3, PER MIN: Performed by: SPECIALIST

## 2022-10-14 PROCEDURE — 250N000011 HC RX IP 250 OP 636: Performed by: NURSE ANESTHETIST, CERTIFIED REGISTERED

## 2022-10-14 PROCEDURE — 250N000009 HC RX 250: Performed by: SPECIALIST

## 2022-10-14 RX ORDER — NALOXONE HYDROCHLORIDE 0.4 MG/ML
0.2 INJECTION, SOLUTION INTRAMUSCULAR; INTRAVENOUS; SUBCUTANEOUS
Status: DISCONTINUED | OUTPATIENT
Start: 2022-10-14 | End: 2022-10-14 | Stop reason: HOSPADM

## 2022-10-14 RX ORDER — PROPOFOL 10 MG/ML
INJECTION, EMULSION INTRAVENOUS CONTINUOUS PRN
Status: DISCONTINUED | OUTPATIENT
Start: 2022-10-14 | End: 2022-10-14

## 2022-10-14 RX ORDER — FENTANYL CITRATE 50 UG/ML
50 INJECTION, SOLUTION INTRAMUSCULAR; INTRAVENOUS EVERY 5 MIN PRN
Status: DISCONTINUED | OUTPATIENT
Start: 2022-10-14 | End: 2022-10-14 | Stop reason: HOSPADM

## 2022-10-14 RX ORDER — FENTANYL CITRATE 50 UG/ML
25 INJECTION, SOLUTION INTRAMUSCULAR; INTRAVENOUS
Status: DISCONTINUED | OUTPATIENT
Start: 2022-10-14 | End: 2022-10-14 | Stop reason: HOSPADM

## 2022-10-14 RX ORDER — ONDANSETRON 4 MG/1
4 TABLET, ORALLY DISINTEGRATING ORAL EVERY 30 MIN PRN
Status: DISCONTINUED | OUTPATIENT
Start: 2022-10-14 | End: 2022-10-14 | Stop reason: HOSPADM

## 2022-10-14 RX ORDER — ONDANSETRON 2 MG/ML
INJECTION INTRAMUSCULAR; INTRAVENOUS PRN
Status: DISCONTINUED | OUTPATIENT
Start: 2022-10-14 | End: 2022-10-14

## 2022-10-14 RX ORDER — SODIUM CHLORIDE, SODIUM LACTATE, POTASSIUM CHLORIDE, CALCIUM CHLORIDE 600; 310; 30; 20 MG/100ML; MG/100ML; MG/100ML; MG/100ML
INJECTION, SOLUTION INTRAVENOUS CONTINUOUS PRN
Status: DISCONTINUED | OUTPATIENT
Start: 2022-10-14 | End: 2022-10-14

## 2022-10-14 RX ORDER — MAGNESIUM HYDROXIDE 1200 MG/15ML
LIQUID ORAL PRN
Status: DISCONTINUED | OUTPATIENT
Start: 2022-10-14 | End: 2022-10-14 | Stop reason: HOSPADM

## 2022-10-14 RX ORDER — LIDOCAINE 40 MG/G
CREAM TOPICAL
Status: DISCONTINUED | OUTPATIENT
Start: 2022-10-14 | End: 2022-10-14 | Stop reason: HOSPADM

## 2022-10-14 RX ORDER — MEPERIDINE HYDROCHLORIDE 50 MG/ML
12.5 INJECTION INTRAMUSCULAR; INTRAVENOUS; SUBCUTANEOUS
Status: DISCONTINUED | OUTPATIENT
Start: 2022-10-14 | End: 2022-10-14 | Stop reason: HOSPADM

## 2022-10-14 RX ORDER — OXYCODONE HYDROCHLORIDE 5 MG/1
5 TABLET ORAL EVERY 4 HOURS PRN
Status: DISCONTINUED | OUTPATIENT
Start: 2022-10-14 | End: 2022-10-14 | Stop reason: HOSPADM

## 2022-10-14 RX ORDER — NALOXONE HYDROCHLORIDE 0.4 MG/ML
0.4 INJECTION, SOLUTION INTRAMUSCULAR; INTRAVENOUS; SUBCUTANEOUS
Status: DISCONTINUED | OUTPATIENT
Start: 2022-10-14 | End: 2022-10-14 | Stop reason: HOSPADM

## 2022-10-14 RX ORDER — SODIUM CHLORIDE, SODIUM LACTATE, POTASSIUM CHLORIDE, CALCIUM CHLORIDE 600; 310; 30; 20 MG/100ML; MG/100ML; MG/100ML; MG/100ML
INJECTION, SOLUTION INTRAVENOUS CONTINUOUS
Status: DISCONTINUED | OUTPATIENT
Start: 2022-10-14 | End: 2022-10-14 | Stop reason: HOSPADM

## 2022-10-14 RX ORDER — DEXAMETHASONE SODIUM PHOSPHATE 4 MG/ML
INJECTION, SOLUTION INTRA-ARTICULAR; INTRALESIONAL; INTRAMUSCULAR; INTRAVENOUS; SOFT TISSUE PRN
Status: DISCONTINUED | OUTPATIENT
Start: 2022-10-14 | End: 2022-10-14

## 2022-10-14 RX ORDER — KETAMINE HYDROCHLORIDE 10 MG/ML
INJECTION INTRAMUSCULAR; INTRAVENOUS PRN
Status: DISCONTINUED | OUTPATIENT
Start: 2022-10-14 | End: 2022-10-14

## 2022-10-14 RX ORDER — ONDANSETRON 2 MG/ML
4 INJECTION INTRAMUSCULAR; INTRAVENOUS EVERY 30 MIN PRN
Status: DISCONTINUED | OUTPATIENT
Start: 2022-10-14 | End: 2022-10-14 | Stop reason: HOSPADM

## 2022-10-14 RX ORDER — HYDROMORPHONE HYDROCHLORIDE 1 MG/ML
0.4 INJECTION, SOLUTION INTRAMUSCULAR; INTRAVENOUS; SUBCUTANEOUS EVERY 5 MIN PRN
Status: DISCONTINUED | OUTPATIENT
Start: 2022-10-14 | End: 2022-10-14 | Stop reason: HOSPADM

## 2022-10-14 RX ORDER — GLYCOPYRROLATE 0.2 MG/ML
INJECTION, SOLUTION INTRAMUSCULAR; INTRAVENOUS PRN
Status: DISCONTINUED | OUTPATIENT
Start: 2022-10-14 | End: 2022-10-14

## 2022-10-14 RX ADMIN — GLYCOPYRROLATE 0.2 MG: 0.2 INJECTION, SOLUTION INTRAMUSCULAR; INTRAVENOUS at 07:29

## 2022-10-14 RX ADMIN — Medication 10 MG: at 07:51

## 2022-10-14 RX ADMIN — PROPOFOL 150 MCG/KG/MIN: 10 INJECTION, EMULSION INTRAVENOUS at 07:29

## 2022-10-14 RX ADMIN — SODIUM CHLORIDE, POTASSIUM CHLORIDE, SODIUM LACTATE AND CALCIUM CHLORIDE 100 ML/HR: 600; 310; 30; 20 INJECTION, SOLUTION INTRAVENOUS at 07:13

## 2022-10-14 RX ADMIN — LIDOCAINE HYDROCHLORIDE 0.1 ML: 10 INJECTION, SOLUTION EPIDURAL; INFILTRATION; INTRACAUDAL; PERINEURAL at 07:14

## 2022-10-14 RX ADMIN — DEXAMETHASONE SODIUM PHOSPHATE 8 MG: 4 INJECTION, SOLUTION INTRA-ARTICULAR; INTRALESIONAL; INTRAMUSCULAR; INTRAVENOUS; SOFT TISSUE at 07:31

## 2022-10-14 RX ADMIN — SODIUM CHLORIDE, SODIUM LACTATE, POTASSIUM CHLORIDE, AND CALCIUM CHLORIDE: 600; 310; 30; 20 INJECTION, SOLUTION INTRAVENOUS at 07:26

## 2022-10-14 RX ADMIN — ONDANSETRON HYDROCHLORIDE 4 MG: 2 SOLUTION INTRAMUSCULAR; INTRAVENOUS at 07:31

## 2022-10-14 RX ADMIN — Medication 10 MG: at 07:31

## 2022-10-14 RX ADMIN — Medication 10 MG: at 07:41

## 2022-10-14 ASSESSMENT — ACTIVITIES OF DAILY LIVING (ADL)
ADLS_ACUITY_SCORE: 35
ADLS_ACUITY_SCORE: 35

## 2022-10-14 NOTE — BRIEF OP NOTE
[facility]    Brief Operative Note    Pre-operative diagnosis: Carpal tunnel syndrome [G56.00]  Post-operative diagnosis Same as pre-operative diagnosis    Procedure: Procedure(s):  Right Endoscopic Carpal Tunnel Release  Surgeon: Surgeon(s) and Role:     * Sebastien Rowe MD - Primary     * Brandan Vora PA - Assisting  Anesthesia: MAC with Local   Estimated Blood Loss: None    Drains: None  Specimens: * No specimens in log *  Findings:   None.  Complications: None.  Implants: * No implants in log *

## 2022-10-14 NOTE — OP NOTE
Procedure Date: 10/14/2022    PREOPERATIVE DIAGNOSIS:  Right carpal tunnel syndrome.    POSTOPERATIVE DIAGNOSIS:  Right carpal tunnel syndrome.    PROCEDURE PERFORMED:  Right endoscopic carpal tunnel release.    SURGEON:  Sebastien Rowe MD    ASSISTANT:  Brandan Vora PA-C    ANESTHESIA:  Local with IV sedation.    INDICATIONS FOR PROCEDURE:  This is a 72-year-old male with EMG confirmed carpal tunnel syndrome.  He failed nonoperative treatment and was offered surgical treatment as outlined above.  Risks, complications, and benefits were reviewed.    DESCRIPTION OF PROCEDURE:  Following medical clearance, the patient was brought to the operating room and placed on the operating table.  Pneumatic tourniquet was placed about the right upper extremity.  The right upper extremity was prepped and draped in sterile manner.  Timeout procedure to be performed, surgical site, patient and procedure.  Right upper extremity was then elevated, exsanguinated, and tourniquet was inflated to 250 mmHg.  A transverse incision was planned at the wrist.  Distal infiltrated with local anesthetic epinephrine.  Incision was made, carried sharply down through skin and subcutaneous tissue.  Careful spreading was performed.  Antebrachial fascia was identified and a distally based U-shaped flap was elevated.  Spatula was placed beneath the transverse carpal ligament, followed by sequential dilators.  The endoscope was introduced.  The distal aspect of transverse carpal ligament was well visualized.  Palmar pressure was applied and the transverse carpal ligament released.  Following this, through endoscopic portal, both sides of the transverse carpal could be identified.  The endoscope was then withdrawn.  First, antebrachial fascia was approximated with tenotomy scissors.  Tourniquet was deflated.  Circulation returned promptly to the hand and fingers.  Hemostasis intact pressure.  Skin was closed with interrupted 4-0 nylon suture.   Sterile dressing was applied.  The patient was brought to recovery room in stable condition.    Sebastien Rowe MD        D: 10/14/2022   T: 10/14/2022   MT: KARLA    Name:     REFUGIO GUFeli  MRN:      [id number]        Account:        [id number]   :      1950           Procedure Date: 10/14/2022     Document: [id number]

## 2022-10-14 NOTE — OR NURSING
Shay has been discharged to home at 0900 via ambulatory accompanied by Madelaine, and KEL Felton    Written discharge instructions were provided to patient.  Prescriptions were N/A.  Patient states their pain is zero, and denies any nausea or dizziness upon discharge.    Patient and adult caring for them verbalize understanding of discharge instructions including no driving until tomorrow and no longer taking narcotic pain medications - no operating mechanical equipment and no making any important decisions.They understand reason for discharge, and necessary follow-up appointments.      Purnima Kitchen RN on 10/14/2022 at 9:38 AM

## 2022-10-14 NOTE — DISCHARGE INSTRUCTIONS
[facility]  Adult Discharge Orders & Instructions      For 24 hours after surgery:  Get plenty of rest.  A responsible adult must stay with you for at least 24 hours after you leave the hospital.   You may feel lightheaded.  IF so, sit for a few minutes before standing.  Have someone help you get up.   You may have a slight fever. Call the doctor if your fever is over 101 F (38.3 C) (taken under the tongue) or lasts longer than 24 hours.  You may have a dry mouth, a sore throat, muscle aches or trouble sleeping.  These should go away after 24 hours.  Do not make important or legal decisions.  6.   Do not drive or use heavy equipment.  If you have weakness or tingling, don't drive or use heavy equipment until this feeling goes away.  To contact a doctor, call    [phone]______________ Surgeon Contact Information

## 2022-10-14 NOTE — ANESTHESIA CARE TRANSFER NOTE
Patient: Eh Perry    Procedure: Procedure(s):  Right Endoscopic Carpal Tunnel Release       Diagnosis: Carpal tunnel syndrome [G56.00]  Diagnosis Additional Information: No value filed.    Anesthesia Type:   MAC     Note:      Level of Consciousness: awake  Oxygen Supplementation: room air    Independent Airway: airway patency satisfactory and stable  Dentition: dentition unchanged  Vital Signs Stable: post-procedure vital signs reviewed and stable  Report to RN Given: handoff report given  Patient transferred to: Phase II    Handoff Report: Identifed the Patient, Identified the Reponsible Provider, Reviewed the pertinent medical history, Discussed the surgical course, Reviewed Intra-OP anesthesia mangement and issues during anesthesia, Set expectations for post-procedure period and Allowed opportunity for questions and acknowledgement of understanding      Vitals:  Vitals Value Taken Time   BP     Temp     Pulse     Resp     SpO2         Electronically Signed By: David Kellerman, APRN CRNA  October 14, 2022  8:08 AM

## 2022-10-14 NOTE — ANESTHESIA POSTPROCEDURE EVALUATION
Patient: Eh Perry    Procedure: Procedure(s):  Right Endoscopic Carpal Tunnel Release       Anesthesia Type:  MAC    Note:  Disposition: Outpatient   Postop Pain Control: Uneventful            Sign Out: Well controlled pain   PONV: No   Neuro/Psych: Uneventful            Sign Out: Acceptable/Baseline neuro status   Airway/Respiratory: Uneventful            Sign Out: Acceptable/Baseline resp. status   CV/Hemodynamics: Uneventful            Sign Out: Acceptable CV status; No obvious hypovolemia; No obvious fluid overload   Other NRE: NONE   DID A NON-ROUTINE EVENT OCCUR? No           Last vitals:  Vitals Value Taken Time   /81 10/14/22 0845   Temp 97.9  F (36.6  C) 10/14/22 0809   Pulse 89 10/14/22 0845   Resp 16 10/14/22 0845   SpO2 96 % 10/14/22 0846   Vitals shown include unvalidated device data.    Electronically Signed By: DONTRELL CARDENAS CRNA  October 14, 2022  2:34 PM

## 2022-10-14 NOTE — ANESTHESIA PREPROCEDURE EVALUATION
Anesthesia Pre-Procedure Evaluation    Patient: Eh Perry   MRN: [id number] : 1950        Procedure : Procedure(s):  Right Endoscopic Carpal Tunnel Release          Past Medical History:   Diagnosis Date     Anemia      CKD (chronic kidney disease) stage 3, GFR 30-59 ml/min (H)      GERD (gastroesophageal reflux disease)      Hepatitis     elevated AST due to excess ETOH use (5 drinks/day)     HTN (hypertension)      Hyperlipidemia LDL goal <100 3/7/2013     MGUS (monoclonal gammopathy of unknown significance)      IgG Lambda. Followed by Dr Rogers     Proteinuria 2015     PVC (premature ventricular contraction)      Retinopathy due to secondary diabetes (H)     s/p laser therapy left     Subclinical hypothyroidism 2018     Tobacco abuse     20 pack hx. Quit      Type 1 diabetes mellitus with nephropathy (goal A1C<7)       Past Surgical History:   Procedure Laterality Date     HAND SURGERY  2007    contracture release     VOCAL CORD INJECTION      vocal cord nodules      Allergies   Allergen Reactions     Lipitor [Atorvastatin Calcium]       Elevated Ck and myalgias     Wasp Venom Protein      Wasp sting      Social History     Tobacco Use     Smoking status: Former     Packs/day: 1.00     Years: 15.00     Pack years: 15.00     Types: Cigarettes     Quit date: 1980     Years since quittin.8     Smokeless tobacco: Never   Substance Use Topics     Alcohol use: Yes     Comment: occ, 1-2 glass of wine      Wt Readings from Last 1 Encounters:   10/04/22 78.8 kg (173 lb 11.2 oz)        Anesthesia Evaluation   Pt has had prior anesthetic.         ROS/MED HX  ENT/Pulmonary:  - neg pulmonary ROS     Neurologic:  - neg neurologic ROS     Cardiovascular:     (+) hypertension-----    METS/Exercise Tolerance: >4 METS    Hematologic:  - neg hematologic  ROS     Musculoskeletal:  - neg musculoskeletal ROS     GI/Hepatic:     (+) GERD, hepatitis liver disease,      Renal/Genitourinary:     (+) renal disease, type: CRI,     Endo:     (+) type I DM, thyroid problem,     Psychiatric/Substance Use:  - neg psychiatric ROS     Infectious Disease:  - neg infectious disease ROS     Malignancy:  - neg malignancy ROS     Other:  - neg other ROS          Physical Exam    Airway        Mallampati: I   TM distance: > 3 FB   Neck ROM: full   Mouth opening: > 3 cm    Respiratory Devices and Support         Dental  no notable dental history     (+) caps      Cardiovascular   cardiovascular exam normal       Rhythm and rate: regular and normal     Pulmonary   pulmonary exam normal        breath sounds clear to auscultation           OUTSIDE LABS:  CBC:   Lab Results   Component Value Date    WBC 4.4 05/24/2017    WBC 4.9 06/25/2015    HGB 14.5 05/24/2017    HGB 13.0 (L) 06/25/2015    HCT 40.7 05/24/2017    HCT 38.2 (L) 06/25/2015     05/24/2017     06/25/2015     BMP:   Lab Results   Component Value Date     06/09/2022     01/17/2022    POTASSIUM 4.2 06/09/2022    POTASSIUM 4.9 01/17/2022    CHLORIDE 102 06/09/2022    CHLORIDE 104 01/17/2022    CO2 27 06/09/2022    CO2 29 01/17/2022    BUN 15 06/09/2022    BUN 19 01/17/2022    CR 0.97 06/09/2022    CR 0.99 01/17/2022     (H) 06/09/2022     (H) 01/17/2022     COAGS: No results found for: PTT, INR, FIBR  POC:   Lab Results   Component Value Date     (H) 10/29/2010     HEPATIC:   Lab Results   Component Value Date    ALBUMIN 4.8 01/17/2022    PROTTOTAL 7.8 01/17/2022    ALT 20 01/17/2022    AST 21 01/17/2022    ALKPHOS 48 01/17/2022    BILITOTAL 0.9 01/17/2022     OTHER:   Lab Results   Component Value Date    A1C 5.9 06/09/2022    MARIE 9.2 06/09/2022    PHOS 3.5 01/15/2013    TSH 2.50 08/25/2021    T4 0.78 07/31/2018       Anesthesia Plan    ASA Status:  2   NPO Status:  NPO Appropriate    Anesthesia Type: MAC.     - Reason for MAC: straight local not clinically adequate               Consents    Anesthesia Plan(s) and associated risks, benefits, and realistic alternatives discussed. Questions answered and patient/representative(s) expressed understanding.     - Discussed: Risks, Benefits and Alternatives for the PROCEDURE were discussed     - Discussed with:  Patient         Postoperative Care            Comments:                David Kellerman, APRN CRNA

## 2022-10-18 ENCOUNTER — TELEPHONE (OUTPATIENT)
Dept: PEDIATRICS | Facility: OTHER | Age: 72
End: 2022-10-18

## 2022-10-18 NOTE — TELEPHONE ENCOUNTER
Patient needs to have his sutures removed on Friday, due to [place].    If he cannot be work in can they have a suture kit and steri strips to do themselves.    Please call patient back   Thank you   Lili Linda on 10/18/2022 at 3:02 PM

## 2022-10-20 NOTE — TELEPHONE ENCOUNTER
Use 1240 slot tomorrow    Signed, Hemanth Cunningham MD, FAAP, FACP  Internal Medicine & Pediatrics

## 2022-10-20 NOTE — TELEPHONE ENCOUNTER
Injection Nurse is unable to double book anymore patients into the schedule tomorrow.      Ok for [facility] or for this writer to work patient in tomorrow?      Marisol Pedro LPN 10/20/2022 10:22 AM

## 2022-10-20 NOTE — TELEPHONE ENCOUNTER
Patient reports that he has his own suture removal kit and will take care of this at home.      Marisol Pdero LPN 10/20/2022 4:16 PM

## 2022-11-28 DIAGNOSIS — E10.21 TYPE 1 DIABETES MELLITUS WITH NEPHROPATHY (H): ICD-10-CM

## 2022-11-28 RX ORDER — PROCHLORPERAZINE 25 MG/1
SUPPOSITORY RECTAL
Qty: 3 EACH | Refills: 0 | Status: SHIPPED | OUTPATIENT
Start: 2022-11-28 | End: 2022-12-06

## 2022-12-06 ENCOUNTER — OFFICE VISIT (OUTPATIENT)
Dept: PEDIATRICS | Facility: OTHER | Age: 72
End: 2022-12-06
Attending: INTERNAL MEDICINE
Payer: MEDICARE

## 2022-12-06 VITALS
SYSTOLIC BLOOD PRESSURE: 130 MMHG | OXYGEN SATURATION: 99 % | BODY MASS INDEX: 25.03 KG/M2 | TEMPERATURE: 97 F | HEART RATE: 79 BPM | RESPIRATION RATE: 20 BRPM | HEIGHT: 70 IN | WEIGHT: 174.8 LBS | DIASTOLIC BLOOD PRESSURE: 76 MMHG

## 2022-12-06 DIAGNOSIS — E10.21 TYPE 1 DIABETES MELLITUS WITH NEPHROPATHY (H): Primary | ICD-10-CM

## 2022-12-06 DIAGNOSIS — R35.0 URINARY FREQUENCY: ICD-10-CM

## 2022-12-06 DIAGNOSIS — N52.9 ED (ERECTILE DYSFUNCTION) OF ORGANIC ORIGIN: ICD-10-CM

## 2022-12-06 DIAGNOSIS — Z71.84 TRAVEL ADVICE ENCOUNTER: ICD-10-CM

## 2022-12-06 DIAGNOSIS — E78.5 HYPERLIPIDEMIA LDL GOAL <100: ICD-10-CM

## 2022-12-06 DIAGNOSIS — N52.9 ERECTILE DYSFUNCTION, UNSPECIFIED ERECTILE DYSFUNCTION TYPE: ICD-10-CM

## 2022-12-06 DIAGNOSIS — T63.461A ALLERGIC REACTION TO WASP STING: ICD-10-CM

## 2022-12-06 DIAGNOSIS — Z98.890 H/O CARPAL TUNNEL REPAIR: ICD-10-CM

## 2022-12-06 DIAGNOSIS — I10 ESSENTIAL HYPERTENSION: ICD-10-CM

## 2022-12-06 DIAGNOSIS — G56.02 CARPAL TUNNEL SYNDROME OF LEFT WRIST: ICD-10-CM

## 2022-12-06 DIAGNOSIS — K21.9 GASTROESOPHAGEAL REFLUX DISEASE WITHOUT ESOPHAGITIS: ICD-10-CM

## 2022-12-06 LAB
ANION GAP SERPL CALCULATED.3IONS-SCNC: 8 MMOL/L (ref 7–15)
BUN SERPL-MCNC: 13.2 MG/DL (ref 8–23)
CALCIUM SERPL-MCNC: 9.5 MG/DL (ref 8.8–10.2)
CHLORIDE SERPL-SCNC: 101 MMOL/L (ref 98–107)
CHOLEST SERPL-MCNC: 160 MG/DL
CREAT SERPL-MCNC: 0.81 MG/DL (ref 0.67–1.17)
DEPRECATED HCO3 PLAS-SCNC: 28 MMOL/L (ref 22–29)
GFR SERPL CREATININE-BSD FRML MDRD: >90 ML/MIN/1.73M2
GLUCOSE SERPL-MCNC: 95 MG/DL (ref 70–99)
HBA1C MFR BLD: 6.1 % (ref 4–6.2)
HDLC SERPL-MCNC: 90 MG/DL
LDLC SERPL CALC-MCNC: 56 MG/DL
NONHDLC SERPL-MCNC: 70 MG/DL
POTASSIUM SERPL-SCNC: 4.7 MMOL/L (ref 3.4–5.3)
PSA SERPL-MCNC: 1.13 NG/ML (ref 0–6.5)
SODIUM SERPL-SCNC: 137 MMOL/L (ref 136–145)
TRIGL SERPL-MCNC: 72 MG/DL

## 2022-12-06 PROCEDURE — 99214 OFFICE O/P EST MOD 30 MIN: CPT | Performed by: INTERNAL MEDICINE

## 2022-12-06 PROCEDURE — 36415 COLL VENOUS BLD VENIPUNCTURE: CPT | Mod: ZL | Performed by: INTERNAL MEDICINE

## 2022-12-06 PROCEDURE — 83036 HEMOGLOBIN GLYCOSYLATED A1C: CPT | Mod: ZL | Performed by: INTERNAL MEDICINE

## 2022-12-06 PROCEDURE — 99207 PR FOOT EXAM NO CHARGE: CPT | Performed by: INTERNAL MEDICINE

## 2022-12-06 PROCEDURE — G0463 HOSPITAL OUTPT CLINIC VISIT: HCPCS

## 2022-12-06 PROCEDURE — 80048 BASIC METABOLIC PNL TOTAL CA: CPT | Mod: ZL | Performed by: INTERNAL MEDICINE

## 2022-12-06 PROCEDURE — 80061 LIPID PANEL: CPT | Mod: ZL | Performed by: INTERNAL MEDICINE

## 2022-12-06 PROCEDURE — 84153 ASSAY OF PSA TOTAL: CPT | Mod: ZL | Performed by: INTERNAL MEDICINE

## 2022-12-06 RX ORDER — PROCHLORPERAZINE 25 MG/1
SUPPOSITORY RECTAL
Qty: 3 EACH | Refills: 0 | Status: SHIPPED | OUTPATIENT
Start: 2022-12-06 | End: 2022-12-07

## 2022-12-06 RX ORDER — SILDENAFIL 100 MG/1
TABLET, FILM COATED ORAL
Qty: 30 TABLET | Refills: 11 | Status: SHIPPED | OUTPATIENT
Start: 2022-12-06 | End: 2024-03-13

## 2022-12-06 RX ORDER — PEN NEEDLE, DIABETIC 31 GX5/16"
NEEDLE, DISPOSABLE MISCELLANEOUS
Qty: 400 EACH | Refills: 11 | Status: SHIPPED | OUTPATIENT
Start: 2022-12-06 | End: 2024-05-14

## 2022-12-06 RX ORDER — INSULIN LISPRO 100 [IU]/ML
INJECTION, SOLUTION INTRAVENOUS; SUBCUTANEOUS
Qty: 15 ML | Refills: 11 | Status: SHIPPED | OUTPATIENT
Start: 2022-12-06 | End: 2023-06-06

## 2022-12-06 RX ORDER — EPINEPHRINE 0.3 MG/.3ML
INJECTION SUBCUTANEOUS
COMMUNITY
Start: 2022-01-20 | End: 2022-12-06

## 2022-12-06 RX ORDER — EPINEPHRINE 0.3 MG/.3ML
0.3 INJECTION SUBCUTANEOUS PRN
Qty: 2 EACH | Refills: 3 | Status: SHIPPED | OUTPATIENT
Start: 2022-12-06 | End: 2024-07-19

## 2022-12-06 RX ORDER — INSULIN GLARGINE 100 [IU]/ML
INJECTION, SOLUTION SUBCUTANEOUS
Qty: 15 ML | Refills: 11 | Status: SHIPPED | OUTPATIENT
Start: 2022-12-06 | End: 2023-06-06

## 2022-12-06 RX ORDER — BLOOD-GLUCOSE CONTROL, NORMAL
EACH MISCELLANEOUS
Qty: 1 EACH | Refills: 1 | Status: SHIPPED | OUTPATIENT
Start: 2022-12-06 | End: 2022-12-07

## 2022-12-06 RX ORDER — PROCHLORPERAZINE 25 MG/1
1 SUPPOSITORY RECTAL DAILY
Qty: 1 EACH | Refills: 11 | Status: SHIPPED | OUTPATIENT
Start: 2022-12-06 | End: 2023-12-11

## 2022-12-06 RX ORDER — SIMVASTATIN 10 MG
TABLET ORAL
Qty: 45 TABLET | Refills: 3 | Status: SHIPPED | OUTPATIENT
Start: 2022-12-06 | End: 2023-06-06

## 2022-12-06 RX ORDER — LISINOPRIL 40 MG/1
40 TABLET ORAL DAILY
Qty: 90 TABLET | Refills: 3 | Status: SHIPPED | OUTPATIENT
Start: 2022-12-06 | End: 2023-06-06

## 2022-12-06 RX ORDER — SILDENAFIL 50 MG/1
TABLET, FILM COATED ORAL
Qty: 30 TABLET | Refills: 11 | Status: SHIPPED | OUTPATIENT
Start: 2022-12-06 | End: 2024-03-13

## 2022-12-06 RX ORDER — BLOOD SUGAR DIAGNOSTIC
STRIP MISCELLANEOUS
Qty: 200 STRIP | Refills: 11 | Status: SHIPPED | OUTPATIENT
Start: 2022-12-06 | End: 2022-12-07

## 2022-12-06 RX ORDER — LANCETS
EACH MISCELLANEOUS
Qty: 102 EACH | Refills: 6 | Status: SHIPPED | OUTPATIENT
Start: 2022-12-06 | End: 2022-12-07

## 2022-12-06 RX ORDER — PROCHLORPERAZINE 25 MG/1
SUPPOSITORY RECTAL
Qty: 1 EACH | Refills: 11 | Status: SHIPPED | OUTPATIENT
Start: 2022-12-06 | End: 2023-12-11

## 2022-12-06 RX ORDER — AMLODIPINE BESYLATE 2.5 MG/1
2.5 TABLET ORAL DAILY
Qty: 90 TABLET | Refills: 3 | Status: SHIPPED | OUTPATIENT
Start: 2022-12-06 | End: 2023-06-06

## 2022-12-06 RX ORDER — CARVEDILOL 6.25 MG/1
6.25 TABLET ORAL 2 TIMES DAILY WITH MEALS
Qty: 180 TABLET | Refills: 3 | Status: SHIPPED | OUTPATIENT
Start: 2022-12-06 | End: 2023-06-06

## 2022-12-06 ASSESSMENT — PAIN SCALES - GENERAL: PAINLEVEL: NO PAIN (0)

## 2022-12-06 NOTE — NURSING NOTE
"Chief Complaint   Patient presents with     Diabetes     Refill dexcom         Initial /76   Pulse 79   Temp 97  F (36.1  C) (Tympanic)   Resp 20   Ht 1.778 m (5' 10\")   Wt 79.3 kg (174 lb 12.8 oz)   SpO2 99%   BMI 25.08 kg/m   Estimated body mass index is 25.08 kg/m  as calculated from the following:    Height as of this encounter: 1.778 m (5' 10\").    Weight as of this encounter: 79.3 kg (174 lb 12.8 oz).         Norma J. Gosselin, LPN   "

## 2022-12-06 NOTE — PROGRESS NOTES
Assessment & Plan   1. Type 1 diabetes mellitus with nephropathy (goal A1C<7)  At goal, no change.  I think it makes the most sense for him to start working with his pharmacist.  See what paperwork and possibly different prescriptions are required in order to provide him with an adequate supply of insulin throughout his 4-month international trip.  Fortunately there will be a doctor on ship if he encounters an issue, develops an infection, etc.  - FOOT EXAM  - blood glucose (ACCU-CHEK ERUM PLUS) test strip; Use to test blood sugar 4-6 times daily.  Dispense: 200 strip; Refill: 11  - blood glucose calibration (NO BRAND SPECIFIED) solution; Use to calibrate blood glucose monitor as needed as directed.  Dispense: 1 each; Refill: 1  - blood glucose monitoring (ACCU-CHEK FASTCLIX) lancets; USE TO TEST 1-2 TIMES DAILY  Dispense: 102 each; Refill: 6  - Continuous Blood Gluc  (DEXCOM G6 ) EMERSON; Use to monitor blood sugars  continuously through the day  Dispense: 1 each; Refill: 11  - Continuous Blood Gluc Sensor (DEXCOM G6 SENSOR) MISC; Use to check blood sugar. Change every 10 days.  Dispense: 3 each; Refill: 0  - Continuous Blood Gluc Transmit (DEXCOM G6 TRANSMITTER) MISC; 1 Device daily , change every 3 months  Dispense: 1 each; Refill: 11  - insulin glargine (LANTUS SOLOSTAR) 100 UNIT/ML pen; ADMINISTER 17 UNITS UNDER THE SKIN DAILY  Dispense: 15 mL; Refill: 11  - insulin lispro (HUMALOG KWIKPEN) 100 UNIT/ML (1 unit dial) KWIKPEN; ADMINISTER 3 UNITS UNDER THE SKIN BEFORE MEALS PER SLIDING SCALE. MAX OF 20 UNITS PER DAY  Dispense: 15 mL; Refill: 11  - insulin pen needle (B-D U/F) 31G X 8 MM miscellaneous; USE WITH INSULIN PEN FOUR TIMES DAILY AS DIRECTED  Dispense: 400 each; Refill: 11  - Hemoglobin A1c; Future  - Basic metabolic panel; Future  - Basic metabolic panel  - Hemoglobin A1c    2. Travel advice encounter  His cruise ship said no additional immunizations are required.  I find that difficult to  believe given the number of [place] countries he will be traveling to.  We did not have time to go through the extensive list of countries however if he comes back to see me I will review those recommendations through the CDC.  An infectious disease travel referral has also been placed.  - Infectious Disease Referral; Future    3. Essential hypertension  At goal, no change.  - amLODIPine (NORVASC) 2.5 MG tablet; Take 1 tablet (2.5 mg) by mouth daily  Dispense: 90 tablet; Refill: 3  - carvedilol (COREG) 6.25 MG tablet; Take 1 tablet (6.25 mg) by mouth 2 times daily (with meals)  Dispense: 180 tablet; Refill: 3  - lisinopril (ZESTRIL) 40 MG tablet; Take 1 tablet (40 mg) by mouth daily  Dispense: 90 tablet; Refill: 3  - Basic metabolic panel; Future  - Basic metabolic panel    4. Gastroesophageal reflux disease without esophagitis  At goal, no change.  - omeprazole (PRILOSEC) 20 MG DR capsule; Take 1 capsule (20 mg) by mouth daily  Dispense: 90 capsule; Refill: 3    5. ED (erectile dysfunction) of organic origin  At goal, no change.  - sildenafil (VIAGRA) 100 MG tablet; Take 1/2-1 tablet by mouth 1 hour prior to activity  Dispense: 30 tablet; Refill: 11    6. Erectile dysfunction, unspecified erectile dysfunction type  At goal, no change.  - sildenafil (VIAGRA) 50 MG tablet; Take 0.5-1 tablets  by mouth  min before  sex. Max use once a day. Never use with nitroglycerin, terazosin or doxazosin.  Dispense: 30 tablet; Refill: 11    7. Hyperlipidemia LDL goal <100  At goal, no change.  - simvastatin (ZOCOR) 10 MG tablet; TAKE 1/2 TABLET(5 MG) BY MOUTH AT BEDTIME  Dispense: 45 tablet; Refill: 3  - Lipid Profile; Future  - Lipid Profile    8. Allergic reaction to wasp sting  At goal, no change.  - EPINEPHrine (ANY BX GENERIC EQUIV) 0.3 MG/0.3ML injection 2-pack; Inject 0.3 mLs (0.3 mg) into the muscle as needed for anaphylaxis INJECT 0.3 MLS INTO THE MUSCLE ONCE FOR 1 DOSE  Dispense: 2 each; Refill: 3    9. s/p  carpal tunnel repair, right  10. Carpal tunnel syndrome of left wrist  - Miscellaneous Order for DME - ONLY FOR DME    11. Urinary frequency  I think his symptoms are most consistent with overactive bladder.  We discussed options and decided to obtain a PSA level.  If significant elevation will refer back to urology.  Otherwise recommend excluding trigger foods.  - Prostate Specific Antigen; Future  - Prostate Specific Antigen        Patient Instructions    -- ID consult for travel advice/vaccines    Overactive Bladder: foods to consider avoiding/reducing  Certain foods and beverages might irritate your bladder, including:      Coffee, tea and carbonated drinks, even without caffeine    Alcohol    Certain acidic fruits -- oranges, grapefruits, graeme and limes -- and fruit juices    Spicy foods    Tomato-based products    Carbonated drinks    Chocolate    Consider avoiding these possible bladder irritants for about a week to see if your symptoms improve. Then gradually -- every one to two days -- add one back into your diet, noting any changes in urinary urgency, frequency or incontinence.    (Source [facility]: https://[email or web address]/diseases-conditions/urinary-incontinence/in-depth/bladder-control-problem/ART-86083816?p=1)        Return in about 5 months (around 5/6/2023) for med management after cruise.    Signed, Hemanth Cunningham MD, FAAP, FACP  Internal Medicine & Pediatrics    Subjective   Eh Perry is a 72 year old male who presents for med check, diabetic check.  He will be leaving on January 6, 2023 on a 4-month worldwide cruise.  He needs to bring 4 months of insulin along.  He has been working with his insurance company.  They require that he contact them within 7 business days prior to leaving on the trip to obtain his medicine.  He wants to know what he should do in advance.  He has been having fluctuating urinary frequency.  He saw the urologist.  He thinks it may be worse this year.  He wonders  "if he should get another PSA.  He had his carpal tunnel repaired on the right.  Symptoms have improved.  He is now getting some symptoms on the left.    Objective   Vitals: /76   Pulse 79   Temp 97  F (36.1  C) (Tympanic)   Resp 20   Ht 1.778 m (5' 10\")   Wt 79.3 kg (174 lb 12.8 oz)   SpO2 99%   BMI 25.08 kg/m      HEENT: No carotid bruits  Cardiovascular regular, no murmur  Pulmonary clear    Foot Exam:  12/6/2022  Intact to monofilament bilaterally.  Skin intact without erythema.      Review and Analysis of Data   I personally reviewed the following:  External notes: No  Results: Yes Last lab work from the last year is reviewed  Use of an independent historian: No  Independent review of a test performed by another physician: No  Discussion of management with another physician: No  Moderate risk of morbidity from additional diagnostic testing and/or treatment.    Patient currently uses a Dexcom G6 CGM for continuous monitoring of glucose.   Patient injects or boluses insulin 3 or more times daily before breakfast, before lunch, before dinner, and bedtime.  Patient requires frequent adjustments to their insulin treatment regimen on the basis of therapeutic CGM testing results.      "

## 2022-12-06 NOTE — PATIENT INSTRUCTIONS
-- ID consult for travel advice/vaccines    Overactive Bladder: foods to consider avoiding/reducing  Certain foods and beverages might irritate your bladder, including:    Coffee, tea and carbonated drinks, even without caffeine  Alcohol  Certain acidic fruits -- oranges, grapefruits, graeme and limes -- and fruit juices  Spicy foods  Tomato-based products  Carbonated drinks  Chocolate    Consider avoiding these possible bladder irritants for about a week to see if your symptoms improve. Then gradually -- every one to two days -- add one back into your diet, noting any changes in urinary urgency, frequency or incontinence.    (Source [facility]: https://www.[email or web address]/diseases-conditions/urinary-incontinence/in-depth/bladder-control-problem/ART-11767040?p=1)

## 2022-12-07 ENCOUNTER — TELEPHONE (OUTPATIENT)
Dept: PEDIATRICS | Facility: OTHER | Age: 72
End: 2022-12-07

## 2022-12-07 DIAGNOSIS — E10.21 TYPE 1 DIABETES MELLITUS WITH NEPHROPATHY (H): Primary | ICD-10-CM

## 2022-12-07 RX ORDER — PROCHLORPERAZINE 25 MG/1
SUPPOSITORY RECTAL
Qty: 3 EACH | Refills: 3 | Status: SHIPPED | OUTPATIENT
Start: 2022-12-07 | End: 2022-12-07

## 2022-12-07 RX ORDER — PROCHLORPERAZINE 25 MG/1
SUPPOSITORY RECTAL
Qty: 3 EACH | Refills: 11 | Status: SHIPPED | OUTPATIENT
Start: 2022-12-07 | End: 2023-12-11

## 2022-12-07 NOTE — TELEPHONE ENCOUNTER
ICD-10-CM    1. Type 1 diabetes mellitus with nephropathy (goal A1C<7)  E10.21 Continuous Blood Gluc Sensor (DEXCOM G6 SENSOR) MISC     blood glucose (NO BRAND SPECIFIED) test strip     blood glucose monitoring (NO BRAND SPECIFIED) meter device kit     blood glucose (NO BRAND SPECIFIED) lancets standard         Orders Placed This Encounter   Medications     Continuous Blood Gluc Sensor (DEXCOM G6 SENSOR) MISC     Sig: Use to check blood sugar. Change every 10 days.     Dispense:  3 each     Refill:  3     blood glucose (NO BRAND SPECIFIED) test strip     Sig: Dispense item covered by insurance. Test blood sugar 6 times daily.     Dispense:  100 strip     Refill:  11     blood glucose monitoring (NO BRAND SPECIFIED) meter device kit     Sig: Dispense option covered by insurance. Test blood sugar 6 times daily.     Dispense:  1 kit     Refill:  11     blood glucose (NO BRAND SPECIFIED) lancets standard     Sig: Dispense item covered by insurance. Test blood sugar 6 times daily.     Dispense:  100 each     Refill:  11     Signed, Hemanth Cunningham MD, FAAP, FACP  Internal Medicine & Pediatrics

## 2022-12-07 NOTE — TELEPHONE ENCOUNTER
Please call the patient.  He needs his Dexcom 6 sensors  3 pack refilled.      Pharmacy is [facility].      He also has questions on Accu check test strips.   He needs a form filled out for Medicare.      He is out of both.      Sindhu Garcia on 12/7/2022 at 10:01 AM

## 2022-12-07 NOTE — TELEPHONE ENCOUNTER
Prescription modified, signed and sent to pharmacy.  Patient called and notified.  Urvashi Garcia LPN....................  12/7/2022   4:37 PM

## 2022-12-07 NOTE — TELEPHONE ENCOUNTER
Pt states that there is an error on this prescription.  Please call.    Derek Stinson on 12/7/2022 at 2:16 PM

## 2023-02-25 DIAGNOSIS — I10 ESSENTIAL HYPERTENSION: ICD-10-CM

## 2023-02-27 RX ORDER — AMLODIPINE BESYLATE 2.5 MG/1
TABLET ORAL
Qty: 90 TABLET | Refills: 3 | OUTPATIENT
Start: 2023-02-27

## 2023-02-27 RX ORDER — CARVEDILOL 6.25 MG/1
TABLET ORAL
Qty: 180 TABLET | Refills: 3 | OUTPATIENT
Start: 2023-02-27

## 2023-02-27 RX ORDER — LISINOPRIL 40 MG/1
TABLET ORAL
Qty: 90 TABLET | Refills: 3 | OUTPATIENT
Start: 2023-02-27

## 2023-02-27 NOTE — TELEPHONE ENCOUNTER
[facility] sent Rx request for the following:    LISINOPRIL 40MG TABLETS  CARVEDILOL 6.25MG TABLETS  AMLODIPINE BESYLATE 2.5MG TABLETS  Last Prescription Date:   12/6/22  Last Fill Qty/Refills:         90, 180 R-3    Last Office Visit:              12/6/22   Future Office visit:           None   Redundant refill request refused: Too soon:  Has a year of refills sent to [facility].  Nadira Shaffer RN on 2/27/2023 at 9:18 AM

## 2023-03-17 DIAGNOSIS — E10.21 TYPE 1 DIABETES MELLITUS WITH NEPHROPATHY (H): ICD-10-CM

## 2023-03-21 RX ORDER — INSULIN LISPRO 100 [IU]/ML
INJECTION, SOLUTION INTRAVENOUS; SUBCUTANEOUS
Qty: 15 ML | Refills: 11 | OUTPATIENT
Start: 2023-03-21

## 2023-03-21 NOTE — TELEPHONE ENCOUNTER
Rx filled 12/6/22. Should have refills remaining. Denied with note to pharmacy.     Marleny Blanco RN on 3/21/2023 at 10:30 AM

## 2023-03-25 ENCOUNTER — HEALTH MAINTENANCE LETTER (OUTPATIENT)
Age: 73
End: 2023-03-25

## 2023-05-12 DIAGNOSIS — E10.21 TYPE 1 DIABETES MELLITUS WITH NEPHROPATHY (H): ICD-10-CM

## 2023-05-15 RX ORDER — INSULIN LISPRO 100 [IU]/ML
INJECTION, SOLUTION INTRAVENOUS; SUBCUTANEOUS
Qty: 15 ML | Refills: 11 | OUTPATIENT
Start: 2023-05-15

## 2023-05-15 NOTE — TELEPHONE ENCOUNTER
"Filled 12/6/22 #15 ml x 11. Contacted pharmacy and spoke with Nils, \" No he has refills remaining. Please disregard\". Unable to complete prescription refill per RNMedication Refill Policy.................... Roslyn Casas RN ....................  5/15/2023   11:44 AM       Disp Refills Start End THA   insulin lispro (HUMALOG KWIKPEN) 100 UNIT/ML (1 unit dial) KWIKPEN 15 mL 11 12/6/2022  No   Sig: ADMINISTER 3 UNITS UNDER THE SKIN BEFORE MEALS PER SLIDING SCALE. MAX OF 20 UNITS PER DAY   Sent to pharmacy as: Insulin Lispro (1 Unit Dial) 100 UNIT/ML Subcutaneous Solution Pen-injector (HumaLOG KWIKpen)   Class: E-Prescribe   Order: [id number]   E-Prescribing Status: Receipt confirmed by pharmacy (12/6/2022  9:20 AM CST)       [id number] - [place] - [address]       "

## 2023-06-06 ENCOUNTER — OFFICE VISIT (OUTPATIENT)
Dept: PEDIATRICS | Facility: OTHER | Age: 73
End: 2023-06-06
Attending: INTERNAL MEDICINE
Payer: MEDICARE

## 2023-06-06 VITALS
HEART RATE: 72 BPM | DIASTOLIC BLOOD PRESSURE: 72 MMHG | OXYGEN SATURATION: 98 % | WEIGHT: 165.4 LBS | HEIGHT: 70 IN | TEMPERATURE: 96.4 F | BODY MASS INDEX: 23.68 KG/M2 | SYSTOLIC BLOOD PRESSURE: 138 MMHG | RESPIRATION RATE: 16 BRPM

## 2023-06-06 DIAGNOSIS — K21.9 GASTROESOPHAGEAL REFLUX DISEASE WITHOUT ESOPHAGITIS: ICD-10-CM

## 2023-06-06 DIAGNOSIS — E10.21 TYPE 1 DIABETES MELLITUS WITH NEPHROPATHY (H): Primary | ICD-10-CM

## 2023-06-06 DIAGNOSIS — E78.5 HYPERLIPIDEMIA LDL GOAL <100: ICD-10-CM

## 2023-06-06 DIAGNOSIS — E10.21 TYPE 1 DIABETES MELLITUS WITH NEPHROPATHY (H): ICD-10-CM

## 2023-06-06 DIAGNOSIS — I10 ESSENTIAL HYPERTENSION: ICD-10-CM

## 2023-06-06 DIAGNOSIS — E03.8 SUBCLINICAL HYPOTHYROIDISM: ICD-10-CM

## 2023-06-06 LAB
ANION GAP SERPL CALCULATED.3IONS-SCNC: 11 MMOL/L (ref 7–15)
BUN SERPL-MCNC: 22.3 MG/DL (ref 8–23)
CALCIUM SERPL-MCNC: 9.3 MG/DL (ref 8.8–10.2)
CHLORIDE SERPL-SCNC: 102 MMOL/L (ref 98–107)
CREAT SERPL-MCNC: 0.9 MG/DL (ref 0.67–1.17)
CREAT UR-MCNC: 70.1 MG/DL
DEPRECATED HCO3 PLAS-SCNC: 25 MMOL/L (ref 22–29)
GFR SERPL CREATININE-BSD FRML MDRD: >90 ML/MIN/1.73M2
GLUCOSE SERPL-MCNC: 128 MG/DL (ref 70–99)
HBA1C MFR BLD: 5.7 % (ref 4–6.2)
HOLD SPECIMEN: NORMAL
MICROALBUMIN UR-MCNC: 40.8 MG/L
MICROALBUMIN/CREAT UR: 58.2 MG/G CR (ref 0–17)
POTASSIUM SERPL-SCNC: 4.5 MMOL/L (ref 3.4–5.3)
SODIUM SERPL-SCNC: 138 MMOL/L (ref 136–145)
TSH SERPL DL<=0.005 MIU/L-ACNC: 2.92 UIU/ML (ref 0.3–4.2)

## 2023-06-06 PROCEDURE — 99214 OFFICE O/P EST MOD 30 MIN: CPT | Performed by: INTERNAL MEDICINE

## 2023-06-06 PROCEDURE — G0463 HOSPITAL OUTPT CLINIC VISIT: HCPCS | Performed by: INTERNAL MEDICINE

## 2023-06-06 PROCEDURE — 83036 HEMOGLOBIN GLYCOSYLATED A1C: CPT | Mod: ZL | Performed by: INTERNAL MEDICINE

## 2023-06-06 PROCEDURE — 82570 ASSAY OF URINE CREATININE: CPT | Mod: ZL | Performed by: INTERNAL MEDICINE

## 2023-06-06 PROCEDURE — 36415 COLL VENOUS BLD VENIPUNCTURE: CPT | Mod: ZL | Performed by: INTERNAL MEDICINE

## 2023-06-06 PROCEDURE — 84443 ASSAY THYROID STIM HORMONE: CPT | Mod: ZL | Performed by: INTERNAL MEDICINE

## 2023-06-06 PROCEDURE — 82310 ASSAY OF CALCIUM: CPT | Mod: ZL | Performed by: INTERNAL MEDICINE

## 2023-06-06 PROCEDURE — 82374 ASSAY BLOOD CARBON DIOXIDE: CPT | Mod: ZL | Performed by: INTERNAL MEDICINE

## 2023-06-06 RX ORDER — SIMVASTATIN 10 MG
TABLET ORAL
Qty: 45 TABLET | Refills: 3 | Status: SHIPPED | OUTPATIENT
Start: 2023-06-06 | End: 2024-07-23

## 2023-06-06 RX ORDER — AMLODIPINE BESYLATE 2.5 MG/1
2.5 TABLET ORAL DAILY
Qty: 90 TABLET | Refills: 3 | Status: SHIPPED | OUTPATIENT
Start: 2023-06-06 | End: 2023-12-11

## 2023-06-06 RX ORDER — LISINOPRIL 40 MG/1
40 TABLET ORAL DAILY
Qty: 90 TABLET | Refills: 3 | Status: SHIPPED | OUTPATIENT
Start: 2023-06-06 | End: 2024-07-23

## 2023-06-06 RX ORDER — INSULIN LISPRO 100 [IU]/ML
INJECTION, SOLUTION INTRAVENOUS; SUBCUTANEOUS
Qty: 15 ML | Refills: 11 | Status: SHIPPED | OUTPATIENT
Start: 2023-06-06 | End: 2024-07-11

## 2023-06-06 RX ORDER — INSULIN GLARGINE 100 [IU]/ML
INJECTION, SOLUTION SUBCUTANEOUS
Qty: 15 ML | Refills: 11 | Status: SHIPPED | OUTPATIENT
Start: 2023-06-06 | End: 2024-07-11

## 2023-06-06 RX ORDER — CARVEDILOL 6.25 MG/1
6.25 TABLET ORAL 2 TIMES DAILY WITH MEALS
Qty: 180 TABLET | Refills: 3 | Status: SHIPPED | OUTPATIENT
Start: 2023-06-06 | End: 2024-07-23

## 2023-06-06 ASSESSMENT — PAIN SCALES - GENERAL: PAINLEVEL: NO PAIN (0)

## 2023-06-06 NOTE — NURSING NOTE
"Chief Complaint   Patient presents with     Diabetes     6 month check         Initial /72   Pulse 72   Temp (!) 96.4  F (35.8  C) (Tympanic)   Resp 16   Ht 1.778 m (5' 10\")   Wt 75 kg (165 lb 6.4 oz)   SpO2 98%   BMI 23.73 kg/m   Estimated body mass index is 23.73 kg/m  as calculated from the following:    Height as of this encounter: 1.778 m (5' 10\").    Weight as of this encounter: 75 kg (165 lb 6.4 oz).         Norma J. Gosselin, LPN   "

## 2023-06-06 NOTE — PROGRESS NOTES
Assessment & Plan   1. Type 1 diabetes mellitus with nephropathy (H)  2. Type 1 diabetes mellitus with nephropathy (goal A1C<7)  At goal, no change.  - Albumin Random Urine Quantitative with Creat Ratio; Future  - insulin glargine (LANTUS SOLOSTAR) 100 UNIT/ML pen; ADMINISTER 17 UNITS UNDER THE SKIN DAILY  Dispense: 15 mL; Refill: 11  - insulin lispro (HUMALOG KWIKPEN) 100 UNIT/ML (1 unit dial) KWIKPEN; ADMINISTER 3 UNITS UNDER THE SKIN BEFORE MEALS PER SLIDING SCALE. MAX OF 20 UNITS PER DAY  Dispense: 15 mL; Refill: 11  - Hemoglobin A1c; Future  - Basic metabolic panel; Future  - blood glucose (NO BRAND SPECIFIED) lancets standard; Dispense item covered by insurance. Test blood sugar 1 times daily.  Dispense: 100 each; Refill: 11  - blood glucose (NO BRAND SPECIFIED) test strip; Dispense item covered by insurance. Test blood sugar 1 times daily.  Dispense: 100 strip; Refill: 11  - blood glucose monitoring (NO BRAND SPECIFIED) meter device kit; Dispense option covered by insurance. Test blood sugar 1 times daily.  Dispense: 1 kit; Refill: 11    3. Essential hypertension  At goal, no change.  - lisinopril (ZESTRIL) 40 MG tablet; Take 1 tablet (40 mg) by mouth daily  Dispense: 90 tablet; Refill: 3  - carvedilol (COREG) 6.25 MG tablet; Take 1 tablet (6.25 mg) by mouth 2 times daily (with meals)  Dispense: 180 tablet; Refill: 3  - amLODIPine (NORVASC) 2.5 MG tablet; Take 1 tablet (2.5 mg) by mouth daily  Dispense: 90 tablet; Refill: 3  - Basic metabolic panel; Future    4. Gastroesophageal reflux disease without esophagitis  At goal, no change.  - omeprazole (PRILOSEC) 20 MG DR capsule; Take 1 capsule (20 mg) by mouth daily  Dispense: 90 capsule; Refill: 3    5. Hyperlipidemia LDL goal <100  At goal, no change.  - simvastatin (ZOCOR) 10 MG tablet; TAKE 1/2 TABLET(5 MG) BY MOUTH AT BEDTIME  Dispense: 45 tablet; Refill: 3    6. Subclinical hypothyroidism  He had a one-time elevated TSH in the past possibly due to illness,  "will recheck.  - TSH with free T4 reflex; Future        Return in about 6 months (around 12/6/2023), or if symptoms worsen or fail to improve, for med management, medicare wellness visit.    Signed, Hemanth Cunningham MD, FAAP, FACP  Internal Medicine & Pediatrics    Subjective   Eh Perry is a 73 year old male who presents for diabetes check.     Objective   Vitals: /72   Pulse 72   Temp (!) 96.4  F (35.8  C) (Tympanic)   Resp 16   Ht 1.778 m (5' 10\")   Wt 75 kg (165 lb 6.4 oz)   SpO2 98%   BMI 23.73 kg/m        Review and Analysis of Data   I personally reviewed the following:  External notes: No  Results: Yes diabetes lab  Use of an independent historian: No  Independent review of a test performed by another physician: No  Discussion of management with another physician: No  Moderate risk of morbidity from additional diagnostic testing and/or treatment.       Patient currently uses a Dexcom G6 CGM for continuous monitoring of glucose.   Patient injects or boluses insulin 3 or more times daily before breakfast, before lunch, before dinner, and bedtime.  Patient requires frequent adjustments to their insulin treatment regimen on the basis of therapeutic CGM testing results.  "

## 2023-06-14 ENCOUNTER — TRANSFERRED RECORDS (OUTPATIENT)
Dept: HEALTH INFORMATION MANAGEMENT | Facility: CLINIC | Age: 73
End: 2023-06-14
Payer: MEDICARE

## 2023-06-14 LAB — RETINOPATHY: POSITIVE

## 2023-08-07 ENCOUNTER — MYC MEDICAL ADVICE (OUTPATIENT)
Dept: PEDIATRICS | Facility: OTHER | Age: 73
End: 2023-08-07
Payer: MEDICARE

## 2023-08-07 DIAGNOSIS — E10.21 TYPE 1 DIABETES MELLITUS WITH NEPHROPATHY (H): Primary | Chronic | ICD-10-CM

## 2023-08-10 RX ORDER — ACYCLOVIR 400 MG/1
TABLET ORAL
Qty: 1 EACH | Refills: 0 | Status: SHIPPED | OUTPATIENT
Start: 2023-08-10

## 2023-08-28 NOTE — TELEPHONE ENCOUNTER
Office Consent to Operation/Invasive Procedure    Name: Erin Price   YOB: 1998   MRN: [id number]   AUTHORIZATION:  I authorize performance on the patient of the following surgical operation/invasive procedure under the direction, supervision and authority of Gina Raymond DPM.  Description of operation(s): Nail Removal Right Foot Lateral Border with Phenol and Alcohol    NATURE OF TREATMENT:  The nature of my condition, the nature and purpose of the operation/procedure, possible alternative methods of treatment, risks involved, and the possibility of complications have been explained to me. I have had an opportunity to discuss this operation/procedure with the physician and other doctors concerned, and I have received answers to all questions I asked and do hereby assume all risks involved. No guarantee or assurance has been given to me by anyone as to the results that may be obtained.     ADDITIONAL PROCEDURES:  I consent to the performance of operations and procedures in addition to or different from those now contemplated, whether or not arising from presently unforeseen conditions, which the above-named doctor or his/her associate or assistants may consider necessary or advisable in the course of the operation.    OTHER QUALIFIED PRACTITIONERS:  I have been advised, and I agree, that the operation/procedure may be performed by a team of doctors including one or more attending, doctors, residents and medical students. I consent to these other qualified medical practitioners, who are not physicians, performing important parts of the operation/procedure or the administration of anesthetic agents.    ANESTHETIC AGENTS: I understand that anesthetic agents may have serious and even fatal complications. I consent to the administration of such anesthetics/sedatives/medications as may be considered necessary or advisable by the physician responsible for the service.      OBSERVATION:  For the purpose  Message sent via [employer].      Kellie Doherty CMA  [facility]  [place]       of advancing the educational programs of the facility I consent to the admittance of qualified observers.    PHOTOGRAPHY: I consent to the taking and publication of photographs and other reproductions in the course of the operation or procedure for the purpose of advancing education provided that the identify of the patient is not revealed.    TISSUE USE:  I authorize the facility to preserve and use for scientific or teaching purposes or otherwise dispose of any dismembered tissue resulting from the procedure and treatment authorized above.    INDEPENDENT PHYSICIAN SERVICES: I have been informed and I acknowledge and fully understand that the Physician(s) providing services to me in this facility, such as my personal Physician(s),  Specialty Physician(s), Radiology, Pathology, consulting, and other allied health physicians, are independent contractors and are not employees or agents of this facility, unless otherwise specifically stated. My decision to seek medical care at the facility is not based upon any understanding, representation, advertisement, media campaign, inference, implication or reliance that the physicians who are or will be treating me are employees or agents of the facility.     DO NOT SIGN IF YOU HAVE ANY QUESTIONS  ________________________________________________________________________________________________________________  My signature below constitutes my acknowledgement and agreement that I have read and understand the above, was given an opportunity to discuss this form and ask questions, that all questions were answered to my satisfaction, and I am satisfied I understand the form's contents and significance.    Date: __________________ Time: _________________ Signed: ______________________________________________________        Patient/Legally Authorized Representative ([place] appropriate)     Date: __________________   Time: _________________   Signed:  ______________________________________________________  Witness   Certificate of Interpretation:  I certify that I have read the foregoing to the signor hearof in the ____________________________________________________ language.     Date: ________________ Time: __________________ Signed: _________________________________________________________     Affirmation by Responsible Physician  I have informed the above named patient or Legally Authorized Representative of the medical condition requiring surgical treatment and/or the further diagnostic procedures referred to above. I have explained, consistent with accepted medical judgment, the nature and purposes of the treatment or procedures, the reasonable (1) possible alternatives (2) risks/complications and (3) consequences in the treatment or procedure consented to. I have also given the opportunity to ask questions and have answered any such questions.     Date:________________ Time: __________________ Signed: _________________________________________________________

## 2023-09-29 ENCOUNTER — MYC MEDICAL ADVICE (OUTPATIENT)
Dept: PEDIATRICS | Facility: OTHER | Age: 73
End: 2023-09-29
Payer: MEDICARE

## 2023-09-29 DIAGNOSIS — E10.21 TYPE 1 DIABETES MELLITUS WITH NEPHROPATHY (H): Primary | ICD-10-CM

## 2023-09-29 RX ORDER — ACYCLOVIR 400 MG/1
TABLET ORAL
Qty: 3 EACH | Refills: 5 | Status: SHIPPED | OUTPATIENT
Start: 2023-09-29 | End: 2024-04-23

## 2023-10-26 ENCOUNTER — HOSPITAL ENCOUNTER (EMERGENCY)
Facility: OTHER | Age: 73
Discharge: HOME | End: 2023-10-26
Attending: PHYSICIAN ASSISTANT | Admitting: PHYSICIAN ASSISTANT
Payer: MEDICARE

## 2023-10-26 VITALS
HEIGHT: 70 IN | BODY MASS INDEX: 24.34 KG/M2 | HEART RATE: 99 BPM | TEMPERATURE: 96.5 F | DIASTOLIC BLOOD PRESSURE: 90 MMHG | SYSTOLIC BLOOD PRESSURE: 160 MMHG | OXYGEN SATURATION: 99 % | RESPIRATION RATE: 14 BRPM | WEIGHT: 170 LBS

## 2023-10-26 DIAGNOSIS — T50.901A ACCIDENTAL DRUG OVERDOSE, INITIAL ENCOUNTER: ICD-10-CM

## 2023-10-26 DIAGNOSIS — E11.9 TYPE 2 DIABETES MELLITUS (H): ICD-10-CM

## 2023-10-26 LAB — GLUCOSE BLDC GLUCOMTR-MCNC: 145 MG/DL (ref 70–99)

## 2023-10-26 PROCEDURE — 99283 EMERGENCY DEPT VISIT LOW MDM: CPT | Performed by: PHYSICIAN ASSISTANT

## 2023-10-26 PROCEDURE — 82962 GLUCOSE BLOOD TEST: CPT

## 2023-10-26 PROCEDURE — 99282 EMERGENCY DEPT VISIT SF MDM: CPT | Performed by: PHYSICIAN ASSISTANT

## 2023-10-26 ASSESSMENT — ACTIVITIES OF DAILY LIVING (ADL): ADLS_ACUITY_SCORE: 33

## 2023-10-26 NOTE — ED NOTES
"Patient states he is \"doing well.\" He has been fed a PB&J, milk, orange juice, and another glucose tablet. His dexicom is reading 150, currently. Will do finger stick and room.   "

## 2023-10-26 NOTE — ED TRIAGE NOTES
"Patient being evaluated today after mistaking his long acting insulin for his short acting insulin and administering 16 units of additional short acting insulin. Patient has had 2 sugary drinks, 7 small cookies, and 2 glucose tablets. He is wearing a dexacom and reports BG of 193. His last dose of humalog was at noon; 3 units. Patient awake and alert. BP (!) 160/90   Pulse 99   Temp (!) 96.5  F (35.8  C) (Tympanic)   Resp 14   Ht 1.778 m (5' 10\")   Wt 77.1 kg (170 lb)   SpO2 99%   BMI 24.39 kg/m         Triage Assessment (Adult)       Row Name 10/26/23 1091          Triage Assessment    Airway WDL WDL        Respiratory WDL    Respiratory WDL WDL        Skin Circulation/Temperature WDL    Skin Circulation/Temperature WDL WDL        Cardiac WDL    Cardiac WDL WDL        Peripheral/Neurovascular WDL    Peripheral Neurovascular WDL WDL        Cognitive/Neuro/Behavioral WDL    Cognitive/Neuro/Behavioral WDL WDL                     "

## 2023-10-27 NOTE — DISCHARGE INSTRUCTIONS
-Return to the ER for any worsening of symptoms.  -Continue to monitor your blood sugars.  -Continue with your normal medications.

## 2023-10-31 ENCOUNTER — MEDICAL CORRESPONDENCE (OUTPATIENT)
Dept: HEALTH INFORMATION MANAGEMENT | Facility: OTHER | Age: 73
End: 2023-10-31
Payer: MEDICARE

## 2023-11-06 NOTE — ED PROVIDER NOTES
"      EMERGENCY DEPARTMENT ENCOUNTER      NAME: Eh Perry  AGE: 73 year old male  YOB: 1950  MRN: [id number]  EVALUATION DATE & TIME: 10/26/2023  6:22 PM    PCP: Hemanth Cunningham    ED PROVIDER: Alex Yen PA-C       CHIEF COMPLAINT:  Chief Complaint   Patient presents with    Insulin Reaction     Patient reports taking \"5 times his insulin dose.\"       ED COURSE, MEDICAL DECISION MAKING, FINAL IMPRESSION AND PLAN:     The patient was interviewed and examined.  HPI and physical exam as below.  Differential diagnosis and MDM Key Documentation Elements as below.  Vitals and triage note were reviewed.  BP (!) 160/90   Pulse 99   Temp (!) 96.5  F (35.8  C) (Tympanic)   Resp 14   Ht 1.778 m (5' 10\")   Wt 77.1 kg (170 lb)   SpO2 99%   BMI 24.39 kg/m      Eh Perry is a pleasant 73 year old male who presents to the ER today with his wife for concern of mixing up his insulin.  Patient states that he mixed up his long-acting and short acting insulin.  Patient states that he accidentally gave 16 more units of additional short acting insulin today.  This was done this afternoon a few hours before arrival to ER roughly at 1630.  Patient states he did have some degree drinks and foods also as well as glucose tablets.  Patient states that he took Humalog 3 units at noon today.  Patient currently denying any symptoms.  No feeling of lightheadedness or jitteriness.  No chest pain, shortness of breath, abdominal pain, nausea vomiting.  Presents to the ER just to make sure that he is okay.    Differential includes but is not limited to insulin overdose, hypoglycemia    Patient was afebrile with otherwise normal vitals.  Patient was in no acute distress.  Patient is asymptomatic.  Blood sugar today here in the ER was 145.     Given that patient is asymptomatic with normal blood sugars with last insulin dose being given multiple hours ago.  Feel that patient may be discharged home safely.  " "If he has any worsening symptoms, he may return to the ER.  Follow-up primary care doctor as needed.    Assessment / Plan:  1. Accidental drug overdose, initial encounter    2. Type 2 diabetes mellitus (H)          Medications given during today's ER visit:  Medications - No data to display    New prescriptions started at today's ER visit  Discharge Medication List as of 10/26/2023  7:06 PM            Pertinent Labs & Imaging studies reviewed. (See chart for details)  Results for orders placed or performed during the hospital encounter of 10/26/23   Glucose by meter   Result Value Ref Range    GLUCOSE BY METER POCT 145 (H) 70 - 99 mg/dL     No results found for: \"ABORH\"      Reassessments, Medications, Interventions, & Response to Treatments:  Patient remained stable here in the ER.  No new symptom development    Consultations:  None    Decision Rules, Medical Calculators, and Risk Stratification Tools:  None    MDM Key Documentation Elements for Patient's Evaluation:  Differential diagnosis to include high risk considerations: As above  Escalation to admission/observation considered: Admission/observation considered, but patient does not meet admission criteria  Discussions and management with other clinicians:    3a. Independent interpretation of testing performed by another health professional:  -No  3b. Discussion of management or test interpretation with another health professional: -No  Independent interpretation of tests:  Ordering and/or review of 1 test(s)  Discussion of test interpretations with radiology:  No  History obtained from source other than patient or assessment requiring an independent historian:  No  Review of non-ED/external records:  review of 1 records  Diagnostic tests considered but not ultimately performed/deferred:  -CBC, CMP, A1c  Prescription medications considered but not prescribed:  -Insulin  Chronic conditions affecting care:  -Type 2 diabetes  Care affected by social determinants " of health:  -None    The patient's management involved:   - Laboratory studies      A shared decision making model was used. Time was taken to answer all questions.  Patient and/or associated parties understood and were agreeable to treatment plan.  Plan and all results were discussed. Warning signs and close return precautions to return to the ED given. Copy of results given. Discharged in stable condition. Discharged with discharge instructions outlining plan for further care and follow up.      PPE worn during patient evaluation:  Mask: Yes  Eye Protection: No  Gown: No  Hair cover: No  Face Shield: No  Patient wearing a mask: No    =================================================================    HPI  Eh Prery is a pleasant 73 year old male who presents to the ER today with his wife for concern of mixing up his insulin.  Patient states that he mixed up his long-acting and short acting insulin.  Patient states that he accidentally gave 16 more units of additional short acting insulin today.  This was done this afternoon a few hours before arrival to ER roughly at 1630.  Patient states he did have some degree drinks and foods also as well as glucose tablets.  Patient states that he took Humalog 3 units at noon today.  Patient currently denying any symptoms.  No feeling of lightheadedness or jitteriness.  No chest pain, shortness of breath, abdominal pain, nausea vomiting.  Presents to the ER just to make sure that he is okay.      REVIEW OF SYSTEMS   Review of Systems  As above, otherwise ROS is unremarkable.      PAST MEDICAL HISTORY:  Past Medical History:   Diagnosis Date    Anemia     CKD (chronic kidney disease) stage 3, GFR 30-59 ml/min (H)     GERD (gastroesophageal reflux disease)     Hepatitis 2011    elevated AST due to excess ETOH use (5 drinks/day)    HTN (hypertension)     Hyperlipidemia LDL goal <100 3/7/2013    MGUS (monoclonal gammopathy of unknown significance)      IgG Lambda. Followed  by Dr Rogers    Proteinuria 1/22/2015    PVC (premature ventricular contraction)     Retinopathy due to secondary diabetes (H)     s/p laser therapy left    Subclinical hypothyroidism 6/11/2018    Tobacco abuse     20 pack hx. Quit 1991    Type 1 diabetes mellitus with nephropathy (goal A1C<7) 1978       PAST SURGICAL HISTORY:  Past Surgical History:   Procedure Laterality Date    ENDOSCOPIC RELEASE CARPAL TUNNEL Right 10/14/2022    Procedure: Right Endoscopic Carpal Tunnel Release;  Surgeon: Sebastien Rowe MD;  Location: [facility] OR    HAND SURGERY  01/01/2007    contracture release    VOCAL CORD INJECTION      vocal cord nodules       CURRENT MEDICATIONS:    Current Outpatient Medications   Medication Instructions    amLODIPine (NORVASC) 2.5 mg, Oral, DAILY    aspirin 81 MG tablet 1 tablet, DAILY    blood glucose (NO BRAND SPECIFIED) lancets standard Dispense item covered by insurance. Test blood sugar 1 times daily.    blood glucose (NO BRAND SPECIFIED) test strip Dispense item covered by insurance. Test blood sugar 1 times daily.    blood glucose monitoring (NO BRAND SPECIFIED) meter device kit Dispense option covered by insurance. Test blood sugar 1 times daily.    Carboxymethylcellulose Sodium (EYE DROPS OP) Ophthalmic, For dry eyes     carvedilol (COREG) 6.25 mg, Oral, 2 TIMES DAILY WITH MEALS    Continuous Blood Gluc  (DEXCOM G6 ) EMERSON Use to monitor blood sugars  continuously through the day    Continuous Blood Gluc  (DEXCOM G7 ) EMERSON Use to read blood sugars as per 's instructions.    Continuous Blood Gluc Sensor (DEXCOM G6 SENSOR) MISC Use to check blood sugar. Change every 10 days.    Continuous Blood Gluc Sensor (DEXCOM G7 SENSOR) MISC Change every 10 days.    Continuous Blood Gluc Transmit (DEXCOM G6 TRANSMITTER) MISC 1 Device, Does not apply, DAILY, , change every 3 months    EPINEPHrine (ANY BX GENERIC EQUIV) 0.3 mg, Intramuscular, PRN, INJECT 0.3 MLS INTO  THE MUSCLE ONCE FOR 1 DOSE    fish oil-omega-3 fatty acids 1-2 g, Oral, DAILY    insulin glargine (LANTUS SOLOSTAR) 100 UNIT/ML pen ADMINISTER 17 UNITS UNDER THE SKIN DAILY    insulin lispro (HUMALOG KWIKPEN) 100 UNIT/ML (1 unit dial) KWIKPEN ADMINISTER 3 UNITS UNDER THE SKIN BEFORE MEALS PER SLIDING SCALE. MAX OF 20 UNITS PER DAY    insulin pen needle (B-D U/F) 31G X 8 MM miscellaneous USE WITH INSULIN PEN FOUR TIMES DAILY AS DIRECTED    lisinopril (ZESTRIL) 40 mg, Oral, DAILY    Multiple Vitamin (MULTI VITAMIN  MENS) TABS Take  by mouth.    omeprazole (PRILOSEC) 20 mg, Oral, DAILY    sildenafil (VIAGRA) 100 MG tablet Take 1/2-1 tablet by mouth 1 hour prior to activity    sildenafil (VIAGRA) 50 MG tablet Take 0.5-1 tablets  by mouth  min before  sex. Max use once a day. Never use with nitroglycerin, terazosin or doxazosin.    simvastatin (ZOCOR) 10 MG tablet TAKE 1/2 TABLET(5 MG) BY MOUTH AT BEDTIME       ALLERGIES:  Allergies   Allergen Reactions    Lipitor [Atorvastatin Calcium]       Elevated Ck and myalgias    Wasp Venom Protein      Wasp sting       FAMILY HISTORY:  Family History   Problem Relation Age of Onset    Cancer Mother         peritoneal carcinoma    Diabetes Father     Diabetes Sister     Cerebrovascular Disease Maternal Grandmother     Diabetes Paternal Grandmother     Prostate Cancer Cousin     Anesthesia Reaction No family hx of     Bleeding Diathesis No family hx of     Colon Cancer No family hx of        SOCIAL HISTORY:   Social History     Socioeconomic History    Marital status:    Occupational History     Employer: RETIRED     Comment: anesthesiologist   Tobacco Use    Smoking status: Former     Packs/day: 1.00     Years: 15.00     Additional pack years: 0.00     Total pack years: 15.00     Types: Cigarettes     Quit date: 1980     Years since quittin.8    Smokeless tobacco: Never   Vaping Use    Vaping Use: Never used   Substance and Sexual Activity    Alcohol use: Not  "Currently     Comment: occ, 1-2 glass of wine    Drug use: No    Sexual activity: Yes     Partners: Female       PHYSICAL EXAM    VITAL SIGNS: BP (!) 160/90   Pulse 99   Temp (!) 96.5  F (35.8  C) (Tympanic)   Resp 14   Ht 1.778 m (5' 10\")   Wt 77.1 kg (170 lb)   SpO2 99%   BMI 24.39 kg/m      No data found.    Physical Exam  Vitals and nursing note reviewed.   Constitutional:       General: He is not in acute distress.     Appearance: Normal appearance. He is not ill-appearing or diaphoretic.   HENT:      Nose: Nose normal.      Mouth/Throat:      Mouth: Mucous membranes are moist.      Pharynx: Oropharynx is clear.   Eyes:      Conjunctiva/sclera: Conjunctivae normal.   Cardiovascular:      Rate and Rhythm: Normal rate and regular rhythm.      Pulses: Normal pulses.      Heart sounds: Normal heart sounds.   Pulmonary:      Effort: Pulmonary effort is normal.      Breath sounds: Normal breath sounds.   Musculoskeletal:         General: Normal range of motion.      Cervical back: Normal range of motion and neck supple.   Skin:     General: Skin is warm and dry.      Capillary Refill: Capillary refill takes less than 2 seconds.   Neurological:      General: No focal deficit present.      Mental Status: He is alert.   Psychiatric:         Mood and Affect: Mood normal.              LABS & RADIOLOGY:  All pertinent labs reviewed and interpreted. Reviewed all pertinent imaging. Please see official radiology report.  Results for orders placed or performed during the hospital encounter of 10/26/23   Glucose by meter   Result Value Ref Range    GLUCOSE BY METER POCT 145 (H) 70 - 99 mg/dL     No orders to display         ITeto PA-C, personally performed the services described in this documentation, and it is both accurate and complete.       Alex Yen PA-C  11/05/23 [id number]    "

## 2023-11-27 ENCOUNTER — MEDICAL CORRESPONDENCE (OUTPATIENT)
Dept: HEALTH INFORMATION MANAGEMENT | Facility: OTHER | Age: 73
End: 2023-11-27
Payer: MEDICARE

## 2023-12-04 ENCOUNTER — MEDICAL CORRESPONDENCE (OUTPATIENT)
Dept: HEALTH INFORMATION MANAGEMENT | Facility: OTHER | Age: 73
End: 2023-12-04
Payer: MEDICARE

## 2023-12-11 ENCOUNTER — OFFICE VISIT (OUTPATIENT)
Dept: PEDIATRICS | Facility: OTHER | Age: 73
End: 2023-12-11
Attending: INTERNAL MEDICINE
Payer: MEDICARE

## 2023-12-11 VITALS
HEART RATE: 82 BPM | TEMPERATURE: 96.9 F | WEIGHT: 173 LBS | BODY MASS INDEX: 24.77 KG/M2 | SYSTOLIC BLOOD PRESSURE: 144 MMHG | HEIGHT: 70 IN | RESPIRATION RATE: 16 BRPM | OXYGEN SATURATION: 98 % | DIASTOLIC BLOOD PRESSURE: 78 MMHG

## 2023-12-11 DIAGNOSIS — E10.21 TYPE 1 DIABETES MELLITUS WITH NEPHROPATHY (H): Chronic | ICD-10-CM

## 2023-12-11 DIAGNOSIS — Z12.5 SCREENING FOR PROSTATE CANCER: ICD-10-CM

## 2023-12-11 DIAGNOSIS — E78.5 HYPERLIPIDEMIA LDL GOAL <100: ICD-10-CM

## 2023-12-11 DIAGNOSIS — I10 ESSENTIAL HYPERTENSION: ICD-10-CM

## 2023-12-11 DIAGNOSIS — N18.2 CHRONIC KIDNEY DISEASE, STAGE 2 (MILD): ICD-10-CM

## 2023-12-11 DIAGNOSIS — E03.8 SUBCLINICAL HYPOTHYROIDISM: ICD-10-CM

## 2023-12-11 DIAGNOSIS — Z00.00 ENCOUNTER FOR MEDICARE ANNUAL WELLNESS EXAM: Primary | ICD-10-CM

## 2023-12-11 LAB
ANION GAP SERPL CALCULATED.3IONS-SCNC: 11 MMOL/L (ref 7–15)
BUN SERPL-MCNC: 14.9 MG/DL (ref 8–23)
CALCIUM SERPL-MCNC: 9.7 MG/DL (ref 8.8–10.2)
CHLORIDE SERPL-SCNC: 102 MMOL/L (ref 98–107)
CHOLEST SERPL-MCNC: 155 MG/DL
CREAT SERPL-MCNC: 0.83 MG/DL (ref 0.67–1.17)
DEPRECATED HCO3 PLAS-SCNC: 25 MMOL/L (ref 22–29)
EGFRCR SERPLBLD CKD-EPI 2021: >90 ML/MIN/1.73M2
FASTING STATUS PATIENT QL REPORTED: NO
GLUCOSE SERPL-MCNC: 148 MG/DL (ref 70–99)
HBA1C MFR BLD: 6.2 % (ref 4–6.2)
HDLC SERPL-MCNC: 77 MG/DL
LDLC SERPL CALC-MCNC: 62 MG/DL
NONHDLC SERPL-MCNC: 78 MG/DL
POTASSIUM SERPL-SCNC: 4.3 MMOL/L (ref 3.4–5.3)
PSA SERPL DL<=0.01 NG/ML-MCNC: 2.36 NG/ML (ref 0–6.5)
SODIUM SERPL-SCNC: 138 MMOL/L (ref 135–145)
TRIGL SERPL-MCNC: 79 MG/DL

## 2023-12-11 PROCEDURE — 99214 OFFICE O/P EST MOD 30 MIN: CPT | Mod: 25 | Performed by: INTERNAL MEDICINE

## 2023-12-11 PROCEDURE — 82310 ASSAY OF CALCIUM: CPT | Mod: ZL | Performed by: INTERNAL MEDICINE

## 2023-12-11 PROCEDURE — 99207 PR FOOT EXAM NO CHARGE: CPT | Performed by: INTERNAL MEDICINE

## 2023-12-11 PROCEDURE — 83036 HEMOGLOBIN GLYCOSYLATED A1C: CPT | Mod: ZL | Performed by: INTERNAL MEDICINE

## 2023-12-11 PROCEDURE — G0439 PPPS, SUBSEQ VISIT: HCPCS | Performed by: INTERNAL MEDICINE

## 2023-12-11 PROCEDURE — G0463 HOSPITAL OUTPT CLINIC VISIT: HCPCS | Performed by: INTERNAL MEDICINE

## 2023-12-11 PROCEDURE — 36415 COLL VENOUS BLD VENIPUNCTURE: CPT | Mod: ZL | Performed by: INTERNAL MEDICINE

## 2023-12-11 PROCEDURE — 80061 LIPID PANEL: CPT | Mod: ZL | Performed by: INTERNAL MEDICINE

## 2023-12-11 PROCEDURE — G0103 PSA SCREENING: HCPCS | Mod: ZL | Performed by: INTERNAL MEDICINE

## 2023-12-11 RX ORDER — AMLODIPINE BESYLATE 5 MG/1
5 TABLET ORAL DAILY
Qty: 90 TABLET | Refills: 3 | Status: SHIPPED | OUTPATIENT
Start: 2023-12-11

## 2023-12-11 ASSESSMENT — PAIN SCALES - GENERAL: PAINLEVEL: NO PAIN (0)

## 2023-12-11 NOTE — PROGRESS NOTES
SUBJECTIVE:   Shay is a 73 year old, presenting for the following:  Medicare Visit (Annual) and Diabetes        Are you in the first 12 months of your Medicare coverage?  No    History of Present Illness       Diabetes:   He presents for follow up of diabetes.   He is checking home blood glucose with a continuous glucose monitor.   He checks blood glucose before meals, after meals, before and after meals and at bedtime.  Blood glucose is sometimes over 200 and sometimes under 70. He is aware of hypoglycemia symptoms including shakiness and weakness.    He has no concerns regarding his diabetes at this time.   He is not experiencing numbness or burning in feet, excessive thirst, blurry vision, weight changes or redness, sores or blisters on feet.           Hypertension: He presents for follow up of hypertension.  He does check blood pressure  regularly outside of the clinic. Outside blood pressures have been over 140/90. He does not follow a low salt diet.     He eats 2-3 servings of fruits and vegetables daily.He consumes 0 sweetened beverage(s) daily.He exercises with enough effort to increase his heart rate 60 or more minutes per day.  He exercises with enough effort to increase his heart rate 7 days per week.   He is taking medications regularly.      Today's PHQ-2 Score:       12/11/2023     2:10 PM   PHQ-2 ( 1999 [employer])   Q1: Little interest or pleasure in doing things 0   Q2: Feeling down, depressed or hopeless 0   PHQ-2 Score 0   Q1: Little interest or pleasure in doing things Not at all   Q2: Feeling down, depressed or hopeless Not at all   PHQ-2 Score 0           Have you ever done Advance Care Planning? (For example, a Health Directive, POLST, or a discussion with a medical provider or your loved ones about your wishes): Yes, patient states has an Advance Care Planning document and will bring a copy to the clinic.       Fall risk  Fallen 2 or more times in the past year?: No  Any fall with injury in the  past year?: No    Cognitive Screening   1) Repeat 3 items (Leader, Season, Table)    2) Clock draw: NORMAL  3) 3 item recall: Recalls 3 objects  Results: 3 items recalled: COGNITIVE IMPAIRMENT LESS LIKELY    Mini-CogTM Copyright JOHN Fried. Licensed by the author for use in [facility]; reprinted with permission (jennie@[facility]). All rights reserved.      Do you have sleep apnea, excessive snoring or daytime drowsiness? : no    Reviewed and updated as needed this visit by clinical staff   Tobacco  Allergies  Meds  Problems             Reviewed and updated as needed this visit by Provider    Allergies  Meds  Problems            Social History     Tobacco Use     Smoking status: Former     Packs/day: 1.00     Years: 15.00     Additional pack years: 0.00     Total pack years: 15.00     Types: Cigarettes     Quit date: 1980     Years since quittin.9     Smokeless tobacco: Never   Substance Use Topics     Alcohol use: Not Currently     Comment: occ, 1-2 glass of wine             2022     6:33 AM   Alcohol Use   Prescreen: >3 drinks/day or >7 drinks/week? No         2021    11:19 AM   AUDIT - Alcohol Use Disorders Identification Test - Reproduced from the World Health Organization Audit 2001 (Second Edition)   1.  How often do you have a drink containing alcohol? 4 or more times a week   2.  How many drinks containing alcohol do you have on a typical day when you are drinking? 1 or 2   3.  How often do you have five or more drinks on one occasion? Never   4.  How often during the last year have you found that you were not able to stop drinking once you had started? Never   5.  How often during the last year have you failed to do what was normally expected of you because of drinking? Never   6.  How often during the last year have you needed a first drink in the morning to get yourself going after a heavy drinking session? Never   7.  How often during the last year have you had a feeling of  guilt or remorse after drinking? Never   8.  How often during the last year have you been unable to remember what happened the night before because of your drinking? Never   9.  Have you or someone else been injured because of your drinking? No   10. Has a relative, friend, doctor or other health care worker been concerned about your drinking or suggested you cut down? No   TOTAL SCORE 4     Do you have a current opioid prescription? No  Do you use any other controlled substances or medications that are not prescribed by a provider? None              Current providers sharing in care for this patient include:   Patient Care Team:  Hemanth Cunningham MD as PCP - General (Internal Medicine)  Sebastien Rowe MD as Assigned Musculoskeletal Provider  Hemanth Cunningham MD as Assigned PCP  Hemanth Cunningham MD as MD (Internal Medicine)  Sebastien Rowe MD as MD (Orthopaedic Surgery)  Oliver James MD as Assigned Surgical Provider  Chun Padilla MD as MD (Internal Medicine)  Sebastien Rowe MD as MD (Orthopaedic Surgery)    The following health maintenance items are reviewed in Epic and correct as of today:  Health Maintenance   Topic Date Due     A1C  12/06/2023     LIPID  12/06/2023     BMP  06/06/2024     MICROALBUMIN  06/06/2024     EYE EXAM  06/14/2024     MEDICARE ANNUAL WELLNESS VISIT  12/11/2024     DIABETIC FOOT EXAM  12/11/2024     FALL RISK ASSESSMENT  12/11/2024     COLORECTAL CANCER SCREENING  01/31/2025     DTAP/TDAP/TD IMMUNIZATION (3 - Td or Tdap) 07/03/2025     PSA  12/06/2025     ADVANCE CARE PLANNING  12/11/2028     HEPATITIS C SCREENING  Completed     PHQ-2 (once per calendar year)  Completed     INFLUENZA VACCINE  Completed     Pneumococcal Vaccine: 65+ Years  Completed     ZOSTER IMMUNIZATION  Completed     RSV VACCINE (Pregnancy & 60+)  Completed     AORTIC ANEURYSM SCREENING (SYSTEM ASSIGNED)  Completed     COVID-19 Vaccine  Completed     IPV IMMUNIZATION  Aged Out     HPV  "IMMUNIZATION  Aged Out     MENINGITIS IMMUNIZATION  Aged Out     RSV MONOCLONAL ANTIBODY  Aged Out     Lab work is in process  Labs reviewed in [facility]        Review of Systems  Constitutional, HEENT, cardiovascular, pulmonary, gi and gu systems are negative, except as otherwise noted.    OBJECTIVE:   BP (!) 144/78   Pulse 82   Temp 96.9  F (36.1  C) (Tympanic)   Resp 16   Ht 1.778 m (5' 10\")   Wt 78.5 kg (173 lb)   SpO2 98%   BMI 24.82 kg/m   Estimated body mass index is 24.82 kg/m  as calculated from the following:    Height as of this encounter: 1.778 m (5' 10\").    Weight as of this encounter: 78.5 kg (173 lb).  Physical Exam  Gen: Alert, NAD.  Neck: No carotid bruits  CV: RRR no m/r/g  Pulm: CTAB, no w/r/r. No increased work of breathing  Msk: No LE edema  Neuro: Grossly intact  Skin: No concerning lesions.  Psychiatric: Normal affect and insight. Does not appear anxious or depressed.    Foot Exam:  12/11/2023  Intact to monofilament bilaterally.  Skin intact without erythema.    Foot/Ankle Musculoskeletal Exam      Diagnostic Test Results:  Labs reviewed in Epic    ASSESSMENT / PLAN:   1. Encounter for Medicare annual wellness exam  - Hemoglobin A1c; Future  - Hemoglobin A1c    2. Type 1 diabetes mellitus with nephropathy (goal A1C<7)  His diabetes has been well-controlled.  He is thinking that it may be that his A1c is a little higher based on his recent CGM however he is estimating in the 6.5 area.  Due for 90-day A1c.  No insulin dose adjustments recommended.  - FOOT EXAM  - Hemoglobin A1c; Future  - Hemoglobin A1c    3. Essential hypertension  His blood pressure is not at goal.  Home blood pressure readings a little bit better in the 135 systolic range.  Discussed options and I recommend increasing amlodipine to 5 mg.  Recommended watching for development of lower extremity edema.  I have asked him to monitor his blood pressures at home and send me a log as well as a nurse only check in 1 " month.  - amLODIPine (NORVASC) 5 MG tablet; Take 1 tablet (5 mg) by mouth daily  Dispense: 90 tablet; Refill: 3    4. Subclinical hypothyroidism  Recently checked this summer and it was normal.  Plan to recheck annually    5. Chronic kidney disease, stage 2 (mild)  Elevated microalbumin consistent with longstanding diabetes mellitus type 1.  Recommend work on glycemic control, hypertension  - Basic metabolic panel; Future  - Basic metabolic panel    6. Screening for prostate cancer  - Prostate Specific Antigen Screen; Future  - Prostate Specific Antigen Screen    7. Hyperlipidemia LDL goal <100  At goal, no change.  - Lipid Profile; Future  - Lipid Profile        Patient currently uses a Dexcom G7 CGM for continuous monitoring of glucose.   Patient injects or boluses insulin 3 or more times daily before breakfast, before lunch, before dinner, and bedtime.  Patient requires frequent adjustments to their insulin treatment regimen on the basis of therapeutic CGM testing results.      Patient Instructions    -- Increase amlodipine from 2.5 to 5 mg   -- Send BP log in 2 weeks   -- Nurse only BP check in 1 month    Check your Blood Pressure   -- Sit in a chair, feet flat on the floor for 5 minutes   -- Avoid caffeine, exercise and smoking for 30 minutes before checking   -- Have your arm at the level of your heart   -- Make sure you have the correct size cuff   -- Write them down, bring your log book in to your appointment   -- Goal blood pressure 120/70     -- Learn about DASH Diet for dietary ways to reduce blood pressure  Google: NIH DASH diet  NIH site: https://www.nhlbi.nih.gov/health-topics/dash-eating-plan  PDF from [facility]: https://www.nhlbi.nih.gov/files/docs/public/heart/new_dash.pdf    What should I do?   -- Reduce salt intake (box, can, package, restaurant, salt shaker)   -- Reduce caffeine   -- Reduce alcohol   -- Work on 5% weight loss   -- Daily physical exercise (American Heart recommends 30-45 minutes of  brisk walking 5 days per week)      Signed, Hemanth Cunningham MD, FAAP, FACP  Internal Medicine & Pediatrics        COUNSELING:  Reviewed preventive health counseling, as reflected in patient instructions        He reports that he quit smoking about 43 years ago. He has a 15.00 pack-year smoking history. He has never used smokeless tobacco.      Appropriate preventive services were discussed with this patient, including applicable screening as appropriate for fall prevention, nutrition, physical activity, Tobacco-use cessation, weight loss and cognition.  Checklist reviewing preventive services available has been given to the patient.    Reviewed patients plan of care and provided an AVS. The Basic Care Plan (routine screening as documented in Health Maintenance) for Eh meets the Care Plan requirement. This Care Plan has been established and reviewed with the Patient.          Hemanth Cunningham MD  [facility]    Identified Health Risks:  I have reviewed Opioid Use Disorder and Substance Use Disorder risk factors and made any needed referrals.

## 2023-12-11 NOTE — PATIENT INSTRUCTIONS
-- Increase amlodipine from 2.5 to 5 mg   -- Send BP log in 2 weeks   -- Nurse only BP check in 1 month    Check your Blood Pressure   -- Sit in a chair, feet flat on the floor for 5 minutes   -- Avoid caffeine, exercise and smoking for 30 minutes before checking   -- Have your arm at the level of your heart   -- Make sure you have the correct size cuff   -- Write them down, bring your log book in to your appointment   -- Goal blood pressure 120/70     -- Learn about DASH Diet for dietary ways to reduce blood pressure  Google: [facility] DASH diet  NIH site: https://www.nhlbi.nih.gov/health-topics/dash-eating-plan  PDF from [facility]: https://www.nhlbi.nih.gov/files/docs/public/heart/new_dash.pdf    What should I do?   -- Reduce salt intake (box, can, package, restaurant, salt shaker)   -- Reduce caffeine   -- Reduce alcohol   -- Work on 5% weight loss   -- Daily physical exercise (American Heart recommends 30-45 minutes of brisk walking 5 days per week)        Patient Education   Personalized Prevention Plan  You are due for the preventive services outlined below.  Your care team is available to assist you in scheduling these services.  If you have already completed any of these items, please share that information with your care team to update in your medical record.  Health Maintenance Due   Topic Date Due    A1C Lab  12/06/2023    Cholesterol Lab  12/06/2023    Diabetic Foot Exam  12/06/2023

## 2023-12-11 NOTE — NURSING NOTE
"Chief Complaint   Patient presents with    Medicare Visit     Annual    Diabetes       Initial BP (!) 144/78   Pulse 82   Temp 96.9  F (36.1  C) (Tympanic)   Resp 16   Ht 1.778 m (5' 10\")   Wt 78.5 kg (173 lb)   SpO2 98%   BMI 24.82 kg/m   Estimated body mass index is 24.82 kg/m  as calculated from the following:    Height as of this encounter: 1.778 m (5' 10\").    Weight as of this encounter: 78.5 kg (173 lb).  Medication Review: complete    The next two questions are to help us understand your food security.  If you are feeling you need any assistance in this area, we have resources available to support you today.          12/11/2023   SDOH- Food Insecurity   Within the past 12 months, did you worry that your food would run out before you got money to buy more? N   Within the past 12 months, did the food you bought just not last and you didn t have money to get more? N         Health Care Directive:  Patient does not have a Health Care Directive or Living Will: Patient states has Advance Directive and will bring in a copy to clinic.    Norma J. Gosselin, LPN      "

## 2023-12-18 ENCOUNTER — MEDICAL CORRESPONDENCE (OUTPATIENT)
Dept: HEALTH INFORMATION MANAGEMENT | Facility: OTHER | Age: 73
End: 2023-12-18
Payer: MEDICARE

## 2024-01-23 ENCOUNTER — MEDICAL CORRESPONDENCE (OUTPATIENT)
Dept: HEALTH INFORMATION MANAGEMENT | Facility: OTHER | Age: 74
End: 2024-01-23
Payer: MEDICARE

## 2024-02-06 ENCOUNTER — MEDICAL CORRESPONDENCE (OUTPATIENT)
Dept: HEALTH INFORMATION MANAGEMENT | Facility: OTHER | Age: 74
End: 2024-02-06
Payer: MEDICARE

## 2024-02-06 DIAGNOSIS — I10 ESSENTIAL HYPERTENSION: ICD-10-CM

## 2024-02-09 RX ORDER — AMLODIPINE BESYLATE 2.5 MG/1
2.5 TABLET ORAL DAILY
Qty: 90 TABLET | Refills: 3 | OUTPATIENT
Start: 2024-02-09

## 2024-02-09 NOTE — TELEPHONE ENCOUNTER
[facility] sent Rx request for the following:      amLODIPine (NORVASC) 2.5 MG tablet (Discontinued) 90 tablet 3 6/6/2023 12/11/2023 No   Sig - Route: Take 1 tablet (2.5 mg) by mouth daily - Oral     Last Prescription Date:     amLODIPine (NORVASC) 5 MG tablet 90 tablet 3 12/11/2023 -- No   Sig - Route: Take 1 tablet (5 mg) by mouth daily - Oral     Pharmacy notified. Lucrecia Lechuga RN .............. 2/9/2024  4:09 PM

## 2024-02-27 ENCOUNTER — MEDICAL CORRESPONDENCE (OUTPATIENT)
Dept: HEALTH INFORMATION MANAGEMENT | Facility: OTHER | Age: 74
End: 2024-02-27
Payer: MEDICARE

## 2024-03-07 ENCOUNTER — MEDICAL CORRESPONDENCE (OUTPATIENT)
Dept: HEALTH INFORMATION MANAGEMENT | Facility: OTHER | Age: 74
End: 2024-03-07
Payer: MEDICARE

## 2024-03-08 DIAGNOSIS — N40.1 BENIGN PROSTATIC HYPERPLASIA WITH URINARY FREQUENCY: Primary | ICD-10-CM

## 2024-03-08 DIAGNOSIS — R35.0 BENIGN PROSTATIC HYPERPLASIA WITH URINARY FREQUENCY: Primary | ICD-10-CM

## 2024-03-13 ENCOUNTER — LAB (OUTPATIENT)
Dept: LAB | Facility: OTHER | Age: 74
End: 2024-03-13
Attending: UROLOGY
Payer: MEDICARE

## 2024-03-13 ENCOUNTER — OFFICE VISIT (OUTPATIENT)
Dept: UROLOGY | Facility: OTHER | Age: 74
End: 2024-03-13
Attending: UROLOGY
Payer: MEDICARE

## 2024-03-13 VITALS
HEART RATE: 80 BPM | RESPIRATION RATE: 20 BRPM | DIASTOLIC BLOOD PRESSURE: 60 MMHG | SYSTOLIC BLOOD PRESSURE: 122 MMHG | WEIGHT: 173 LBS | HEIGHT: 70 IN | BODY MASS INDEX: 24.77 KG/M2 | OXYGEN SATURATION: 99 %

## 2024-03-13 DIAGNOSIS — N52.2 DRUG-INDUCED ERECTILE DYSFUNCTION: ICD-10-CM

## 2024-03-13 DIAGNOSIS — R35.0 BENIGN PROSTATIC HYPERPLASIA WITH URINARY FREQUENCY: ICD-10-CM

## 2024-03-13 DIAGNOSIS — N52.9 ED (ERECTILE DYSFUNCTION) OF ORGANIC ORIGIN: ICD-10-CM

## 2024-03-13 DIAGNOSIS — R97.20 INCREASED PROSTATE SPECIFIC ANTIGEN (PSA) VELOCITY: Primary | ICD-10-CM

## 2024-03-13 DIAGNOSIS — N40.1 BENIGN PROSTATIC HYPERPLASIA WITH URINARY FREQUENCY: ICD-10-CM

## 2024-03-13 LAB
ALBUMIN UR-MCNC: 20 MG/DL
APPEARANCE UR: CLEAR
BILIRUB UR QL STRIP: NEGATIVE
COLOR UR AUTO: ABNORMAL
GLUCOSE UR STRIP-MCNC: NEGATIVE MG/DL
HGB UR QL STRIP: NEGATIVE
KETONES UR STRIP-MCNC: NEGATIVE MG/DL
LEUKOCYTE ESTERASE UR QL STRIP: NEGATIVE
NITRATE UR QL: NEGATIVE
PH UR STRIP: 6 [PH] (ref 5–9)
SP GR UR STRIP: 1.01 (ref 1–1.03)
UROBILINOGEN UR STRIP-MCNC: NORMAL MG/DL

## 2024-03-13 PROCEDURE — G0463 HOSPITAL OUTPT CLINIC VISIT: HCPCS

## 2024-03-13 PROCEDURE — G0463 HOSPITAL OUTPT CLINIC VISIT: HCPCS | Mod: 25

## 2024-03-13 PROCEDURE — 81003 URINALYSIS AUTO W/O SCOPE: CPT | Mod: ZL

## 2024-03-13 PROCEDURE — 99214 OFFICE O/P EST MOD 30 MIN: CPT | Performed by: UROLOGY

## 2024-03-13 RX ORDER — SILDENAFIL 100 MG/1
100 TABLET, FILM COATED ORAL DAILY PRN
Qty: 10 TABLET | Refills: 5 | Status: SHIPPED | OUTPATIENT
Start: 2024-03-13

## 2024-03-13 RX ORDER — TAMSULOSIN HYDROCHLORIDE 0.4 MG/1
0.4 CAPSULE ORAL DAILY
Qty: 30 CAPSULE | Refills: 11 | Status: SHIPPED | OUTPATIENT
Start: 2024-03-13

## 2024-03-13 RX ORDER — SILDENAFIL 100 MG/1
TABLET, FILM COATED ORAL
Qty: 30 TABLET | Refills: 11 | Status: SHIPPED | OUTPATIENT
Start: 2024-03-13 | End: 2024-03-13

## 2024-03-13 ASSESSMENT — PAIN SCALES - GENERAL: PAINLEVEL: NO PAIN (0)

## 2024-03-13 NOTE — PROGRESS NOTES
Chief Complaint: Elevated PSA  .    HPI: Mr. Eh Perry is a 73 year old year old male presenting today March 13, 2024 in follow up for PSA concerns with his PSA doubling in the last year from 1.13 to 2.36 ng/mL.    Denies a family history of prostate cancer.    Urologically a history of left hydrocele managed nonsurgically.  History of ED successfully treated with sildenafil.    He has not been seen since August 2022.    Here today for follow-up.  Past Medical History:   Diagnosis Date    Anemia     CKD (chronic kidney disease) stage 3, GFR 30-59 ml/min (H)     GERD (gastroesophageal reflux disease)     Hepatitis 2011    elevated AST due to excess ETOH use (5 drinks/day)    HTN (hypertension)     Hyperlipidemia LDL goal <100 3/7/2013    MGUS (monoclonal gammopathy of unknown significance)      IgG Lambda. Followed by Dr Rogers    Proteinuria 1/22/2015    PVC (premature ventricular contraction)     Retinopathy due to secondary diabetes (H)     s/p laser therapy left    Subclinical hypothyroidism 6/11/2018    Tobacco abuse     20 pack hx. Quit 1991    Type 1 diabetes mellitus with nephropathy (goal A1C<7) 1978       Past Surgical History:   Procedure Laterality Date    ENDOSCOPIC RELEASE CARPAL TUNNEL Right 10/14/2022    Procedure: Right Endoscopic Carpal Tunnel Release;  Surgeon: Sebastien Rowe MD;  Location: [facility] OR    HAND SURGERY  01/01/2007    contracture release    VOCAL CORD INJECTION      vocal cord nodules       Current Outpatient Medications   Medication Sig Dispense Refill    amLODIPine (NORVASC) 5 MG tablet Take 1 tablet (5 mg) by mouth daily 90 tablet 3    aspirin 81 MG tablet Take 1 tablet by mouth daily.      blood glucose (NO BRAND SPECIFIED) lancets standard Dispense item covered by insurance. Test blood sugar 1 times daily. 100 each 11    blood glucose (NO BRAND SPECIFIED) test strip Dispense item covered by insurance. Test blood sugar 1 times daily. 100 strip 11    blood glucose monitoring  (NO BRAND SPECIFIED) meter device kit Dispense option covered by insurance. Test blood sugar 1 times daily. 1 kit 11    Carboxymethylcellulose Sodium (EYE DROPS OP) For dry eyes      carvedilol (COREG) 6.25 MG tablet Take 1 tablet (6.25 mg) by mouth 2 times daily (with meals) 180 tablet 3    Continuous Blood Gluc  (DEXCOM G7 ) EMERSON Use to read blood sugars as per 's instructions. 1 each 0    Continuous Blood Gluc Sensor (DEXCOM G7 SENSOR) MISC Change every 10 days. 3 each 5    EPINEPHrine (ANY BX GENERIC EQUIV) 0.3 MG/0.3ML injection 2-pack Inject 0.3 mLs (0.3 mg) into the muscle as needed for anaphylaxis INJECT 0.3 MLS INTO THE MUSCLE ONCE FOR 1 DOSE 2 each 3    fish oil-omega-3 fatty acids 1000 MG capsule Take 1-2 g by mouth daily       insulin glargine (LANTUS SOLOSTAR) 100 UNIT/ML pen ADMINISTER 17 UNITS UNDER THE SKIN DAILY 15 mL 11    insulin lispro (HUMALOG KWIKPEN) 100 UNIT/ML (1 unit dial) KWIKPEN ADMINISTER 3 UNITS UNDER THE SKIN BEFORE MEALS PER SLIDING SCALE. MAX OF 20 UNITS PER DAY 15 mL 11    insulin pen needle (B-D U/F) 31G X 8 MM miscellaneous USE WITH INSULIN PEN FOUR TIMES DAILY AS DIRECTED 400 each 11    lisinopril (ZESTRIL) 40 MG tablet Take 1 tablet (40 mg) by mouth daily 90 tablet 3    Multiple Vitamin (MULTI VITAMIN  MENS) TABS Take  by mouth. 30 tablet     omeprazole (PRILOSEC) 20 MG DR capsule Take 1 capsule (20 mg) by mouth daily 90 capsule 3    sildenafil (VIAGRA) 100 MG tablet Take 1/2-1 tablet by mouth 1 hour prior to activity 30 tablet 11    sildenafil (VIAGRA) 50 MG tablet Take 0.5-1 tablets  by mouth  min before  sex. Max use once a day. Never use with nitroglycerin, terazosin or doxazosin. 30 tablet 11    simvastatin (ZOCOR) 10 MG tablet TAKE 1/2 TABLET(5 MG) BY MOUTH AT BEDTIME 45 tablet 3       ALLERGIES: Lipitor [atorvastatin calcium] and Wasp venom protein      REVIEW OF SYSTEMS:    Skin: negative  Eyes: negative  Ears/Nose/Throat:  "negative  Respiratory: No shortness of breath, dyspnea on exertion, cough, or hemoptysis  Cardiovascular: negative  Gastrointestinal: negative  Genitourinary: negative  Musculoskeletal: negative  Neurologic: negative  Psychiatric: negative  Hematologic/Lymphatic/Immunologic: negative  Endocrine: negative     AUA= 9  post void residual = 109 ml  GENERAL PHYSICAL EXAM:   Vitals: /60   Pulse 80   Resp 20   Ht 1.778 m (5' 10\")   Wt 78.5 kg (173 lb)   SpO2 99%   BMI 24.82 kg/m    Body mass index is 24.82 kg/m .    GENERAL: Well groomed, well developed, well nourished male in NAD.  ENT:  ENT exam normal  CV:  Warm extremities   RESPIRATORY: Normal respiratory effort.   GI:  Soft, NT, ND  MS: Moving all 4  NEURO: Alert and oriented x 3.  PSYCH: Normal mood and affect, pleasant and agreeable during interview and exam.    :  penis circ, nl testis, no obvious hydrocele, no hernia     Prostate 35 gms. Smooth     PVR: Residual urine by ultrasound was 109 ml.           RADIOLOGY: The following tests were reviewed: None to review    LABS: The last test results for Mr. Eh Perry were reviewed:  Results for orders placed or performed in visit on 03/13/24 (from the past 24 hour(s))   Urinalysis Macroscopic   Result Value Ref Range    Color Urine Light Yellow Colorless, Straw, Light Yellow, Yellow    Appearance Urine Clear Clear    Glucose Urine Negative Negative mg/dL    Bilirubin Urine Negative Negative    Ketones Urine Negative Negative mg/dL    Specific Gravity Urine 1.010 1.000 - 1.030    Blood Urine Negative Negative    pH Urine 6.0 5.0 - 9.0    Protein Albumin Urine 20 (A) Negative mg/dL    Urobilinogen Urine Normal Normal, 2.0 mg/dL    Nitrite Urine Negative Negative    Leukocyte Esterase Urine Negative Negative       PSA -   Lab Results   Component Value Date    PSA 2.36 12/11/2023    PSA 1.13 12/06/2022    PSA 0.50 01/17/2022    PSA 0.80 08/05/2020    PSA 0.59 01/22/2020    PSA 0.60 01/09/2019    PSA 0.74 " 05/24/2017     BMP -   Recent Labs   Lab Test 12/11/23  1456 10/26/23  1825 06/06/23  1050 12/06/22  0932     --  138 137   POTASSIUM 4.3  --  4.5 4.7   CHLORIDE 102  --  102 101   CO2 25  --  25 28   BUN 14.9  --  22.3 13.2   CR 0.83  --  0.90 0.81   * 145* 128* 95   MARIE 9.7  --  9.3 9.5       CBC -   Recent Labs   Lab Test 05/24/17  1003   WBC 4.4   HGB 14.5          ASSESSMENT:   Elevated PSA velocity  BPH with Luts  Right Hydrocele  ED    PLAN:   PSA may likely be elevated secondary to his prostate enlargement.  Less likely prostate cancer but if velocity continues to rise then may be concerned about possible prostate cancer.    Recommend % free PSA in 3 months.    It is possible that his retention could skew and possibly raise the PSA as well.  I recommend trial of flomax nightly after before bedtime.  Cannot be taken with 4 hours of sildenafil.  Watch for dizziness upon standing. .    Hydrocele not a concern.  No surgical recommendations at this  time.    Recommend using sildenafil on an empty stomach preferably in the morning.    Follow up 3 months with % free PSA.     30 minutes spent on the date of this encounter doing chart review, history and exam, documentation and further activities as noted above.      Chava Reinoso MD  [facility]

## 2024-03-13 NOTE — NURSING NOTE
Review Of Systems  Skin: negative  Eyes: negative  Ears/Nose/Throat: negative  Respiratory: No shortness of breath, dyspnea on exertion, cough, or hemoptysis  Cardiovascular: negative  Gastrointestinal: negative  Genitourinary: negative  Musculoskeletal: negative  Neurologic: negative  Psychiatric: negative  Hematologic/Lymphatic/Immunologic: negative  Endocrine: negative    AUA= 9  post void residual = 109 ml

## 2024-04-14 ENCOUNTER — MYC MEDICAL ADVICE (OUTPATIENT)
Dept: PEDIATRICS | Facility: OTHER | Age: 74
End: 2024-04-14
Payer: MEDICARE

## 2024-04-14 DIAGNOSIS — I10 ESSENTIAL HYPERTENSION: ICD-10-CM

## 2024-04-16 ENCOUNTER — MEDICAL CORRESPONDENCE (OUTPATIENT)
Dept: HEALTH INFORMATION MANAGEMENT | Facility: OTHER | Age: 74
End: 2024-04-16
Payer: MEDICARE

## 2024-04-16 RX ORDER — AMLODIPINE BESYLATE 2.5 MG/1
2.5 TABLET ORAL DAILY
Qty: 90 TABLET | Refills: 3 | Status: SHIPPED | OUTPATIENT
Start: 2024-04-16

## 2024-04-16 NOTE — TELEPHONE ENCOUNTER
This is okay with me.    Signed, Hemanth Cunningham MD, FAAP, FACP  Internal Medicine & Pediatrics

## 2024-04-16 NOTE — TELEPHONE ENCOUNTER
Currently on Amlodipine 5mg daily and having leg swelling.      Wondering about decreasing to 2.5mg before appt on 6/7.    Danny'd up past 2.5mg dose.     Arabella Cameron RN on 4/16/2024 at 9:13 AM

## 2024-04-19 DIAGNOSIS — E10.21 TYPE 1 DIABETES MELLITUS WITH NEPHROPATHY (H): ICD-10-CM

## 2024-04-23 RX ORDER — ACYCLOVIR 400 MG/1
TABLET ORAL
Qty: 3 EACH | Refills: 5 | Status: SHIPPED | OUTPATIENT
Start: 2024-04-23

## 2024-04-23 NOTE — TELEPHONE ENCOUNTER
Continuous Blood Gluc Sensor (DEXCOM G7 SENSOR) MISC     Sig: Change every 10 days.         Last Written Prescription Date:  9/9/23  Last Fill Quantity: 30,   # refills: 5  Last Office Visit: 12/11/23  Future Office visit:    Next 5 appointments (look out 90 days)      Jun 07, 2024 10:00 AM  (Arrive by 9:45 AM)  SHORT with Hemanth Cunningham MD  [facility] ([facility] ) [address]  [place] [address]  [phone]             Routing refill request to provider for review/approval because:  Drug not on the [facility], [facility] or [facility] refill protocol or controlled substance Ivonne Iraheta RN on 4/23/2024 at 4:47 PM

## 2024-05-09 DIAGNOSIS — E10.21 TYPE 1 DIABETES MELLITUS WITH NEPHROPATHY (H): ICD-10-CM

## 2024-05-14 RX ORDER — PEN NEEDLE, DIABETIC 31 GX5/16"
NEEDLE, DISPOSABLE MISCELLANEOUS
Qty: 400 EACH | Refills: 0 | Status: SHIPPED | OUTPATIENT
Start: 2024-05-14

## 2024-05-14 NOTE — TELEPHONE ENCOUNTER
[facility] sent Rx request for the following:      Requested Prescriptions   Pending Prescriptions Disp Refills    B-D U/F 31G X 8 MM insulin pen needle [Pharmacy Med Name: B-D PEN NDL SHRT [id number](5/16) JOAQUIN]       Sig: USE WITH INSULIN PEN FOUR TIMES DAILY AS DIRECTED     Last Prescription Date:   12/6/22  Last Fill Qty/Refills:         400, R-11    Last Office Visit:              12/11/23   Future Office visit:           6/7/24    Per LOV note:    Return in about 6 months (around 6/11/2024), or if symptoms worsen or fail to improve, for diabetes.    Prescription refilled per RN Medication Refill Policy.................... Lucrecia Lechuga RN ....................  5/14/2024   3:06 PM

## 2024-05-16 ENCOUNTER — TELEPHONE (OUTPATIENT)
Dept: PEDIATRICS | Facility: OTHER | Age: 74
End: 2024-05-16
Payer: MEDICARE

## 2024-05-16 NOTE — TELEPHONE ENCOUNTER
Formulary options for Ultra Fine Short Pen Needle 31 GX5/16 Needle Disposable is Techlite Pen Needle, Assure ID Pen Needle.      Na Vora on 5/16/2024 at 9:02 AM

## 2024-05-28 ENCOUNTER — LAB (OUTPATIENT)
Dept: LAB | Facility: OTHER | Age: 74
End: 2024-05-28
Attending: UROLOGY
Payer: MEDICARE

## 2024-05-28 DIAGNOSIS — R97.20 INCREASED PROSTATE SPECIFIC ANTIGEN (PSA) VELOCITY: ICD-10-CM

## 2024-05-28 LAB
PSA FREE MFR SERPL: 10.64 %
PSA FREE SERPL-MCNC: 0.1 NG/ML
PSA SERPL DL<=0.01 NG/ML-MCNC: 0.94 NG/ML (ref 0–6.5)

## 2024-05-28 PROCEDURE — 36415 COLL VENOUS BLD VENIPUNCTURE: CPT | Mod: ZL

## 2024-05-28 PROCEDURE — 84153 ASSAY OF PSA TOTAL: CPT | Mod: ZL

## 2024-05-30 NOTE — RESULT ENCOUNTER NOTE
Mr. Perry, your PSA actually improved and is even better than it was a year ago.  That PSA 5 months ago appears to be erroneous.  Very reassuring.  Dr. Reinoso

## 2024-06-05 DIAGNOSIS — E10.21 TYPE 1 DIABETES MELLITUS WITH NEPHROPATHY (H): ICD-10-CM

## 2024-06-07 ENCOUNTER — OFFICE VISIT (OUTPATIENT)
Dept: PEDIATRICS | Facility: OTHER | Age: 74
End: 2024-06-07
Attending: INTERNAL MEDICINE
Payer: MEDICARE

## 2024-06-07 VITALS
WEIGHT: 173.2 LBS | HEIGHT: 70 IN | BODY MASS INDEX: 24.79 KG/M2 | OXYGEN SATURATION: 98 % | TEMPERATURE: 97.2 F | DIASTOLIC BLOOD PRESSURE: 86 MMHG | RESPIRATION RATE: 16 BRPM | HEART RATE: 78 BPM | SYSTOLIC BLOOD PRESSURE: 138 MMHG

## 2024-06-07 DIAGNOSIS — E10.21 TYPE 1 DIABETES MELLITUS WITH NEPHROPATHY (H): Primary | ICD-10-CM

## 2024-06-07 DIAGNOSIS — Z86.2 HISTORY OF ANEMIA: ICD-10-CM

## 2024-06-07 DIAGNOSIS — I10 ESSENTIAL HYPERTENSION: Chronic | ICD-10-CM

## 2024-06-07 DIAGNOSIS — R06.09 DOE (DYSPNEA ON EXERTION): ICD-10-CM

## 2024-06-07 DIAGNOSIS — D47.2 MGUS (MONOCLONAL GAMMOPATHY OF UNKNOWN SIGNIFICANCE): ICD-10-CM

## 2024-06-07 DIAGNOSIS — N18.2 CHRONIC KIDNEY DISEASE, STAGE 2 (MILD): Chronic | ICD-10-CM

## 2024-06-07 DIAGNOSIS — H91.93 BILATERAL HEARING LOSS, UNSPECIFIED HEARING LOSS TYPE: ICD-10-CM

## 2024-06-07 DIAGNOSIS — M54.2 NECK PAIN: ICD-10-CM

## 2024-06-07 DIAGNOSIS — E03.8 SUBCLINICAL HYPOTHYROIDISM: ICD-10-CM

## 2024-06-07 LAB
ANION GAP SERPL CALCULATED.3IONS-SCNC: 9 MMOL/L (ref 7–15)
BASOPHILS # BLD AUTO: 0 10E3/UL (ref 0–0.2)
BASOPHILS NFR BLD AUTO: 1 %
BUN SERPL-MCNC: 13.2 MG/DL (ref 8–23)
CALCIUM SERPL-MCNC: 9.7 MG/DL (ref 8.8–10.2)
CHLORIDE SERPL-SCNC: 99 MMOL/L (ref 98–107)
CREAT SERPL-MCNC: 0.77 MG/DL (ref 0.67–1.17)
CREAT UR-MCNC: 46.1 MG/DL
DEPRECATED HCO3 PLAS-SCNC: 27 MMOL/L (ref 22–29)
EGFRCR SERPLBLD CKD-EPI 2021: >90 ML/MIN/1.73M2
EOSINOPHIL # BLD AUTO: 0.2 10E3/UL (ref 0–0.7)
EOSINOPHIL NFR BLD AUTO: 4 %
ERYTHROCYTE [DISTWIDTH] IN BLOOD BY AUTOMATED COUNT: 12.4 % (ref 10–15)
GLUCOSE SERPL-MCNC: 163 MG/DL (ref 70–99)
HBA1C MFR BLD: 5.7 % (ref 4–6.2)
HCT VFR BLD AUTO: 40 % (ref 40–53)
HGB BLD-MCNC: 13.9 G/DL (ref 13.3–17.7)
IMM GRANULOCYTES # BLD: 0 10E3/UL
IMM GRANULOCYTES NFR BLD: 0 %
LYMPHOCYTES # BLD AUTO: 1.6 10E3/UL (ref 0.8–5.3)
LYMPHOCYTES NFR BLD AUTO: 30 %
MCH RBC QN AUTO: 32.8 PG (ref 26.5–33)
MCHC RBC AUTO-ENTMCNC: 34.8 G/DL (ref 31.5–36.5)
MCV RBC AUTO: 94 FL (ref 78–100)
MICROALBUMIN UR-MCNC: 291 MG/L
MICROALBUMIN/CREAT UR: 631.24 MG/G CR (ref 0–17)
MONOCYTES # BLD AUTO: 0.5 10E3/UL (ref 0–1.3)
MONOCYTES NFR BLD AUTO: 9 %
NEUTROPHILS # BLD AUTO: 3 10E3/UL (ref 1.6–8.3)
NEUTROPHILS NFR BLD AUTO: 56 %
NRBC # BLD AUTO: 0 10E3/UL
NRBC BLD AUTO-RTO: 0 /100
PLATELET # BLD AUTO: 226 10E3/UL (ref 150–450)
POTASSIUM SERPL-SCNC: 4.6 MMOL/L (ref 3.4–5.3)
RBC # BLD AUTO: 4.24 10E6/UL (ref 4.4–5.9)
SODIUM SERPL-SCNC: 135 MMOL/L (ref 135–145)
TSH SERPL DL<=0.005 MIU/L-ACNC: 2.78 UIU/ML (ref 0.3–4.2)
WBC # BLD AUTO: 5.3 10E3/UL (ref 4–11)

## 2024-06-07 PROCEDURE — 84165 PROTEIN E-PHORESIS SERUM: CPT | Mod: ZL | Performed by: PATHOLOGY

## 2024-06-07 PROCEDURE — 95251 CONT GLUC MNTR ANALYSIS I&R: CPT | Performed by: INTERNAL MEDICINE

## 2024-06-07 PROCEDURE — 84155 ASSAY OF PROTEIN SERUM: CPT | Mod: ZL | Performed by: INTERNAL MEDICINE

## 2024-06-07 PROCEDURE — 36415 COLL VENOUS BLD VENIPUNCTURE: CPT | Mod: ZL | Performed by: INTERNAL MEDICINE

## 2024-06-07 PROCEDURE — 99214 OFFICE O/P EST MOD 30 MIN: CPT | Performed by: INTERNAL MEDICINE

## 2024-06-07 PROCEDURE — G0463 HOSPITAL OUTPT CLINIC VISIT: HCPCS

## 2024-06-07 PROCEDURE — 85048 AUTOMATED LEUKOCYTE COUNT: CPT | Mod: ZL | Performed by: INTERNAL MEDICINE

## 2024-06-07 PROCEDURE — 80048 BASIC METABOLIC PNL TOTAL CA: CPT | Mod: ZL | Performed by: INTERNAL MEDICINE

## 2024-06-07 PROCEDURE — 84443 ASSAY THYROID STIM HORMONE: CPT | Mod: ZL | Performed by: INTERNAL MEDICINE

## 2024-06-07 PROCEDURE — 82784 ASSAY IGA/IGD/IGG/IGM EACH: CPT | Mod: ZL | Performed by: INTERNAL MEDICINE

## 2024-06-07 PROCEDURE — 90480 ADMN SARSCOV2 VAC 1/ONLY CMP: CPT

## 2024-06-07 PROCEDURE — 83521 IG LIGHT CHAINS FREE EACH: CPT | Mod: ZL | Performed by: INTERNAL MEDICINE

## 2024-06-07 PROCEDURE — 84165 PROTEIN E-PHORESIS SERUM: CPT | Mod: 26 | Performed by: PATHOLOGY

## 2024-06-07 PROCEDURE — 82570 ASSAY OF URINE CREATININE: CPT | Mod: ZL | Performed by: INTERNAL MEDICINE

## 2024-06-07 PROCEDURE — 83036 HEMOGLOBIN GLYCOSYLATED A1C: CPT | Mod: ZL | Performed by: INTERNAL MEDICINE

## 2024-06-07 RX ORDER — PROCHLORPERAZINE 25 MG/1
SUPPOSITORY RECTAL
OUTPATIENT
Start: 2024-06-07

## 2024-06-07 ASSESSMENT — PAIN SCALES - GENERAL: PAINLEVEL: SEVERE PAIN (7)

## 2024-06-07 NOTE — TELEPHONE ENCOUNTER
[facility] sent Rx request for the following:      Requested Prescriptions   Pending Prescriptions Disp Refills    Continuous Glucose Transmitter (DEXCOM G6 TRANSMITTER) MISC [Pharmacy Med Name: DEXCOM G6 TRANSMITTER]       Sig: CHANGE EVERY 3 MONTHS       Diabetic Supplies Protocol Failed - 6/7/2024  9:44 AM   Last Prescription Date:     Last Fill Qty/Refills:         , R-    Last Office Visit:              12/11/23   Future Office visit:             Next 5 appointments (look out 90 days)      Jun 07, 2024 10:00 AM  (Arrive by 9:45 AM)  SHORT with Hemanth Cunningham MD  [facility] ([facility] ) [address]  [place] [address] [phone]           Patient is prescribed the dexcom G7.  Nadira Shaffer RN on 6/7/2024 at 9:45 AM

## 2024-06-07 NOTE — PROGRESS NOTES
Assessment & Plan   1. Type 1 diabetes mellitus with nephropathy (H)  Continuous Glucose Monitor (CGM) Professional interpretation procedural note  I personally reviewed more than 72 hours of CGM data for Mr. Eh Perry 6/7/2024.     Recommendations:  Based on the patterns and trends, the following recommendations are made today:   -- Medication changes, if applicable outlined below.   -- Close follow-up is recommended for in person follow-up and review, with ongoing therapy adjustments.      No change to current insulin regiment.  Anticipate his A1c will be very well-controlled given his very high in range percentage of upper 80s percent.    - Hemoglobin A1c; Future  - Albumin Random Urine Quantitative with Creat Ratio; Future  - Basic metabolic panel; Future  - Hemoglobin A1c  - Albumin Random Urine Quantitative with Creat Ratio  - Basic metabolic panel    2. Chronic kidney disease, stage 2 (mild)  At goal, no change.  - Albumin Random Urine Quantitative with Creat Ratio; Future  - Basic metabolic panel; Future  - Albumin Random Urine Quantitative with Creat Ratio  - Basic metabolic panel    3. Essential hypertension  At goal, no change.  - Basic metabolic panel; Future  - Basic metabolic panel    4. Subclinical hypothyroidism  Only had 1 elevated TSH several years ago.  However at high risk for autoimmune thyroid disease given con, tense diabetes mellitus type 1.  Recommend annual TSH, due today.  - TSH with free T4 reflex; Future  - TSH with free T4 reflex    5. History of anemia  6. ENNIS (dyspnea on exertion)  7. MGUS (monoclonal gammopathy of unknown significance)  He has had a couple of episodes where he feels slightly short of breath briefly when he goes up the stairs.  It resolves instantly.  No chest pains or heart palpitations.  He felt this way in the past when he had some anemia.  He was told he had the very slightest of an elevated immunoglobulin level.  Recommend repeat testing today.  We discussed  the possibility of stress testing and the patient declined at this time.  If symptoms persist or worsen would recommend stress testing and PFTs, recommend repeat evaluation at that time.  - CBC with Platelets & Differential; Future  - Kappa and lambda light chain; Future  - Protein electrophoresis; Future  - Immunoglobulins A G and M; Future  - CBC with Platelets & Differential  - Kappa and lambda light chain  - Protein electrophoresis  - Immunoglobulins A G and M    8. Neck pain  He has been having some pain in his neck.  Its predominantly on the right side of the neck without significant radiation.  Differential diagnosis includes cervical foraminal stenoses, cervical facet arthropathy, cervical degenerative disc disease, others.  Recommend PT and OT.  Considered expert consultation if symptoms persist  - Physical Therapy  Referral; Future  - Occupational Therapy  Referral; Future    9. Bilateral hearing loss, unspecified hearing loss type  His wife noticed his hearing is reduced.  I recommend an audiology consult.  - Adult Audiology  Referral; Future      Patient Instructions    -- Try to connect with Dexcom Clarity at home:   [facility] hospital invites you to share your continuous glucose monitoring (CGM) data using Dexcom Clarity.    Dexcom Clarity displays your CGM data in graphs so you and your clinic can view patterns, trends, and statistics anytime.    To start sharing data with this clinic, enter the clinic code in your Clarity vicki, [email or web address], or at [email or web address]    Clinic code: [email or web address]        Aspects of Diabetes we can improve:  Hemoglobin A1c Lab Results   Component Value Date    A1C 6.2 12/11/2023    A1C 5.7 06/06/2023    A1C 6.1 12/06/2022    A1C 5.9 06/09/2022    A1C 6.0 01/17/2022    A1C 6.0 01/13/2021    A1C 6.0 08/05/2020    A1C 6.1 01/22/2020    A1C 6.1 07/17/2019    A1C 6.2 11/23/2018    Goal range is under 8. Best is 6.5 to 7   Blood  Pressure 138/86 Goal to keep less than 140/90   Tobacco  reports that he quit smoking about 44 years ago. His smoking use included cigarettes. He started smoking about 59 years ago. He has a 15 pack-year smoking history. He has never used smokeless tobacco. Goal to abstain from tobacco   Aspirin yes Aspirin reduces risk of heart disease and stroke   ACE/ARB lisinopril These medications reduce risk of kidney disease   Cholesterol simvastatin Statins reduce risk of heart disease and stroke   Eye Exam annual Annual diabetic eye exam   Healthy weight Body mass index is 24.85 kg/m . Goal BMI under 30, best is under 25.      -- I'm trying to exercise daily (goal at least 20 min/day) with moderate aerobic activity   -- Eat healthy (resources from ADA at http://www.diabetes.org/)   -- I'm taking good care of my feet. Consider seeing the Podiatrist   -- Check blood sugars as directed, record in log book and bring to every appointment   -- Goal sugar before breakfast: under 140   -- Goal sugar 2 hours after supper: under 170   -- Next diabetes lab draw: 6 months   -- Next diabetes office visit: 6 months        Hemanth Perales MD, FAAP, FACP  Internal Medicine & Pediatrics      Patient Instructions    -- Try to connect with Dexcom Clarity at home:   [facility] hospital invites you to share your continuous glucose monitoring (CGM) data using Dexcom Clarity.    Dexcom Clarity displays your CGM data in graphs so you and your clinic can view patterns, trends, and statistics anytime.    To start sharing data with this clinic, enter the clinic code in your Clarity vicki, Clarity web, or at [email or web address]    Clinic code: [email or web address]        Aspects of Diabetes we can improve:  Hemoglobin A1c Lab Results   Component Value Date    A1C 6.2 12/11/2023    A1C 5.7 06/06/2023    A1C 6.1 12/06/2022    A1C 5.9 06/09/2022    A1C 6.0 01/17/2022    A1C 6.0 01/13/2021    A1C 6.0 08/05/2020    A1C 6.1 01/22/2020    A1C  "6.1 07/17/2019    A1C 6.2 11/23/2018    Goal range is under 8. Best is 6.5 to 7   Blood Pressure 138/86 Goal to keep less than 140/90   Tobacco  reports that he quit smoking about 44 years ago. His smoking use included cigarettes. He started smoking about 59 years ago. He has a 15 pack-year smoking history. He has never used smokeless tobacco. Goal to abstain from tobacco   Aspirin yes Aspirin reduces risk of heart disease and stroke   ACE/ARB lisinopril These medications reduce risk of kidney disease   Cholesterol simvastatin Statins reduce risk of heart disease and stroke   Eye Exam annual Annual diabetic eye exam   Healthy weight Body mass index is 24.85 kg/m . Goal BMI under 30, best is under 25.      -- I'm trying to exercise daily (goal at least 20 min/day) with moderate aerobic activity   -- Eat healthy (resources from ADA at http://www.diabetes.org/)   -- I'm taking good care of my feet. Consider seeing the Podiatrist   -- Check blood sugars as directed, record in log book and bring to every appointment   -- Goal sugar before breakfast: under 140   -- Goal sugar 2 hours after supper: under 170   -- Next diabetes lab draw: 6 months   -- Next diabetes office visit: 6 months        Return in about 6 months (around 12/7/2024), or if symptoms worsen or fail to improve, for diabetes.    Signed, Hemanth Cunningham MD, FAAP, FACP  Internal Medicine & Pediatrics    Subjective   Eh Perry is a 74 year old male who presents for Diabetes and Recheck Medication.    Objective   Vitals: /86   Pulse 78   Temp 97.2  F (36.2  C) (Tympanic)   Resp 16   Ht 1.778 m (5' 10\")   Wt 78.6 kg (173 lb 3.2 oz)   SpO2 98%   BMI 24.85 kg/m      HEENT: Tympanic membranes normal no cerumen  Neck: No pain with axial loading.  Normal range of motion  MSK: Negative can sign bilaterally    Review and Analysis of Data   I personally reviewed the following:  External notes: No  Results: Yes diabetic lab, previous heme lab  Use of an " independent historian: No  Independent review of a test performed by another physician: No  Discussion of management with another physician: No  Moderate risk of morbidity from additional diagnostic testing and/or treatment.    Patient currently uses a Dexcom CGM for continuous monitoring of glucose.   Patient injects or boluses insulin 1 or more times daily.  Patient requires frequent adjustments to their insulin treatment regimen on the basis of therapeutic CGM testing results.

## 2024-06-07 NOTE — PATIENT INSTRUCTIONS
-- Try to connect with Dexcom Clarity at home:   [facility] hospital invites you to share your continuous glucose monitoring (CGM) data using Dexcom Clarity.    Dexcom BlackStratus displays your CGM data in graphs so you and your clinic can view patterns, trends, and statistics anytime.    To start sharing data with this clinic, enter the clinic code in your Clarity vicki, Clarity web, or at [email or web address]    Clinic code: [email or web address]        Aspects of Diabetes we can improve:  Hemoglobin A1c Lab Results   Component Value Date    A1C 6.2 12/11/2023    A1C 5.7 06/06/2023    A1C 6.1 12/06/2022    A1C 5.9 06/09/2022    A1C 6.0 01/17/2022    A1C 6.0 01/13/2021    A1C 6.0 08/05/2020    A1C 6.1 01/22/2020    A1C 6.1 07/17/2019    A1C 6.2 11/23/2018    Goal range is under 8. Best is 6.5 to 7   Blood Pressure 138/86 Goal to keep less than 140/90   Tobacco  reports that he quit smoking about 44 years ago. His smoking use included cigarettes. He started smoking about 59 years ago. He has a 15 pack-year smoking history. He has never used smokeless tobacco. Goal to abstain from tobacco   Aspirin yes Aspirin reduces risk of heart disease and stroke   ACE/ARB lisinopril These medications reduce risk of kidney disease   Cholesterol simvastatin Statins reduce risk of heart disease and stroke   Eye Exam annual Annual diabetic eye exam   Healthy weight Body mass index is 24.85 kg/m . Goal BMI under 30, best is under 25.      -- I'm trying to exercise daily (goal at least 20 min/day) with moderate aerobic activity   -- Eat healthy (resources from ADA at http://www.diabetes.org/)   -- I'm taking good care of my feet. Consider seeing the Podiatrist   -- Check blood sugars as directed, record in log book and bring to every appointment   -- Goal sugar before breakfast: under 140   -- Goal sugar 2 hours after supper: under 170   -- Next diabetes lab draw: 6 months   -- Next diabetes office visit: 6 months

## 2024-06-07 NOTE — NURSING NOTE
"Chief Complaint   Patient presents with    Diabetes    Recheck Medication       Initial /86   Pulse 78   Temp 97.2  F (36.2  C) (Tympanic)   Resp 16   Ht 1.778 m (5' 10\")   Wt 78.6 kg (173 lb 3.2 oz)   SpO2 98%   BMI 24.85 kg/m   Estimated body mass index is 24.85 kg/m  as calculated from the following:    Height as of this encounter: 1.778 m (5' 10\").    Weight as of this encounter: 78.6 kg (173 lb 3.2 oz).  Medication Review: complete    The next two questions are to help us understand your food security.  If you are feeling you need any assistance in this area, we have resources available to support you today.          6/7/2024   SDOH- Food Insecurity   Within the past 12 months, did you worry that your food would run out before you got money to buy more? N   Within the past 12 months, did the food you bought just not last and you didn t have money to get more? N         Health Care Directive:  Patient does not have a Health Care Directive or Living Will: Patient states has Advance Directive and will bring in a copy to clinic.    Norma J. Gosselin, LPN      "

## 2024-06-08 LAB — TOTAL PROTEIN SERUM FOR ELP: 7.2 G/DL (ref 6.4–8.3)

## 2024-06-10 LAB
ALBUMIN SERPL ELPH-MCNC: 4.6 G/DL (ref 3.7–5.1)
ALPHA1 GLOB SERPL ELPH-MCNC: 0.2 G/DL (ref 0.2–0.4)
ALPHA2 GLOB SERPL ELPH-MCNC: 0.5 G/DL (ref 0.5–0.9)
B-GLOBULIN SERPL ELPH-MCNC: 0.7 G/DL (ref 0.6–1)
GAMMA GLOB SERPL ELPH-MCNC: 1.2 G/DL (ref 0.7–1.6)
IGA SERPL-MCNC: 359 MG/DL (ref 84–499)
IGG SERPL-MCNC: 1227 MG/DL (ref 610–1616)
IGM SERPL-MCNC: 139 MG/DL (ref 35–242)
KAPPA LC FREE SER-MCNC: 2.39 MG/DL (ref 0.33–1.94)
KAPPA LC FREE/LAMBDA FREE SER NEPH: 1.31 {RATIO} (ref 0.26–1.65)
LAMBDA LC FREE SERPL-MCNC: 1.83 MG/DL (ref 0.57–2.63)
M PROTEIN SERPL ELPH-MCNC: 0 G/DL
PROT PATTERN SERPL ELPH-IMP: NORMAL

## 2024-06-11 RX ORDER — TAMSULOSIN HYDROCHLORIDE 0.4 MG/1
CAPSULE ORAL
Qty: 90 CAPSULE | OUTPATIENT
Start: 2024-06-11

## 2024-06-12 ENCOUNTER — TRANSFERRED RECORDS (OUTPATIENT)
Dept: HEALTH INFORMATION MANAGEMENT | Facility: CLINIC | Age: 74
End: 2024-06-12
Payer: MEDICARE

## 2024-06-12 LAB — RETINOPATHY: POSITIVE

## 2024-06-14 ENCOUNTER — TELEPHONE (OUTPATIENT)
Dept: PEDIATRICS | Facility: OTHER | Age: 74
End: 2024-06-14
Payer: MEDICARE

## 2024-06-14 ENCOUNTER — DOCUMENTATION ONLY (OUTPATIENT)
Dept: OTHER | Facility: CLINIC | Age: 74
End: 2024-06-14
Payer: MEDICARE

## 2024-06-14 NOTE — TELEPHONE ENCOUNTER
The patient called and stated he needs clinical notes from last office visit with Dr Cunningham regarding  is DexCom 7 prescription that needs to be sent to [facility].  The patient needs to have this by the middle of next week.  Please advise.

## 2024-06-18 DIAGNOSIS — R35.0 BENIGN PROSTATIC HYPERPLASIA WITH URINARY FREQUENCY: Primary | ICD-10-CM

## 2024-06-18 DIAGNOSIS — N40.1 BENIGN PROSTATIC HYPERPLASIA WITH URINARY FREQUENCY: Primary | ICD-10-CM

## 2024-06-18 NOTE — TELEPHONE ENCOUNTER
I spoke to Xena in [facility] and she states she faxed Hemanth Cunningham MD notes yesterday.  Lili Chamorro CMA ([facility])......................6/18/2024  9:36 AM

## 2024-06-24 ENCOUNTER — VIRTUAL VISIT (OUTPATIENT)
Dept: UROLOGY | Facility: OTHER | Age: 74
End: 2024-06-24
Attending: UROLOGY
Payer: MEDICARE

## 2024-06-24 ENCOUNTER — LAB (OUTPATIENT)
Dept: LAB | Facility: OTHER | Age: 74
End: 2024-06-24
Attending: UROLOGY
Payer: MEDICARE

## 2024-06-24 VITALS
OXYGEN SATURATION: 98 % | BODY MASS INDEX: 24.77 KG/M2 | RESPIRATION RATE: 18 BRPM | WEIGHT: 173 LBS | HEART RATE: 69 BPM | HEIGHT: 70 IN

## 2024-06-24 DIAGNOSIS — N40.1 BENIGN PROSTATIC HYPERPLASIA WITH URINARY FREQUENCY: ICD-10-CM

## 2024-06-24 DIAGNOSIS — N52.9 ED (ERECTILE DYSFUNCTION) OF ORGANIC ORIGIN: ICD-10-CM

## 2024-06-24 DIAGNOSIS — R35.0 BENIGN PROSTATIC HYPERPLASIA WITH URINARY FREQUENCY: ICD-10-CM

## 2024-06-24 DIAGNOSIS — R97.20 INCREASED PROSTATE SPECIFIC ANTIGEN (PSA) VELOCITY: Primary | ICD-10-CM

## 2024-06-24 DIAGNOSIS — N43.3 LEFT HYDROCELE: ICD-10-CM

## 2024-06-24 LAB
ALBUMIN UR-MCNC: 10 MG/DL
APPEARANCE UR: CLEAR
BILIRUB UR QL STRIP: NEGATIVE
COLOR UR AUTO: ABNORMAL
GLUCOSE UR STRIP-MCNC: NEGATIVE MG/DL
HGB UR QL STRIP: NEGATIVE
KETONES UR STRIP-MCNC: NEGATIVE MG/DL
LEUKOCYTE ESTERASE UR QL STRIP: NEGATIVE
NITRATE UR QL: NEGATIVE
PH UR STRIP: 6.5 [PH] (ref 5–9)
SP GR UR STRIP: 1.01 (ref 1–1.03)
UROBILINOGEN UR STRIP-MCNC: NORMAL MG/DL

## 2024-06-24 PROCEDURE — 81003 URINALYSIS AUTO W/O SCOPE: CPT | Mod: ZL

## 2024-06-24 PROCEDURE — 99213 OFFICE O/P EST LOW 20 MIN: CPT | Mod: 95 | Performed by: UROLOGY

## 2024-06-24 PROCEDURE — G0463 HOSPITAL OUTPT CLINIC VISIT: HCPCS | Mod: 25,GT

## 2024-06-24 PROCEDURE — 51798 US URINE CAPACITY MEASURE: CPT | Performed by: UROLOGY

## 2024-06-24 ASSESSMENT — PAIN SCALES - GENERAL: PAINLEVEL: NO PAIN (0)

## 2024-06-24 NOTE — NURSING NOTE
"Chief Complaint   Patient presents with    Follow Up      3 month follow up , Elevated PSA       Initial Pulse 69   Resp 18   Ht 1.778 m (5' 10\")   Wt 78.5 kg (173 lb)   SpO2 98%   BMI 24.82 kg/m   Estimated body mass index is 24.82 kg/m  as calculated from the following:    Height as of this encounter: 1.778 m (5' 10\").    Weight as of this encounter: 78.5 kg (173 lb).  Medication Reconciliation: complete    AUA= 17  post void residual = 22 ml     Chantal Menezes LPN    "

## 2024-06-24 NOTE — PROGRESS NOTES
Type of service:  Video Visit   Video Visit Start Time: 0955  Video Visit End Time: 1015    Originating Location (pt. Location): [facility]  Distant Location (provider location): [place], [place]  Platform used for Video Visit: Epic Virtual Visit Platform    I have reviewed the note as documented above.  This accurately captures the substance of my virtual visit with the patient. The patient states an understanding and is agreeable with the plan.   Chava Reinoso MD   Urology     Chief Complaint: Follow Up ( 3 month follow up , Elevated PSA)  .    HPI: Mr. Eh Perry is a 74 year old year old male presenting today June 24, 2024 virtually in follow up for evaluation of an elevated PSA velocity.  He was seen in March 2024 where his PSA had doubled to 2.36 ng/mL from 1.13 a year previous.    Exam was reassuring with an enlarged yet smooth and benign feeling prostate.    We ended up ordering another PSA which came back normal at 0.94 ng/mL much improved.    Here today in follow-up.  Denies any complaints. Urinating better with tamsulosin.      He has a PMH significant for hypertension, diabetes on insulin and BPH on tamsulosin.  No family history of prostate cancer.    Current Outpatient Medications   Medication Sig Dispense Refill    amLODIPine (NORVASC) 2.5 MG tablet Take 1 tablet (2.5 mg) by mouth daily 90 tablet 3    aspirin 81 MG tablet Take 1 tablet by mouth daily.      blood glucose (NO BRAND SPECIFIED) lancets standard Dispense item covered by insurance. Test blood sugar 1 times daily. 100 each 11    blood glucose (NO BRAND SPECIFIED) test strip Dispense item covered by insurance. Test blood sugar 1 times daily. 100 strip 11    blood glucose monitoring (NO BRAND SPECIFIED) meter device kit Dispense option covered by insurance. Test blood sugar 1 times daily. 1 kit 11    Carboxymethylcellulose Sodium (EYE DROPS OP) For dry eyes      carvedilol (COREG) 6.25 MG tablet Take 1 tablet (6.25  mg) by mouth 2 times daily (with meals) 180 tablet 3    Continuous Blood Gluc  (DEXCOM G7 ) EMERSON Use to read blood sugars as per 's instructions. 1 each 0    Continuous Glucose Sensor (DEXCOM G7 SENSOR) MISC CHANGE EVERY 10 DAYS 3 each 5    EPINEPHrine (ANY BX GENERIC EQUIV) 0.3 MG/0.3ML injection 2-pack Inject 0.3 mLs (0.3 mg) into the muscle as needed for anaphylaxis INJECT 0.3 MLS INTO THE MUSCLE ONCE FOR 1 DOSE 2 each 3    fish oil-omega-3 fatty acids 1000 MG capsule Take 1-2 g by mouth daily       insulin glargine (LANTUS SOLOSTAR) 100 UNIT/ML pen ADMINISTER 17 UNITS UNDER THE SKIN DAILY 15 mL 11    insulin lispro (HUMALOG KWIKPEN) 100 UNIT/ML (1 unit dial) KWIKPEN ADMINISTER 3 UNITS UNDER THE SKIN BEFORE MEALS PER SLIDING SCALE. MAX OF 20 UNITS PER DAY 15 mL 11    insulin pen needle (B-D U/F) 31G X 8 MM miscellaneous USE WITH INSULIN PEN FOUR TIMES DAILY 400 each 0    lisinopril (ZESTRIL) 40 MG tablet Take 1 tablet (40 mg) by mouth daily 90 tablet 3    Multiple Vitamin (MULTI VITAMIN  MENS) TABS Take  by mouth. 30 tablet     omeprazole (PRILOSEC) 20 MG DR capsule Take 1 capsule (20 mg) by mouth daily 90 capsule 3    sildenafil (VIAGRA) 100 MG tablet Take 1 tablet (100 mg) by mouth daily as needed 10 tablet 5    simvastatin (ZOCOR) 10 MG tablet TAKE 1/2 TABLET(5 MG) BY MOUTH AT BEDTIME 45 tablet 3    tamsulosin (FLOMAX) 0.4 MG capsule Take 1 capsule (0.4 mg) by mouth daily Watch for dizziness upon standing. 30 capsule 11    amLODIPine (NORVASC) 5 MG tablet Take 1 tablet (5 mg) by mouth daily (Patient not taking: Reported on 6/7/2024) 90 tablet 3       ALLERGIES: Lipitor [atorvastatin calcium] and Wasp venom protein     GENERAL PHYSICAL EXAM:   Vitals: There were no vitals taken for this visit.  There is no height or weight on file to calculate BMI.    GENERAL: Well groomed, well developed, well nourished male in NAD.  ENT:  ENT exam normal  RESPIRATORY: Normal respiratory effort.    MS: Visibly moving all four   NEURO: Alert and oriented x 3.  PSYCH: Normal mood and affect, pleasant and agreeable during interview and exam.    PVR: Residual urine by ultrasound was 22ml.    AUA= 17     RADIOLOGY: The following tests were reviewed: None    LABS: The last test results for Mr. Eh Perry were reviewed:  No results found for this or any previous visit (from the past 24 hour(s)).    PSA -   Lab Results   Component Value Date    PSA 0.94 05/28/2024    PSA 2.36 12/11/2023    PSA 1.13 12/06/2022    PSA 0.50 01/17/2022    PSA 0.80 08/05/2020    PSA 0.59 01/22/2020    PSA 0.60 01/09/2019    PSA 0.74 05/24/2017     BMP -   Recent Labs   Lab Test 06/07/24  1040 12/11/23  1456 10/26/23  1825 06/06/23  1050    138  --  138   POTASSIUM 4.6 4.3  --  4.5   CHLORIDE 99 102  --  102   CO2 27 25  --  25   BUN 13.2 14.9  --  22.3   CR 0.77 0.83  --  0.90   * 148* 145* 128*   MARIE 9.7 9.7  --  9.3       CBC -   Recent Labs   Lab Test 06/07/24  1040 05/24/17  1003   WBC 5.3 4.4   HGB 13.9 14.5    260       ASSESSMENT:   Elevated PSA velocity improved  Left hydrocele  ED    PLAN:   Patient is doing well and very stable.  PSA was very reassuring.  Percent free PSA has no relevance unless his PSA were between 4 and 10.    We discussed prostate cancer screening which would not be indicated at his age.  He would like to continue to check a PSA with Dr. Cunningham and will do so in December.    I would like to see him in March 2025 for an in person exam of his prostate.    All questions were addressed    21 minutes spent on the date of this encounter doing chart review, history and exam, documentation and further activities as noted above.        Chava Reinoso MD  [facility]

## 2024-07-05 DIAGNOSIS — E10.21 TYPE 1 DIABETES MELLITUS WITH NEPHROPATHY (H): ICD-10-CM

## 2024-07-10 NOTE — TELEPHONE ENCOUNTER
Sandra sent Rx request for the following:      Requested Prescriptions   Pending Prescriptions Disp Refills    LANTUS SOLOSTAR 100 UNIT/ML soln [Pharmacy Med Name: LANTUS SOLOSTAR PEN INJ 3ML] 15 mL 11     Sig: ADMINISTER 17 UNITS UNDER THE SKIN DAILY       Insulin Protocol Failed - 7/10/2024  9:56 AM    insulin lispro (HUMALOG KWIKPEN) 100 UNIT/ML (1 unit dial) KWIKPEN [Pharmacy Med Name: INSULIN LISPRO 100U/ML KWIKPEN 3ML] 15 mL 11     Sig: ADMINISTER 3 UNITS UNDER THE SKIN BEFORE MEALS PER SLIDING SCALE. MAX OF 20 UNITS PER DAY       Insulin Protocol Failed - 7/10/2024  9:56 AM    ACCU-CHEK GUIDE test strip [Pharmacy Med Name: ACCU-CHEK GUIDE TEST STRIPS 100S] 100 strip 11     Sig: TESTING 6 TIMES DAILY       Diabetic Supplies Protocol Passed - 7/10/2024  9:56 AM   Last Prescription Date:   6/6/23  Last Fill Qty/Refills:         100, 15 ml R-11    Last Office Visit:              6/7/24   Future Office visit:            none  Nadira Shaffer RN on 7/10/2024 at 9:57 AM

## 2024-07-11 RX ORDER — INSULIN GLARGINE 100 [IU]/ML
INJECTION, SOLUTION SUBCUTANEOUS
Qty: 15 ML | Refills: 11 | Status: SHIPPED | OUTPATIENT
Start: 2024-07-11

## 2024-07-11 RX ORDER — BLOOD SUGAR DIAGNOSTIC
STRIP MISCELLANEOUS
Qty: 100 STRIP | Refills: 11 | Status: SHIPPED | OUTPATIENT
Start: 2024-07-11

## 2024-07-11 RX ORDER — INSULIN LISPRO 100 [IU]/ML
INJECTION, SOLUTION INTRAVENOUS; SUBCUTANEOUS
Qty: 15 ML | Refills: 11 | Status: SHIPPED | OUTPATIENT
Start: 2024-07-11

## 2024-07-12 ENCOUNTER — THERAPY VISIT (OUTPATIENT)
Dept: PHYSICAL THERAPY | Facility: OTHER | Age: 74
End: 2024-07-12
Attending: INTERNAL MEDICINE
Payer: MEDICARE

## 2024-07-12 DIAGNOSIS — M54.2 NECK PAIN: ICD-10-CM

## 2024-07-12 PROCEDURE — 97110 THERAPEUTIC EXERCISES: CPT | Mod: GP,PO

## 2024-07-12 PROCEDURE — 97140 MANUAL THERAPY 1/> REGIONS: CPT | Mod: GP,PO

## 2024-07-12 PROCEDURE — 97161 PT EVAL LOW COMPLEX 20 MIN: CPT | Mod: GP,PO

## 2024-07-12 NOTE — PROGRESS NOTES
PHYSICAL THERAPY EVALUATION  Type of Visit: Evaluation       Fall Risk Screen:  Fall screen completed by: PT  Have you fallen 2 or more times in the past year?: No  Have you fallen and had an injury in the past year?: No  Is patient a fall risk?: No    Subjective       Presenting condition or subjective complaint: Posterior neck pain  Patient reports he has been experiencing right-sided neck pain for quite some time.  Reports pain is typically worse when he rotates his head to the right.  He notes difficulty at times when turning his head while driving.  Patient reports when symptoms are most aggravated he will experience some radiating symptoms into the right upper trapezius region.  He denies any arm/hand radicular symptoms.  He denies any headaches.  He reports symptoms are typically worse in the morning and improve as the day goes on.    Date of onset: 06/07/24    Relevant medical history: Diabetes; High blood pressure; Kidney disease   Dates & types of surgery:  Reviewed medical chart    Prior diagnostic imaging/testing results:     Reviewed medical chart  Prior therapy history for the same diagnosis, illness or injury: No      Prior Level of Function  No prior function limitations reported.    Living Environment  Social support: With a significant other or spouse   Type of home: House   Stairs to enter the home: Yes 3 Is there a railing: No     Ramp: No   Stairs inside the home: Yes 13 Is there a railing: Yes     Help at home: None  Equipment owned:       Employment: No    Hobbies/Interests: Golf, music, walking    Patient goals for therapy: Move my head comfortably         Objective   CERVICAL SPINE EVALUATION  PAIN: Pain Level at Rest: 2/10  Pain Level with Use: 8/10  Pain Location: Right side of neck  Pain Quality: Aching  Pain Frequency: constant  Pain is Exacerbated By: Turning head to the right  Pain is Relieved By: Changing positions  INTEGUMENTARY (edema, incisions): WNL  POSTURE:  Rounded shoulder  posture in sitting.    ROM:   Cervical range of motion:  Flexion 30 degrees-pulling on the right  Extension: 35 degrees-no pain  Left sidebendin degrees-pulling on the right  Right sidebendin degrees-no pain  Left cervical rotation: 45 degrees-no pain   Right cervical rotation: 40 degrees-pain on the right    MYOTOMES:    Left Right   C1-2 (Neck Flexion) 5 5   C3 (Neck Side Bend)  5 5   C4 (Shrug) 5 5   C5 (Deltoid) 5 5   C6 (Biceps) 5 5   C7 (Triceps) 5 5   C8 (Thumb Ext) 5 5   T1 (Intrinsics) 5 5       NEURAL TENSION: Negative for symptoms  FLEXIBILITY: Decreased right upper trapezius and levator scapulae flexibility    PALPATION: Tightness over the right upper trapezius and levator scapulae muscles noted.  SPINAL SEGMENTAL CONCLUSIONS: Right cervical rotation improved with manual therapy techniques to the right C 3-5 facet region.      Assessment & Plan   CLINICAL IMPRESSIONS  Medical Diagnosis: Neck pain (M54.2)    Treatment Diagnosis: Pain, impaired range of motion, postural dysfunction, vertebral segmental dysfunction   Impression/Assessment: Patient is a 74 year old male with right-sided neck pain complaints.  The following significant findings have been identified: Pain, Decreased ROM/flexibility, Decreased joint mobility, Impaired muscle performance, Decreased activity tolerance, and Impaired posture. These impairments interfere with their ability to perform self care tasks, recreational activities, household chores, and driving  as compared to previous level of function.     Clinical Decision Making (Complexity):  Clinical Presentation: Stable/Uncomplicated  Clinical Presentation Rationale: based on medical and personal factors listed in PT evaluation  Clinical Decision Making (Complexity): Low complexity    PLAN OF CARE  Treatment Interventions:  Modalities: Ultrasound  Interventions: Manual Therapy, Neuromuscular Re-education, Therapeutic Exercise    Long Term Goals     PT Goal 1  Goal  Identifier: Neck pain  Goal Description: Patient will report the ability to turn his head to the right while driving without neck pain.  Rationale: to maximize safety and independence with performance of ADLs and functional tasks;to maximize safety and independence within the home;to maximize safety and independence within the community;to maximize safety and independence with transportation  Target Date: 09/06/24  PT Goal 2  Goal Identifier: Range of motion  Goal Description: Patient will demonstrate 45 degrees of right cervical rotation without difficulty to allow turning head while driving without difficulty.  Rationale: to maximize safety and independence within the community;to maximize safety and independence with transportation  Target Date: 09/06/24  PT Goal 3  Goal Identifier: Posture  Goal Description: Patient will demonstrate independent postural correction to avoid rounded shoulder forward head positions.  Rationale: to maximize safety and independence with performance of ADLs and functional tasks;to maximize safety and independence within the home;to maximize safety and independence within the community;to maximize safety and independence with transportation  Target Date: 09/06/24      Frequency of Treatment: 12 visits  Duration of Treatment: 8 weeks      Education Assessment:        Risks and benefits of evaluation/treatment have been explained.   Patient/Family/caregiver agrees with Plan of Care.     Evaluation Time:     PT Eval, Low Complexity Minutes (08015): 20       Signing Clinician: PAMELA Scott [facility]                                                                                   OUTPATIENT PHYSICAL THERAPY      PLAN OF TREATMENT FOR OUTPATIENT REHABILITATION   Patient's Last Name, First Name, Eh Jesus YOB: 1950   Provider's Name   [facility]   Medical Record No.  [id number]     Onset  Date: 06/07/24  Start of Care Date: 07/12/24     Medical Diagnosis:  Neck pain (M54.2)      PT Treatment Diagnosis:  Pain, impaired range of motion, postural dysfunction, vertebral segmental dysfunction Plan of Treatment  Frequency/Duration: 12 visits/ 8 weeks    Certification date from 07/12/24 to 09/06/24         See note for plan of treatment details and functional goals     Bolivar Coleman, PT                         I CERTIFY THE NEED FOR THESE SERVICES FURNISHED UNDER        THIS PLAN OF TREATMENT AND WHILE UNDER MY CARE     (Physician attestation of this document indicates review and certification of the therapy plan).              Referring Provider:  Hemanth Cunningham    Initial Assessment  See Epic Evaluation- Start of Care Date: 07/12/24

## 2024-07-15 ENCOUNTER — THERAPY VISIT (OUTPATIENT)
Dept: PHYSICAL THERAPY | Facility: OTHER | Age: 74
End: 2024-07-15
Attending: INTERNAL MEDICINE
Payer: MEDICARE

## 2024-07-15 DIAGNOSIS — M54.2 NECK PAIN: Primary | ICD-10-CM

## 2024-07-15 DIAGNOSIS — T63.461A ALLERGIC REACTION TO WASP STING: ICD-10-CM

## 2024-07-15 PROCEDURE — 97140 MANUAL THERAPY 1/> REGIONS: CPT | Mod: GP,PO

## 2024-07-18 ENCOUNTER — THERAPY VISIT (OUTPATIENT)
Dept: PHYSICAL THERAPY | Facility: OTHER | Age: 74
End: 2024-07-18
Attending: INTERNAL MEDICINE
Payer: MEDICARE

## 2024-07-18 DIAGNOSIS — M54.2 NECK PAIN: Primary | ICD-10-CM

## 2024-07-18 PROCEDURE — 97140 MANUAL THERAPY 1/> REGIONS: CPT | Mod: GP,PO

## 2024-07-19 DIAGNOSIS — E78.5 HYPERLIPIDEMIA LDL GOAL <100: ICD-10-CM

## 2024-07-19 DIAGNOSIS — I10 ESSENTIAL HYPERTENSION: ICD-10-CM

## 2024-07-19 RX ORDER — EPINEPHRINE 0.3 MG/.3ML
INJECTION SUBCUTANEOUS
Qty: 2 EACH | Refills: 0 | Status: SHIPPED | OUTPATIENT
Start: 2024-07-19

## 2024-07-19 NOTE — TELEPHONE ENCOUNTER
[facility] sent Rx request for the following:      Requested Prescriptions   Pending Prescriptions Disp Refills    EPINEPHrine (ANY BX GENERIC EQUIV) 0.3 MG/0.3ML injection 2-pack [Pharmacy Med Name: EPINEPHRINE 0.3MG INJ 2 PACK]       Sig: INJECT 1 PEN IN THE MUSCLE ONE TIME AS DIRECTED   Last Prescription Date:   12/6/22  Last Fill Qty/Refills:         2, R-3    Last Office Visit:                6/7/24 12/11/23 (Px)    Future Office visit:             Future Appointments 7/19/2024 - 1/15/2025        Date Visit Type Length Department Provider     8/12/2024 10:30 AM PT TREATMENT 45 min  PT OUTPATIENT Bolivar Coleman, PT    Location Instructions:     Our clinic is located at: [facility], [address] [place] [address]  How to find our clinic: Enter the [facility] under the large awing on the south side of the building.  Parking: Free parking is available  Questions or to Reschedule: Contact our clinic: [phone].               12/19/2024 10:40 AM OFFICE VISIT 20 min  IM/Hemanth Wheeler MD    Location Instructions:     [facility] is located [address]                   Per Last Physical note:    Return in about 6 months (around 6/11/2024), or if symptoms worsen or fail to improve, for diabetes.    Prescription refilled per RN Medication Refill Policy.................... Lucrecia Lechuga RN ....................  7/19/2024   12:02 PM

## 2024-07-23 RX ORDER — CARVEDILOL 6.25 MG/1
6.25 TABLET ORAL 2 TIMES DAILY WITH MEALS
Qty: 180 TABLET | Refills: 3 | Status: SHIPPED | OUTPATIENT
Start: 2024-07-23

## 2024-07-23 RX ORDER — LISINOPRIL 40 MG/1
40 TABLET ORAL DAILY
Qty: 90 TABLET | Refills: 3 | Status: SHIPPED | OUTPATIENT
Start: 2024-07-23

## 2024-07-23 RX ORDER — SIMVASTATIN 10 MG
TABLET ORAL
Qty: 45 TABLET | Refills: 3 | Status: SHIPPED | OUTPATIENT
Start: 2024-07-23

## 2024-07-23 NOTE — TELEPHONE ENCOUNTER
Prescription refilled per RN Medication Refill Policy..................Isaura Mota RN 7/23/2024 10:36 AM

## 2024-07-26 ENCOUNTER — TRANSFERRED RECORDS (OUTPATIENT)
Dept: HEALTH INFORMATION MANAGEMENT | Facility: OTHER | Age: 74
End: 2024-07-26
Payer: MEDICARE

## 2024-07-30 ENCOUNTER — APPOINTMENT (OUTPATIENT)
Dept: CT IMAGING | Facility: OTHER | Age: 74
End: 2024-07-30
Attending: FAMILY MEDICINE
Payer: MEDICARE

## 2024-07-30 ENCOUNTER — APPOINTMENT (OUTPATIENT)
Dept: GENERAL RADIOLOGY | Facility: OTHER | Age: 74
End: 2024-07-30
Attending: FAMILY MEDICINE
Payer: MEDICARE

## 2024-07-30 ENCOUNTER — HOSPITAL ENCOUNTER (EMERGENCY)
Facility: OTHER | Age: 74
Discharge: HOME | End: 2024-07-30
Attending: FAMILY MEDICINE | Admitting: FAMILY MEDICINE
Payer: MEDICARE

## 2024-07-30 VITALS
BODY MASS INDEX: 24.34 KG/M2 | SYSTOLIC BLOOD PRESSURE: 150 MMHG | HEIGHT: 70 IN | DIASTOLIC BLOOD PRESSURE: 80 MMHG | RESPIRATION RATE: 16 BRPM | WEIGHT: 170 LBS | OXYGEN SATURATION: 99 % | HEART RATE: 71 BPM | TEMPERATURE: 96.6 F

## 2024-07-30 DIAGNOSIS — S32.511A CLOSED FRACTURE OF SUPERIOR RAMUS OF RIGHT PUBIS, INITIAL ENCOUNTER (H): ICD-10-CM

## 2024-07-30 DIAGNOSIS — K68.3 RETROPERITONEAL HEMATOMA: ICD-10-CM

## 2024-07-30 DIAGNOSIS — S32.434A: ICD-10-CM

## 2024-07-30 PROCEDURE — 250N000013 HC RX MED GY IP 250 OP 250 PS 637: Performed by: FAMILY MEDICINE

## 2024-07-30 PROCEDURE — 73700 CT LOWER EXTREMITY W/O DYE: CPT | Mod: RT,MF

## 2024-07-30 PROCEDURE — 99284 EMERGENCY DEPT VISIT MOD MDM: CPT | Mod: 25

## 2024-07-30 PROCEDURE — 73502 X-RAY EXAM HIP UNI 2-3 VIEWS: CPT

## 2024-07-30 PROCEDURE — 72192 CT PELVIS W/O DYE: CPT | Mod: MG

## 2024-07-30 PROCEDURE — 99284 EMERGENCY DEPT VISIT MOD MDM: CPT | Performed by: FAMILY MEDICINE

## 2024-07-30 RX ORDER — OXYCODONE HYDROCHLORIDE 5 MG/1
5 TABLET ORAL EVERY 6 HOURS PRN
Qty: 20 TABLET | Refills: 0 | Status: SHIPPED | OUTPATIENT
Start: 2024-07-30 | End: 2024-08-02

## 2024-07-30 RX ORDER — ACETAMINOPHEN 325 MG/1
650 TABLET ORAL ONCE
Status: COMPLETED | OUTPATIENT
Start: 2024-07-30 | End: 2024-07-30

## 2024-07-30 RX ADMIN — ACETAMINOPHEN 650 MG: 325 TABLET, FILM COATED ORAL at 08:58

## 2024-07-30 ASSESSMENT — ACTIVITIES OF DAILY LIVING (ADL)
ADLS_ACUITY_SCORE: 36

## 2024-07-30 ASSESSMENT — COLUMBIA-SUICIDE SEVERITY RATING SCALE - C-SSRS
6. HAVE YOU EVER DONE ANYTHING, STARTED TO DO ANYTHING, OR PREPARED TO DO ANYTHING TO END YOUR LIFE?: NO
1. IN THE PAST MONTH, HAVE YOU WISHED YOU WERE DEAD OR WISHED YOU COULD GO TO SLEEP AND NOT WAKE UP?: NO
2. HAVE YOU ACTUALLY HAD ANY THOUGHTS OF KILLING YOURSELF IN THE PAST MONTH?: NO

## 2024-07-30 NOTE — ED PROVIDER NOTES
History     Chief Complaint   Patient presents with    Fall    Hip Pain     Right hip       HPI  Eh Perry is a 74 year old male who is for right hip pain after a fall.  He got up at 3:00 this morning because his glucometer went off the thing as a low blood sugar.  He was feeling a little lightheaded.  Ran into a dog kennel which is not normally there.  They are watching a friend's pet.  Fell and hit his right arm right hand and right hip.  After that he was not able to get up and bear weight.  Wife brought him crutches and brought him into the emergency room.  At rest is 0.  Pain does not radiate anywhere.  It is directly in the right hip when he tries to bear weight.    Allergies:  Allergies   Allergen Reactions    Lipitor [Atorvastatin Calcium]       Elevated Ck and myalgias    Wasp Venom Protein      Wasp sting       Problem List:    Patient Active Problem List    Diagnosis Date Noted    Neck pain 07/12/2024     Priority: Medium    MGUS (monoclonal gammopathy of unknown significance) 06/07/2024     Priority: Medium    Chronic kidney disease, stage 2 (mild) 12/11/2023     Priority: Medium    Dupuytren's contracture 06/21/2021     Priority: Medium    Subclinical hypothyroidism 06/11/2018     Priority: Medium    Essential hypertension 01/14/2016     Priority: Medium    Gastroesophageal reflux disease, esophagitis presence not specified 01/14/2016     Priority: Medium     IMO Regulatory Load OCT 2020      Erectile dysfunction, unspecified erectile dysfunction type 01/14/2016     Priority: Medium    Type 1 diabetes mellitus with nephropathy (goal A1C<7) 10/17/2015     Priority: Medium    Hyperlipidemia LDL goal <100 03/07/2013     Priority: Medium    ACP (advance care planning) 10/19/2012     Priority: Medium     Discussed advance care planning with patient; information given to patient to review. 10/19/2012             Past Medical History:    Past Medical History:   Diagnosis Date    Anemia     CKD (chronic  kidney disease) stage 3, GFR 30-59 ml/min (H)     GERD (gastroesophageal reflux disease)     Hepatitis     HTN (hypertension)     Hyperlipidemia LDL goal <100 3/7/2013    MGUS (monoclonal gammopathy of unknown significance)     Proteinuria 2015    PVC (premature ventricular contraction)     Retinopathy due to secondary diabetes (H)     Subclinical hypothyroidism 2018    Tobacco abuse     Type 1 diabetes mellitus with nephropathy (goal A1C<7)        Past Surgical History:    Past Surgical History:   Procedure Laterality Date    ENDOSCOPIC RELEASE CARPAL TUNNEL Right 10/14/2022    Procedure: Right Endoscopic Carpal Tunnel Release;  Surgeon: Sebastien Rowe MD;  Location: [facility] OR    HAND SURGERY  2007    contracture release    VOCAL CORD INJECTION      vocal cord nodules       Family History:    Family History   Problem Relation Age of Onset    Cancer Mother         peritoneal carcinoma    Diabetes Father     Diabetes Sister     Cerebrovascular Disease Maternal Grandmother     Diabetes Paternal Grandmother     Prostate Cancer Cousin     Anesthesia Reaction No family hx of     Bleeding Diathesis No family hx of     Colon Cancer No family hx of        Social History:  Marital Status:   [2]  Social History     Tobacco Use    Smoking status: Former     Current packs/day: 0.00     Average packs/day: 1 pack/day for 15.0 years (15.0 ttl pk-yrs)     Types: Cigarettes     Start date: 1965     Quit date: 1980     Years since quittin.6    Smokeless tobacco: Never   Vaping Use    Vaping status: Never Used   Substance Use Topics    Alcohol use: Not Currently     Comment: occ, 1-2 glass of wine    Drug use: No        Medications:    EPINEPHrine (ANY BX GENERIC EQUIV) 0.3 MG/0.3ML injection 2-pack  oxyCODONE (ROXICODONE) 5 MG tablet  ACCU-CHEK GUIDE test strip  amLODIPine (NORVASC) 2.5 MG tablet  amLODIPine (NORVASC) 5 MG tablet  aspirin 81 MG tablet  blood glucose (NO BRAND SPECIFIED)  "lancets standard  blood glucose monitoring (NO BRAND SPECIFIED) meter device kit  Carboxymethylcellulose Sodium (EYE DROPS OP)  carvedilol (COREG) 6.25 MG tablet  Continuous Blood Gluc  (DEXCOM G7 ) [place]  Continuous Glucose Sensor (DEXCOM G7 SENSOR) MISC  fish oil-omega-3 fatty acids 1000 MG capsule  insulin lispro (HUMALOG KWIKPEN) 100 UNIT/ML (1 unit dial) KWIKPEN  insulin pen needle (B-D U/F) 31G X 8 MM miscellaneous  LANTUS SOLOSTAR 100 UNIT/ML soln  lisinopril (ZESTRIL) 40 MG tablet  Multiple Vitamin (MULTI VITAMIN  MENS) TABS  omeprazole (PRILOSEC) 20 MG DR capsule  sildenafil (VIAGRA) 100 MG tablet  simvastatin (ZOCOR) 10 MG tablet  tamsulosin (FLOMAX) 0.4 MG capsule          Review of Systems   Musculoskeletal:  Positive for gait problem.        R hip pain with bearing weight   Skin:  Negative for rash.       Physical Exam   BP: (!) 150/80  Pulse: 71  Temp: (!) 96.6  F (35.9  C)  Resp: 16  Height: 177.8 cm (5' 10\")  Weight: 77.1 kg (170 lb)  SpO2: 99 %      Physical Exam  Constitutional:       Appearance: Normal appearance.   Musculoskeletal:      Comments: Has a small abrasion of the trochanter area.  There is no tenderness to palpation or obvious deformity.  Normal range of motion at the hip.  No tenderness to palpation of the pelvis or sacral area.  Negative straight leg raise test.  Normal strength.  Did not have patient bear weight due to pain.   Skin:     General: Skin is warm and dry.   Neurological:      Mental Status: He is alert.         ED Course     ED Course as of 07/30/24 1211   Tue Jul 30, 2024   0905 On the floor around 3 AM.  Hit right hip right arm and right elbow.  Cannot bear weight on the right side.  X-ray negative.  Discussed getting a CT.  Patient and wife agree with plan.     Procedures              Critical Care time:  none               Results for orders placed or performed during the hospital encounter of 07/30/24 (from the past 24 hour(s))   XR Hip Right 2-3 " Views    Narrative    PROCEDURE:  XR HIP RIGHT 2-3 VIEWS    HISTORY: fall, cannot bear weight.    COMPARISON:  None.    TECHNIQUE:  AP pelvis and 2 views right hip.    FINDINGS:  No fracture or dislocation is identified. Mild right  femoral neck osteophytes are seen. Vascular calcifications are  present.       Impression    IMPRESSION: No acute fracture.      JULIANE ARBOLEDA MD         SYSTEM ID:  [id number]   CT Pelvis Bone wo Contrast    Narrative    EXAM: CT PELVIS BONE WO CONTRAST, CT HIP RIGHT W/O CONTRAST, 7/30/2024  10:19 AM    HISTORY: fall, cannot bear weight on R leg.    COMPARISON: Radiographs pelvis and right hip 7/30/2024    PROCEDURE:   Imaging protocol: Axial CT images with multiplanar reformats were  obtained of the pelvis and right without contrast.  Acquisition: This CT exam was performed using one or more the  following dose reduction techniques: automated exposure control,  adjustment of the mA and/or kV according to patient size, and/or  iterative reconstruction technique.    FINDINGS:    BONES AND SOFT TISSUE:  Nondisplaced acute fractures the right acetabulum extending anteriorly  through the pubic eminence and superiorly along the iliac bone for the  SI joint. There is a vertical longitudinal component that extends  posteriorly along the posterior acetabulum. Fracture extends into the  right hip joint space and into the right superior pubic ramus. Joints  are appropriately aligned. Bilateral SI joint ankylosis. No suspicious  osseous lesions.    PELVIS:  No bowel dilatation or wall thickening. No free fluid or free air.  Vascular calcifications. Aortic aneurysmal dilatation. No  pathologically enlarged lymph nodes. Retroperitoneal hematoma along  the right pelvic wall. Extends anteriorly along the extraperitoneal  space anterior to the bladder.      Impression    IMPRESSION:  1.  Acute comminuted nondisplaced right acetabular fracture extending  to the right pubic eminence, right superior  pubic ramus and right  iliac bone.  2.  Retroperitoneal/extraperitoneal hematoma along the right and  anterior pelvis.    OCHOA BEY MD         SYSTEM ID:  [id number]   CT Hip Right w/o Contrast    Narrative    EXAM: CT PELVIS BONE WO CONTRAST, CT HIP RIGHT W/O CONTRAST, 7/30/2024  10:19 AM    HISTORY: fall, cannot bear weight on R leg.    COMPARISON: Radiographs pelvis and right hip 7/30/2024    PROCEDURE:   Imaging protocol: Axial CT images with multiplanar reformats were  obtained of the pelvis and right without contrast.  Acquisition: This CT exam was performed using one or more the  following dose reduction techniques: automated exposure control,  adjustment of the mA and/or kV according to patient size, and/or  iterative reconstruction technique.    FINDINGS:    BONES AND SOFT TISSUE:  Nondisplaced acute fractures the right acetabulum extending anteriorly  through the pubic eminence and superiorly along the iliac bone for the  SI joint. There is a vertical longitudinal component that extends  posteriorly along the posterior acetabulum. Fracture extends into the  right hip joint space and into the right superior pubic ramus. Joints  are appropriately aligned. Bilateral SI joint ankylosis. No suspicious  osseous lesions.    PELVIS:  No bowel dilatation or wall thickening. No free fluid or free air.  Vascular calcifications. Aortic aneurysmal dilatation. No  pathologically enlarged lymph nodes. Retroperitoneal hematoma along  the right pelvic wall. Extends anteriorly along the extraperitoneal  space anterior to the bladder.      Impression    IMPRESSION:  1.  Acute comminuted nondisplaced right acetabular fracture extending  to the right pubic eminence, right superior pubic ramus and right  iliac bone.  2.  Retroperitoneal/extraperitoneal hematoma along the right and  anterior pelvis.    OCHOA BEY MD         SYSTEM ID:  [id number]       Medications   acetaminophen (TYLENOL) tablet 650 mg (650 mg Oral $Given  7/30/24 0256)       Assessments & Plan (with Medical Decision Making)     I have reviewed the nursing notes.    I have reviewed the findings, diagnosis, plan and need for follow up with the patient.           Medical Decision Making  The patient's presentation was of moderate complexity (an acute complicated injury).    The patient's evaluation involved:  ordering and/or review of 2 test(s) in this encounter (see separate area of note for details)    The patient's management necessitated moderate risk (prescription drug management including medications given in the ED).        New Prescriptions    OXYCODONE (ROXICODONE) 5 MG TABLET    Take 1 tablet (5 mg) by mouth every 6 hours as needed for pain       Final diagnoses:   Closed nondisplaced fracture of anterior column of right acetabulum, initial encounter (H)   Closed fracture of superior ramus of right pubis, initial encounter (H)   Retroperitoneal hematoma   Presented with injury at home.  Right hip pain and difficulty with weightbearing due to pain.  Imaging confirmed acetabular and superior ramus fracture on the right side.  Discussed pain management with patient.  Oxycodone sent to pharmacy.  Discussed that is potentially addictive and can cause constipation so to use sparingly.  He understands and agrees with plan.  Otherwise okay to use Tylenol at home.  Would be okay to use ibuprofen in a few days but he does have underlying diagnosis of MGUS and is on aspirin every other day with a known hematoma.  Follow-up with Ortho in approximately 2 weeks.  If increasing pain not tolerating medication or other concerns return to the emergency room.    7/30/2024   [facility]       Adriana Mock DO  07/30/24 1834

## 2024-07-30 NOTE — DISCHARGE INSTRUCTIONS
Fracture of your pelvis.  This is in 2 areas.  There is a little bit of bleeding inside the lower part of your belly from the fracture.  Okay to use Tylenol for pain control.  I have sent a narcotic called oxycodone to the pharmacy for you.  This is considered a potentially addictive medication.  Use it sparingly.  Do not drive on this medication.  Do not mix with other drugs or alcohol.  It can cause constipation.  Consider using a stool softener each time you take it.  Toe-touch weightbearing for the next 72 hours.  Then weightbearing as tolerated.  Crutches given.  Orthopedic follow-up in approximately 2 weeks.  If any increasing pain, worsening symptoms new weakness numbness or tingling or other concerns please call or return to the emergency room

## 2024-07-30 NOTE — ED TRIAGE NOTES
"Pt is here today with his wife for right sided hip pain after a fall.   Pt was at home and went to get some OJ at 0300 for low blood sugar.   Pt tripped over a dog kennel that is normally not there and fell to his right side.  He thought that he landed on his hand and right arm.   When he went to stand, he was unable to bear wt on his right hip.   Pt is not on blood thinner.   No hx of osteoporosis. Pt did take 600 mg of ibuprofen at 0400. BP (!) 150/80   Pulse 71   Temp (!) 96.6  F (35.9  C) (Temporal)   Resp 16   Ht 1.778 m (5' 10\")   Wt 77.1 kg (170 lb)   SpO2 99%   BMI 24.39 kg/m      Nirmala Ramirez RN on 7/30/2024 at 8:24 AM         Triage Assessment (Adult)       Row Name 07/30/24 0821          Triage Assessment    Airway WDL WDL        Respiratory WDL    Respiratory WDL WDL        Skin Circulation/Temperature WDL    Skin Circulation/Temperature WDL WDL        Cardiac WDL    Cardiac WDL WDL        Peripheral/Neurovascular WDL    Peripheral Neurovascular WDL WDL        Cognitive/Neuro/Behavioral WDL    Cognitive/Neuro/Behavioral WDL WDL                     "

## 2024-07-30 NOTE — ED NOTES
Per Marissa in Ortho.  She spoke with Dr. Jeronimo.   Pt's fx is non-surgical.   Toe touch weight bearing and F/U with ortho in 2 weeks.  Provider notified.  Nirmala Ramirez RN on 7/30/2024 at 12:06 PM

## 2024-08-02 ENCOUNTER — MYC MEDICAL ADVICE (OUTPATIENT)
Dept: PEDIATRICS | Facility: OTHER | Age: 74
End: 2024-08-02
Payer: MEDICARE

## 2024-08-02 NOTE — TELEPHONE ENCOUNTER
I looked at the images.  It looks like a small aneurysmal enlargement right at the bifurcation of the iliac arteries.  I believe we can discuss this at our next visit.    SignedHemanth MD, FAAP, FACP  Internal Medicine & Pediatrics

## 2024-08-12 ENCOUNTER — THERAPY VISIT (OUTPATIENT)
Dept: PHYSICAL THERAPY | Facility: OTHER | Age: 74
End: 2024-08-12
Attending: INTERNAL MEDICINE
Payer: MEDICARE

## 2024-08-12 DIAGNOSIS — M54.2 NECK PAIN: Primary | ICD-10-CM

## 2024-08-12 PROCEDURE — 97110 THERAPEUTIC EXERCISES: CPT | Mod: GP,PO

## 2024-08-16 DIAGNOSIS — S32.401A CLOSED NONDISPLACED FRACTURE OF RIGHT ACETABULUM, UNSPECIFIED PORTION OF ACETABULUM, INITIAL ENCOUNTER (H): Primary | ICD-10-CM

## 2024-08-19 ENCOUNTER — HOSPITAL ENCOUNTER (OUTPATIENT)
Dept: GENERAL RADIOLOGY | Facility: OTHER | Age: 74
Discharge: HOME | End: 2024-08-19
Attending: ORTHOPAEDIC SURGERY
Payer: MEDICARE

## 2024-08-19 ENCOUNTER — OFFICE VISIT (OUTPATIENT)
Dept: ORTHOPEDICS | Facility: OTHER | Age: 74
End: 2024-08-19
Attending: FAMILY MEDICINE
Payer: MEDICARE

## 2024-08-19 DIAGNOSIS — S32.401A CLOSED NONDISPLACED FRACTURE OF RIGHT ACETABULUM, UNSPECIFIED PORTION OF ACETABULUM, INITIAL ENCOUNTER (H): ICD-10-CM

## 2024-08-19 DIAGNOSIS — S32.434A: ICD-10-CM

## 2024-08-19 DIAGNOSIS — K21.9 GASTROESOPHAGEAL REFLUX DISEASE WITHOUT ESOPHAGITIS: ICD-10-CM

## 2024-08-19 DIAGNOSIS — S32.511A CLOSED FRACTURE OF SUPERIOR RAMUS OF RIGHT PUBIS, INITIAL ENCOUNTER (H): ICD-10-CM

## 2024-08-19 DIAGNOSIS — K68.3 RETROPERITONEAL HEMATOMA: ICD-10-CM

## 2024-08-19 PROCEDURE — 72170 X-RAY EXAM OF PELVIS: CPT

## 2024-08-19 PROCEDURE — 99213 OFFICE O/P EST LOW 20 MIN: CPT | Performed by: ORTHOPAEDIC SURGERY

## 2024-08-19 PROCEDURE — G0463 HOSPITAL OUTPT CLINIC VISIT: HCPCS | Mod: 25

## 2024-08-19 PROCEDURE — G0463 HOSPITAL OUTPT CLINIC VISIT: HCPCS

## 2024-08-19 NOTE — PROGRESS NOTES
Subjective:    74-year-old gentleman who comes in today with a history of a right pelvic fracture.  He apparently slipped on some tile in his kitchen and fell directly onto his right hip.  He had significant pain and difficulty walking thought that he had a possible hip fracture.  X-ray was negative but CT scan showed that he had an anterior column acetabular fracture affecting the right hip.  He was made partial weightbearing and comes in today for follow-up.      Objective:    On examination today 74-year-old gentleman no acute stress.  Very pleasant on examination.  Walking with a walker at this point.    Assessment:    74-year-old gentleman with nondisplaced anterior column acetabular fracture now about 2-1/2 weeks out    Plan:    Agree to continue to keep him with very protected weightbearing for the next week and a half yet.  He can start transitioning more to a cane and increase his partial weightbearing in approximately about a week and a half at which point he will be 4 weeks status post fracture and this will be a little bit stronger.  All this was expressed to him and he understands.  Will see him back in approximately 1 month.

## 2024-08-23 NOTE — TELEPHONE ENCOUNTER
[facility] sent Rx request for the following:      Requested Prescriptions   Pending Prescriptions Disp Refills    omeprazole (PRILOSEC) 20 MG DR capsule [Pharmacy Med Name: OMEPRAZOLE 20MG CAPSULES] 90 capsule 3     Sig: TAKE 1 CAPSULE(20 MG) BY MOUTH DAILY       PPI Protocol Passed - 8/23/2024 11:42 AM        Passed - Medication is active on med list        Passed - Medication indicated for associated diagnosis     The medication is prescribed for one or more of the following conditions:     Erosive esophagitis    Gastritis   Gastric hypersecretion   Gastric ulcer   Gastroesophageal reflux disease   Helicobacter pylori gastrointestinal tract infection   Ulcer of duodenum   Drug-induced peptic ulcer   Esophageal stricture   Gastrointestinal hemorrhage   Indigestion   Stress ulcer   Zollinger-Hearn syndrome   Lobo s esophagus   Laryngopharyngeal reflux   Epigastric Pain   Morbid Obesity   Cough          Passed - Recent (12 mo) or future (90 days) visit within the authorizing provider's specialty     The patient must have completed an in-person or virtual visit within the past 12 months or has a future visit scheduled within the next 90 days with the authorizing provider s specialty.  Urgent care and e-visits do not quality as an office visit for this protocol.          Passed - Patient is age 18 or older             Last Prescription Date:   6/6/23  Last Fill Qty/Refills:         90, R-3    Last Office Visit:              6/7/24   Future Office visit:           12/19/24  Next 5 appointments (look out 90 days)      Sep 16, 2024 10:30 AM  (Arrive by 10:15 AM)  Return Visit with Jaime Wisdom MD  [facility] ([facility] ) [address]  [place] [address]  [phone]           Prescription approved per [facility] Refill Protocol.    Alex May RN on 8/23/2024 at 11:44 AM

## 2024-08-27 DIAGNOSIS — K21.9 GASTROESOPHAGEAL REFLUX DISEASE WITHOUT ESOPHAGITIS: ICD-10-CM

## 2024-08-28 NOTE — TELEPHONE ENCOUNTER
[facility] sent Rx request for the following:      Requested Prescriptions   Pending Prescriptions Disp Refills    omeprazole (PRILOSEC) 20 MG DR capsule [Pharmacy Med Name: OMEPRAZOLE 20MG CAPSULES] 90 capsule 0     Sig: TAKE 1 CAPSULE(20 MG) BY MOUTH DAILY       PPI Protocol Passed - 8/28/2024  3:28 PM     Last Prescription Date:   8/23/24  Last Fill Qty/Refills:         90, R-0    Last Office Visit:              6/7/24   Future Office visit:             Next 5 appointments (look out 90 days)      Sep 16, 2024 10:30 AM  (Arrive by 10:15 AM)  Return Visit with Jaime Wisdom MD  [facility] ([facility] ) [address]  [place] [address] [phone]     Redundant refill request refused: Too soon:    Nadira Shaffer RN on 8/28/2024 at 3:29 PM

## 2024-09-03 ENCOUNTER — TRANSFERRED RECORDS (OUTPATIENT)
Dept: HEALTH INFORMATION MANAGEMENT | Facility: OTHER | Age: 74
End: 2024-09-03
Payer: MEDICARE

## 2024-09-09 ENCOUNTER — MYC MEDICAL ADVICE (OUTPATIENT)
Dept: PEDIATRICS | Facility: OTHER | Age: 74
End: 2024-09-09
Payer: MEDICARE

## 2024-09-13 DIAGNOSIS — S32.434A: Primary | ICD-10-CM

## 2024-09-16 ENCOUNTER — OFFICE VISIT (OUTPATIENT)
Dept: ORTHOPEDICS | Facility: OTHER | Age: 74
End: 2024-09-16
Attending: ORTHOPAEDIC SURGERY
Payer: MEDICARE

## 2024-09-16 ENCOUNTER — HOSPITAL ENCOUNTER (OUTPATIENT)
Dept: GENERAL RADIOLOGY | Facility: OTHER | Age: 74
Discharge: HOME | End: 2024-09-16
Attending: ORTHOPAEDIC SURGERY
Payer: MEDICARE

## 2024-09-16 DIAGNOSIS — S32.434A: ICD-10-CM

## 2024-09-16 DIAGNOSIS — S32.434A: Primary | ICD-10-CM

## 2024-09-16 PROCEDURE — G0463 HOSPITAL OUTPT CLINIC VISIT: HCPCS

## 2024-09-16 PROCEDURE — 99213 OFFICE O/P EST LOW 20 MIN: CPT | Performed by: ORTHOPAEDIC SURGERY

## 2024-09-16 PROCEDURE — 72170 X-RAY EXAM OF PELVIS: CPT

## 2024-09-16 NOTE — PROGRESS NOTES
Subjective:    74-year-old gentleman with a history of a right anterior column fracture of his pelvis.  He states that he has been walking but only does about 1 mile as compared to 3 miles prior to the injury.  He is now 7 weeks out from the injury.  States that he is doing fairly well    Objective:    On examination today is a 74-year-old gentleman no acute distress.  Very pleasant on examination.  He walks with a nonantalgic gait.  No pain with internal and external rotation.  He is neurovascularly intact    Assessment:    74-year-old gentleman with right acetabular fracture primarily involving the anterior column.    Plan:    Continue current.  Follow-up as needed.

## 2024-10-21 DIAGNOSIS — E10.21 TYPE 1 DIABETES MELLITUS WITH NEPHROPATHY (H): ICD-10-CM

## 2024-10-24 RX ORDER — ACYCLOVIR 400 MG/1
TABLET ORAL
Qty: 3 EACH | Refills: 11 | Status: SHIPPED | OUTPATIENT
Start: 2024-10-24

## 2024-10-24 NOTE — TELEPHONE ENCOUNTER
DEXCOM G7 SENSOR         Last Written Prescription Date:  4/23/24  Last Fill Quantity: 3,   # refills: 5  Last Office Visit: 6/7/24  Future Office visit:    Next 5 appointments (look out 90 days)      Dec 19, 2024 10:40 AM  (Arrive by 10:25 AM)  Provider Visit with Hemanth Cunningham MD  [facility] ([facility] ) [address]  [place] [place] [facility] [address]  [phone]             Routing refill request to provider for review/approval because:  Drug not on the [facility], [facility] or [facility] refill protocol or controlled substance. Unable to complete prescription refill per RNMedication Refill Policy.................... Vanessa Sheth RN ....................  10/24/2024   10:17 AM

## 2024-11-14 ENCOUNTER — APPOINTMENT (OUTPATIENT)
Dept: CT IMAGING | Facility: OTHER | Age: 74
DRG: 871 | End: 2024-11-14
Attending: PHYSICIAN ASSISTANT
Payer: MEDICARE

## 2024-11-14 ENCOUNTER — HOSPITAL ENCOUNTER (INPATIENT)
Facility: OTHER | Age: 74
DRG: 871 | End: 2024-11-14
Attending: PHYSICIAN ASSISTANT | Admitting: FAMILY MEDICINE
Payer: MEDICARE

## 2024-11-14 ENCOUNTER — OFFICE VISIT (OUTPATIENT)
Dept: FAMILY MEDICINE | Facility: OTHER | Age: 74
End: 2024-11-14
Attending: STUDENT IN AN ORGANIZED HEALTH CARE EDUCATION/TRAINING PROGRAM
Payer: MEDICARE

## 2024-11-14 VITALS
WEIGHT: 176.2 LBS | DIASTOLIC BLOOD PRESSURE: 70 MMHG | HEART RATE: 93 BPM | RESPIRATION RATE: 18 BRPM | HEIGHT: 70 IN | TEMPERATURE: 97.4 F | OXYGEN SATURATION: 96 % | SYSTOLIC BLOOD PRESSURE: 112 MMHG | BODY MASS INDEX: 25.22 KG/M2

## 2024-11-14 DIAGNOSIS — R05.8 PAINFUL COUGH: ICD-10-CM

## 2024-11-14 DIAGNOSIS — J96.01 ACUTE RESPIRATORY FAILURE WITH HYPOXIA (H): ICD-10-CM

## 2024-11-14 DIAGNOSIS — E10.65 TYPE 1 DIABETES MELLITUS WITH HYPERGLYCEMIA (H): ICD-10-CM

## 2024-11-14 DIAGNOSIS — D63.1 ANEMIA IN CHRONIC KIDNEY DISEASE (CKD): ICD-10-CM

## 2024-11-14 DIAGNOSIS — J18.9 PNEUMONIA: ICD-10-CM

## 2024-11-14 DIAGNOSIS — R79.89 ELEVATED TSH: ICD-10-CM

## 2024-11-14 DIAGNOSIS — E83.42 HYPOMAGNESEMIA: ICD-10-CM

## 2024-11-14 DIAGNOSIS — Z20.822 LAB TEST NEGATIVE FOR COVID-19 VIRUS: ICD-10-CM

## 2024-11-14 DIAGNOSIS — E10.22 TYPE 1 DIABETES MELLITUS WITH DIABETIC CHRONIC KIDNEY DISEASE, UNSPECIFIED CKD STAGE (H): ICD-10-CM

## 2024-11-14 DIAGNOSIS — R94.6 NONSPECIFIC ABNORMAL RESULTS OF THYROID FUNCTION STUDY: ICD-10-CM

## 2024-11-14 DIAGNOSIS — N18.9 ANEMIA IN CHRONIC KIDNEY DISEASE (CKD): ICD-10-CM

## 2024-11-14 DIAGNOSIS — I48.0 PAROXYSMAL ATRIAL FIBRILLATION WITH RAPID VENTRICULAR RESPONSE (H): ICD-10-CM

## 2024-11-14 DIAGNOSIS — E83.39 HYPOPHOSPHATEMIA: ICD-10-CM

## 2024-11-14 DIAGNOSIS — R52 PAINFUL COUGH: ICD-10-CM

## 2024-11-14 DIAGNOSIS — J18.9 PNEUMONIA DUE TO INFECTIOUS ORGANISM, UNSPECIFIED LATERALITY, UNSPECIFIED PART OF LUNG: ICD-10-CM

## 2024-11-14 DIAGNOSIS — N18.30 STAGE 3 CHRONIC KIDNEY DISEASE, UNSPECIFIED WHETHER STAGE 3A OR 3B CKD (H): ICD-10-CM

## 2024-11-14 DIAGNOSIS — J06.9 VIRAL URI WITH COUGH: Primary | ICD-10-CM

## 2024-11-14 DIAGNOSIS — A41.9 SEVERE SEPSIS (H): ICD-10-CM

## 2024-11-14 DIAGNOSIS — R65.20 SEVERE SEPSIS (H): ICD-10-CM

## 2024-11-14 DIAGNOSIS — E87.1 HYPONATREMIA: ICD-10-CM

## 2024-11-14 DIAGNOSIS — D63.1 ANEMIA IN CHRONIC KIDNEY DISEASE (CODE): ICD-10-CM

## 2024-11-14 PROBLEM — D64.9 ANEMIA, UNSPECIFIED TYPE: Status: ACTIVE | Noted: 2024-11-14

## 2024-11-14 LAB
ALBUMIN SERPL BCG-MCNC: 3.7 G/DL (ref 3.5–5.2)
ALBUMIN UR-MCNC: 70 MG/DL
ALP SERPL-CCNC: 197 U/L (ref 40–150)
ALT SERPL W P-5'-P-CCNC: 43 U/L (ref 0–70)
ANION GAP SERPL CALCULATED.3IONS-SCNC: 14 MMOL/L (ref 7–15)
APPEARANCE UR: CLEAR
AST SERPL W P-5'-P-CCNC: 38 U/L (ref 0–45)
BASE EXCESS BLDV CALC-SCNC: -0.5 MMOL/L (ref -3–3)
BASOPHILS # BLD AUTO: 0.1 10E3/UL (ref 0–0.2)
BASOPHILS NFR BLD AUTO: 1 %
BILIRUB SERPL-MCNC: 0.9 MG/DL
BILIRUB UR QL STRIP: NEGATIVE
BUN SERPL-MCNC: 25.1 MG/DL (ref 8–23)
CALCIUM SERPL-MCNC: 9 MG/DL (ref 8.8–10.4)
CHLORIDE SERPL-SCNC: 80 MMOL/L (ref 98–107)
COLOR UR AUTO: YELLOW
CREAT SERPL-MCNC: 1.05 MG/DL (ref 0.67–1.17)
EGFRCR SERPLBLD CKD-EPI 2021: 75 ML/MIN/1.73M2
EOSINOPHIL # BLD AUTO: 0 10E3/UL (ref 0–0.7)
EOSINOPHIL NFR BLD AUTO: 0 %
ERYTHROCYTE [DISTWIDTH] IN BLOOD BY AUTOMATED COUNT: 11.8 % (ref 10–15)
FLUAV RNA SPEC QL NAA+PROBE: NEGATIVE
FLUBV RNA RESP QL NAA+PROBE: NEGATIVE
GLUCOSE SERPL-MCNC: 259 MG/DL (ref 70–99)
GLUCOSE UR STRIP-MCNC: 100 MG/DL
HCO3 BLDV-SCNC: 24 MMOL/L (ref 21–28)
HCO3 SERPL-SCNC: 22 MMOL/L (ref 22–29)
HCT VFR BLD AUTO: 33.3 % (ref 40–53)
HGB BLD-MCNC: 11.8 G/DL (ref 13.3–17.7)
HGB UR QL STRIP: NEGATIVE
HOLD SPECIMEN: NORMAL
HOLD SPECIMEN: NORMAL
HYALINE CASTS: 1 /LPF
IMM GRANULOCYTES # BLD: 0.2 10E3/UL
IMM GRANULOCYTES NFR BLD: 1 %
KETONES UR STRIP-MCNC: 10 MG/DL
LACTATE SERPL-SCNC: 1.6 MMOL/L (ref 0.7–2)
LEUKOCYTE ESTERASE UR QL STRIP: NEGATIVE
LYMPHOCYTES # BLD AUTO: 0.5 10E3/UL (ref 0.8–5.3)
LYMPHOCYTES NFR BLD AUTO: 2 %
MAGNESIUM SERPL-MCNC: 1.5 MG/DL (ref 1.7–2.3)
MCH RBC QN AUTO: 31.9 PG (ref 26.5–33)
MCHC RBC AUTO-ENTMCNC: 35.4 G/DL (ref 31.5–36.5)
MCV RBC AUTO: 90 FL (ref 78–100)
MONOCYTES # BLD AUTO: 1.2 10E3/UL (ref 0–1.3)
MONOCYTES NFR BLD AUTO: 5 %
NEUTROPHILS # BLD AUTO: 23.2 10E3/UL (ref 1.6–8.3)
NEUTROPHILS NFR BLD AUTO: 92 %
NITRATE UR QL: NEGATIVE
NRBC # BLD AUTO: 0 10E3/UL
NRBC BLD AUTO-RTO: 0 /100
NT-PROBNP SERPL-MCNC: 1762 PG/ML (ref 0–900)
O2/TOTAL GAS SETTING VFR VENT: 21 %
OXYHGB MFR BLDV: 38 % (ref 70–75)
PCO2 BLDV: 40 MM HG (ref 40–50)
PH BLDV: 7.39 [PH] (ref 7.32–7.43)
PH UR STRIP: 6 [PH] (ref 5–9)
PHOSPHATE SERPL-MCNC: 2.3 MG/DL (ref 2.5–4.5)
PLATELET # BLD AUTO: 257 10E3/UL (ref 150–450)
PO2 BLDV: 22 MM HG (ref 25–47)
POTASSIUM SERPL-SCNC: 4.1 MMOL/L (ref 3.4–5.3)
PROCALCITONIN SERPL IA-MCNC: 4.29 NG/ML
PROT SERPL-MCNC: 7.2 G/DL (ref 6.4–8.3)
RBC # BLD AUTO: 3.7 10E6/UL (ref 4.4–5.9)
RBC URINE: <1 /HPF
RSV RNA SPEC NAA+PROBE: NEGATIVE
SAO2 % BLDV: 38.3 % (ref 70–75)
SARS-COV-2 RNA RESP QL NAA+PROBE: NEGATIVE
SODIUM SERPL-SCNC: 116 MMOL/L (ref 135–145)
SODIUM SERPL-SCNC: 117 MMOL/L (ref 135–145)
SODIUM UR-SCNC: <20 MMOL/L
SP GR UR STRIP: 1.02 (ref 1–1.03)
T4 FREE SERPL-MCNC: 1.53 NG/DL (ref 0.9–1.7)
TROPONIN T SERPL HS-MCNC: 14 NG/L
TROPONIN T SERPL HS-MCNC: 15 NG/L
TSH SERPL DL<=0.005 MIU/L-ACNC: 4.21 UIU/ML (ref 0.3–4.2)
UROBILINOGEN UR STRIP-MCNC: 3 MG/DL
WBC # BLD AUTO: 25.2 10E3/UL (ref 4–11)
WBC URINE: 1 /HPF

## 2024-11-14 PROCEDURE — 80053 COMPREHEN METABOLIC PANEL: CPT | Performed by: PHYSICIAN ASSISTANT

## 2024-11-14 PROCEDURE — 85041 AUTOMATED RBC COUNT: CPT | Performed by: PHYSICIAN ASSISTANT

## 2024-11-14 PROCEDURE — 84100 ASSAY OF PHOSPHORUS: CPT | Performed by: PHYSICIAN ASSISTANT

## 2024-11-14 PROCEDURE — 96368 THER/DIAG CONCURRENT INF: CPT | Performed by: PHYSICIAN ASSISTANT

## 2024-11-14 PROCEDURE — 250N000011 HC RX IP 250 OP 636: Performed by: PHYSICIAN ASSISTANT

## 2024-11-14 PROCEDURE — G0463 HOSPITAL OUTPT CLINIC VISIT: HCPCS

## 2024-11-14 PROCEDURE — 36415 COLL VENOUS BLD VENIPUNCTURE: CPT | Performed by: PHYSICIAN ASSISTANT

## 2024-11-14 PROCEDURE — 84439 ASSAY OF FREE THYROXINE: CPT | Performed by: PHYSICIAN ASSISTANT

## 2024-11-14 PROCEDURE — 84443 ASSAY THYROID STIM HORMONE: CPT | Performed by: PHYSICIAN ASSISTANT

## 2024-11-14 PROCEDURE — 84300 ASSAY OF URINE SODIUM: CPT | Performed by: PHYSICIAN ASSISTANT

## 2024-11-14 PROCEDURE — 99222 1ST HOSP IP/OBS MODERATE 55: CPT | Performed by: FAMILY MEDICINE

## 2024-11-14 PROCEDURE — 87040 BLOOD CULTURE FOR BACTERIA: CPT | Performed by: PHYSICIAN ASSISTANT

## 2024-11-14 PROCEDURE — 83735 ASSAY OF MAGNESIUM: CPT | Performed by: PHYSICIAN ASSISTANT

## 2024-11-14 PROCEDURE — 99285 EMERGENCY DEPT VISIT HI MDM: CPT | Performed by: PHYSICIAN ASSISTANT

## 2024-11-14 PROCEDURE — 96375 TX/PRO/DX INJ NEW DRUG ADDON: CPT | Mod: XU | Performed by: PHYSICIAN ASSISTANT

## 2024-11-14 PROCEDURE — 83880 ASSAY OF NATRIURETIC PEPTIDE: CPT | Performed by: PHYSICIAN ASSISTANT

## 2024-11-14 PROCEDURE — 84145 PROCALCITONIN (PCT): CPT | Performed by: PHYSICIAN ASSISTANT

## 2024-11-14 PROCEDURE — 999N000157 HC STATISTIC RCP TIME EA 10 MIN

## 2024-11-14 PROCEDURE — 83605 ASSAY OF LACTIC ACID: CPT | Performed by: PHYSICIAN ASSISTANT

## 2024-11-14 PROCEDURE — 99285 EMERGENCY DEPT VISIT HI MDM: CPT | Mod: 25 | Performed by: PHYSICIAN ASSISTANT

## 2024-11-14 PROCEDURE — 258N000003 HC RX IP 258 OP 636: Performed by: PHYSICIAN ASSISTANT

## 2024-11-14 PROCEDURE — 85025 COMPLETE CBC W/AUTO DIFF WBC: CPT | Performed by: PHYSICIAN ASSISTANT

## 2024-11-14 PROCEDURE — 84484 ASSAY OF TROPONIN QUANT: CPT | Performed by: PHYSICIAN ASSISTANT

## 2024-11-14 PROCEDURE — 93010 ELECTROCARDIOGRAM REPORT: CPT | Performed by: INTERNAL MEDICINE

## 2024-11-14 PROCEDURE — 83935 ASSAY OF URINE OSMOLALITY: CPT | Performed by: PHYSICIAN ASSISTANT

## 2024-11-14 PROCEDURE — 87637 SARSCOV2&INF A&B&RSV AMP PRB: CPT | Performed by: PHYSICIAN ASSISTANT

## 2024-11-14 PROCEDURE — 96367 TX/PROPH/DG ADDL SEQ IV INF: CPT | Performed by: PHYSICIAN ASSISTANT

## 2024-11-14 PROCEDURE — 71275 CT ANGIOGRAPHY CHEST: CPT | Mod: MA

## 2024-11-14 PROCEDURE — 250N000009 HC RX 250: Performed by: PHYSICIAN ASSISTANT

## 2024-11-14 PROCEDURE — 200N000001 HC R&B ICU

## 2024-11-14 PROCEDURE — 84295 ASSAY OF SERUM SODIUM: CPT | Performed by: PHYSICIAN ASSISTANT

## 2024-11-14 PROCEDURE — 81003 URINALYSIS AUTO W/O SCOPE: CPT | Performed by: PHYSICIAN ASSISTANT

## 2024-11-14 PROCEDURE — 96365 THER/PROPH/DIAG IV INF INIT: CPT | Mod: XU | Performed by: PHYSICIAN ASSISTANT

## 2024-11-14 PROCEDURE — 82805 BLOOD GASES W/O2 SATURATION: CPT | Performed by: PHYSICIAN ASSISTANT

## 2024-11-14 PROCEDURE — 83930 ASSAY OF BLOOD OSMOLALITY: CPT | Performed by: PHYSICIAN ASSISTANT

## 2024-11-14 RX ORDER — AZITHROMYCIN 500 MG/5ML
500 INJECTION, POWDER, LYOPHILIZED, FOR SOLUTION INTRAVENOUS EVERY 24 HOURS
Status: DISCONTINUED | OUTPATIENT
Start: 2024-11-14 | End: 2024-11-16

## 2024-11-14 RX ORDER — IPRATROPIUM BROMIDE AND ALBUTEROL SULFATE 2.5; .5 MG/3ML; MG/3ML
3 SOLUTION RESPIRATORY (INHALATION) ONCE
Status: COMPLETED | OUTPATIENT
Start: 2024-11-14 | End: 2024-11-14

## 2024-11-14 RX ORDER — MAGNESIUM SULFATE HEPTAHYDRATE 40 MG/ML
4 INJECTION, SOLUTION INTRAVENOUS ONCE
Status: COMPLETED | OUTPATIENT
Start: 2024-11-14 | End: 2024-11-14

## 2024-11-14 RX ORDER — CODEINE PHOSPHATE AND GUAIFENESIN 10; 100 MG/5ML; MG/5ML
1-2 SOLUTION ORAL EVERY 4 HOURS PRN
Qty: 237 ML | Refills: 0 | Status: ON HOLD | OUTPATIENT
Start: 2024-11-14

## 2024-11-14 RX ORDER — CEFTRIAXONE 2 G/1
2 INJECTION, POWDER, FOR SOLUTION INTRAMUSCULAR; INTRAVENOUS ONCE
Status: COMPLETED | OUTPATIENT
Start: 2024-11-14 | End: 2024-11-14

## 2024-11-14 RX ORDER — IOPAMIDOL 755 MG/ML
76 INJECTION, SOLUTION INTRAVASCULAR ONCE
Status: COMPLETED | OUTPATIENT
Start: 2024-11-14 | End: 2024-11-14

## 2024-11-14 RX ORDER — 3% SODIUM CHLORIDE 3 G/100ML
INJECTION, SOLUTION INTRAVENOUS CONTINUOUS
Status: DISCONTINUED | OUTPATIENT
Start: 2024-11-14 | End: 2024-11-14

## 2024-11-14 RX ORDER — METHYLPREDNISOLONE SODIUM SUCCINATE 125 MG/2ML
125 INJECTION INTRAMUSCULAR; INTRAVENOUS ONCE
Status: COMPLETED | OUTPATIENT
Start: 2024-11-14 | End: 2024-11-14

## 2024-11-14 RX ORDER — CEFTRIAXONE 1 G/1
1 INJECTION, POWDER, FOR SOLUTION INTRAMUSCULAR; INTRAVENOUS ONCE
Status: DISCONTINUED | OUTPATIENT
Start: 2024-11-14 | End: 2024-11-14

## 2024-11-14 RX ADMIN — CEFTRIAXONE 2 G: 2 INJECTION, POWDER, FOR SOLUTION INTRAMUSCULAR; INTRAVENOUS at 18:38

## 2024-11-14 RX ADMIN — SODIUM CHLORIDE 80 ML: 9 INJECTION, SOLUTION INTRAVENOUS at 19:53

## 2024-11-14 RX ADMIN — SODIUM CHLORIDE 2190 ML: 9 INJECTION, SOLUTION INTRAVENOUS at 18:34

## 2024-11-14 RX ADMIN — METHYLPREDNISOLONE SODIUM SUCCINATE 125 MG: 125 INJECTION, POWDER, FOR SOLUTION INTRAMUSCULAR; INTRAVENOUS at 18:37

## 2024-11-14 RX ADMIN — MAGNESIUM SULFATE HEPTAHYDRATE 4 G: 40 INJECTION, SOLUTION INTRAVENOUS at 20:27

## 2024-11-14 RX ADMIN — AZITHROMYCIN MONOHYDRATE 500 MG: 500 INJECTION, POWDER, LYOPHILIZED, FOR SOLUTION INTRAVENOUS at 20:09

## 2024-11-14 RX ADMIN — IPRATROPIUM BROMIDE AND ALBUTEROL SULFATE 3 ML: 2.5; .5 SOLUTION RESPIRATORY (INHALATION) at 18:52

## 2024-11-14 RX ADMIN — SODIUM CHLORIDE: 3 INJECTION, SOLUTION INTRAVENOUS at 20:38

## 2024-11-14 RX ADMIN — IOPAMIDOL 76 ML: 755 INJECTION, SOLUTION INTRAVENOUS at 19:52

## 2024-11-14 ASSESSMENT — ACTIVITIES OF DAILY LIVING (ADL)
ADLS_ACUITY_SCORE: 0

## 2024-11-14 ASSESSMENT — COLUMBIA-SUICIDE SEVERITY RATING SCALE - C-SSRS
6. HAVE YOU EVER DONE ANYTHING, STARTED TO DO ANYTHING, OR PREPARED TO DO ANYTHING TO END YOUR LIFE?: NO
2. HAVE YOU ACTUALLY HAD ANY THOUGHTS OF KILLING YOURSELF IN THE PAST MONTH?: NO
1. IN THE PAST MONTH, HAVE YOU WISHED YOU WERE DEAD OR WISHED YOU COULD GO TO SLEEP AND NOT WAKE UP?: NO

## 2024-11-14 ASSESSMENT — PAIN SCALES - GENERAL: PAINLEVEL_OUTOF10: SEVERE PAIN (6)

## 2024-11-14 NOTE — PROGRESS NOTES
ASSESSMENT/PLAN:     I have reviewed the nursing notes.  I have reviewed the findings, diagnosis, plan and need for follow up with the patient.        1. Painful cough  - guaiFENesin-codeine (ROBITUSSIN AC) 100-10 MG/5ML solution; Take 5-10 mLs by mouth every 4 hours as needed for cough.  Dispense: 237 mL; Refill: 0    2. Viral URI with cough (Primary)  - guaiFENesin-codeine (ROBITUSSIN AC) 100-10 MG/5ML solution; Take 5-10 mLs by mouth every 4 hours as needed for cough.  Dispense: 237 mL; Refill: 0    Discussed with patient that symptoms and exam are consistent with viral illness.    No clinical indications for antibiotic treatment at this time as symptoms for 2 days.  Reports negative home covid test.    Patient having difficulty with persistent hard coughing and rib pain - requesting cough syrup with codeine.    Symptomatic treatment - Encouraged fluids, salt water gargles, honey, elevation, humidifier, saline nasal spray, sinus rinse/netti pot, lozenges, tea, soup, smoothies, popsicles, topical vapor rub, rest, etc   May use over-the-counter Tylenol or ibuprofen PRN    Discussed warning signs/symptoms indicative of need to f/u  Follow up if symptoms persist or worsen or concerns      I explained my diagnostic considerations and recommendations to the patient, who voiced understanding and agreement with the treatment plan. All questions were answered. We discussed potential side effects of any prescribed or recommended therapies, as well as expectations for response to treatments.    Nicky Roman NP  [facility] AND [facility]      SUBJECTIVE:   Eh Perry is a 74 year old male who presents to clinic today for the following health issues:  Cough, fever    HPI  Cough, fever, and rib/back pain the past 2 days.  Fever earlier of 100 this morning and highest of 101.6 with occasional chills.  Cough is congested but unable to produce and clear the phlegm.  Feeling fatigued and weak.  Throat irritation  from coughing.  No nasal congestion or drainage.  Dry heaves and acute diarrhea 3 nights ago.  Appetite poor.  Drinking fluids well.   Diabetic and having to increase his insulin during the illness.    Reports negative home covid test this morning.  Taking Ibuprofen        Past Medical History:   Diagnosis Date    Anemia     CKD (chronic kidney disease) stage 3, GFR 30-59 ml/min (H)     GERD (gastroesophageal reflux disease)     Hepatitis     elevated AST due to excess ETOH use (5 drinks/day)    HTN (hypertension)     Hyperlipidemia LDL goal <100 3/7/2013    MGUS (monoclonal gammopathy of unknown significance)      IgG Lambda. Followed by Dr Rogers    Proteinuria 2015    PVC (premature ventricular contraction)     Retinopathy due to secondary diabetes (H)     s/p laser therapy left    Subclinical hypothyroidism 2018    Tobacco abuse     20 pack hx. Quit     Type 1 diabetes mellitus with nephropathy (goal A1C<7)      Past Surgical History:   Procedure Laterality Date    ENDOSCOPIC RELEASE CARPAL TUNNEL Right 10/14/2022    Procedure: Right Endoscopic Carpal Tunnel Release;  Surgeon: Sebastien Rowe MD;  Location: [facility] OR    HAND SURGERY  2007    contracture release    VOCAL CORD INJECTION      vocal cord nodules     Social History     Tobacco Use    Smoking status: Former     Current packs/day: 0.00     Average packs/day: 1 pack/day for 15.0 years (15.0 ttl pk-yrs)     Types: Cigarettes     Start date: 1965     Quit date: 1980     Years since quittin.9    Smokeless tobacco: Never   Substance Use Topics    Alcohol use: Not Currently     Comment: occ, 1-2 glass of wine     Current Outpatient Medications   Medication Sig Dispense Refill    ACCU-CHEK GUIDE test strip TESTING 6 TIMES DAILY 100 strip 11    amLODIPine (NORVASC) 2.5 MG tablet Take 1 tablet (2.5 mg) by mouth daily 90 tablet 3    aspirin 81 MG tablet Take 1 tablet by mouth daily.      blood glucose (NO BRAND  SPECIFIED) lancets standard Dispense item covered by insurance. Test blood sugar 1 times daily. 100 each 11    blood glucose monitoring (NO BRAND SPECIFIED) meter device kit Dispense option covered by insurance. Test blood sugar 1 times daily. 1 kit 11    Carboxymethylcellulose Sodium (EYE DROPS OP) For dry eyes      carvedilol (COREG) 6.25 MG tablet TAKE 1 TABLET(6.25 MG) BY MOUTH TWICE DAILY WITH MEALS 180 tablet 3    Continuous Blood Gluc  (DEXCOM G7 ) EMERSON Use to read blood sugars as per 's instructions. 1 each 0    Continuous Glucose Sensor (DEXCOM G7 SENSOR) MISC CHANGE EVERY 10 DAYS 3 each 11    EPINEPHrine (ANY BX GENERIC EQUIV) 0.3 MG/0.3ML injection 2-pack INJECT 1 PEN IN THE MUSCLE ONE TIME AS DIRECTED 2 each 0    fish oil-omega-3 fatty acids 1000 MG capsule Take 1-2 g by mouth daily       insulin lispro (HUMALOG KWIKPEN) 100 UNIT/ML (1 unit dial) KWIKPEN ADMINISTER 3 UNITS UNDER THE SKIN BEFORE MEALS PER SLIDING SCALE. MAX OF 20 UNITS PER DAY 15 mL 11    insulin pen needle (B-D U/F) 31G X 8 MM miscellaneous USE WITH INSULIN PEN FOUR TIMES DAILY 400 each 0    LANTUS SOLOSTAR 100 UNIT/ML soln ADMINISTER 17 UNITS UNDER THE SKIN DAILY 15 mL 11    lisinopril (ZESTRIL) 40 MG tablet TAKE 1 TABLET(40 MG) BY MOUTH DAILY 90 tablet 3    Multiple Vitamin (MULTI VITAMIN  MENS) TABS Take  by mouth. 30 tablet     omeprazole (PRILOSEC) 20 MG DR capsule TAKE 1 CAPSULE(20 MG) BY MOUTH DAILY 90 capsule 0    sildenafil (VIAGRA) 100 MG tablet Take 1 tablet (100 mg) by mouth daily as needed 10 tablet 5    simvastatin (ZOCOR) 10 MG tablet TAKE 1/2 TABLET(5 MG) BY MOUTH AT BEDTIME 45 tablet 3    tamsulosin (FLOMAX) 0.4 MG capsule Take 1 capsule (0.4 mg) by mouth daily Watch for dizziness upon standing. 30 capsule 11    amLODIPine (NORVASC) 5 MG tablet Take 1 tablet (5 mg) by mouth daily (Patient not taking: Reported on 6/7/2024) 90 tablet 3     Allergies   Allergen Reactions    Lipitor [Atorvastatin  "Calcium]       Elevated Ck and myalgias    Wasp Venom Protein      Wasp sting         Past medical history, past surgical history, current medications and allergies reviewed and accurate to the best of my knowledge.        OBJECTIVE:     /70 (BP Location: Left arm, Patient Position: Sitting, Cuff Size: Adult Regular)   Pulse 93   Temp 97.4  F (36.3  C) (Temporal)   Resp 18   Ht 1.778 m (5' 10\")   Wt 79.9 kg (176 lb 3.2 oz)   SpO2 96%   BMI 25.28 kg/m    Body mass index is 25.28 kg/m .      Physical Exam  General Appearance: Well appearing young elderly male, appropriate appearance for age. No acute distress  Orophayrnx: moist mucous membranes, pharynx without erythema, tonsils without hypertrophy, tonsils without erythema, no tonsillar exudates, no oral lesions, no palate petechiae, no post nasal drip seen, no trismus, voice clear.    Nose:  No noted drainage   Neck: supple without adenopathy  Respiratory: normal chest wall and respirations.  Normal effort.  Clear to auscultation bilaterally, no wheezing, crackles or rhonchi.  No increased work of breathing.  No cough appreciated.  Cardiac: RRR with no murmurs  Musculoskeletal:  Equal movement of bilateral upper extremities.  Equal movement of bilateral lower extremities.  Normal gait.    Psychological: normal affect, alert, oriented, and pleasant.           "

## 2024-11-14 NOTE — LETTER
[facility] AND [facility]  [address]  [place] [address]  Phone: [phone]  Fax: [phone]    November 18, 2024        Eh Perry  [address] [address] [address]          To whom it may concern:    RE: Eh Perry    This patient has been hospitalized for a severe illness with complications beginning on 11/14/2024.  He will require additional procedures and follow-up evaluation after he is discharged from the hospital so will be unable to take the trip he was planning.  His family members will need to be available to provide assistance and transportation to procedures.     Please contact me for questions or concerns.      Sincerely,      Cheryl Lake MD

## 2024-11-14 NOTE — NURSING NOTE
"Chief Complaint   Patient presents with    Cough    Fever    Back Pain   Patient presents to the rapid clinic today for concerns of a cough, fever and back pain. Patient states his cough is productive and states the cough started on Tuesday as well as the sore back/ ribs. Patient also notes that he got a flu shot a week ago and had flu like symptoms 2 days later.     Initial /70 (BP Location: Left arm, Patient Position: Sitting, Cuff Size: Adult Regular)   Pulse 93   Temp 97.4  F (36.3  C) (Temporal)   Resp 18   Ht 1.778 m (5' 10\")   Wt 79.9 kg (176 lb 3.2 oz)   SpO2 96%   BMI 25.28 kg/m   Estimated body mass index is 25.28 kg/m  as calculated from the following:    Height as of this encounter: 1.778 m (5' 10\").    Weight as of this encounter: 79.9 kg (176 lb 3.2 oz).  Medication Review: complete    The next two questions are to help us understand your food security.  If you are feeling you need any assistance in this area, we have resources available to support you today.          11/14/2024   SDOH- Food Insecurity   Within the past 12 months, did you worry that your food would run out before you got money to buy more? N   Within the past 12 months, did the food you bought just not last and you didn t have money to get more? N              Health Care Directive:  Patient has a Health Care Directive on file      Beau Irizarry      "

## 2024-11-15 PROBLEM — N18.2 CHRONIC KIDNEY DISEASE, STAGE 2 (MILD): Chronic | Status: ACTIVE | Noted: 2023-12-11

## 2024-11-15 LAB
ATRIAL RATE - MUSE: 89 BPM
DIASTOLIC BLOOD PRESSURE - MUSE: NORMAL MMHG
GLUCOSE BLDC GLUCOMTR-MCNC: 212 MG/DL (ref 70–99)
GLUCOSE BLDC GLUCOMTR-MCNC: 229 MG/DL (ref 70–99)
GLUCOSE BLDC GLUCOMTR-MCNC: 265 MG/DL (ref 70–99)
GLUCOSE BLDC GLUCOMTR-MCNC: 305 MG/DL (ref 70–99)
GLUCOSE BLDC GLUCOMTR-MCNC: 317 MG/DL (ref 70–99)
GRAM STAIN RESULT: ABNORMAL
HOLD SPECIMEN: NORMAL
INTERPRETATION ECG - MUSE: NORMAL
L PNEUMO1 AG UR QL IA: NEGATIVE
MAGNESIUM SERPL-MCNC: 2.2 MG/DL (ref 1.7–2.3)
OSMOLALITY SERPL: 262 MMOL/KG (ref 280–301)
OSMOLALITY UR: 551 MMOL/KG (ref 100–1200)
P AXIS - MUSE: 63 DEGREES
PHOSPHATE SERPL-MCNC: 2.8 MG/DL (ref 2.5–4.5)
PR INTERVAL - MUSE: 174 MS
QRS DURATION - MUSE: 88 MS
QT - MUSE: 354 MS
QTC - MUSE: 430 MS
R AXIS - MUSE: 28 DEGREES
S PNEUM AG SPEC QL: NEGATIVE
SODIUM SERPL-SCNC: 117 MMOL/L (ref 135–145)
SODIUM SERPL-SCNC: 120 MMOL/L (ref 135–145)
SODIUM SERPL-SCNC: 121 MMOL/L (ref 135–145)
SODIUM SERPL-SCNC: 122 MMOL/L (ref 135–145)
SODIUM SERPL-SCNC: 124 MMOL/L (ref 135–145)
SODIUM SERPL-SCNC: 125 MMOL/L (ref 135–145)
SPECIMEN TYPE: NORMAL
SYSTOLIC BLOOD PRESSURE - MUSE: NORMAL MMHG
T AXIS - MUSE: 42 DEGREES
VENTRICULAR RATE- MUSE: 89 BPM

## 2024-11-15 PROCEDURE — 99232 SBSQ HOSP IP/OBS MODERATE 35: CPT | Performed by: FAMILY MEDICINE

## 2024-11-15 PROCEDURE — 84295 ASSAY OF SERUM SODIUM: CPT | Performed by: FAMILY MEDICINE

## 2024-11-15 PROCEDURE — 250N000012 HC RX MED GY IP 250 OP 636 PS 637: Performed by: FAMILY MEDICINE

## 2024-11-15 PROCEDURE — 36415 COLL VENOUS BLD VENIPUNCTURE: CPT | Performed by: PHYSICIAN ASSISTANT

## 2024-11-15 PROCEDURE — 258N000003 HC RX IP 258 OP 636: Performed by: FAMILY MEDICINE

## 2024-11-15 PROCEDURE — 84295 ASSAY OF SERUM SODIUM: CPT | Performed by: PHYSICIAN ASSISTANT

## 2024-11-15 PROCEDURE — 87070 CULTURE OTHR SPECIMN AEROBIC: CPT | Performed by: FAMILY MEDICINE

## 2024-11-15 PROCEDURE — 83735 ASSAY OF MAGNESIUM: CPT | Performed by: FAMILY MEDICINE

## 2024-11-15 PROCEDURE — 87899 AGENT NOS ASSAY W/OPTIC: CPT | Performed by: FAMILY MEDICINE

## 2024-11-15 PROCEDURE — 120N000001 HC R&B MED SURG/OB

## 2024-11-15 PROCEDURE — 36415 COLL VENOUS BLD VENIPUNCTURE: CPT | Performed by: FAMILY MEDICINE

## 2024-11-15 PROCEDURE — 250N000011 HC RX IP 250 OP 636: Performed by: FAMILY MEDICINE

## 2024-11-15 PROCEDURE — 84100 ASSAY OF PHOSPHORUS: CPT | Performed by: FAMILY MEDICINE

## 2024-11-15 PROCEDURE — 250N000013 HC RX MED GY IP 250 OP 250 PS 637: Performed by: FAMILY MEDICINE

## 2024-11-15 PROCEDURE — 87205 SMEAR GRAM STAIN: CPT | Performed by: FAMILY MEDICINE

## 2024-11-15 RX ORDER — CEFTRIAXONE 1 G/1
1 INJECTION, POWDER, FOR SOLUTION INTRAMUSCULAR; INTRAVENOUS EVERY 24 HOURS
Status: DISCONTINUED | OUTPATIENT
Start: 2024-11-15 | End: 2024-11-16

## 2024-11-15 RX ORDER — NICOTINE POLACRILEX 4 MG
15-30 LOZENGE BUCCAL
Status: DISCONTINUED | OUTPATIENT
Start: 2024-11-15 | End: 2024-11-19 | Stop reason: HOSPADM

## 2024-11-15 RX ORDER — SIMVASTATIN 5 MG
5 TABLET ORAL AT BEDTIME
Status: DISCONTINUED | OUTPATIENT
Start: 2024-11-15 | End: 2024-11-19 | Stop reason: HOSPADM

## 2024-11-15 RX ORDER — ACETAMINOPHEN 500 MG
1000 TABLET ORAL EVERY 8 HOURS PRN
COMMUNITY
End: 2024-11-25

## 2024-11-15 RX ORDER — LISINOPRIL 40 MG/1
40 TABLET ORAL DAILY
Status: DISCONTINUED | OUTPATIENT
Start: 2024-11-15 | End: 2024-11-19 | Stop reason: HOSPADM

## 2024-11-15 RX ORDER — TAMSULOSIN HYDROCHLORIDE 0.4 MG/1
0.4 CAPSULE ORAL DAILY
Status: DISCONTINUED | OUTPATIENT
Start: 2024-11-15 | End: 2024-11-16

## 2024-11-15 RX ORDER — AMLODIPINE BESYLATE 2.5 MG/1
2.5 TABLET ORAL DAILY
Status: DISCONTINUED | OUTPATIENT
Start: 2024-11-15 | End: 2024-11-18

## 2024-11-15 RX ORDER — SODIUM CHLORIDE 9 MG/ML
INJECTION, SOLUTION INTRAVENOUS CONTINUOUS
Status: DISCONTINUED | OUTPATIENT
Start: 2024-11-15 | End: 2024-11-15

## 2024-11-15 RX ORDER — PANTOPRAZOLE SODIUM 40 MG/1
40 TABLET, DELAYED RELEASE ORAL DAILY
Status: DISCONTINUED | OUTPATIENT
Start: 2024-11-15 | End: 2024-11-19 | Stop reason: HOSPADM

## 2024-11-15 RX ORDER — DEXTROSE MONOHYDRATE 25 G/50ML
25-50 INJECTION, SOLUTION INTRAVENOUS
Status: DISCONTINUED | OUTPATIENT
Start: 2024-11-15 | End: 2024-11-19 | Stop reason: HOSPADM

## 2024-11-15 RX ORDER — NICOTINE POLACRILEX 4 MG
15-30 LOZENGE BUCCAL
Status: DISCONTINUED | OUTPATIENT
Start: 2024-11-15 | End: 2024-11-15

## 2024-11-15 RX ORDER — IBUPROFEN 200 MG
400 TABLET ORAL EVERY 6 HOURS PRN
Status: ON HOLD | COMMUNITY
End: 2024-11-19

## 2024-11-15 RX ORDER — GUAIFENESIN 600 MG/1
600 TABLET, EXTENDED RELEASE ORAL 2 TIMES DAILY
Status: DISCONTINUED | OUTPATIENT
Start: 2024-11-15 | End: 2024-11-19 | Stop reason: HOSPADM

## 2024-11-15 RX ORDER — POLYETHYLENE GLYCOL 400 AND PROPYLENE GLYCOL 4; 3 MG/ML; MG/ML
1 SOLUTION/ DROPS OPHTHALMIC 4 TIMES DAILY PRN
COMMUNITY

## 2024-11-15 RX ORDER — CARVEDILOL 6.25 MG/1
6.25 TABLET ORAL 2 TIMES DAILY WITH MEALS
Status: DISCONTINUED | OUTPATIENT
Start: 2024-11-15 | End: 2024-11-16

## 2024-11-15 RX ORDER — ENOXAPARIN SODIUM 100 MG/ML
40 INJECTION SUBCUTANEOUS EVERY 24 HOURS
Status: DISCONTINUED | OUTPATIENT
Start: 2024-11-15 | End: 2024-11-16

## 2024-11-15 RX ORDER — DEXTROSE MONOHYDRATE 25 G/50ML
25-50 INJECTION, SOLUTION INTRAVENOUS
Status: DISCONTINUED | OUTPATIENT
Start: 2024-11-15 | End: 2024-11-15

## 2024-11-15 RX ORDER — ONDANSETRON 4 MG/1
4 TABLET, ORALLY DISINTEGRATING ORAL EVERY 6 HOURS PRN
Status: DISCONTINUED | OUTPATIENT
Start: 2024-11-15 | End: 2024-11-19 | Stop reason: HOSPADM

## 2024-11-15 RX ORDER — ONDANSETRON 2 MG/ML
4 INJECTION INTRAMUSCULAR; INTRAVENOUS EVERY 6 HOURS PRN
Status: DISCONTINUED | OUTPATIENT
Start: 2024-11-15 | End: 2024-11-19 | Stop reason: HOSPADM

## 2024-11-15 RX ADMIN — TAMSULOSIN HYDROCHLORIDE 0.4 MG: 0.4 CAPSULE ORAL at 10:12

## 2024-11-15 RX ADMIN — AMLODIPINE BESYLATE 2.5 MG: 2.5 TABLET ORAL at 10:42

## 2024-11-15 RX ADMIN — INSULIN GLARGINE 25 UNITS: 100 INJECTION, SOLUTION SUBCUTANEOUS at 21:42

## 2024-11-15 RX ADMIN — SIMVASTATIN 5 MG: 5 TABLET, FILM COATED ORAL at 21:45

## 2024-11-15 RX ADMIN — PANTOPRAZOLE SODIUM 40 MG: 40 TABLET, DELAYED RELEASE ORAL at 10:12

## 2024-11-15 RX ADMIN — SODIUM CHLORIDE: 9 INJECTION, SOLUTION INTRAVENOUS at 00:23

## 2024-11-15 RX ADMIN — CARVEDILOL 6.25 MG: 6.25 TABLET, FILM COATED ORAL at 17:19

## 2024-11-15 RX ADMIN — GUAIFENESIN 600 MG: 600 TABLET, EXTENDED RELEASE ORAL at 21:44

## 2024-11-15 RX ADMIN — AZITHROMYCIN MONOHYDRATE 500 MG: 500 INJECTION, POWDER, LYOPHILIZED, FOR SOLUTION INTRAVENOUS at 20:26

## 2024-11-15 RX ADMIN — ENOXAPARIN SODIUM 40 MG: 40 INJECTION SUBCUTANEOUS at 10:11

## 2024-11-15 RX ADMIN — GUAIFENESIN 600 MG: 600 TABLET, EXTENDED RELEASE ORAL at 11:49

## 2024-11-15 RX ADMIN — CARVEDILOL 6.25 MG: 6.25 TABLET, FILM COATED ORAL at 08:25

## 2024-11-15 RX ADMIN — CEFTRIAXONE SODIUM 1 G: 1 INJECTION, POWDER, FOR SOLUTION INTRAMUSCULAR; INTRAVENOUS at 17:20

## 2024-11-15 NOTE — PLAN OF CARE
Problem: Comorbidity Management  Goal: Blood Glucose Levels Within Targeted Range  Outcome: Not Progressing  Intervention: Monitor and Manage Glycemia  Recent Flowsheet Documentation  Taken 11/15/2024 0408 by Abelardo Doherty, RN  Medication Review/Management:   medications reviewed   high-risk medications identified  Taken 11/14/2024 2220 by Abelardo Doherty, RN  Medication Review/Management:   medications reviewed   high-risk medications identified     Problem: Gas Exchange Impaired  Goal: Optimal Gas Exchange  Outcome: Not Progressing  Intervention: Optimize Oxygenation and Ventilation  Flowsheets  Taken 11/15/2024 0550  Airway/Ventilation Management: airway patency maintained  Head of Bed (HOB) Positioning: HOB at 20 degrees  Taken 11/15/2024 0408  Head of Bed (HOB) Positioning: HOB at 20 degrees  Taken 11/14/2024 2220  Head of Bed (HOB) Positioning: HOB at 20 degrees     Problem: Infection  Goal: Absence of Infection Signs and Symptoms  Outcome: Not Progressing   Goal Outcome Evaluation:      Plan of Care Reviewed With: patient    Overall Patient Progress: no changeOverall Patient Progress: no change    Outcome Evaluation: Patient remains vitally stable on 5L O2. Sodium still low and treating with NS.

## 2024-11-15 NOTE — PLAN OF CARE
Goal Outcome Evaluation:      Plan of Care Reviewed With: patient    Overall Patient Progress: improvingOverall Patient Progress: improving    Outcome Evaluation: ozygen decreased to 2 liters and sodium is improving, last one at 125

## 2024-11-15 NOTE — PROVIDER NOTIFICATION
11/14/24 2300   General Information (Adult)   Patient Belongings remains with patient   Patient Belongings Remaining with Patient clothing;cell phone/electronics;other (see comments);vision aids;jewelry  (Smart watch, Glasses, Ring)     [facility] will make every effort per our policy to help keep your items safe while in the hospital.  If you choose to keep any items at the bedside, we cannot be held responsible for any items that are lost or broken.      List items sent to safe: NA    I have reviewed my belongings list on admission and verify that it is correct.     Patient signature_______________________________  Date/Time_____________________    2nd Staff person if patient unable to sign __________________________  Date/Time ______________________      I have received all my belongings noted above at discharge.    Patient signature________________________________  Date/Time  __________________________

## 2024-11-15 NOTE — PROGRESS NOTES
Pt weaned from 5L NC with humidification to 4L overnight.  Respiratory will continue to provide support as needed.    Chantelle Alcantara, RT

## 2024-11-15 NOTE — PHARMACY-ADMISSION MEDICATION HISTORY
Pharmacist Admission Medication History    Admission medication history is complete. The information provided in this note is only as accurate as the sources available at the time of the update.    Information Source(s): Patient, Family member, and CareEverywhere/SureScripts via in-person and chart review    Pertinent Information: Patient states his blood sugars have been more elevated recently due to his acute illness.  Patient states he normally uses 14-17 units of Lantus daily but took 20 units yesterday at 1700 prior to ED arrival.  Patient also states he used 7 units last evening and normally takes 3 units of Humalog plus sliding scale (1 unit for every 40 points above 120; 120=1 unit; 160=2 units, etc)    Changes made to PTA medication list:  Added: acetaminophen prn, ibuprofen prn, dextromethorphan, omega eye vitamin  Deleted: fish oil  Changed: eye drops    Allergies reviewed with patient and updates made in EHR: yes    Medication History Completed By: Molly Portillo [facility] 11/15/2024 9:43 AM    PTA Med List   Medication Sig Last Dose/Taking    acetaminophen (TYLENOL) 500 MG tablet Take 1,000 mg by mouth every 8 hours as needed for pain or fever. 11/13/2024 Morning    amLODIPine (NORVASC) 2.5 MG tablet Take 1 tablet (2.5 mg) by mouth daily 11/14/2024 Morning    aspirin 81 MG tablet Take 1 tablet by mouth daily. 11/14/2024 Evening    carvedilol (COREG) 6.25 MG tablet TAKE 1 TABLET(6.25 MG) BY MOUTH TWICE DAILY WITH MEALS 11/14/2024 Morning    Dextromethorphan HBr 15 MG TABS Take 2 tablets by mouth 2 times daily as needed (COUGH). 11/14/2024 Noon    EPINEPHrine (ANY BX GENERIC EQUIV) 0.3 MG/0.3ML injection 2-pack INJECT 1 PEN IN THE MUSCLE ONE TIME AS DIRECTED More than a month    guaiFENesin-codeine (ROBITUSSIN AC) 100-10 MG/5ML solution Take 5-10 mLs by mouth every 4 hours as needed for cough. 11/14/2024 Morning    ibuprofen (ADVIL/MOTRIN) 200 MG tablet Take 400 mg by mouth every 6 hours as needed for pain or  fever. 11/14/2024 Morning    insulin lispro (HUMALOG KWIKPEN) 100 UNIT/ML (1 unit dial) KWIKPEN ADMINISTER 3 UNITS UNDER THE SKIN BEFORE MEALS PER SLIDING SCALE. MAX OF 20 UNITS PER DAY 11/14/2024 at  5:00 PM    LANTUS SOLOSTAR 100 UNIT/ML soln ADMINISTER 17 UNITS UNDER THE SKIN DAILY 11/14/2024 at  5:00 PM    lisinopril (ZESTRIL) 40 MG tablet TAKE 1 TABLET(40 MG) BY MOUTH DAILY 11/14/2024 Morning    Multiple Vitamin (MULTI VITAMIN  MENS) TABS Take  by mouth. 11/14/2024    omeprazole (PRILOSEC) 20 MG DR capsule TAKE 1 CAPSULE(20 MG) BY MOUTH DAILY 11/14/2024 Evening    polyethylene glycol-propylene glycol (SYSTANE ULTRA) 0.4-0.3 % SOLN ophthalmic solution Place 1 drop into both eyes 4 times daily as needed for dry eyes. 11/14/2024 Noon    sildenafil (VIAGRA) 100 MG tablet Take 1 tablet (100 mg) by mouth daily as needed Past Month    simvastatin (ZOCOR) 10 MG tablet TAKE 1/2 TABLET(5 MG) BY MOUTH AT BEDTIME 11/14/2024 Evening    tamsulosin (FLOMAX) 0.4 MG capsule Take 1 capsule (0.4 mg) by mouth daily Watch for dizziness upon standing. 11/14/2024 Evening    UNABLE TO FIND Take 2 capsules by mouth daily. MEDICATION NAME: PRN DE Omega capsules for dry eyes 11/14/2024 Morning

## 2024-11-15 NOTE — PROGRESS NOTES
Admission Note    Data:  Eh Perry admitted to ICU from emergency room at 2210.      Action:  Dr. Fernandez has been notified of admission. Pt oriented to unit, call light in reach.     Response:  Patient is alert and oriented and able to ambulate IND. Patient is on 5L O2 via nasal cannula. Patient sodium is low and slowly replacing. Patient has no concerns at this time.  Admission/Transfer from: ER  2 RN skin assessment completed. Yes  Bao BEGUM  Significant findings include: N/A  Pictures taken: N/A  WOC Nurse Consult Ordered? No

## 2024-11-15 NOTE — PROGRESS NOTES
[facility]    Medicine Progress Note - Hospitalist Service    Date of Admission:  11/14/2024    Assessment & Plan   This 73 yo male with a PMH significant for DM-1 since age 23, HTN, CKD-2, anemia and subclinical hypothyroidism presented to the ER with complaints of worsening shortness of breath.  He had a cough beginning 4 days ago but this progressed throughout the week and was not relieved by his usual OTC medications of Mucinex and guaifenesin with codeine.  His blood sugars have also been very high and his HgbA1C is usually in the non-diabetic range so this is unusual for him.  In the ER he was found to have hypoxia, HTN, temp 99.9, sodium 116, Cr 1.05 with GFR 75 with baseline Cr 0.9, GFR >90, WBC 25.2, procalcitonin 4.29 with normal lactic acid at 1.6.  BNP 1,762 but no prior for comparison.  CTA chest showed a large area of consolidation in the RUL and RML and scattered foci of consolidation in the RLL and LIVIA, no PE.  He was treated with Zithromax, Rocephin, Solu-Medrol, Duoneb and NS fluid resuscitation and admitted to ICU for continued cares.  By the following morning he is feeling better but remains short of breath with elevated blood sugars.    Principal Problem:    Severe sepsis with acute organ dysfunction (H) - hyperlactatemia, acute respiratory failure with hypoxia    Assessment: Improving    Plan: Continue Rocephin and Zithromax, add Mucinex, wean O2 as able, follow labs.  Anticipate 2-3 more days in the hospital.    Active Problems:    Essential hypertension    Assessment: Controlled     Plan: Continue amlodipine 2.5 mg daily, tamsulosin 0.4 mg daily and carvedilol 6.25 mg BID but hold lisinopril for now due to mild FLORENTIN.  Follow BP readings and labs and adjust as indicated.      Gastroesophageal reflux disease, esophagitis presence not specified    Assessment: Stable    Plan: Continue PPI using pantoprazole as formulary substitution for omeprazole.        Subclinical  hypothyroidism    Assessment: Controlled    Plan: Last TSH from 6/7/2024 normal at 2.78.  Done at the time of the ER visit elevated at 4.21 with normal FT4 at 1.53 so no change in treatment in this acute situation.  Not on any regular medications.      Chronic kidney disease, stage 2 (mild)    Assessment: Mild FLORENTIN    Plan: Continue IV fluids, hold lisinopril, recheck labs in AM.      Hypomagnesemia    Assessment: Replaced    Plan: Given mag 4 gm IV in the ER and mag replaced to normal at 2.2.  Recheck every few days or if his respiratory status worsens.      Hyponatremia    Assessment: Improving    Plan: Checking sodium every 4 hours and has been improving.      Hypophosphatemia    Assessment: Resolved    Plan: Was 2.3 at ER presentation, improved to 2.8 by morning.      Pneumonia of both upper lobes due to infectious organism    Assessment: Acute    Plan: Continue Rocephin and Zithromax, Legionella and Strep pneumo negative.  Add Mucinex, sputum sample pending.  Taper off O2 as able.  Cough medicine prn, nebs.  Will not continue steroids due to his hyperglycemia and no wheezing at this time.      Type 1 diabetes mellitus with hyperglycemia (H)    Assessment: Acute    Plan: Generally has excellent control of his glucose with his HgbA1C < 6.2 for the past 4 years.  Continue his usual medications of Lantus 17 units at bedtime and some carb coverage although he does not particularly count carbs to dose and his sliding scale which is 1 unit per 40 over 120.       Anemia in chronic kidney disease (CKD)    Assessment: Decreased    Plan: Hgb slightly decreased from his baseline which is closer to 14.  Recheck in AM.  May be dilutional.          Diet: Consistent Carbohydrate Diet Moderate Consistent Carb (60 g CHO per Meal) Diet    DVT Prophylaxis: Enoxaparin (Lovenox) SQ  Payne Catheter: Not present  Lines: None     Cardiac Monitoring: ACTIVE order. Indication: ICU  Code Status: Full Code      Clinically Significant Risk  "Factors Present on Admission         # Hyponatremia: Lowest Na = 116 mmol/L in last 2 days, will monitor as appropriate  # Hypochloremia: Lowest Cl = 80 mmol/L in last 2 days, will monitor as appropriate    # Hypomagnesemia: Lowest Mg = 1.5 mg/dL in last 2 days, will replace as needed     # Drug Induced Platelet Defect: home medication list includes an antiplatelet medication   # Hypertension: Noted on problem list     # Acute Hypoxic Respiratory Failure: Documented O2 saturation < 90%. Continue supplemental oxygen as needed        # Overweight: Estimated body mass index is 25.65 kg/m  as calculated from the following:    Height as of this encounter: 1.778 m (5' 10\").    Weight as of this encounter: 81.1 kg (178 lb 12.7 oz).              Social Drivers of Health    Tobacco Use: Medium Risk (11/14/2024)    Patient History     Smoking Tobacco Use: Former     Smokeless Tobacco Use: Never   Alcohol Use: Unknown (8/12/2019)    AUDIT-C     Frequency of Alcohol Consumption: 2-3 times a week          Disposition Plan     Medically Ready for Discharge: Anticipated in 2-4 Days             Cheryl Lake MD  Hospitalist Service  [facility]  Securely message with [employer] (more info)  Text page via [facility] Paging/Directory   ______________________________________________________________________    Interval History   Feeling much better than at admission.  Wife reports his lungs sound much better and he no longer has rattling which can be heard from across the room.  Has tight control of his glucose but has not been able to get it under control for the past few days even with increasing his insulin dosages.  Denies chest pain.  Generally very very active.    Physical Exam   Vital Signs: Temp: 98.1  F (36.7  C) Temp src: Tympanic BP: 128/69 Pulse: 91   Resp: 27 SpO2: 97 % O2 Device: Nasal cannula Oxygen Delivery: 2 LPM  Weight: 178 lbs 12.69 oz    Constitutional: awake, alert, cooperative, no apparent distress, " appears younger than stated age, and normal weight  Eyes: pupils equal, round and reactive to light, extra-ocular muscles intact, and mild conjunctival injection bilaterally  ENT: normocephalic, without obvious abnormality, atraumatic  Respiratory: mild respiratory distress, fair air exchange and rales in the right base, no wheezing or rhonchi  Cardiovascular: regular rate and rhythm, normal S1 and S2, no murmur noted, and no edema  GI: normal bowel sounds, soft, non-distended, and non-tender  Skin: normal skin color, texture, turgor, no redness, warmth, or swelling, and no rashes  Musculoskeletal: no lower extremity pitting edema present  there is no redness, warmth, or swelling of the joints  full range of motion noted  motor strength is 5 out of 5 all extremities bilaterally  tone is normal  Neurologic: Mental Status Exam:  Fund of Knowledge:  normal  Attention/Concentration:  normal  Language:  normal  Cranial Nerves:  cranial nerves II-XII are grossly intact  Motor Exam:  moves all extremities well and symmetrically  Neuropsychiatric: General: normal, calm, and normal eye contact  Level of consciousness: alert / normal  Affect: normal, pleasant, and full  Orientation: oriented to self, place, time and situation  Memory and insight: normal, memory for past and recent events intact, and thought process normal    Medical Decision Making             Data     I have personally reviewed the following data over the past 24 hrs:    N/A  \   N/A   / N/A     122 (L) N/A N/A /  265 (H)   N/A N/A N/A \     ALT: N/A AST: N/A AP: N/A TBILI: N/A   ALB: N/A TOT PROTEIN: N/A LIPASE: N/A     Trop: 14 BNP: N/A     TSH: N/A T4: N/A A1C: N/A       Imaging results reviewed over the past 24 hrs:   Recent Results (from the past 24 hours)   CT Chest Pulmonary Embolism w Contrast    Narrative    Exam:  CT CHEST PULMONARY EMBOLISM W CONTRAST    Exam reason:  Fever, shortness of breath, tachycardia.  Rule out PE,  consolidation,  effusion    Technique:  Contrast enhanced helical CT pulmonary angiography was  performed. Sagittal and coronal reformats as well as coronal MIP  reformats were obtained. This CT was performed using one or more of  the following dose reduction techniques: automated exposure control,  adjustment of the mA and/or kV according to patient size, and/or use  of iterative reconstruction technique.    Meds/Contrast: isovue 370 76ml    Comparison:  10/29/2010    FINDINGS:    Technical quality: Diagnostic.    Cardiovascular:   -There are no filling defects in the pulmonary arteries that would  indicate pulmonary embolism.  -The main pulmonary artery is mildly dilated, likely due to pulmonary  hypertension.  -No aortic aneurysm.  Lungs/Airways: There is a large area of right upper and middle lobe  consolidation with additional scattered foci of consolidation in the  right lower lobe and left upper lobe.  Mirella/Mediastinum: There are several prominent mediastinal lymph nodes,  likely reactive.  Pleura: Small right pleural effusion. Trace left pleural effusion. No  pneumothorax.  Chest Wall/Axilla: Unremarkable.    Visualized Upper Abdomen: No acute findings in the visualized upper  abdomen.  Musculoskeletal: No acute osseous abnormalities.      Impression    IMPRESSION:    Large area of consolidation in the right upper and middle lobes as  well as scattered additional foci of consolidation in the right lower  and left upper lobes, compatible with pneumonia. Small right pleural  effusion and trace left pleural effusion.    No pulmonary embolism.    LUIS MANUEL RAMIREZ MD         SYSTEM ID:  [id number]

## 2024-11-15 NOTE — PROGRESS NOTES
Interdisciplinary Discharge Planning Note    Anticipated Discharge Date: 11/16-11/17    Anticipated Discharge Location: Home    Clinical Needs Before Discharge:  stable oxygen requirement and stable vital signs    Treatment Needs After Discharge:  None identified    Potential Barriers to Discharge: None identified at this time     NANCY Mcdermott  11/15/2024,  12:45 PM

## 2024-11-15 NOTE — ED NOTES
Critical and/or Actionable Test Result  Date: November 14, 2024     Time Received:  1935  Lab:sodium  Verbal Result Read Back to Caller:   Result:   116  MD Notification: Teto Everett

## 2024-11-15 NOTE — ED TRIAGE NOTES
"Pt presents to ED via private car with c/o cough, fever, and shortness of breath that started on Tuesday. Pt had a negative covid test at home and was seen in [facility] at 0900 today. BP (!) 157/77   Pulse 109   Temp 99.9  F (37.7  C) (Tympanic)   Resp 28   Ht 1.778 m (5' 10\")   Wt 79.8 kg (176 lb)   SpO2 93%   BMI 25.25 kg/m         Triage Assessment (Adult)       Row Name 11/14/24 4628          Triage Assessment    Airway WDL X     Additional Documentation Breath Sounds (Group)        Respiratory WDL    Respiratory WDL X;rhythm/pattern     Rhythm/Pattern, Respiratory dyspnea upon exertion;shortness of breath;tachypneic        Skin Circulation/Temperature WDL    Skin Circulation/Temperature WDL X;temperature     Skin Temperature warm        Cardiac WDL    Cardiac WDL X;rhythm     Pulse Rate & Regularity tachycardic        Peripheral/Neurovascular WDL    Peripheral Neurovascular WDL WDL        Cognitive/Neuro/Behavioral WDL    Cognitive/Neuro/Behavioral WDL WDL                     "

## 2024-11-15 NOTE — ED PROVIDER NOTES
EMERGENCY DEPARTMENT ENCOUNTER      NAME: Eh Perry  AGE: 74 year old male  YOB: 1950  MRN: [id number]  EVALUATION DATE & TIME: 11/14/2024  6:10 PM    PCP: Hemanth Cunningham    ED PROVIDER: Alex Yen PA-C       CHIEF COMPLAINT:  Chief Complaint   Patient presents with    Shortness of Breath         HPI  Eh Perry is a pleasant 74 year old male with history of type 1 diabetes, chronic kidney disease, anemia chronic kidney disease, hypertension, hyperlipidemia, and GERD who presents to the ER via private vehicle with his wife for evaluation of worsening cough and shortness of breath.  Patient states that cough began earlier this week on Tuesday.  Patient had initially been sick on Monday supposedly after eating some bad chili that his wife made.  He briefly had some nausea and vomiting which quickly resolved.  Slight cough that developed on Tuesday which has progressed throughout the week.  Patient did go to urgent care earlier today and was prescribed cough medication but this has not helped.  Patient has continued to have worsening cough and shortness of breath.  Patient feeling feverish.  Patient former smoker but no history of asthma or COPD.  Denies any history of pneumonia or heart issues.  Denies any history of blood clots.  No anticoagulation therapy.  Patient is a type I diabetic.      REVIEW OF SYSTEMS   Review of Systems  As above, otherwise ROS is unremarkable.      PAST MEDICAL HISTORY:  Past Medical History:   Diagnosis Date    Anemia     CKD (chronic kidney disease) stage 3, GFR 30-59 ml/min (H)     GERD (gastroesophageal reflux disease)     Hepatitis 2011    elevated AST due to excess ETOH use (5 drinks/day)    HTN (hypertension)     Hyperlipidemia LDL goal <100 3/7/2013    MGUS (monoclonal gammopathy of unknown significance)      IgG Lambda. Followed by Dr Rogers    Proteinuria 1/22/2015    PVC (premature ventricular contraction)     Retinopathy due to  secondary diabetes (H)     s/p laser therapy left    Subclinical hypothyroidism 6/11/2018    Tobacco abuse     20 pack hx. Quit 1991    Type 1 diabetes mellitus with nephropathy (goal A1C<7) 1978         PAST SURGICAL HISTORY:  Past Surgical History:   Procedure Laterality Date    ENDOSCOPIC RELEASE CARPAL TUNNEL Right 10/14/2022    Procedure: Right Endoscopic Carpal Tunnel Release;  Surgeon: Sebastien Rowe MD;  Location: [facility] OR    HAND SURGERY  01/01/2007    contracture release    VOCAL CORD INJECTION      vocal cord nodules         CURRENT MEDICATIONS:    Current Outpatient Medications   Medication Instructions    ACCU-CHEK GUIDE test strip TESTING 6 TIMES DAILY    amLODIPine (NORVASC) 2.5 mg, Oral, DAILY    aspirin 81 MG tablet 1 tablet, DAILY    blood glucose (NO BRAND SPECIFIED) lancets standard Dispense item covered by insurance. Test blood sugar 1 times daily.    blood glucose monitoring (NO BRAND SPECIFIED) meter device kit Dispense option covered by insurance. Test blood sugar 1 times daily.    Carboxymethylcellulose Sodium (EYE DROPS OP) For dry eyes    carvedilol (COREG) 6.25 mg, Oral, 2 TIMES DAILY WITH MEALS    Continuous Blood Gluc  (DEXCOM G7 ) EMERSON Use to read blood sugars as per 's instructions.    Continuous Glucose Sensor (DEXCOM G7 SENSOR) MISC CHANGE EVERY 10 DAYS    EPINEPHrine (ANY BX GENERIC EQUIV) 0.3 MG/0.3ML injection 2-pack INJECT 1 PEN IN THE MUSCLE ONE TIME AS DIRECTED    fish oil-omega-3 fatty acids 1-2 g, DAILY    guaiFENesin-codeine (ROBITUSSIN AC) 100-10 MG/5ML solution 5-10 mLs, Oral, EVERY 4 HOURS PRN    insulin lispro (HUMALOG KWIKPEN) 100 UNIT/ML (1 unit dial) KWIKPEN ADMINISTER 3 UNITS UNDER THE SKIN BEFORE MEALS PER SLIDING SCALE. MAX OF 20 UNITS PER DAY    insulin pen needle (B-D U/F) 31G X 8 MM miscellaneous USE WITH INSULIN PEN FOUR TIMES DAILY    LANTUS SOLOSTAR 100 UNIT/ML soln ADMINISTER 17 UNITS UNDER THE SKIN DAILY    lisinopril  (ZESTRIL) 40 mg, Oral, DAILY    Multiple Vitamin (MULTI VITAMIN  MENS) TABS Take  by mouth.    omeprazole (PRILOSEC) 20 mg, Oral, DAILY    sildenafil (VIAGRA) 100 mg, Oral, DAILY PRN    simvastatin (ZOCOR) 10 MG tablet TAKE 1/2 TABLET(5 MG) BY MOUTH AT BEDTIME    tamsulosin (FLOMAX) 0.4 mg, Oral, DAILY, Watch for dizziness upon standing.         ALLERGIES:  Allergies   Allergen Reactions    Lipitor [Atorvastatin Calcium]       Elevated Ck and myalgias    Wasp Venom Protein      Wasp sting         FAMILY HISTORY:  Family History   Problem Relation Age of Onset    Cancer Mother         peritoneal carcinoma    Diabetes Father     Diabetes Sister     Cerebrovascular Disease Maternal Grandmother     Diabetes Paternal Grandmother     Prostate Cancer Cousin     Anesthesia Reaction No family hx of     Bleeding Diathesis No family hx of     Colon Cancer No family hx of          SOCIAL HISTORY:   Social History     Socioeconomic History    Marital status:    Occupational History     Employer: RETIRED     Comment: anesthesiologist   Tobacco Use    Smoking status: Former     Current packs/day: 0.00     Average packs/day: 1 pack/day for 15.0 years (15.0 ttl pk-yrs)     Types: Cigarettes     Start date: 1965     Quit date: 1980     Years since quittin.9    Smokeless tobacco: Never   Vaping Use    Vaping status: Never Used   Substance and Sexual Activity    Alcohol use: Not Currently     Comment: occ, 1-2 glass of wine    Drug use: No    Sexual activity: Yes     Partners: Female     Social Drivers of Health     Financial Resource Strain: Low Risk  (2024)    Financial Resource Strain     Within the past 12 months, have you or your family members you live with been unable to get utilities (heat, electricity) when it was really needed?: No   Food Insecurity: Low Risk  (2024)    Food Insecurity     Within the past 12 months, did you worry that your food would run out before you got money to buy more?: No  "    Within the past 12 months, did the food you bought just not last and you didn t have money to get more?: No   Transportation Needs: Low Risk  (6/7/2024)    Transportation Needs     Within the past 12 months, has lack of transportation kept you from medical appointments, getting your medicines, non-medical meetings or appointments, work, or from getting things that you need?: No   Interpersonal Safety: Low Risk  (6/7/2024)    Interpersonal Safety     Do you feel physically and emotionally safe where you currently live?: Yes     Within the past 12 months, have you been hit, slapped, kicked or otherwise physically hurt by someone?: No     Within the past 12 months, have you been humiliated or emotionally abused in other ways by your partner or ex-partner?: No   Housing Stability: Low Risk  (6/7/2024)    Housing Stability     Do you have housing? : Yes     Are you worried about losing your housing?: No       ==================================================================================================================================    PHYSICAL EXAM    VITAL SIGNS: BP (!) 140/64   Pulse 92   Temp 99.9  F (37.7  C) (Tympanic)   Resp 28   Ht 1.778 m (5' 10\")   Wt 79.8 kg (176 lb)   SpO2 90%   BMI 25.25 kg/m      Patient Vitals for the past 24 hrs:   BP Temp Temp src Pulse Resp SpO2 Height Weight   11/14/24 2107 -- -- -- -- -- 90 % -- --   11/14/24 2100 (!) 140/64 -- -- 92 -- 90 % -- --   11/14/24 2030 138/66 -- -- 93 -- 90 % -- --   11/14/24 1930 130/78 -- -- 101 -- 92 % -- --   11/14/24 1900 133/61 -- -- 102 -- 92 % -- --   11/14/24 1852 (!) 140/78 -- -- 104 -- 90 % -- --   11/14/24 1813 -- -- -- -- -- 92 % -- --   11/14/24 1812 (!) 161/78 -- -- 105 -- (!) 89 % -- --   11/14/24 1806 (!) 157/77 99.9  F (37.7  C) Tympanic 109 28 93 % 1.778 m (5' 10\") 79.8 kg (176 lb)       Physical Exam  Vitals and nursing note reviewed.   Constitutional:       Appearance: He is well-developed. He is ill-appearing.   HENT: "      Nose: Nose normal. No congestion or rhinorrhea.      Mouth/Throat:      Mouth: Mucous membranes are moist.      Pharynx: Oropharynx is clear. No posterior oropharyngeal erythema.   Eyes:      Conjunctiva/sclera: Conjunctivae normal.   Cardiovascular:      Rate and Rhythm: Regular rhythm. Tachycardia present.      Pulses: Normal pulses.      Heart sounds: Normal heart sounds.   Pulmonary:      Effort: Tachypnea present.      Breath sounds: Examination of the right-upper field reveals rhonchi. Examination of the left-upper field reveals rhonchi. Examination of the right-middle field reveals rhonchi. Examination of the right-lower field reveals rhonchi. Rhonchi present.   Musculoskeletal:         General: Normal range of motion.      Cervical back: Normal range of motion and neck supple. No rigidity.      Right lower leg: No edema.      Left lower leg: No edema.   Skin:     General: Skin is warm and dry.      Capillary Refill: Capillary refill takes less than 2 seconds.   Neurological:      General: No focal deficit present.      Mental Status: He is alert.   Psychiatric:         Mood and Affect: Mood normal.            ==================================================================================================================================    LABS & RADIOLOGY:  All pertinent labs reviewed and interpreted. Reviewed all pertinent imaging. Please see official radiology report.  Results for orders placed or performed during the hospital encounter of 11/14/24   CT Chest Pulmonary Embolism w Contrast    Impression    IMPRESSION:    Large area of consolidation in the right upper and middle lobes as  well as scattered additional foci of consolidation in the right lower  and left upper lobes, compatible with pneumonia. Small right pleural  effusion and trace left pleural effusion.    No pulmonary embolism.    LUIS MANUEL RAMIREZ MD         SYSTEM ID:  [id number]   Comprehensive metabolic panel   Result Value Ref Range     Sodium 116 (LL) 135 - 145 mmol/L    Potassium 4.1 3.4 - 5.3 mmol/L    Carbon Dioxide (CO2) 22 22 - 29 mmol/L    Anion Gap 14 7 - 15 mmol/L    Urea Nitrogen 25.1 (H) 8.0 - 23.0 mg/dL    Creatinine 1.05 0.67 - 1.17 mg/dL    GFR Estimate 75 >60 mL/min/1.73m2    Calcium 9.0 8.8 - 10.4 mg/dL    Chloride 80 (L) 98 - 107 mmol/L    Glucose 259 (H) 70 - 99 mg/dL    Alkaline Phosphatase 197 (H) 40 - 150 U/L    AST 38 0 - 45 U/L    ALT 43 0 - 70 U/L    Protein Total 7.2 6.4 - 8.3 g/dL    Albumin 3.7 3.5 - 5.2 g/dL    Bilirubin Total 0.9 <=1.2 mg/dL   Lactic acid whole blood with 1x repeat in 2 hr when >2   Result Value Ref Range    Lactic Acid, Initial 1.6 0.7 - 2.0 mmol/L   Result Value Ref Range    Procalcitonin 4.29 (H) <0.50 ng/mL   Result Value Ref Range    Troponin T, High Sensitivity 15 <=22 ng/L   Result Value Ref Range    Magnesium 1.5 (L) 1.7 - 2.3 mg/dL   TSH Reflex [facility]   Result Value Ref Range    TSH 4.21 (H) 0.30 - 4.20 uIU/mL   Blood gas venous   Result Value Ref Range    pH Venous 7.39 7.32 - 7.43    pCO2 Venous 40 40 - 50 mm Hg    pO2 Venous 22 (L) 25 - 47 mm Hg    Bicarbonate Venous 24 21 - 28 mmol/L    Base Excess/Deficit Venous -0.5 -3.0 - 3.0 mmol/L    FIO2 21     Oxyhemoglobin Venous 38 (L) 70 - 75 %    O2 Sat, Venous 38.3 (L) 70.0 - 75.0 %   Nt probnp inpatient (BNP)   Result Value Ref Range    N terminal Pro BNP Inpatient 1,762 (H) 0 - 900 pg/mL   Influenza A/B, RSV, & SARS-CoV2 PCR (COVID-19) Nose    Specimen: Nose; Swab   Result Value Ref Range    Influenza A PCR Negative Negative    Influenza B PCR Negative Negative    RSV PCR Negative Negative    SARS CoV2 PCR Negative Negative   UA with Microscopic reflex to Culture    Specimen: Urine, Clean Catch   Result Value Ref Range    Color Urine Yellow Colorless, Straw, Light Yellow, Yellow    Appearance Urine Clear Clear    Glucose Urine 100 (A) Negative mg/dL    Bilirubin Urine Negative Negative    Ketones Urine 10 (A) Negative mg/dL    Specific Gravity  Urine 1.020 1.000 - 1.030    Blood Urine Negative Negative    pH Urine 6.0 5.0 - 9.0    Protein Albumin Urine 70 (A) Negative mg/dL    Urobilinogen Urine 3.0 (A) Normal, 2.0 mg/dL    Nitrite Urine Negative Negative    Leukocyte Esterase Urine Negative Negative    RBC Urine <1 <=2 /HPF    WBC Urine 1 <=5 /HPF    Hyaline Casts Urine 1 <=2 /LPF   CBC with platelets and differential   Result Value Ref Range    WBC Count 25.2 (H) 4.0 - 11.0 10e3/uL    RBC Count 3.70 (L) 4.40 - 5.90 10e6/uL    Hemoglobin 11.8 (L) 13.3 - 17.7 g/dL    Hematocrit 33.3 (L) 40.0 - 53.0 %    MCV 90 78 - 100 fL    MCH 31.9 26.5 - 33.0 pg    MCHC 35.4 31.5 - 36.5 g/dL    RDW 11.8 10.0 - 15.0 %    Platelet Count 257 150 - 450 10e3/uL    % Neutrophils 92 %    % Lymphocytes 2 %    % Monocytes 5 %    % Eosinophils 0 %    % Basophils 1 %    % Immature Granulocytes 1 %    NRBCs per 100 WBC 0 <1 /100    Absolute Neutrophils 23.2 (H) 1.6 - 8.3 10e3/uL    Absolute Lymphocytes 0.5 (L) 0.8 - 5.3 10e3/uL    Absolute Monocytes 1.2 0.0 - 1.3 10e3/uL    Absolute Eosinophils 0.0 0.0 - 0.7 10e3/uL    Absolute Basophils 0.1 0.0 - 0.2 10e3/uL    Absolute Immature Granulocytes 0.2 <=0.4 10e3/uL    Absolute NRBCs 0.0 10e3/uL   Extra Blue Top Tube   Result Value Ref Range    Hold Specimen x    Extra Red Top Tube   Result Value Ref Range    Hold Specimen x    Result Value Ref Range    Free T4 1.53 0.90 - 1.70 ng/dL   Result Value Ref Range    Troponin T, High Sensitivity 14 <=22 ng/L   Result Value Ref Range    Sodium 117 (LL) 135 - 145 mmol/L   Result Value Ref Range    Phosphorus 2.3 (L) 2.5 - 4.5 mg/dL   Sodium Random Urine [facility]   Result Value Ref Range    Sodium Urine mmol/L <20 mmol/L     CT Chest Pulmonary Embolism w Contrast   Final Result   IMPRESSION:      Large area of consolidation in the right upper and middle lobes as   well as scattered additional foci of consolidation in the right lower   and left upper lobes, compatible with pneumonia. Small right pleural  "  effusion and trace left pleural effusion.      No pulmonary embolism.      LUIS MANUEL RAMIREZ MD            SYSTEM ID:  [id number]            EKG:    EKG reviewed at 2111.  1) Rhythm: NSR  2) Rate: ventricular rate 89 bpm.  3) QRS Axis: No axis deviation  4) P waves/ VA interval: Sinus. Nml RHETT. No atrial enlargement  5) QRS complex: Narrow. No BBB. No ventricular hypertrophy. No pathological Q waves.  6) ST Segment: No acute ST segment elevation or depression  7) T waves: No T wave inversions. No peaked or flattened T waves.     I have independently reviewed and interpreted today's EKG, pending cardiologist over read.         ==================================================================================================================================    ED COURSE, MEDICAL DECISION MAKING, FINAL IMPRESSION AND PLAN:     Assessment / Plan:  1. Hyponatremia    2. Severe sepsis (H)    3. Pneumonia    4. Acute respiratory failure with hypoxia (H)    5. Type 1 diabetes mellitus with hyperglycemia (H)    6. Hypomagnesemia    7. Hypophosphatemia    8. Anemia in chronic kidney disease (CKD)    9. Elevated TSH        The patient was interviewed and examined.  HPI and physical exam as below.  Differential diagnosis and MDM Key Documentation Elements as below.  Vitals, triage note, and nursing notes were reviewed.  BP (!) 140/64   Pulse 92   Temp 99.9  F (37.7  C) (Tympanic)   Resp 28   Ht 1.778 m (5' 10\")   Wt 79.8 kg (176 lb)   SpO2 90%   BMI 25.25 kg/m      Differential includes but is not limited to pneumonia, PE, sepsis, acute respiratory failure, ACS/MI, unstable angina, pneumothorax, hemothorax, acute heart failure    1.  Bilateral pneumonia  2.  Severe sepsis  3.  Acute respiratory failure with hypoxia  -Patient was afebrile but hypoxic, tachypneic, and tachycardic.  Patient had O2 levels in the upper 80s%.  Patient was hypertensive with blood pressure 157/77.  Bilateral rhonchi more prominent on the right " side.  No lower extremity edema or tenderness.  Physical exam today was otherwise benign.  -Patient with elevated white blood cell count of 25,200.  Procalcitonin elevated at 4.29.  Normal lactic acid at 1.6.  Influenza, COVID, and RSV negative.  Normal venous pH is 7.39.  CO2 40.  O2 low at 22.  Bicarb normal at 24.  CT chest PE study with no signs of acute PE but significant larger consolidation in the right upper and middle lobes.  Small foci of consolidation in the right lower lobe and left upper lobe.  Patient also with small bilateral pleural effusions.  Pleural effusions likely secondary to pneumonia versus congestive heart failure.  -Patient started on sepsis protocol.  Patient given IV Rocephin 2 g and IV azithromycin 500 mg.  30 cc/kg IV fluids use.  Patient given IV Solu-Medrol 125 mg (patient is a former smoker) and DuoNeb with slight improvement in symptoms.  Blood pressures remained stable with on IV fluids but tachycardia did not resolve.  O2 levels improved with use of supplemental 3 L nasal cannula.  -Patient to be admitted to ICU.  Discussed with hospitalist service, Dr. Fernandez, who was kind enough to accept patient.  Admitted in stable condition.    4.  Hyponatremia  -Initial sodium 116.  Sodium reverified at 117 patient not confused.  Patient initially given 2 L normal saline and then briefly placed on a 3% drip at 30 cc/h.  After discussion with hospitalist service, will discontinue 3% drip at request of Dr. Fernandez    5.  Type 1 diabetes with hyperglycemia  -Glucose 259.  Normal lactic acid.  No signs of lactic acidosis.  -Continue to monitor.    6.  Hypomagnesemia  -Magnesium 1.5.  IV magnesium 4 g given for a punishment.    7.  Hypophosphatemia  -Phosphorus 2.3.  Continue to monitor.    8.  Anemia of chronic kidney disease  -Hemoglobin stable 11.8.  No indication for blood transfusion.  Renal function with creatinine 1.05, BUN 25.1, GFR 75.    9.  Elevated TSH  -TSH 4.21.  Normal free T4 at  1.53.  -No signs of thyrotoxicosis.  Patient can discuss with with hospitalist or primary care about management    Pertinent Labs & Imaging studies reviewed. (See chart for details)  Results for orders placed or performed during the hospital encounter of 11/14/24   CT Chest Pulmonary Embolism w Contrast    Impression    IMPRESSION:    Large area of consolidation in the right upper and middle lobes as  well as scattered additional foci of consolidation in the right lower  and left upper lobes, compatible with pneumonia. Small right pleural  effusion and trace left pleural effusion.    No pulmonary embolism.    LUIS MANUEL RAMIREZ MD         SYSTEM ID:  [id number]   Comprehensive metabolic panel   Result Value Ref Range    Sodium 116 (LL) 135 - 145 mmol/L    Potassium 4.1 3.4 - 5.3 mmol/L    Carbon Dioxide (CO2) 22 22 - 29 mmol/L    Anion Gap 14 7 - 15 mmol/L    Urea Nitrogen 25.1 (H) 8.0 - 23.0 mg/dL    Creatinine 1.05 0.67 - 1.17 mg/dL    GFR Estimate 75 >60 mL/min/1.73m2    Calcium 9.0 8.8 - 10.4 mg/dL    Chloride 80 (L) 98 - 107 mmol/L    Glucose 259 (H) 70 - 99 mg/dL    Alkaline Phosphatase 197 (H) 40 - 150 U/L    AST 38 0 - 45 U/L    ALT 43 0 - 70 U/L    Protein Total 7.2 6.4 - 8.3 g/dL    Albumin 3.7 3.5 - 5.2 g/dL    Bilirubin Total 0.9 <=1.2 mg/dL   Lactic acid whole blood with 1x repeat in 2 hr when >2   Result Value Ref Range    Lactic Acid, Initial 1.6 0.7 - 2.0 mmol/L   Result Value Ref Range    Procalcitonin 4.29 (H) <0.50 ng/mL   Result Value Ref Range    Troponin T, High Sensitivity 15 <=22 ng/L   Result Value Ref Range    Magnesium 1.5 (L) 1.7 - 2.3 mg/dL   TSH Reflex [facility]   Result Value Ref Range    TSH 4.21 (H) 0.30 - 4.20 uIU/mL   Blood gas venous   Result Value Ref Range    pH Venous 7.39 7.32 - 7.43    pCO2 Venous 40 40 - 50 mm Hg    pO2 Venous 22 (L) 25 - 47 mm Hg    Bicarbonate Venous 24 21 - 28 mmol/L    Base Excess/Deficit Venous -0.5 -3.0 - 3.0 mmol/L    FIO2 21     Oxyhemoglobin Venous 38 (L) 70  - 75 %    O2 Sat, Venous 38.3 (L) 70.0 - 75.0 %   Nt probnp inpatient (BNP)   Result Value Ref Range    N terminal Pro BNP Inpatient 1,762 (H) 0 - 900 pg/mL   Influenza A/B, RSV, & SARS-CoV2 PCR (COVID-19) Nose    Specimen: Nose; Swab   Result Value Ref Range    Influenza A PCR Negative Negative    Influenza B PCR Negative Negative    RSV PCR Negative Negative    SARS CoV2 PCR Negative Negative   UA with Microscopic reflex to Culture    Specimen: Urine, Clean Catch   Result Value Ref Range    Color Urine Yellow Colorless, Straw, Light Yellow, Yellow    Appearance Urine Clear Clear    Glucose Urine 100 (A) Negative mg/dL    Bilirubin Urine Negative Negative    Ketones Urine 10 (A) Negative mg/dL    Specific Gravity Urine 1.020 1.000 - 1.030    Blood Urine Negative Negative    pH Urine 6.0 5.0 - 9.0    Protein Albumin Urine 70 (A) Negative mg/dL    Urobilinogen Urine 3.0 (A) Normal, 2.0 mg/dL    Nitrite Urine Negative Negative    Leukocyte Esterase Urine Negative Negative    RBC Urine <1 <=2 /HPF    WBC Urine 1 <=5 /HPF    Hyaline Casts Urine 1 <=2 /LPF   CBC with platelets and differential   Result Value Ref Range    WBC Count 25.2 (H) 4.0 - 11.0 10e3/uL    RBC Count 3.70 (L) 4.40 - 5.90 10e6/uL    Hemoglobin 11.8 (L) 13.3 - 17.7 g/dL    Hematocrit 33.3 (L) 40.0 - 53.0 %    MCV 90 78 - 100 fL    MCH 31.9 26.5 - 33.0 pg    MCHC 35.4 31.5 - 36.5 g/dL    RDW 11.8 10.0 - 15.0 %    Platelet Count 257 150 - 450 10e3/uL    % Neutrophils 92 %    % Lymphocytes 2 %    % Monocytes 5 %    % Eosinophils 0 %    % Basophils 1 %    % Immature Granulocytes 1 %    NRBCs per 100 WBC 0 <1 /100    Absolute Neutrophils 23.2 (H) 1.6 - 8.3 10e3/uL    Absolute Lymphocytes 0.5 (L) 0.8 - 5.3 10e3/uL    Absolute Monocytes 1.2 0.0 - 1.3 10e3/uL    Absolute Eosinophils 0.0 0.0 - 0.7 10e3/uL    Absolute Basophils 0.1 0.0 - 0.2 10e3/uL    Absolute Immature Granulocytes 0.2 <=0.4 10e3/uL    Absolute NRBCs 0.0 10e3/uL   Extra Blue Top Tube   Result  "Value Ref Range    Hold Specimen x    Extra Red Top Tube   Result Value Ref Range    Hold Specimen x    Result Value Ref Range    Free T4 1.53 0.90 - 1.70 ng/dL   Result Value Ref Range    Troponin T, High Sensitivity 14 <=22 ng/L   Result Value Ref Range    Sodium 117 (LL) 135 - 145 mmol/L   Result Value Ref Range    Phosphorus 2.3 (L) 2.5 - 4.5 mg/dL   Sodium Random Urine [facility]   Result Value Ref Range    Sodium Urine mmol/L <20 mmol/L     No results found for: \"ABORH\"      Reassessments, Medications, Interventions, & Response to Treatments:  -as above    Medications given during today's ER visit:  Medications   azithromycin (ZITHROMAX) 500 mg in  mL intermittent infusion (0 mg Intravenous Stopped 11/14/24 2121)   sodium chloride (PF) 0.9% PF flush 3 mL (has no administration in time range)   sodium chloride (PF) 0.9% PF flush 3 mL (3 mLs Intracatheter $Given 11/14/24 2010)   magnesium sulfate 4 g in 100 mL sterile water intermittent infusion (4 g Intravenous $New Bag 11/14/24 2027)   methylPREDNISolone Na Suc (solu-MEDROL) injection 125 mg (125 mg Intravenous $Given 11/14/24 1837)   ipratropium - albuterol 0.5 mg/2.5 mg/3 mL (DUONEB) neb solution 3 mL (3 mLs Nebulization $Given 11/14/24 1852)   cefTRIAXone (ROCEPHIN) 2 g vial to attach to  ml bag for ADULTS or NS 50 ml bag for PEDS (0 g Intravenous Stopped 11/14/24 2007)   sodium chloride 0.9% BOLUS 2,190 mL (0 mLs Intravenous Stopped 11/14/24 1947)   iopamidol (ISOVUE-370) solution 76 mL (76 mLs Intravenous $Given 11/14/24 1952)   sodium chloride 0.9 % bag 500 mL for CT scan flush use (80 mLs Intravenous $Given 11/14/24 1953)       Consultations:  As above    Decision Rules, Medical Calculators, and Risk Stratification Tools:  None    MDM Key Documentation Elements for Patient's Evaluation:  Differential diagnosis to include high risk considerations: As above  Escalation to admission/observation considered: Admission/observation considered, patient " admitted to ICU  Discussions and management with other clinicians:    3a. Independent interpretation of testing performed by another health professional:  -No  3b. Discussion of management or test interpretation with another health professional: -Yes  Independent interpretation of tests:  Ordering and/or review of 3+ test(s)  Discussion of test interpretations with radiology:  No  History obtained from source other than patient or assessment requiring an independent historian:  No  Review of non-ED/external records:  review of 3+ records  Diagnostic tests considered but not ultimately performed/deferred:  -Chest x-ray  Prescription medications considered but not prescribed:  -Patient was admitted  Chronic conditions affecting care:  -Type 1 diabetes, former smoker  Care affected by social determinants of health:  -None    The patient's management involved:   - Laboratory studies  - Imaging studies  - Parenteral controlled substance  - Decision regarding hospitalization      A shared decision making model was used. Time was taken to answer all questions.  Patient and/or associated parties understood and were agreeable to treatment plan.  Plan and all results were discussed.  Patient admitted in stable condition.    New prescriptions started at today's ER visit  New Prescriptions    No medications on file       ==================================================================================================================================      Teto MCCANN PA-C, personally performed the services described in this documentation, and it is both accurate and complete.       Alex Yen PA-C  11/14/24 [id number]

## 2024-11-15 NOTE — H&P
[facility]    History and Physical - Hospitalist Service  Visit/Communication Style   Virtual (Video) communication was used to evaluate Eh.  Eh consented to the use of video communication: yes  Video START time: 10:30 pm, 11/14/2024  Video STOP time: 10:40 pm, 11/14/2024   Patient's location: [facility]   Provider's location during the visit: [facility]medicine site          Date of Admission:  11/14/2024    Assessment & Plan      Eh Perry is a 74 year old male admitted on 11/14/2024. He shortness of breath..  Medical history hypertension, hyperlipidemia, type 1 diabetes.     Sepsis secondary to community-acquired pneumonia  hyponatremia secondary to dehydration.  Type 1 diabetes  Hypertension  Hyperlipidemia  GERD      Will admit patient to ICU due to severe hyponatremia.  Will discontinue 3% sodium and start patient on normal saline at 75 cc an hour.  Continue to check sodium levels every 4 hours.  Monitor for any signs of congestive heart failure.  Start antibiotics including Rocephin and Zithromax.  Infectious markers are elevated including procalcitonin 4.29, WBC 25.2.   Tight glucose control will restart home dose of Lantus 17 units during the day and NovoLog sliding scale.  Further home medications have been reviewed and restarted in next.        Diet:  Diabetic diet.  DVT Prophylaxis: Enoxaparin (Lovenox) SQ  Payne Catheter: Not present  Lines: None     Cardiac Monitoring: ACTIVE order. Indication: ICU  Code Status: Full Code    Clinically Significant Risk Factors Present on Admission         # Hyponatremia: Lowest Na = 116 mmol/L in last 2 days, will monitor as appropriate  # Hypochloremia: Lowest Cl = 80 mmol/L in last 2 days, will monitor as appropriate    # Hypomagnesemia: Lowest Mg = 1.5 mg/dL in last 2 days, will replace as needed     # Drug Induced Platelet Defect: home medication list includes an antiplatelet medication   #  "Hypertension: Noted on problem list     # Acute Hypoxic Respiratory Failure: Documented O2 saturation < 90%. Continue supplemental oxygen as needed        # Overweight: Estimated body mass index is 25.65 kg/m  as calculated from the following:    Height as of this encounter: 1.778 m (5' 10\").    Weight as of this encounter: 81.1 kg (178 lb 12.7 oz).              Disposition Plan     Medically Ready for Discharge: Anticipated in 2-4 Days       Shen Fernandez MD  Hospitalist Service  [facility]  Securely message with [employer] (more info)  Text page via [facility] Paging/Directory     ______________________________________________________________________    Chief Complaint   Shortness of breath    History is obtained from the patient, electronic health record, and emergency department physician    History of Present Illness   Eh Perry is a 74 year old male with past medical history of type 1 diabetes, hypertension, hyperlipidemia, presents to the ED for shortness of breath.  Symptoms started about a week ago and have been progressively getting worse.  Now associated with nausea, vomiting, fever, chills, decreased appetite.  Vital signs in the ED showed elevated blood pressure, Tmax of 99.9, and D satting at 89% patient was started on oxygen.  Labs were significant for WBC of 25.2, procalcitonin 4.6, sodium level of 116, magnesium 1.5, venous pH of 7.39, BNP of 1762, UA was negative for infection.  Patient did have a CT chest to rule out PE which was negative but did show a large area of consul dilation of the right upper and middle lobe.  Please see full reading.  Patient was started on Rocephin and Zithromax.    Past Medical History    Past Medical History:   Diagnosis Date    Anemia     CKD (chronic kidney disease) stage 3, GFR 30-59 ml/min (H)     GERD (gastroesophageal reflux disease)     Hepatitis 2011    elevated AST due to excess ETOH use (5 drinks/day)    HTN (hypertension)     Hyperlipidemia " LDL goal <100 3/7/2013    MGUS (monoclonal gammopathy of unknown significance)      IgG Lambda. Followed by Dr Rogers    Proteinuria 1/22/2015    PVC (premature ventricular contraction)     Retinopathy due to secondary diabetes (H)     s/p laser therapy left    Subclinical hypothyroidism 6/11/2018    Tobacco abuse     20 pack hx. Quit 1991    Type 1 diabetes mellitus with nephropathy (goal A1C<7) 1978       Past Surgical History   Past Surgical History:   Procedure Laterality Date    ENDOSCOPIC RELEASE CARPAL TUNNEL Right 10/14/2022    Procedure: Right Endoscopic Carpal Tunnel Release;  Surgeon: Sebastien Rowe MD;  Location: [facility] OR    HAND SURGERY  01/01/2007    contracture release    VOCAL CORD INJECTION      vocal cord nodules       Prior to Admission Medications   Prior to Admission Medications   Prescriptions Last Dose Informant Patient Reported? Taking?   ACCU-CHEK GUIDE test strip   No No   Sig: TESTING 6 TIMES DAILY   Carboxymethylcellulose Sodium (EYE DROPS OP)   Yes No   Sig: For dry eyes   Continuous Blood Gluc  (DEXCOM G7 ) EMERSON   No No   Sig: Use to read blood sugars as per 's instructions.   Continuous Glucose Sensor (DEXCOM G7 SENSOR) MISC   No No   Sig: CHANGE EVERY 10 DAYS   EPINEPHrine (ANY BX GENERIC EQUIV) 0.3 MG/0.3ML injection 2-pack   No No   Sig: INJECT 1 PEN IN THE MUSCLE ONE TIME AS DIRECTED   LANTUS SOLOSTAR 100 UNIT/ML soln   No No   Sig: ADMINISTER 17 UNITS UNDER THE SKIN DAILY   Multiple Vitamin (MULTI VITAMIN  MENS) TABS 11/14/2024  Yes Yes   Sig: Take  by mouth.   amLODIPine (NORVASC) 2.5 MG tablet 11/14/2024  No Yes   Sig: Take 1 tablet (2.5 mg) by mouth daily   aspirin 81 MG tablet 11/14/2024  Yes Yes   Sig: Take 1 tablet by mouth daily.   blood glucose (NO BRAND SPECIFIED) lancets standard   No No   Sig: Dispense item covered by insurance. Test blood sugar 1 times daily.   blood glucose monitoring (NO BRAND SPECIFIED) meter device kit   No No   Sig:  Dispense option covered by insurance. Test blood sugar 1 times daily.   carvedilol (COREG) 6.25 MG tablet 11/14/2024  No Yes   Sig: TAKE 1 TABLET(6.25 MG) BY MOUTH TWICE DAILY WITH MEALS   fish oil-omega-3 fatty acids 1000 MG capsule 11/14/2024  Yes Yes   Sig: Take 1-2 g by mouth daily    guaiFENesin-codeine (ROBITUSSIN AC) 100-10 MG/5ML solution 11/14/2024  No Yes   Sig: Take 5-10 mLs by mouth every 4 hours as needed for cough.   insulin lispro (HUMALOG KWIKPEN) 100 UNIT/ML (1 unit dial) KWIKPEN 11/14/2024  No Yes   Sig: ADMINISTER 3 UNITS UNDER THE SKIN BEFORE MEALS PER SLIDING SCALE. MAX OF 20 UNITS PER DAY   insulin pen needle (B-D U/F) 31G X 8 MM miscellaneous   No No   Sig: USE WITH INSULIN PEN FOUR TIMES DAILY   lisinopril (ZESTRIL) 40 MG tablet 11/14/2024  No Yes   Sig: TAKE 1 TABLET(40 MG) BY MOUTH DAILY   omeprazole (PRILOSEC) 20 MG DR capsule   No No   Sig: TAKE 1 CAPSULE(20 MG) BY MOUTH DAILY   sildenafil (VIAGRA) 100 MG tablet   No No   Sig: Take 1 tablet (100 mg) by mouth daily as needed   simvastatin (ZOCOR) 10 MG tablet 11/14/2024  No Yes   Sig: TAKE 1/2 TABLET(5 MG) BY MOUTH AT BEDTIME   tamsulosin (FLOMAX) 0.4 MG capsule 11/14/2024  No Yes   Sig: Take 1 capsule (0.4 mg) by mouth daily Watch for dizziness upon standing.      Facility-Administered Medications: None        Review of Systems    The 10 point Review of Systems is negative other than noted in the HPI or here.      Physical Exam   Vital Signs: Temp: 97.7  F (36.5  C) Temp src: Tympanic BP: (!) 146/72 Pulse: 90   Resp: 24 SpO2: 97 % O2 Device: Nasal cannula with humidification Oxygen Delivery: 4 LPM  Weight: 178 lbs 12.69 oz    Physical Exam  Constitutional:       Appearance: Normal appearance. He is ill-appearing. He is not toxic-appearing.   HENT:      Head: Normocephalic and atraumatic.      Mouth/Throat:      Mouth: Mucous membranes are moist.   Eyes:      Conjunctiva/sclera: Conjunctivae normal.   Cardiovascular:      Rate and Rhythm:  Normal rate and regular rhythm.   Pulmonary:      Effort: Respiratory distress present.      Breath sounds: Rhonchi present. No wheezing.   Abdominal:      General: Abdomen is flat. There is no distension.      Palpations: Abdomen is soft.   Skin:     General: Skin is warm.      Coloration: Skin is not jaundiced or pale.   Neurological:      General: No focal deficit present.      Mental Status: He is alert and oriented to person, place, and time.   Psychiatric:         Mood and Affect: Mood normal.         Behavior: Behavior normal.        Medical Decision Making       55 MINUTES SPENT BY ME on the date of service doing chart review, history, exam, documentation & further activities per the note.      Data     I have personally reviewed the following data over the past 24 hrs:    25.2 (H)  \   11.8 (L)   / 257     120 (L) 80 (L) 25.1 (H) /  229 (H)   4.1 22 1.05 \     ALT: 43 AST: 38 AP: 197 (H) TBILI: 0.9   ALB: 3.7 TOT PROTEIN: 7.2 LIPASE: N/A     Trop: 14 BNP: 1,762 (H)     TSH: 4.21 (H) T4: 1.53 A1C: N/A     Procal: 4.29 (H) CRP: N/A Lactic Acid: 1.6         Imaging results reviewed over the past 24 hrs:   Recent Results (from the past 24 hours)   CT Chest Pulmonary Embolism w Contrast    Narrative    Exam:  CT CHEST PULMONARY EMBOLISM W CONTRAST    Exam reason:  Fever, shortness of breath, tachycardia.  Rule out PE,  consolidation, effusion    Technique:  Contrast enhanced helical CT pulmonary angiography was  performed. Sagittal and coronal reformats as well as coronal MIP  reformats were obtained. This CT was performed using one or more of  the following dose reduction techniques: automated exposure control,  adjustment of the mA and/or kV according to patient size, and/or use  of iterative reconstruction technique.    Meds/Contrast: isovue 370 76ml    Comparison:  10/29/2010    FINDINGS:    Technical quality: Diagnostic.    Cardiovascular:   -There are no filling defects in the pulmonary arteries that  would  indicate pulmonary embolism.  -The main pulmonary artery is mildly dilated, likely due to pulmonary  hypertension.  -No aortic aneurysm.  Lungs/Airways: There is a large area of right upper and middle lobe  consolidation with additional scattered foci of consolidation in the  right lower lobe and left upper lobe.  Mirella/Mediastinum: There are several prominent mediastinal lymph nodes,  likely reactive.  Pleura: Small right pleural effusion. Trace left pleural effusion. No  pneumothorax.  Chest Wall/Axilla: Unremarkable.    Visualized Upper Abdomen: No acute findings in the visualized upper  abdomen.  Musculoskeletal: No acute osseous abnormalities.      Impression    IMPRESSION:    Large area of consolidation in the right upper and middle lobes as  well as scattered additional foci of consolidation in the right lower  and left upper lobes, compatible with pneumonia. Small right pleural  effusion and trace left pleural effusion.    No pulmonary embolism.    LUIS MANUEL RAMIREZ MD         SYSTEM ID:  [id number]

## 2024-11-16 ENCOUNTER — APPOINTMENT (OUTPATIENT)
Dept: OCCUPATIONAL THERAPY | Facility: OTHER | Age: 74
DRG: 871 | End: 2024-11-16
Attending: FAMILY MEDICINE
Payer: MEDICARE

## 2024-11-16 ENCOUNTER — APPOINTMENT (OUTPATIENT)
Dept: PHYSICAL THERAPY | Facility: OTHER | Age: 74
DRG: 871 | End: 2024-11-16
Attending: FAMILY MEDICINE
Payer: MEDICARE

## 2024-11-16 VITALS
DIASTOLIC BLOOD PRESSURE: 88 MMHG | TEMPERATURE: 100.2 F | BODY MASS INDEX: 25.6 KG/M2 | OXYGEN SATURATION: 92 % | HEART RATE: 108 BPM | SYSTOLIC BLOOD PRESSURE: 149 MMHG | WEIGHT: 178.79 LBS | RESPIRATION RATE: 16 BRPM | HEIGHT: 70 IN

## 2024-11-16 PROBLEM — I48.0 PAROXYSMAL ATRIAL FIBRILLATION WITH RAPID VENTRICULAR RESPONSE (H): Status: ACTIVE | Noted: 2024-11-16

## 2024-11-16 LAB
ANION GAP SERPL CALCULATED.3IONS-SCNC: 11 MMOL/L (ref 7–15)
BUN SERPL-MCNC: 24.7 MG/DL (ref 8–23)
CALCIUM SERPL-MCNC: 8.8 MG/DL (ref 8.8–10.4)
CHLORIDE SERPL-SCNC: 93 MMOL/L (ref 98–107)
CREAT SERPL-MCNC: 0.84 MG/DL (ref 0.67–1.17)
CRP SERPL-MCNC: 124.86 MG/L
D DIMER PPP FEU-MCNC: 2.22 UG/ML FEU (ref 0–0.5)
EGFRCR SERPLBLD CKD-EPI 2021: >90 ML/MIN/1.73M2
ERYTHROCYTE [DISTWIDTH] IN BLOOD BY AUTOMATED COUNT: 11.9 % (ref 10–15)
GLUCOSE BLDC GLUCOMTR-MCNC: 171 MG/DL (ref 70–99)
GLUCOSE BLDC GLUCOMTR-MCNC: 221 MG/DL (ref 70–99)
GLUCOSE BLDC GLUCOMTR-MCNC: 235 MG/DL (ref 70–99)
GLUCOSE BLDC GLUCOMTR-MCNC: 239 MG/DL (ref 70–99)
GLUCOSE BLDC GLUCOMTR-MCNC: 310 MG/DL (ref 70–99)
GLUCOSE SERPL-MCNC: 322 MG/DL (ref 70–99)
HCO3 SERPL-SCNC: 19 MMOL/L (ref 22–29)
HCT VFR BLD AUTO: 32.5 % (ref 40–53)
HGB BLD-MCNC: 11.5 G/DL (ref 13.3–17.7)
HOLD SPECIMEN: NORMAL
MAGNESIUM SERPL-MCNC: 2.1 MG/DL (ref 1.7–2.3)
MCH RBC QN AUTO: 31.6 PG (ref 26.5–33)
MCHC RBC AUTO-ENTMCNC: 35.4 G/DL (ref 31.5–36.5)
MCV RBC AUTO: 89 FL (ref 78–100)
PLATELET # BLD AUTO: 325 10E3/UL (ref 150–450)
POTASSIUM SERPL-SCNC: 4 MMOL/L (ref 3.4–5.3)
RBC # BLD AUTO: 3.64 10E6/UL (ref 4.4–5.9)
SODIUM SERPL-SCNC: 123 MMOL/L (ref 135–145)
SODIUM SERPL-SCNC: 123 MMOL/L (ref 135–145)
SODIUM SERPL-SCNC: 127 MMOL/L (ref 135–145)
TROPONIN T SERPL HS-MCNC: 15 NG/L
TROPONIN T SERPL HS-MCNC: 15 NG/L
TSH SERPL DL<=0.005 MIU/L-ACNC: 3.36 UIU/ML (ref 0.3–4.2)
WBC # BLD AUTO: 27 10E3/UL (ref 4–11)

## 2024-11-16 PROCEDURE — 250N000013 HC RX MED GY IP 250 OP 250 PS 637: Performed by: FAMILY MEDICINE

## 2024-11-16 PROCEDURE — 85379 FIBRIN DEGRADATION QUANT: CPT | Performed by: INTERNAL MEDICINE

## 2024-11-16 PROCEDURE — 80048 BASIC METABOLIC PNL TOTAL CA: CPT | Performed by: FAMILY MEDICINE

## 2024-11-16 PROCEDURE — 97165 OT EVAL LOW COMPLEX 30 MIN: CPT | Mod: GO | Performed by: OCCUPATIONAL THERAPIST

## 2024-11-16 PROCEDURE — 83735 ASSAY OF MAGNESIUM: CPT | Performed by: INTERNAL MEDICINE

## 2024-11-16 PROCEDURE — 93010 ELECTROCARDIOGRAM REPORT: CPT | Performed by: INTERNAL MEDICINE

## 2024-11-16 PROCEDURE — 36415 COLL VENOUS BLD VENIPUNCTURE: CPT | Performed by: INTERNAL MEDICINE

## 2024-11-16 PROCEDURE — 86140 C-REACTIVE PROTEIN: CPT | Performed by: INTERNAL MEDICINE

## 2024-11-16 PROCEDURE — 84295 ASSAY OF SERUM SODIUM: CPT | Performed by: INTERNAL MEDICINE

## 2024-11-16 PROCEDURE — 999N000157 HC STATISTIC RCP TIME EA 10 MIN

## 2024-11-16 PROCEDURE — 97530 THERAPEUTIC ACTIVITIES: CPT | Mod: GP

## 2024-11-16 PROCEDURE — 97530 THERAPEUTIC ACTIVITIES: CPT | Mod: GO | Performed by: OCCUPATIONAL THERAPIST

## 2024-11-16 PROCEDURE — 250N000009 HC RX 250: Performed by: INTERNAL MEDICINE

## 2024-11-16 PROCEDURE — 99232 SBSQ HOSP IP/OBS MODERATE 35: CPT | Performed by: FAMILY MEDICINE

## 2024-11-16 PROCEDURE — 85014 HEMATOCRIT: CPT | Performed by: FAMILY MEDICINE

## 2024-11-16 PROCEDURE — 36415 COLL VENOUS BLD VENIPUNCTURE: CPT | Performed by: FAMILY MEDICINE

## 2024-11-16 PROCEDURE — 120N000001 HC R&B MED SURG/OB

## 2024-11-16 PROCEDURE — 84484 ASSAY OF TROPONIN QUANT: CPT | Performed by: INTERNAL MEDICINE

## 2024-11-16 PROCEDURE — 84443 ASSAY THYROID STIM HORMONE: CPT | Performed by: INTERNAL MEDICINE

## 2024-11-16 PROCEDURE — 97161 PT EVAL LOW COMPLEX 20 MIN: CPT | Mod: GP

## 2024-11-16 PROCEDURE — 250N000011 HC RX IP 250 OP 636: Performed by: FAMILY MEDICINE

## 2024-11-16 PROCEDURE — 84295 ASSAY OF SERUM SODIUM: CPT | Performed by: FAMILY MEDICINE

## 2024-11-16 RX ORDER — TAMSULOSIN HYDROCHLORIDE 0.4 MG/1
0.4 CAPSULE ORAL EVERY EVENING
Status: DISCONTINUED | OUTPATIENT
Start: 2024-11-16 | End: 2024-11-16

## 2024-11-16 RX ORDER — 3% SODIUM CHLORIDE 3 G/100ML
INJECTION, SOLUTION INTRAVENOUS CONTINUOUS
Status: DISPENSED | OUTPATIENT
Start: 2024-11-16 | End: 2024-11-16

## 2024-11-16 RX ORDER — SACCHAROMYCES BOULARDII 250 MG
250 CAPSULE ORAL 2 TIMES DAILY
Status: DISCONTINUED | OUTPATIENT
Start: 2024-11-16 | End: 2024-11-19 | Stop reason: HOSPADM

## 2024-11-16 RX ORDER — TAMSULOSIN HYDROCHLORIDE 0.4 MG/1
0.4 CAPSULE ORAL EVERY EVENING
Status: DISCONTINUED | OUTPATIENT
Start: 2024-11-16 | End: 2024-11-19 | Stop reason: HOSPADM

## 2024-11-16 RX ORDER — METOPROLOL TARTRATE 1 MG/ML
5 INJECTION, SOLUTION INTRAVENOUS EVERY 5 MIN PRN
Status: DISCONTINUED | OUTPATIENT
Start: 2024-11-16 | End: 2024-11-19 | Stop reason: HOSPADM

## 2024-11-16 RX ORDER — METOPROLOL TARTRATE 1 MG/ML
5 INJECTION, SOLUTION INTRAVENOUS ONCE
Status: COMPLETED | OUTPATIENT
Start: 2024-11-16 | End: 2024-11-16

## 2024-11-16 RX ORDER — CARVEDILOL 12.5 MG/1
12.5 TABLET ORAL 2 TIMES DAILY WITH MEALS
Status: DISCONTINUED | OUTPATIENT
Start: 2024-11-16 | End: 2024-11-18

## 2024-11-16 RX ORDER — LEVOFLOXACIN 5 MG/ML
750 INJECTION, SOLUTION INTRAVENOUS EVERY 24 HOURS
Status: DISCONTINUED | OUTPATIENT
Start: 2024-11-16 | End: 2024-11-17

## 2024-11-16 RX ORDER — CARVEDILOL 6.25 MG/1
6.25 TABLET ORAL ONCE
Status: COMPLETED | OUTPATIENT
Start: 2024-11-16 | End: 2024-11-16

## 2024-11-16 RX ADMIN — METOPROLOL TARTRATE 5 MG: 5 INJECTION INTRAVENOUS at 05:29

## 2024-11-16 RX ADMIN — METOPROLOL TARTRATE 5 MG: 5 INJECTION INTRAVENOUS at 04:20

## 2024-11-16 RX ADMIN — APIXABAN 5 MG: 5 TABLET, FILM COATED ORAL at 22:25

## 2024-11-16 RX ADMIN — CARVEDILOL 6.25 MG: 6.25 TABLET, FILM COATED ORAL at 05:50

## 2024-11-16 RX ADMIN — GUAIFENESIN 600 MG: 600 TABLET, EXTENDED RELEASE ORAL at 10:10

## 2024-11-16 RX ADMIN — GUAIFENESIN 600 MG: 600 TABLET, EXTENDED RELEASE ORAL at 22:24

## 2024-11-16 RX ADMIN — CARVEDILOL 12.5 MG: 12.5 TABLET, FILM COATED ORAL at 16:27

## 2024-11-16 RX ADMIN — Medication 250 MG: at 22:24

## 2024-11-16 RX ADMIN — CARVEDILOL 6.25 MG: 6.25 TABLET, FILM COATED ORAL at 08:40

## 2024-11-16 RX ADMIN — PANTOPRAZOLE SODIUM 40 MG: 40 TABLET, DELAYED RELEASE ORAL at 10:11

## 2024-11-16 RX ADMIN — AMLODIPINE BESYLATE 2.5 MG: 2.5 TABLET ORAL at 10:06

## 2024-11-16 RX ADMIN — SODIUM CHLORIDE: 3 INJECTION, SOLUTION INTRAVENOUS at 09:59

## 2024-11-16 RX ADMIN — SIMVASTATIN 5 MG: 5 TABLET, FILM COATED ORAL at 22:24

## 2024-11-16 RX ADMIN — Medication 250 MG: at 11:40

## 2024-11-16 RX ADMIN — LEVOFLOXACIN 750 MG: 750 INJECTION, SOLUTION INTRAVENOUS at 11:21

## 2024-11-16 RX ADMIN — TAMSULOSIN HYDROCHLORIDE 0.4 MG: 0.4 CAPSULE ORAL at 16:38

## 2024-11-16 RX ADMIN — APIXABAN 5 MG: 5 TABLET, FILM COATED ORAL at 10:10

## 2024-11-16 NOTE — PLAN OF CARE
Goal Outcome Evaluation:    New A-fib.  Heart rate in 120's at start of shift. Doctor monitoring meds and rate.    Patient asked for a walker today and PT/OT   got involved with patient.  He walked the halls without issues.    Staff is checking patient's blood sugars and patient has his own monitoring device.  We are using his recomendations for insulin dosing, though.  On his insulin MAR there is a statement called Custom Dosing that allows staff to dose insulin as per the patient.

## 2024-11-16 NOTE — PROGRESS NOTES
"Rhythm change: Sudden Afib, HR sustaining in the 120's, order obtained for an EKG, completed, VS obtained. Pt stated he can feel palpitations in his chest.Tele Dr notified, Awaiting orders.       BP (!) 153/77 (BP Location: Right arm, Patient Position: Semi-Farley's, Cuff Size: Adult Regular)   Pulse (!) 131   Temp 98.6  F (37  C) (Tympanic)   Resp 20   Ht 1.778 m (5' 10\")   Wt 81.1 kg (178 lb 12.7 oz)   SpO2 93%   BMI 25.65 kg/m      Evita Santana RN on 11/16/2024 at 4:16 AM    "

## 2024-11-16 NOTE — PROGRESS NOTES
Report given to Oliver BEGUM, pt transferred over to [facility] via w.c, pt denies any complaints, VSS, oxygen remains at 2 liters

## 2024-11-16 NOTE — PROGRESS NOTES
[facility]    Medicine Progress Note - Hospitalist Service    Date of Admission:  11/14/2024    Assessment & Plan   This 73 yo male with a PMH significant for DM-1 since age 23, HTN, CKD-2, anemia and subclinical hypothyroidism presented to the ER with complaints of worsening shortness of breath.  He had a cough beginning 4 days ago but this progressed throughout the week and was not relieved by his usual OTC medications of Mucinex and guaifenesin with codeine.  His blood sugars have also been very high and his HgbA1C is usually in the non-diabetic range so this is unusual for him.  In the ER he was found to have hypoxia, HTN, temp 99.9, sodium 116, Cr 1.05 with GFR 75 with baseline Cr 0.9, GFR >90, WBC 25.2, procalcitonin 4.29 with normal lactic acid at 1.6.  BNP 1,762 but no prior for comparison.  CTA chest showed a large area of consolidation in the RUL and RML and scattered foci of consolidation in the RLL and LIVIA, no PE.  He was treated with Zithromax, Rocephin, Solu-Medrol, Duoneb and NS fluid resuscitation and admitted to ICU for continued cares.  During the night of 11/15-16 he developed atrial fib with RVR rate in the 130's.  His rate decreased with IV metoprolol, his carvedilol dose was doubled and he was started on Eliquis.    Principal Problem:    Severe sepsis with acute organ dysfunction (H) - hyperlactatemia, acute respiratory failure with hypoxia    Assessment: Improving    Plan: Started on Rocephin and Zithromax, given one dose of methylprednisolone, added Mucinex, off O2, follow labs.  By the second day of hospitalization his WBC increased to 27.0, CRP increased at 124.86 and sputum culture positive for 2+ Gram positive cocci and Gram negative bacilli.  Change abx to levofloxacin for increased coverage pending any ID and sensitivities.  Blood culture negative at 1 day.  Anticipate 2-3 more days in the hospital.    Active Problems:    Essential hypertension    Assessment:  Controlled     Plan: Continue amlodipine 2.5 mg daily, tamsulosin 0.4 mg daily and increase carvedilol to 12.5 mg BID for AF RVR.  Continue to hold lisinopril today due to mild FLORENTIN (improved) but now increased beta blocker.  Follow BP readings and labs and adjust as indicated.  Will restart lisinopril for renal protection likely tomorrow.      Paroxysmal atrial fibrillation with rapid ventricular response    Assessment: New onset    Plan: Given metoprolol during the night with some improvement.  Increase dose of carvedilol to help control rate and his BP is mildly elevated so should tolerate the extra medication.  Started on Eliquis.  May have had episodes PTA when he felt palpitations or fatigue but not documented previously.  Will have him follow-up with cardiology after discharge.  Echo ordered but on a Saturday so may need to get as OP if he is otherwise doing well tomorrow.      Gastroesophageal reflux disease, esophagitis presence not specified    Assessment: Stable    Plan: Continue PPI using pantoprazole as formulary substitution for omeprazole.        Subclinical hypothyroidism    Assessment: Controlled    Plan: Last TSH from 6/7/2024 normal at 2.78.  Done at the time of the ER visit elevated at 4.21 with normal FT4 at 1.53 so no change in treatment in this acute situation.  Not on any regular medications.      Chronic kidney disease, stage 2 (mild)    Assessment: Mild FLORENTIN    Plan: Discontinued regular IV fluids with improvement in his Cr to 0.84 and GFR to >90.  Hold lisinopril another day, recheck labs in AM.      Hypomagnesemia    Assessment: Replaced    Plan: Given mag 4 gm IV in the ER and mag replaced to normal at 2.2, stable at 2.1 today.  Recheck every few days or if his respiratory status worsens.      Hyponatremia    Assessment: Improving    Plan: Checking sodium every 4 hours and had been improving to 125 but decreased to 123 again.  Some pseudohyponatremia as with his glucose 322 his sodium  corrects to 127 so still low.  Give 3% NS for 4 hours and recheck.  Likely still due to pneumonia.      Hypophosphatemia    Assessment: Resolved    Plan: Was 2.3 at ER presentation, improved to 2.8 by the following morning.  Check periodically.      Pneumonia of both upper lobes due to infectious organism    Assessment: Acute    Plan: Change Rocephin and Zithromax to levofloxacin.  Legionella and Strep pneumo negative.  Tapered off O2.  Cough medicine prn, nebs, Mucinex.  Will not continue steroids due to his hyperglycemia and no wheezing at this time.      Type 1 diabetes mellitus with hyperglycemia (H)    Assessment: Acute    Plan: Generally has excellent control of his glucose with his HgbA1C < 6.2 for the past 4 years.  Continue his usual medications of Lantus 17 units at bedtime and some carb coverage although he does not particularly count carbs to dose and his sliding scale which is 1 unit per 40 over 120.  As the patient has such excellent control of his glucose levels request the nurses give him the dose of insulin he would administer at home based on his POC glucose measurements.       Anemia in chronic kidney disease (CKD)    Assessment: Stable    Plan: Hgb slightly decreased from his baseline which is closer to 14.  Recheck in AM.  May be dilutional.          Diet: Consistent Carbohydrate Diet Moderate Consistent Carb (60 g CHO per Meal) Diet    DVT Prophylaxis: DOAC  Payne Catheter: Not present  Lines: None     Cardiac Monitoring: ACTIVE order. Indication: Electrolyte Imbalance (24 hours)- Magnesium <1.3 mg/ml; Potassium < =2.8 or > 5.5 mg/ml  Code Status: Full Code      Clinically Significant Risk Factors         # Hyponatremia: Lowest Na = 116 mmol/L in last 2 days, will monitor as appropriate  # Hypochloremia: Lowest Cl = 80 mmol/L in last 2 days, will monitor as appropriate    # Hypomagnesemia: Lowest Mg = 1.5 mg/dL in last 2 days, will replace as needed       # Hypertension: Noted on problem list    "         # Overweight: Estimated body mass index is 25.65 kg/m  as calculated from the following:    Height as of this encounter: 1.778 m (5' 10\").    Weight as of this encounter: 81.1 kg (178 lb 12.7 oz)., PRESENT ON ADMISSION            Social Drivers of Health    Tobacco Use: Medium Risk (11/14/2024)    Patient History     Smoking Tobacco Use: Former     Smokeless Tobacco Use: Never   Alcohol Use: Unknown (8/12/2019)    AUDIT-C     Frequency of Alcohol Consumption: 2-3 times a week          Disposition Plan     Medically Ready for Discharge: Anticipated in 2-4 Days             Cheryl Lake MD  Hospitalist Service  [facility]  Securely message with [employer] (more info)  Text page via [facility] Paging/Directory   ______________________________________________________________________    Interval History   Started having atrial fib with RVR during the night.  Felt some palpitations.  Has had some palpitations at home PTA but attributed this to his known PVC's for which he takes carvedilol.  Denies chest pain or dyspnea.  HR was into the 130's during the night and took 2 doses of IV metoprolol to decrease.  He is feeling better this morning and is hoping to be allowed to walk in the halls as he is used to being very active and it is hard for him to just sit.  Aware he has some cardiac calcifications that he wants evaluated.  Not coughing much but was able to get a sputum culture, glucose levels still out of control.    Physical Exam   Vital Signs: Temp: 98.6  F (37  C) Temp src: Tympanic BP: 118/71 Pulse: 101   Resp: 18 SpO2: 96 % O2 Device: None (Room air) Oxygen Delivery: 2 LPM  Weight: 178 lbs 12.69 oz    Constitutional: awake, alert, cooperative, no apparent distress, appears younger than stated age, and normal weight  Eyes: pupils equal, round and reactive to light, extra-ocular muscles intact, and mild conjunctival injection bilaterally  ENT: normocephalic, atraumatic  Respiratory: no " respiratory distress, fair air exchange and rales in the right mid but overall improved breath sounds, no wheezing or rhonchi  Cardiovascular: regular rate and rhythm, normal S1 and S2, no murmur noted, and no edema  GI: normal bowel sounds, soft, non-distended, and non-tender  Skin: normal skin color, texture, turgor, no redness, warmth, or swelling, and no rashes  Musculoskeletal: no lower extremity pitting edema present  there is no redness, warmth, or swelling of the joints  full range of motion noted  motor strength is 5 out of 5 all extremities bilaterally  tone is normal  Neurologic: Mental Status Exam:  Fund of Knowledge:  normal  Attention/Concentration:  normal  Language:  normal  Cranial Nerves:  cranial nerves II-XII are grossly intact  Motor Exam:  moves all extremities well and symmetrically  Neuropsychiatric: General: normal, calm, and normal eye contact  Level of consciousness: alert / normal  Affect: normal, pleasant, and full  Orientation: oriented to self, place, time and situation  Memory and insight: normal, memory for past and recent events intact, and thought process normal    Medical Decision Making             Data     I have personally reviewed the following data over the past 24 hrs:    27.0 (H)  \   11.5 (L)   / 325     123 (L) 93 (L) 24.7 (H) /  239 (H)   4.0 19 (L) 0.84 \     Trop: 15 BNP: N/A     TSH: 3.36 T4: N/A A1C: N/A     Procal: N/A CRP: 124.86 (H) Lactic Acid: N/A       INR:  N/A PTT:  N/A   D-dimer:  2.22 (H) Fibrinogen:  N/A       Imaging results reviewed over the past 24 hrs:   No results found for this or any previous visit (from the past 24 hours).

## 2024-11-16 NOTE — PROGRESS NOTES
"Pt was given Metoprolol with little improvement, BP and HR remained elevated.  Did virtual tele visit, received 2nd dose of metoprolol.      BP (!) 163/77 (BP Location: Right arm, Patient Position: Semi-Farley's, Cuff Size: Adult Regular)   Pulse (!) 123   Temp 98.6  F (37  C) (Tympanic)   Resp 18   Ht 1.778 m (5' 10\")   Wt 81.1 kg (178 lb 12.7 oz)   SpO2 96%   BMI 25.65 kg/m      Evita Santana RN on 11/16/2024 at 5:40 AM    "

## 2024-11-16 NOTE — PROGRESS NOTES
"Staff nurse, Evita, alerted me that pt's HR had changed, and had been sustained in the 130s.    EKG was remarkable for A- fib with RVR at 119., Patient was able to feel the palpitations but was not having any chest pain.     At the time, I was in a virtual visit in the ICU for another patient, so I could not virtual visit.     Bp 153/77 with  93% on RA.     Ordered metoprolol 5mg IV with resultant .     Reviewed patient's chart and no history of atrial fibrillation.  He has a history of subclinical hypothyroidism.  Patient indicated that  he's had PVCs  and that he did see a cardiologist over 10 yrs ago and he was put on Carvedilol. 6.25mg and did not have any follow up.      States that since last week he has been wanting ot see  cardiologist because of the increasing heart flutters and that he has \"some calcifications\" that he needed to have addressed.        I was able to virtual in with the patient, and discussed that patient was not having any chest pain or shortness of breath.  He states that he at times can feel when his heart flutters.      This is new onset atrial fibrillation that is documented, and that the patient is duly aware of.    Assessment:  74-year-old male currently admitted in the hospital with pneumonia and hyponatremia, with new onset atrial fibrillation.  The rate was slowed down with metoprolol 5 mg x 2.      Plan  -  Give Carvedilol 6.25mg now  - Echocardiogram  - Cardiology Consultation  -  Start Eliquis 5mg bid [id number] = 3      "

## 2024-11-16 NOTE — PROGRESS NOTES
11/16/24 1400   Appointment Info   Signing Clinician's Name / Credentials (PT) Malik Guadalupe DPT   Living Environment   People in Home spouse   Current Living Arrangements house   Transportation Anticipated family or friend will provide   Self-Care   Usual Activity Tolerance excellent   Current Activity Tolerance fair   Regular Exercise Other (see comments)  (Unknown)   Equipment Currently Used at Home none   General Information   Onset of Illness/Injury or Date of Surgery 11/14/24   Referring Physician Aleksandra   Patient/Family Therapy Goals Statement (PT) Return to home I   Existing Precautions/Restrictions   (SoB due to pneumonia with acute respiratory pneumonia)   Weight-Bearing Status - LUE full weight-bearing   Weight-Bearing Status - RUE full weight-bearing   Weight-Bearing Status - LLE full weight-bearing   Weight-Bearing Status - RLE full weight-bearing   Cognition   Affect/Mental Status (Cognition) WNL   Orientation Status (Cognition) oriented x 4   Follows Commands (Cognition) WNL   Pain Assessment   Patient Currently in Pain Yes, see Vital Sign flowsheet   Integumentary/Edema   Integumentary/Edema no deficits were identifed   Posture    Posture Forward head position   Range of Motion (ROM)   Range of Motion ROM is WNL   Strength (Manual Muscle Testing)   Strength (Manual Muscle Testing) strength is WNL   Bed Mobility   Bed Mobility no deficits identified   Transfers   Maintains Weight-bearing Status (Transfers) able to maintain  (with use of FWW)   Transfer Safety Concerns Noted   (Contact guard with FWW, no AD at baseline)   Impairments Contributing to Impaired Transfers impaired balance;impaired postural control   Gait/Stairs (Locomotion)   Morton Level (Gait) contact guard   Assistive Device (Gait) walker, front-wheeled   Distance in Feet (Gait) 15x2   Pattern (Gait) step-to   Deviations/Abnormal Patterns (Gait) gait speed decreased;base of support, wide;stride length decreased   Balance    Balance Comments Balances in standing with FWW, no AD at baseline   Clinical Impression   Criteria for Skilled Therapeutic Intervention Yes, treatment indicated   PT Diagnosis (PT) Weakness, ENNIS, gait abnormality   Influenced by the following impairments Sepsis with acute organ failure, acute respiratory failure with hypoxia   Functional limitations due to impairments pt currently contact guard for transfers and ambulating with FWW to achieve gait   Clinical Presentation (PT Evaluation Complexity) stable   Clinical Decision Making (Complexity) low complexity   Planned Therapy Interventions (PT) balance training;bed mobility training;gait training;home exercise program;neuromuscular re-education;stair training;strengthening;stretching;transfer training;home program guidelines;risk factor education;progressive activity/exercise   Risk & Benefits of therapy have been explained evaluation/treatment results reviewed;care plan/treatment goals reviewed;risks/benefits reviewed;current/potential barriers reviewed;participants voiced agreement with care plan;participants included;spouse/significant other   PT Total Evaluation Time   PT Eval, Low Complexity Minutes (34827) 15   Physical Therapy Goals   PT Frequency Daily   PT Predicted Duration/Target Date for Goal Attainment 11/19/24   PT Goals Transfers;Gait;Stairs   PT: Transfers Independent   PT: Gait Independent;Greater than 200 feet   PT: Stairs Independent   Interventions   Interventions Quick Adds Therapeutic Activity   Therapeutic Activity   Therapeutic Activities: dynamic activities to improve functional performance Minutes (30351) 15   Symptoms Noted During/After Treatment None   Treatment Detail/Skilled Intervention cues for improved transfer stability, FWW fitting, gait cues with FWW, safety with navigation in new environment   PT Discharge Planning   PT Plan Evaluate and treat as needed until medically cleared to return home with spouse   PT Discharge  Recommendation (DC Rec) home with assist   PT Rationale for DC Rec Wean pt off AD if possible compared to no AD at baseline   PT Brief overview of current status Pt is a pleasant male with c/o [place] and feeling unsteady with independence in room and with transfers: pt and spouse indicate return to home upon medical clearance; pt may benefit from ongoing PT services to return to baseline stability in hopsital and cardiac rehab/OP RT to address cardiopulmonary function for return to baseline.   PT Equipment Needed at Discharge walker, standard  (If pt continues to feel unstable in stance, gait)   Physical Therapy Time and Intention   Timed Code Treatment Minutes 15   Total Session Time (sum of timed and untimed services) 30

## 2024-11-16 NOTE — PROGRESS NOTES
11/16/24 0800   Tech Time   $Tech Time (10 minute increments) 1   Oxygen Therapy   SpO2 96 %   O2 Device None (Room air)     Pt tolerating room air adjustment well. No acute RT concerns at this time.     Andrea Riedell, RT

## 2024-11-16 NOTE — PROGRESS NOTES
11/16/24 1146   Appointment Info   Signing Clinician's Name / Credentials (OT) Arabella Douglaslashonda OTR/L   Living Environment   People in Home significant other   Current Living Arrangements house   Transportation Anticipated family or friend will provide   Self-Care   Usual Activity Tolerance moderate   Current Activity Tolerance fair   General Information   Patient/Family Therapy Goal Statement (OT) To return home   Additional Occupational Profile Info/Pertinent History of Current Problem Pt is admitted due to ill feeling and dizziness.   Left Upper Extremity (Weight-bearing Status) full weight-bearing (FWB)   Right Upper Extremity (Weight-bearing Status) full weight-bearing (FWB)   Left Lower Extremity (Weight-bearing Status) full weight-bearing (FWB)   Right Lower Extremity (Weight-bearing Status) full weight-bearing (FWB)   Cognitive Status Examination   Orientation Status orientation to person, place and time   Coordination   Upper Extremity Coordination No deficits were identified   Transfers   Transfer Comments CGA with FWW   Clinical Impression   Criteria for Skilled Therapeutic Interventions Met (OT) Yes, treatment indicated   OT Diagnosis impaired functional mobility and self cares   Influenced by the following impairments dizziness. weakness,   OT Problem List-Impairments impacting ADL mobility;activity tolerance impaired   Assessment of Occupational Performance 1-3 Performance Deficits   Planned Therapy Interventions (OT) ADL retraining;progressive activity/exercise   Clinical Decision Making Complexity (OT) problem focused assessment/low complexity   OT Total Evaluation Time   OT Eval, Low Complexity Minutes (00283) 15   OT Goals   Therapy Frequency (OT) Daily   OT Predicted Duration/Target Date for Goal Attainment 11/18/24   OT Goals Hygiene/Grooming;Lower Body Dressing;Toilet Transfer/Toileting   OT: Hygiene/Grooming supervision/stand-by assist   OT: Lower Body Dressing Modified  independent;Supervision/stand-by assist   OT: Toilet Transfer/Toileting Supervision/stand-by assist;Modified independent   Interventions   Interventions Quick Adds Therapeutic Activity   Therapeutic Activities   Therapeutic Activity Minutes (13545) 15   Symptoms noted during/after treatment fatigue   Treatment Detail/Skilled Intervention SBA for bed mobility. trasnfered to recliner with FWW and CGA   OT Discharge Planning   OT Plan 15   OT Discharge Recommendation (DC Rec) home with assist   OT Rationale for DC Rec PT will likely not have any OT needs after d/c   OT Brief overview of current status Pt was able to ambulate in room with FWW and no 02. sat's remained above 90   Total Session Time   Timed Code Treatment Minutes 15   Total Session Time (sum of timed and untimed services) 30

## 2024-11-16 NOTE — PROGRESS NOTES
SAFETY CHECKLIST  ID Bands and Risk clasps correct and in place (DNR, Fall risk, Allergy, Latex, Limb):  Yes  All Lines Reconciled and labeled correctly: Yes  Whiteboard updated:Yes  Environmental interventions: Yes  Verify Tele #: [phone]    Evita Santana RN on 11/15/2024 at 7:24 PM

## 2024-11-16 NOTE — PROGRESS NOTES
Patient remains on 2L Nasal Cannula with Sp02 96%.  EKG done.  Respiratory Therapy following.    Lucrecia Marti, RT

## 2024-11-16 NOTE — PROGRESS NOTES
[facility] TRANSPORT NOTE  Data:   Reason for Transport:  Transfer from ICU to [facility]    Eh Perry was transported to [facility] room 314 via wheel chair at 1615.  Patient was accompanied by Transport Aide and Family. Equipment used for transport: Oxygen  Nasal Cannula and Cardiac monitor .     Action:  Report: received from KEL Weiss RN

## 2024-11-16 NOTE — PLAN OF CARE
"Goal Outcome Evaluation:    VSS, A&O, and pleasant. Denies pain/nausea. Remains on 2L NC and cardiac monitoring. Slept well between cares. All questions and concerns addressed.     /70   Pulse 91   Temp 98.9  F (37.2  C) (Tympanic)   Resp 29   Ht 1.778 m (5' 10\")   Wt 81.1 kg (178 lb 12.7 oz)   SpO2 97%   BMI 25.65 kg/m          Plan of Care Reviewed With: patient    Overall Patient Progress: improving    Outcome Evaluation: Sodium improving, remain on 2 liters      "

## 2024-11-17 ENCOUNTER — APPOINTMENT (OUTPATIENT)
Dept: PHYSICAL THERAPY | Facility: OTHER | Age: 74
DRG: 871 | End: 2024-11-17
Payer: MEDICARE

## 2024-11-17 LAB
ANION GAP SERPL CALCULATED.3IONS-SCNC: 9 MMOL/L (ref 7–15)
BUN SERPL-MCNC: 18.3 MG/DL (ref 8–23)
CALCIUM SERPL-MCNC: 8.4 MG/DL (ref 8.8–10.4)
CHLORIDE SERPL-SCNC: 100 MMOL/L (ref 98–107)
CREAT SERPL-MCNC: 0.85 MG/DL (ref 0.67–1.17)
CRP SERPL-MCNC: 58.08 MG/L
EGFRCR SERPLBLD CKD-EPI 2021: >90 ML/MIN/1.73M2
ERYTHROCYTE [DISTWIDTH] IN BLOOD BY AUTOMATED COUNT: 12 % (ref 10–15)
GLUCOSE BLDC GLUCOMTR-MCNC: 139 MG/DL (ref 70–99)
GLUCOSE BLDC GLUCOMTR-MCNC: 151 MG/DL (ref 70–99)
GLUCOSE BLDC GLUCOMTR-MCNC: 168 MG/DL (ref 70–99)
GLUCOSE BLDC GLUCOMTR-MCNC: 186 MG/DL (ref 70–99)
GLUCOSE BLDC GLUCOMTR-MCNC: 202 MG/DL (ref 70–99)
GLUCOSE SERPL-MCNC: 143 MG/DL (ref 70–99)
HCO3 SERPL-SCNC: 21 MMOL/L (ref 22–29)
HCT VFR BLD AUTO: 34.1 % (ref 40–53)
HGB BLD-MCNC: 11.9 G/DL (ref 13.3–17.7)
HOLD SPECIMEN: NORMAL
MCH RBC QN AUTO: 31.6 PG (ref 26.5–33)
MCHC RBC AUTO-ENTMCNC: 34.9 G/DL (ref 31.5–36.5)
MCV RBC AUTO: 91 FL (ref 78–100)
PLATELET # BLD AUTO: 359 10E3/UL (ref 150–450)
POTASSIUM SERPL-SCNC: 3.6 MMOL/L (ref 3.4–5.3)
RBC # BLD AUTO: 3.76 10E6/UL (ref 4.4–5.9)
SODIUM SERPL-SCNC: 130 MMOL/L (ref 135–145)
WBC # BLD AUTO: 11.8 10E3/UL (ref 4–11)

## 2024-11-17 PROCEDURE — 250N000013 HC RX MED GY IP 250 OP 250 PS 637: Performed by: FAMILY MEDICINE

## 2024-11-17 PROCEDURE — 80048 BASIC METABOLIC PNL TOTAL CA: CPT | Performed by: FAMILY MEDICINE

## 2024-11-17 PROCEDURE — 82565 ASSAY OF CREATININE: CPT | Performed by: FAMILY MEDICINE

## 2024-11-17 PROCEDURE — 85027 COMPLETE CBC AUTOMATED: CPT | Performed by: FAMILY MEDICINE

## 2024-11-17 PROCEDURE — 250N000011 HC RX IP 250 OP 636: Performed by: FAMILY MEDICINE

## 2024-11-17 PROCEDURE — 97530 THERAPEUTIC ACTIVITIES: CPT | Mod: GP

## 2024-11-17 PROCEDURE — 99232 SBSQ HOSP IP/OBS MODERATE 35: CPT | Performed by: FAMILY MEDICINE

## 2024-11-17 PROCEDURE — 97116 GAIT TRAINING THERAPY: CPT | Mod: GP

## 2024-11-17 PROCEDURE — 120N000001 HC R&B MED SURG/OB

## 2024-11-17 PROCEDURE — 250N000009 HC RX 250: Performed by: FAMILY MEDICINE

## 2024-11-17 PROCEDURE — 86140 C-REACTIVE PROTEIN: CPT | Performed by: FAMILY MEDICINE

## 2024-11-17 PROCEDURE — 36415 COLL VENOUS BLD VENIPUNCTURE: CPT | Performed by: FAMILY MEDICINE

## 2024-11-17 RX ORDER — LEVOFLOXACIN 750 MG/1
750 TABLET, FILM COATED ORAL DAILY
Status: DISCONTINUED | OUTPATIENT
Start: 2024-11-17 | End: 2024-11-19 | Stop reason: HOSPADM

## 2024-11-17 RX ORDER — LEVOFLOXACIN 750 MG/1
750 TABLET, FILM COATED ORAL DAILY
Status: DISCONTINUED | OUTPATIENT
Start: 2024-11-18 | End: 2024-11-17

## 2024-11-17 RX ADMIN — CARVEDILOL 12.5 MG: 12.5 TABLET, FILM COATED ORAL at 16:42

## 2024-11-17 RX ADMIN — SIMVASTATIN 5 MG: 5 TABLET, FILM COATED ORAL at 21:11

## 2024-11-17 RX ADMIN — LISINOPRIL 40 MG: 40 TABLET ORAL at 13:33

## 2024-11-17 RX ADMIN — PANTOPRAZOLE SODIUM 40 MG: 40 TABLET, DELAYED RELEASE ORAL at 13:33

## 2024-11-17 RX ADMIN — AMLODIPINE BESYLATE 2.5 MG: 2.5 TABLET ORAL at 08:27

## 2024-11-17 RX ADMIN — Medication 250 MG: at 21:11

## 2024-11-17 RX ADMIN — GUAIFENESIN 600 MG: 600 TABLET, EXTENDED RELEASE ORAL at 21:11

## 2024-11-17 RX ADMIN — Medication 250 MG: at 08:28

## 2024-11-17 RX ADMIN — LEVOFLOXACIN 750 MG: 750 TABLET, FILM COATED ORAL at 17:55

## 2024-11-17 RX ADMIN — METOPROLOL TARTRATE 5 MG: 5 INJECTION INTRAVENOUS at 05:28

## 2024-11-17 RX ADMIN — CARVEDILOL 12.5 MG: 12.5 TABLET, FILM COATED ORAL at 08:28

## 2024-11-17 RX ADMIN — GUAIFENESIN 600 MG: 600 TABLET, EXTENDED RELEASE ORAL at 08:29

## 2024-11-17 RX ADMIN — METOPROLOL TARTRATE 5 MG: 5 INJECTION INTRAVENOUS at 22:25

## 2024-11-17 RX ADMIN — TAMSULOSIN HYDROCHLORIDE 0.4 MG: 0.4 CAPSULE ORAL at 16:42

## 2024-11-17 RX ADMIN — APIXABAN 5 MG: 5 TABLET, FILM COATED ORAL at 08:28

## 2024-11-17 RX ADMIN — LEVOFLOXACIN 750 MG: 750 INJECTION, SOLUTION INTRAVENOUS at 13:37

## 2024-11-17 RX ADMIN — APIXABAN 5 MG: 5 TABLET, FILM COATED ORAL at 21:11

## 2024-11-17 NOTE — PLAN OF CARE
Goal Outcome Evaluation:      Plan of Care Reviewed With: patient    Overall Patient Progress: improvingOverall Patient Progress: improving         New IV placed, LS are diminished in bases. Up walking halls with SBA

## 2024-11-17 NOTE — PROGRESS NOTES
[facility]    Medicine Progress Note - Hospitalist Service    Date of Admission:  11/14/2024    Assessment & Plan   This 75 yo male with a PMH significant for DM-1 since age 23, HTN, CKD-2, anemia and subclinical hypothyroidism presented to the ER with complaints of worsening shortness of breath.  He had a cough beginning 4 days ago but this progressed throughout the week and was not relieved by his usual OTC medications of Mucinex and guaifenesin with codeine.  His blood sugars have also been very high and his HgbA1C is usually in the non-diabetic range so this is unusual for him.  In the ER he was found to have hypoxia, HTN, temp 99.9, sodium 116, Cr 1.05 with GFR 75 with baseline Cr 0.9, GFR >90, WBC 25.2, procalcitonin 4.29 with normal lactic acid at 1.6.  BNP 1,762 but no prior for comparison.  CTA chest showed a large area of consolidation in the RUL and RML and scattered foci of consolidation in the RLL and LIVIA, no PE.  He was treated with Zithromax, Rocephin, Solu-Medrol, Duoneb and NS fluid resuscitation and admitted to ICU for continued cares.  During the night of 11/15-16 he developed atrial fib with RVR rate in the 130's.  His rate decreased with IV metoprolol, his carvedilol dose was doubled and he was started on Eliquis.    Principal Problem:    Severe sepsis with acute organ dysfunction (H) - hyperlactatemia, acute respiratory failure with hypoxia    Assessment: Improving    Plan: Started on Rocephin and Zithromax, given one dose of methylprednisolone, added Mucinex, off O2, follow labs.  By the second day of hospitalization his WBC increased to 27.0, CRP increased at 124.86 and sputum culture positive for 2+ Gram positive cocci and Gram negative bacilli.  Change abx to levofloxacin for increased coverage pending any ID and sensitivities.  WBC decreased to 11.8 and CRP to 58.08 after that change.  Blood culture negative at 2 days.  Anticipate possible discharge  tomorrow.    Active Problems:    Essential hypertension    Assessment: Controlled     Plan: Continue amlodipine 2.5 mg daily, tamsulosin 0.4 mg daily and increase carvedilol to 12.5 mg BID for AF RVR.  Initially held lisinopril due to mild FLORENTIN which is now improved.  Will restart lisinopril at a lower dose starting at 10 mg for renal protection today but BP with good control.      Paroxysmal atrial fibrillation with rapid ventricular response    Assessment: New onset    Plan: Given metoprolol again during the night with improvement.  Increased dose of carvedilol to help control rate.  Started on Eliquis.  May have had episodes PTA when he felt palpitations or fatigue but not documented previously.  Will have him follow-up with cardiology after discharge.  Echo ordered and should be completed tomorrow.      Gastroesophageal reflux disease, esophagitis presence not specified    Assessment: Stable    Plan: Continue PPI using pantoprazole as formulary substitution for omeprazole.        Subclinical hypothyroidism    Assessment: Controlled    Plan: Last TSH from 6/7/2024 normal at 2.78.  Done at the time of the ER visit elevated at 4.21 with normal FT4 at 1.53 so no change in treatment in this acute situation.  Not on any regular medications.      Chronic kidney disease, stage 2 (mild)    Assessment: Mild FLORENTIN    Plan: Discontinued regular IV fluids with improvement in his Cr to 0.84 and GFR to >90.  Restart lisinopril today, recheck labs in AM.      Hypomagnesemia    Assessment: Replaced    Plan: Given mag 4 gm IV in the ER and mag replaced to normal at 2.2, stable at 2.1.      Hyponatremia    Assessment: Improving    Plan: Likely due to pneumonia.  Given 3% saline for 8 hours and improved to 130 today.  Recheck in AM.      Hypophosphatemia    Assessment: Resolved    Plan: Was 2.3 at ER presentation, improved to 2.8 by the following morning.  Check periodically.      Pneumonia of both upper lobes due to infectious  "organism    Assessment: Acute    Plan: Changed Rocephin and Zithromax to levofloxacin on 11/16 with improvement in labs.  Legionella and Strep pneumo negative.  Tapered off O2.  Cough medicine prn, nebs, Mucinex.  Will not continue steroids due to his hyperglycemia and no wheezing at this time.      Type 1 diabetes mellitus with hyperglycemia (H)    Assessment: Acute    Plan: Generally has excellent control of his glucose with his HgbA1C < 6.2 for the past 4 years.  Continue his usual medications of Lantus 17 units at bedtime and some carb coverage although he does not particularly count carbs to dose and his sliding scale which is 1 unit per 40 over 120.  As the patient has such excellent control of his glucose levels request the nurses give him the dose of insulin he would administer at home based on his POC glucose measurements.       Anemia in chronic kidney disease (CKD)    Assessment: Stable    Plan: Hgb slightly decreased from his baseline which is closer to 14.  Plan to recheck at F/U appointment.          Diet: Consistent Carbohydrate Diet Moderate Consistent Carb (60 g CHO per Meal) Diet    DVT Prophylaxis: DOAC  Payne Catheter: Not present  Lines: None     Cardiac Monitoring: ACTIVE order. Indication: Electrolyte Imbalance (24 hours)- Magnesium <1.3 mg/ml; Potassium < =2.8 or > 5.5 mg/ml  Code Status: Full Code      Clinically Significant Risk Factors         # Hyponatremia: Lowest Na = 122 mmol/L in last 2 days, will monitor as appropriate  # Hypochloremia: Lowest Cl = 93 mmol/L in last 2 days, will monitor as appropriate          # Hypertension: Noted on problem list            # Overweight: Estimated body mass index is 25.65 kg/m  as calculated from the following:    Height as of this encounter: 1.778 m (5' 10\").    Weight as of this encounter: 81.1 kg (178 lb 12.7 oz)., PRESENT ON ADMISSION            Social Drivers of Health    Tobacco Use: Medium Risk (11/14/2024)    Patient History     Smoking " Tobacco Use: Former     Smokeless Tobacco Use: Never   Alcohol Use: Unknown (8/12/2019)    AUDIT-C     Frequency of Alcohol Consumption: 2-3 times a week          Disposition Plan     Medically Ready for Discharge: Anticipated Tomorrow             Cheryl Lake MD  Hospitalist Service  [facility]  Securely message with [employer] (more info)  Text page via [facility] Paging/Directory   ______________________________________________________________________    Interval History   Feeling overall good.  Cough decreased in intensity, occasionally productive.  Discussed plans for dealing with atrial fib.  He will be scheduled to see the cardiologist and will possibly have cardioversion but will need to be anticoagulated for about 6 weeks or have a SHREE to prove no clot prior to cardioversion.  They have a cruise planned for the end of this month and discussed that if he feels up to it there should be no reason he cannot go.  If he is not up to it he will need documentation for his trip insurance that he is not able to travel but will follow-up with his PCP closer to the time he would leave.    Physical Exam   Vital Signs: Temp: (P) 99.3  F (37.4  C) Temp src: (P) Tympanic BP: 127/88 Pulse: 120   Resp: (P) 16 SpO2: (P) 94 % O2 Device: (P) None (Room air)    Weight: 178 lbs 12.69 oz    Constitutional: awake, alert, cooperative, no apparent distress, appears younger than stated age, and normal weight  Eyes: pupils equal, round and reactive to light, extra-ocular muscles intact, and mild conjunctival injection bilaterally  ENT: normocephalic, atraumatic  Respiratory: no respiratory distress, fair air exchange and clear to auscultation  Cardiovascular: irregular rate and tachycardic rhythm, normal S1 and S2, no murmur noted, and no edema  GI: normal bowel sounds, soft, non-distended, and non-tender  Skin: normal skin color, texture, turgor, no redness, warmth, or swelling, and no rashes  Musculoskeletal: no lower  extremity pitting edema present, there is no redness, warmth, or swelling of the joints, full range of motion noted, motor strength is 5 out of 5 all extremities bilaterally, tone is normal  Neurologic: Mental Status Exam:  Fund of Knowledge:  above normal  Attention/Concentration:  normal  Language:  normal  Cranial Nerves:  cranial nerves II-XII are grossly intact  Motor Exam:  moves all extremities well and symmetrically  Neuropsychiatric: General: normal, calm, and normal eye contact  Level of consciousness: alert / normal  Affect: normal, pleasant, and full  Orientation: oriented to self, place, time and situation  Memory and insight: normal, memory for past and recent events intact, and thought process normal    Medical Decision Making             Data     I have personally reviewed the following data over the past 24 hrs:    11.8 (H)  \   11.9 (L)   / 359     130 (L) 100 18.3 /  168 (H)   3.6 21 (L) 0.85 \     Procal: N/A CRP: 58.08 (H) Lactic Acid: N/A

## 2024-11-17 NOTE — PROGRESS NOTES
11/17/24 1400   Appointment Info   Signing Clinician's Name / Credentials (PT) Malik Guadalupe DPT   Living Environment   People in Home spouse   Current Living Arrangements house   Transportation Anticipated family or friend will provide   Self-Care   Usual Activity Tolerance excellent   Current Activity Tolerance fair   Equipment Currently Used at Home none   General Information   Existing Precautions/Restrictions   (SoB due to pneumonia with acute respiratory pneumonia)   Weight-Bearing Status - LUE full weight-bearing   Weight-Bearing Status - RUE full weight-bearing   Weight-Bearing Status - LLE full weight-bearing   Weight-Bearing Status - RLE full weight-bearing   Cognition   Affect/Mental Status (Cognition) WNL   Orientation Status (Cognition) oriented x 4   Follows Commands (Cognition) WNL   Pain Assessment   Patient Currently in Pain Yes, see Vital Sign flowsheet   Integumentary/Edema   Integumentary/Edema no deficits were identifed   Posture    Posture Forward head position   Range of Motion (ROM)   Range of Motion ROM is WNL   Strength (Manual Muscle Testing)   Strength (Manual Muscle Testing) strength is WNL   Bed Mobility   Bed Mobility no deficits identified   Transfers   Maintains Weight-bearing Status (Transfers) able to maintain  (with use of FWW)   Transfer Safety Concerns Noted   (Contact guard with FWW, no AD at baseline)   Impairments Contributing to Impaired Transfers impaired balance;impaired postural control   Gait/Stairs (Locomotion)   [place] Level (Gait) contact guard   Assistive Device (Gait) walker, front-wheeled   Pattern (Gait) step-to   Deviations/Abnormal Patterns (Gait) gait speed decreased;base of support, wide;stride length decreased   Clinical Impression   Criteria for Skilled Therapeutic Intervention Yes, treatment indicated   Clinical Presentation (PT Evaluation Complexity) stable   Clinical Decision Making (Complexity) low complexity   Planned Therapy Interventions (PT)  balance training;bed mobility training;gait training;home exercise program;neuromuscular re-education;stair training;strengthening;stretching;transfer training;home program guidelines;risk factor education;progressive activity/exercise   Risk & Benefits of therapy have been explained evaluation/treatment results reviewed;care plan/treatment goals reviewed;risks/benefits reviewed;current/potential barriers reviewed;participants voiced agreement with care plan;participants included;spouse/significant other   Physical Therapy Goals   PT Frequency Daily   PT Predicted Duration/Target Date for Goal Attainment 11/19/24   PT Goals Transfers;Gait;Stairs   PT: Transfers Independent   PT: Gait Independent;Greater than 200 feet   PT: Stairs Independent   Interventions   Interventions Quick Adds Therapeutic Activity;Gait Training   Therapeutic Activity   Therapeutic Activities: dynamic activities to improve functional performance Minutes (91210) 15   Symptoms Noted During/After Treatment None   Treatment Detail/Skilled Intervention Educa to pt and daughter regarding current function, afib indications and impact on exercise tolerance, use of FWW for return to home may be indicated   Gait Training   Gait Training Minutes (26113) 13   Symptoms Noted During/After Treatment (Gait Training) none   Treatment Detail/Skilled Intervention FWW step through gait with cues to improve straight line, step width, push off and heel to toe, maintaining upright posture   Distance in Feet 330' maintaining O2 sats above 93% on room air   [place] Level (Gait Training) stand-by assist   Physical Assistance Level (Gait Training) supervision   PT Discharge Planning   PT Plan Evaluate and treat as needed until medically cleared to return home with spouse   PT Discharge Recommendation (DC Rec) home with assist   PT Rationale for DC Rec Wean pt off AD if possible compared to no AD at baseline   PT Brief overview of current status Pt is a pleasant  male with c/o [place] and feeling unsteady with independence in room and with transfers: pt and spouse indicate return to home upon medical clearance; pt may benefit from ongoing PT services to return to baseline stability in hopsital and cardiac rehab/OP RT to address cardiopulmonary function for return to baseline.   PT Equipment Needed at Discharge walker, standard  (If pt continues to feel unstable in stance, gait)   Physical Therapy Time and Intention   Timed Code Treatment Minutes 28   Total Session Time (sum of timed and untimed services) 28

## 2024-11-17 NOTE — PLAN OF CARE
Goal Outcome Evaluation:    Improving.  Walks halls with PT/OT with walker.  No issues.  Heart tachy at times -120.  Plan is to discharge Monday.

## 2024-11-17 NOTE — PROGRESS NOTES
Pt A&Ox4   Pt remains in A-fib. PRN IV metoprolol given this morning for sustained HR in 140's see MAR and progress note for details. Pt denies acute sx or chest pain.   Up SBA w GB and FWW.   Active tele orders  LS dim sating in 90's on room air. Pt refused HS novolog. Pt BG remained in mid 100's throughout night, see results for details.

## 2024-11-17 NOTE — PLAN OF CARE
Goal Outcome Evaluation:      Plan of Care Reviewed With: patient    Overall Patient Progress: improvingOverall Patient Progress: improving         Patient remains on RA, LS are dim. Patient requested extra units of insulin for dinner time. Patient is now SL.

## 2024-11-17 NOTE — PROGRESS NOTES
This writer was notified by KEL Solano that pts HR was sustaining in the 140's. BP at the time was 168/74. PRN IV metoprolol given per orders. See mar for details

## 2024-11-18 ENCOUNTER — APPOINTMENT (OUTPATIENT)
Dept: CARDIOLOGY | Facility: OTHER | Age: 74
DRG: 871 | End: 2024-11-18
Attending: INTERNAL MEDICINE
Payer: MEDICARE

## 2024-11-18 ENCOUNTER — APPOINTMENT (OUTPATIENT)
Dept: PHYSICAL THERAPY | Facility: OTHER | Age: 74
DRG: 871 | End: 2024-11-18
Payer: MEDICARE

## 2024-11-18 LAB
ANION GAP SERPL CALCULATED.3IONS-SCNC: 10 MMOL/L (ref 7–15)
ATRIAL RATE - MUSE: 117 BPM
BACTERIA SPT CULT: ABNORMAL
BUN SERPL-MCNC: 15.3 MG/DL (ref 8–23)
CALCIUM SERPL-MCNC: 8.3 MG/DL (ref 8.8–10.4)
CHLORIDE SERPL-SCNC: 101 MMOL/L (ref 98–107)
CREAT SERPL-MCNC: 0.8 MG/DL (ref 0.67–1.17)
CRP SERPL-MCNC: 48.4 MG/L
DIASTOLIC BLOOD PRESSURE - MUSE: NORMAL MMHG
EGFRCR SERPLBLD CKD-EPI 2021: >90 ML/MIN/1.73M2
ERYTHROCYTE [DISTWIDTH] IN BLOOD BY AUTOMATED COUNT: 12 % (ref 10–15)
GLUCOSE BLDC GLUCOMTR-MCNC: 122 MG/DL (ref 70–99)
GLUCOSE BLDC GLUCOMTR-MCNC: 190 MG/DL (ref 70–99)
GLUCOSE BLDC GLUCOMTR-MCNC: 199 MG/DL (ref 70–99)
GLUCOSE BLDC GLUCOMTR-MCNC: 225 MG/DL (ref 70–99)
GLUCOSE SERPL-MCNC: 136 MG/DL (ref 70–99)
GRAM STAIN RESULT: ABNORMAL
HCO3 SERPL-SCNC: 22 MMOL/L (ref 22–29)
HCT VFR BLD AUTO: 34.9 % (ref 40–53)
HGB BLD-MCNC: 12.1 G/DL (ref 13.3–17.7)
HOLD SPECIMEN: NORMAL
HOLD SPECIMEN: NORMAL
INTERPRETATION ECG - MUSE: NORMAL
LVEF ECHO: NORMAL
MAGNESIUM SERPL-MCNC: 1.8 MG/DL (ref 1.7–2.3)
MCH RBC QN AUTO: 31.3 PG (ref 26.5–33)
MCHC RBC AUTO-ENTMCNC: 34.7 G/DL (ref 31.5–36.5)
MCV RBC AUTO: 90 FL (ref 78–100)
P AXIS - MUSE: NORMAL DEGREES
PHOSPHATE SERPL-MCNC: 3.1 MG/DL (ref 2.5–4.5)
PLATELET # BLD AUTO: 365 10E3/UL (ref 150–450)
POTASSIUM SERPL-SCNC: 3.5 MMOL/L (ref 3.4–5.3)
PR INTERVAL - MUSE: NORMAL MS
QRS DURATION - MUSE: 88 MS
QT - MUSE: 308 MS
QTC - MUSE: 433 MS
R AXIS - MUSE: 42 DEGREES
RBC # BLD AUTO: 3.87 10E6/UL (ref 4.4–5.9)
SODIUM SERPL-SCNC: 133 MMOL/L (ref 135–145)
SYSTOLIC BLOOD PRESSURE - MUSE: NORMAL MMHG
T AXIS - MUSE: 29 DEGREES
VENTRICULAR RATE- MUSE: 119 BPM
WBC # BLD AUTO: 11.7 10E3/UL (ref 4–11)

## 2024-11-18 PROCEDURE — 85027 COMPLETE CBC AUTOMATED: CPT | Performed by: FAMILY MEDICINE

## 2024-11-18 PROCEDURE — 250N000013 HC RX MED GY IP 250 OP 250 PS 637: Performed by: FAMILY MEDICINE

## 2024-11-18 PROCEDURE — 250N000013 HC RX MED GY IP 250 OP 250 PS 637: Performed by: INTERNAL MEDICINE

## 2024-11-18 PROCEDURE — 80048 BASIC METABOLIC PNL TOTAL CA: CPT | Performed by: FAMILY MEDICINE

## 2024-11-18 PROCEDURE — 97530 THERAPEUTIC ACTIVITIES: CPT | Mod: GP

## 2024-11-18 PROCEDURE — 93306 TTE W/DOPPLER COMPLETE: CPT

## 2024-11-18 PROCEDURE — 86140 C-REACTIVE PROTEIN: CPT | Performed by: FAMILY MEDICINE

## 2024-11-18 PROCEDURE — 120N000001 HC R&B MED SURG/OB

## 2024-11-18 PROCEDURE — 83735 ASSAY OF MAGNESIUM: CPT | Performed by: FAMILY MEDICINE

## 2024-11-18 PROCEDURE — 97116 GAIT TRAINING THERAPY: CPT | Mod: GP

## 2024-11-18 PROCEDURE — 36415 COLL VENOUS BLD VENIPUNCTURE: CPT | Performed by: FAMILY MEDICINE

## 2024-11-18 PROCEDURE — 250N000009 HC RX 250: Performed by: FAMILY MEDICINE

## 2024-11-18 PROCEDURE — 84100 ASSAY OF PHOSPHORUS: CPT | Performed by: FAMILY MEDICINE

## 2024-11-18 PROCEDURE — 99231 SBSQ HOSP IP/OBS SF/LOW 25: CPT | Performed by: INTERNAL MEDICINE

## 2024-11-18 PROCEDURE — 93306 TTE W/DOPPLER COMPLETE: CPT | Mod: 26 | Performed by: INTERNAL MEDICINE

## 2024-11-18 RX ORDER — DILTIAZEM HYDROCHLORIDE 120 MG/1
120 CAPSULE, COATED, EXTENDED RELEASE ORAL DAILY
Status: DISCONTINUED | OUTPATIENT
Start: 2024-11-18 | End: 2024-11-18

## 2024-11-18 RX ORDER — CARVEDILOL 12.5 MG/1
25 TABLET ORAL 2 TIMES DAILY WITH MEALS
Status: DISCONTINUED | OUTPATIENT
Start: 2024-11-18 | End: 2024-11-19 | Stop reason: HOSPADM

## 2024-11-18 RX ORDER — DILTIAZEM HYDROCHLORIDE 30 MG/1
30 TABLET, FILM COATED ORAL EVERY 6 HOURS SCHEDULED
Status: COMPLETED | OUTPATIENT
Start: 2024-11-18 | End: 2024-11-19

## 2024-11-18 RX ORDER — DILTIAZEM HYDROCHLORIDE 120 MG/1
240 CAPSULE, COATED, EXTENDED RELEASE ORAL DAILY
Status: DISCONTINUED | OUTPATIENT
Start: 2024-11-19 | End: 2024-11-19 | Stop reason: HOSPADM

## 2024-11-18 RX ORDER — DILTIAZEM HYDROCHLORIDE 90 MG/1
90 CAPSULE, EXTENDED RELEASE ORAL ONCE
Status: DISCONTINUED | OUTPATIENT
Start: 2024-11-18 | End: 2024-11-18

## 2024-11-18 RX ORDER — CARVEDILOL 12.5 MG/1
12.5 TABLET ORAL ONCE
Status: COMPLETED | OUTPATIENT
Start: 2024-11-18 | End: 2024-11-18

## 2024-11-18 RX ADMIN — PANTOPRAZOLE SODIUM 40 MG: 40 TABLET, DELAYED RELEASE ORAL at 10:32

## 2024-11-18 RX ADMIN — LEVOFLOXACIN 750 MG: 750 TABLET, FILM COATED ORAL at 10:31

## 2024-11-18 RX ADMIN — LISINOPRIL 40 MG: 40 TABLET ORAL at 10:32

## 2024-11-18 RX ADMIN — DILTIAZEM HYDROCHLORIDE 30 MG: 30 TABLET, FILM COATED ORAL at 17:29

## 2024-11-18 RX ADMIN — CARVEDILOL 25 MG: 12.5 TABLET, FILM COATED ORAL at 17:29

## 2024-11-18 RX ADMIN — CARVEDILOL 12.5 MG: 12.5 TABLET, FILM COATED ORAL at 08:13

## 2024-11-18 RX ADMIN — APIXABAN 5 MG: 5 TABLET, FILM COATED ORAL at 22:25

## 2024-11-18 RX ADMIN — GUAIFENESIN 600 MG: 600 TABLET, EXTENDED RELEASE ORAL at 10:32

## 2024-11-18 RX ADMIN — Medication 250 MG: at 22:25

## 2024-11-18 RX ADMIN — SIMVASTATIN 5 MG: 5 TABLET, FILM COATED ORAL at 22:25

## 2024-11-18 RX ADMIN — DILTIAZEM HYDROCHLORIDE 120 MG: 120 CAPSULE, COATED, EXTENDED RELEASE ORAL at 13:32

## 2024-11-18 RX ADMIN — APIXABAN 5 MG: 5 TABLET, FILM COATED ORAL at 10:32

## 2024-11-18 RX ADMIN — METOPROLOL TARTRATE 5 MG: 5 INJECTION INTRAVENOUS at 01:18

## 2024-11-18 RX ADMIN — METOPROLOL TARTRATE 5 MG: 5 INJECTION INTRAVENOUS at 03:10

## 2024-11-18 RX ADMIN — CARVEDILOL 12.5 MG: 12.5 TABLET, FILM COATED ORAL at 05:35

## 2024-11-18 RX ADMIN — Medication 250 MG: at 10:32

## 2024-11-18 RX ADMIN — AMLODIPINE BESYLATE 2.5 MG: 2.5 TABLET ORAL at 10:32

## 2024-11-18 RX ADMIN — METOPROLOL TARTRATE 5 MG: 5 INJECTION INTRAVENOUS at 05:34

## 2024-11-18 RX ADMIN — TAMSULOSIN HYDROCHLORIDE 0.4 MG: 0.4 CAPSULE ORAL at 17:29

## 2024-11-18 RX ADMIN — GUAIFENESIN 600 MG: 600 TABLET, EXTENDED RELEASE ORAL at 22:25

## 2024-11-18 NOTE — PLAN OF CARE
"  Problem: Adult Inpatient Plan of Care  Goal: Plan of Care Review  Description: The Plan of Care Review/Shift note should be completed every shift.  The Outcome Evaluation is a brief statement about your assessment that the patient is improving, declining, or no change.  This information will be displayed automatically on your shift  note.  Outcome: Progressing  Flowsheets (Taken 11/18/2024 0428)  Plan of Care Reviewed With: patient  Overall Patient Progress: improving  Goal: Patient-Specific Goal (Individualized)  Description: You can add care plan individualizations to a care plan. Examples of Individualization might be:  \"Parent requests to be called daily at 9am for status\", \"I have a hard time hearing out of my right ear\", or \"Do not touch me to wake me up as it startles  me\".  Outcome: Progressing  Goal: Absence of Hospital-Acquired Illness or Injury  Outcome: Progressing  Goal: Optimal Comfort and Wellbeing  Outcome: Progressing  Goal: Readiness for Transition of Care  Outcome: Progressing   Goal Outcome Evaluation:      Plan of Care Reviewed With: patient    Overall Patient Progress: improvingOverall Patient Progress: improving           "

## 2024-11-18 NOTE — PROGRESS NOTES
"Pt was up to BR. Feeling a \"little sweaty.\" Denies lightheadedness or chest pain. Resp 24/min. Afebrile. Medicated with 5 mg metoprolol. HR decreased to the 90's.  "

## 2024-11-18 NOTE — PROGRESS NOTES
Interdisciplinary Discharge Planning Note    Anticipated Discharge Date: 11/19    Anticipated Discharge Location: Home    Clinical Needs Before Discharge:  stable functional status    Treatment Needs After Discharge:  None identified    Potential Barriers to Discharge: None identified at this time     NANCY Mcdermott  11/18/2024,  2:23 PM

## 2024-11-18 NOTE — PLAN OF CARE
"Afebrile, HR irregular 's, not sustained,coreg and diltiazem given with improvement. Lungs diminished upon first assessment, IS given, using independently, lungs now clear. Pt denies any pain. Ambulates with SBA and walker.    /72   Pulse 106   Temp 99  F (37.2  C) (Tympanic)   Resp 18   Ht 1.778 m (5' 10\")   Wt 81.1 kg (178 lb 12.7 oz)   SpO2 94%   BMI 25.65 kg/m        Goal Outcome Evaluation:      Plan of Care Reviewed With: patient, spouse    Overall Patient Progress: improving       Problem: Adult Inpatient Plan of Care  Goal: Absence of Hospital-Acquired Illness or Injury  Intervention: Prevent Skin Injury  Recent Flowsheet Documentation  Taken 11/18/2024 1200 by Carly Butler RN  Body Position: position changed independently      Problem: Adult Inpatient Plan of Care  Goal: Absence of Hospital-Acquired Illness or Injury  Intervention: Prevent and Manage VTE (Venous Thromboembolism) Risk  Recent Flowsheet Documentation  Taken 11/18/2024 0759 by Carly Butler, RN  VTE Prevention/Management: (eliquis) other (see comments)     Problem: Comorbidity Management  Goal: Blood Glucose Levels Within Targeted Range  Intervention: Monitor and Manage Glycemia  Recent Flowsheet Documentation  Taken 11/18/2024 0759 by Carly Butler, RN  Medication Review/Management: medications reviewed              "

## 2024-11-18 NOTE — PROGRESS NOTES
:     accessed chart to determine discharge planning needs.     NANCY Amador on 11/18/2024 at 7:58 AM

## 2024-11-18 NOTE — PROGRESS NOTES
11/18/24 1523   Appointment Info   Signing Clinician's Name / Credentials (PT) Roman Heredia MPT   Therapeutic Activity   Treatment Detail/Skilled Intervention sit to stand and stand pivot with CGA to SBA without use of Fww   Gait Training   Symptoms Noted During/After Treatment (Gait Training) fatigue   Treatment Detail/Skilled Intervention ambulation in hallway   Distance in Feet 400   [place] Level (Gait Training) stand-by assist   Physical Assistance Level (Gait Training) supervision   Weight Bearing (Gait Training) full weight-bearing   Assistive Device (Gait Training) rolling walker  (trial of ambulation without AD resulting in increased fatigue and mild instability)   Pattern Analysis (Gait Training) swing-through gait   PT Discharge Planning   PT Plan Continue PT   PT Discharge Recommendation (DC Rec) home with assist;home with outpatient physical therapy   PT Rationale for DC Rec to promote return to prior level of safe, functional and independent mobility without use of assistive gait device   PT Brief overview of current status Pleasant patient experiencing decreased functional activity tolerance and gait stability; trial of amblulation without use of Fww generated increased fatigue; he will benefit from continued PT through outpatient services   PT Equipment Needed at Discharge walker, rolling  (AD to be used initially upon discharge, for stability and energy conservation)

## 2024-11-18 NOTE — PROGRESS NOTES
[facility]    Medicine Progress Note - Hospitalist Service    Date of Admission:  11/14/2024    Assessment & Plan   This 73 yo male with a PMH significant for DM-1 since age 23, HTN, CKD-2, anemia and subclinical hypothyroidism presented to the ER with complaints of worsening shortness of breath.  He had a cough beginning 4 days ago but this progressed throughout the week and was not relieved by his usual OTC medications of Mucinex and guaifenesin with codeine.  His blood sugars have also been very high and his HgbA1C is usually in the non-diabetic range so this is unusual for him.  In the ER he was found to have hypoxia, HTN, temp 99.9, sodium 116, Cr 1.05 with GFR 75 with baseline Cr 0.9, GFR >90, WBC 25.2, procalcitonin 4.29 with normal lactic acid at 1.6.  BNP 1,762 but no prior for comparison.  CTA chest showed a large area of consolidation in the RUL and RML and scattered foci of consolidation in the RLL and LIVIA, no PE.  He was treated with Zithromax, Rocephin, Solu-Medrol, Duoneb and NS fluid resuscitation and admitted to ICU for continued cares.  During the night of 11/15-16 he developed atrial fib with RVR rate in the 130's.  His rate decreased with IV metoprolol, his carvedilol dose was doubled and he was started on Eliquis.      Severe sepsis with acute organ dysfunction (H) - hyperlactatemia, acute respiratory failure with hypoxia    Assessment: Improving to nearly resolved (he is stable on RA)    Plan: Started on Rocephin and Zithromax, given one dose of methylprednisolone, added Mucinex, off O2, follow labs.  By the second day of hospitalization his WBC increased to 27.0, CRP increased at 124.86 and sputum culture positive for 2+ Gram positive cocci and Gram negative bacilli.  Change abx to levofloxacin for increased coverage pending any ID and sensitivities.  WBC decreased to 11.8 and CRP to 58.08 after that change.  Blood culture negative at 2 days.  Ready to discharge from a  "\"pneumonia standpoint\" at any time.      Essential hypertension    Assessment: Controlled     Plan: Continue amlodipine 2.5 mg daily, tamsulosin 0.4 mg daily and carvedilol increased to 25 mg BID for AF RVR.  Initially held lisinopril due to mild FLORENTIN which is now improved.  Lisinopril resumed at a lower dose starting at 10 mg for renal protection on 11/17        Paroxysmal atrial fibrillation with rapid ventricular response    Assessment: New onset    Plan:   Increased dose of carvedilol to help control rate, now up to 25 bid.  Started on Eliquis.  May have had episodes PTA when he felt palpitations or fatigue but not documented previously.  Will have him follow-up with cardiology after discharge.  Echo ordered:  - TTE 11/18: Poor acoustic windows.  Global and regional left ventricular function is normal with an EF of 55-60%.  Right ventricular function, chamber size, wall motion, and thickness are  normal. Both atria appear normal.  Pulmonary artery systolic pressure is normal.  No pericardial effusion is present.  No significant valvular abnormalities present.  IVC diameter and respiratory changes fall into an intermediate range  suggesting an RA pressure of 8 mmHg.  There is no prior study for direct comparison.  - will follow on Tele today, and may need to convert to cardizem tomorrow       Gastroesophageal reflux disease, esophagitis presence not specified    Assessment: Stable    Plan: Continue PPI using pantoprazole as formulary substitution for omeprazole.        Subclinical hypothyroidism    Assessment: Controlled    Plan: Last TSH from 6/7/2024 normal at 2.78.  Done at the time of the ER visit elevated at 4.21 with normal FT4 at 1.53 so no change in treatment in this acute situation.  Not on any regular medications.      Chronic kidney disease, stage 2 (mild)    Assessment: Mild FLORENTIN    Plan: Discontinued regular IV fluids with improvement in his Cr to 0.84 and GFR to >90.  Restart lisinopril today, recheck " "labs today \"normal\"      Hypomagnesemia    Assessment: Replaced    Plan: Given mag 4 gm IV in the ER and mag replaced to normal at 2.2, stable at 2.1.      Hyponatremia    Assessment: Improving    Plan: Likely due to pneumonia.  Given 3% saline for 8 hours and improved to 130 today.  Recheck in AM.      Hypophosphatemia    Assessment: Resolved    Plan: Was 2.3 at ER presentation, improved to 2.8 by the following morning.  Check periodically.      Pneumonia of both upper lobes due to infectious organism    Assessment: Acute    Plan: Changed Rocephin and Zithromax to levofloxacin on 11/16 with improvement in labs.  Legionella and Strep pneumo negative.  Tapered off O2.  Cough medicine prn, nebs, Mucinex.  Will not continue steroids due to his hyperglycemia and no wheezing at this time.      Type 1 diabetes mellitus with hyperglycemia (H)    Assessment: Acute    Plan: Generally has excellent control of his glucose with his HgbA1C < 6.2 for the past 4 years.  Continue his usual medications of Lantus 17 units at bedtime and some carb coverage although he does not particularly count carbs to dose and his sliding scale which is 1 unit per 40 over 120.  As the patient has such excellent control of his glucose levels request the nurses give him the dose of insulin he would administer at home based on his POC glucose measurements.       Anemia in chronic kidney disease (CKD)    Assessment: Stable    Plan: Hgb slightly decreased from his baseline which is closer to 14.  Plan to recheck at F/U appointment.          Diet: Consistent Carbohydrate Diet Moderate Consistent Carb (60 g CHO per Meal) Diet    DVT Prophylaxis: DOAC  [place] Catheter: Not present  Lines: None     Cardiac Monitoring: ACTIVE order. Indication: Tachyarrhythmias, acute (48 hours)  Code Status: Full Code      Clinically Significant Risk Factors         # Hyponatremia: Lowest Na = 127 mmol/L in last 2 days, will monitor as appropriate           # Hypertension: " "Noted on problem list            # Overweight: Estimated body mass index is 25.65 kg/m  as calculated from the following:    Height as of this encounter: 1.778 m (5' 10\").    Weight as of this encounter: 81.1 kg (178 lb 12.7 oz)., PRESENT ON ADMISSION            Social Drivers of Health    Tobacco Use: Medium Risk (11/14/2024)    Patient History     Smoking Tobacco Use: Former     Smokeless Tobacco Use: Never   Alcohol Use: Unknown (8/12/2019)    AUDIT-C     Frequency of Alcohol Consumption: 2-3 times a week          Disposition Plan     Medically Ready for Discharge: Anticipated Tomorrow             Oliver Yap MD  Hospitalist Service  [facility]  Securely message with [employer] (more info)  Text page via [facility] Paging/Directory   ______________________________________________________________________    Interval History   Feeling overall good.  Really no complaints today, beyond a productive cough which is improving.  He notes some fatigue with activity.    Physical Exam   Vital Signs: Temp: 98.4  F (36.9  C) Temp src: Tympanic BP: 127/71 Pulse: 97   Resp: 22 SpO2: 95 % O2 Device: None (Room air)    Weight: 178 lbs 12.69 oz    Constitutional: awake, alert, cooperative, no apparent distress, appears younger than stated age, and normal weight  ENT: normocephalic, atraumatic  Respiratory: no respiratory distress, fair air exchange and clear to auscultation  Cardiovascular: irregular rate and tachycardic rhythm, normal S1 and S2, no murmur noted, and no edema  Skin:  no redness, warmth, or swelling, and no rashes  Neurologic: Mental Status Exam:  Fund of Knowledge:  above normal  Neuropsychiatric: General: normal, calm, and normal eye contact      Medical Decision Making             Data     I have personally reviewed the following data over the past 24 hrs:    11.7 (H)  \   12.1 (L)   / 365     133 (L) 101 15.3 /  122 (H)   3.5 22 0.80 \     Procal: N/A CRP: 48.40 (H) Lactic Acid: N/A         "

## 2024-11-18 NOTE — PROGRESS NOTES
SAFETY CHECKLIST  ID Bands and Risk clasps correct and in place (DNR, Fall risk, Allergy, Latex, Limb):  Yes  All Lines Reconciled and labeled correctly: Yes  Whiteboard updated:Yes  Environmental interventions: Yes  Verify Tele #: 4

## 2024-11-18 NOTE — PLAN OF CARE
Goal Outcome Evaluation:           Overall Patient Progress: no changeOverall Patient Progress: no change         HR sustaining in 120-130's, Metoprolol given x2, MD notified. No SOB or chest pain.  LS are diminished. Good urine output.

## 2024-11-19 ENCOUNTER — APPOINTMENT (OUTPATIENT)
Dept: PHYSICAL THERAPY | Facility: OTHER | Age: 74
DRG: 871 | End: 2024-11-19
Payer: MEDICARE

## 2024-11-19 VITALS
TEMPERATURE: 98.2 F | SYSTOLIC BLOOD PRESSURE: 104 MMHG | BODY MASS INDEX: 25.6 KG/M2 | HEIGHT: 70 IN | HEART RATE: 68 BPM | WEIGHT: 178.79 LBS | OXYGEN SATURATION: 95 % | RESPIRATION RATE: 18 BRPM | DIASTOLIC BLOOD PRESSURE: 64 MMHG

## 2024-11-19 DIAGNOSIS — I48.0 PAROXYSMAL ATRIAL FIBRILLATION WITH RAPID VENTRICULAR RESPONSE (H): ICD-10-CM

## 2024-11-19 LAB
BACTERIA BLD CULT: NO GROWTH
GLUCOSE BLDC GLUCOMTR-MCNC: 162 MG/DL (ref 70–99)
GLUCOSE BLDC GLUCOMTR-MCNC: 172 MG/DL (ref 70–99)
GLUCOSE BLDC GLUCOMTR-MCNC: 178 MG/DL (ref 70–99)

## 2024-11-19 PROCEDURE — 97530 THERAPEUTIC ACTIVITIES: CPT | Mod: GP

## 2024-11-19 PROCEDURE — 250N000013 HC RX MED GY IP 250 OP 250 PS 637: Performed by: FAMILY MEDICINE

## 2024-11-19 PROCEDURE — 99239 HOSP IP/OBS DSCHRG MGMT >30: CPT | Performed by: INTERNAL MEDICINE

## 2024-11-19 PROCEDURE — 250N000013 HC RX MED GY IP 250 OP 250 PS 637: Performed by: INTERNAL MEDICINE

## 2024-11-19 PROCEDURE — 97116 GAIT TRAINING THERAPY: CPT | Mod: GP

## 2024-11-19 RX ORDER — DILTIAZEM HYDROCHLORIDE 240 MG/1
240 CAPSULE, COATED, EXTENDED RELEASE ORAL DAILY
Qty: 90 CAPSULE | Refills: 0 | OUTPATIENT
Start: 2024-11-19

## 2024-11-19 RX ORDER — LISINOPRIL 10 MG/1
10 TABLET ORAL DAILY
Qty: 30 TABLET | Refills: 0 | Status: SHIPPED | OUTPATIENT
Start: 2024-11-19 | End: 2024-11-25

## 2024-11-19 RX ORDER — LEVOFLOXACIN 750 MG/1
750 TABLET, FILM COATED ORAL DAILY
Qty: 2 TABLET | Refills: 0 | Status: SHIPPED | OUTPATIENT
Start: 2024-11-20 | End: 2024-11-22

## 2024-11-19 RX ORDER — DILTIAZEM HYDROCHLORIDE 240 MG/1
240 CAPSULE, COATED, EXTENDED RELEASE ORAL DAILY
Qty: 30 CAPSULE | Refills: 1 | Status: SHIPPED | OUTPATIENT
Start: 2024-11-19 | End: 2024-11-25

## 2024-11-19 RX ORDER — CARVEDILOL 25 MG/1
25 TABLET ORAL 2 TIMES DAILY WITH MEALS
Qty: 60 TABLET | Refills: 1 | Status: SHIPPED | OUTPATIENT
Start: 2024-11-19 | End: 2024-11-25

## 2024-11-19 RX ORDER — CARVEDILOL 25 MG/1
25 TABLET ORAL 2 TIMES DAILY WITH MEALS
Qty: 180 TABLET | Refills: 0 | OUTPATIENT
Start: 2024-11-19

## 2024-11-19 RX ADMIN — LEVOFLOXACIN 750 MG: 750 TABLET, FILM COATED ORAL at 10:33

## 2024-11-19 RX ADMIN — CARVEDILOL 25 MG: 12.5 TABLET, FILM COATED ORAL at 08:19

## 2024-11-19 RX ADMIN — DILTIAZEM HYDROCHLORIDE 240 MG: 120 CAPSULE, COATED, EXTENDED RELEASE ORAL at 10:33

## 2024-11-19 RX ADMIN — APIXABAN 5 MG: 5 TABLET, FILM COATED ORAL at 10:33

## 2024-11-19 RX ADMIN — DILTIAZEM HYDROCHLORIDE 30 MG: 30 TABLET, FILM COATED ORAL at 01:06

## 2024-11-19 RX ADMIN — GUAIFENESIN 600 MG: 600 TABLET, EXTENDED RELEASE ORAL at 10:33

## 2024-11-19 RX ADMIN — PANTOPRAZOLE SODIUM 40 MG: 40 TABLET, DELAYED RELEASE ORAL at 08:19

## 2024-11-19 RX ADMIN — Medication 250 MG: at 10:33

## 2024-11-19 RX ADMIN — DILTIAZEM HYDROCHLORIDE 30 MG: 30 TABLET, FILM COATED ORAL at 05:29

## 2024-11-19 ASSESSMENT — ACTIVITIES OF DAILY LIVING (ADL)
ADLS_ACUITY_SCORE: 0

## 2024-11-19 NOTE — TELEPHONE ENCOUNTER
[facility] sent Rx request for the following:      Requested Prescriptions   Pending Prescriptions Disp Refills    diltiazem ER COATED BEADS (CARDIZEM CD/CARTIA XT) 240 MG 24 hr capsule 90 capsule 0     Sig: Take 1 capsule (240 mg) by mouth daily.   Approved today.      carvedilol (COREG) 25 MG tablet 180 tablet 0     Sig: Take 1 tablet (25 mg) by mouth 2 times daily (with meals).   Approved today.    Pharmacy notified. Lucrecia Lechuga RN .............. 11/19/2024  3:24 PM

## 2024-11-19 NOTE — PHARMACY - DISCHARGE MEDICATION RECONCILIATION AND EDUCATION
Pharmacy:  Discharge Counseling and Medication Reconciliation    Eh Perry  [address] [place] [address]  [phone] (home)   74 year old male  PCP: Hemanth Cunningham    Allergies: Wasp venom protein and Lipitor [atorvastatin calcium]    Discharge Counseling:    Pharmacist met with patient (and/or family) today to review the medication portion of the After Visit Summary (with an emphasis on NEW medications) and to address patient's questions/concerns.    Summary of Education: Patient and wife both in room for education. Discussed the appropriate use of oral antibiotics with patient. Such as the importance of finishing the entire course and how to manage potential side effects such as diarrhea with OTC medications and to be on the lookout of intense unusual muscle pain. Patient is aware to space medication from pills containing calcium, iron, mag, zinc d/t decreased absorption and that sun sensitivity may occur. Patient educated that they may take with/without food but should avoid dairy products near administration.     Spent time attempting to get 30 day copay card to help pay for eliquis Rx, patient was not eligible. Wife and patient determined that price would be manageable and they would still  the Rx. Educated patient on importance of adherence. To watch for s/s of both clot: SOB, leg swelling etc and bleed Excessive bruising or epistaxis, Tarry stools, red urine, etc. Discussed new start to diltiazem. Patient knows to watch for s/s of hypotension, heart rate and dizziness.     Patient knows to stop taking Amlodipine, ASA, ibuprofen and other NSAIDs going forward. Patient requests to get reduced dose of lisinopril instead of a Stop. Provider informed and gave patient reduced dose. Patient also educated on increased dose of carvedilol going home.     Materials Provided:  MedCounselor sheets printed from Clinical Pharmacology on: Eliquis, Levofloxacin, diltiazem     Discharge  Medication Reconciliation:    It has been determined that the patient has an adequate supply of medications available or which can be obtained from the patient's preferred pharmacy, which HE/SHE has confirmed as: [facility]    Thank you for the consult.    Flavio Eng [facility]........November 19, 2024 12:10 PM

## 2024-11-19 NOTE — PROGRESS NOTES
SAFETY CHECKLIST  ID Bands and Risk clasps correct and in place (DNR, Fall risk, Allergy, Latex, Limb):  Yes  All Lines Reconciled and labeled correctly: Yes  Whiteboard updated:Yes  Environmental interventions: Yes  Verify Tele #:  MS 4    Evita Santana RN on 11/18/2024 at 8:01 PM

## 2024-11-19 NOTE — DISCHARGE SUMMARY
"[facility]  Hospitalist Discharge Summary      Date of Admission:  11/14/2024  Date of Discharge:  11/19/2024  Discharging Provider: Oliver Yap MD  Discharge Service: Hospitalist Service    Discharge Diagnoses   A fib with RVR, New  Community Acquired Pneumonia     Clinically Significant Risk Factors     # Overweight: Estimated body mass index is 25.65 kg/m  as calculated from the following:    Height as of this encounter: 1.778 m (5' 10\").    Weight as of this encounter: 81.1 kg (178 lb 12.7 oz).       Follow-ups Needed After Discharge   Follow-up Appointments       Follow-up and recommended labs and tests       Follow up with Dr Padilla, on 11/25 for hospital follow- up.  No follow up labs or test are needed at this appointment. However he should have a Hgb checked in the next 3-6 months as his inpatient value was slightly lower than his baseline.     Follow up with Dr. العلي , at [facility], on 12/3 for Atrial Fibrillation follow up.                Unresulted Labs Ordered in the Past 30 Days of this Admission       Date and Time Order Name Status Description    11/14/2024  6:17 PM Blood Culture Peripheral Blood Preliminary         These results will be followed up by [facility]    Discharge Disposition   Discharged to home  Condition at discharge: Good    Hospital Course     Expand All Collapse All    [facility]  Hospitalist Discharge Summary       Date of Admission:  11/14/2024  Date of Discharge:  11/19/2024  Discharging Provider: Oliver Yap MD  Discharge Service: Hospitalist Service        Discharge Diagnoses  Community Acquired Pneumonia  A Fib        Clinically Significant Risk Factors     # Overweight: Estimated body mass index is 25.65 kg/m  as calculated from the following:    Height as of this encounter: 1.778 m (5' 10\").    Weight as of this encounter: 81.1 kg (178 lb 12.7 oz).             Follow-ups Needed After Discharge  Follow-up Appointments         " Follow-up and recommended labs and tests        Follow up with Dr Padilla, on 11/25 for hospital follow- up.  No follow up labs or test are needed.       Follow up with Dr. العلي , at [facility], on 12/3 for Atrial Fibrillation follow up.                     Unresulted Labs Ordered in the Past 30 Days of this Admission         Date and Time Order Name Status Description     11/14/2024  6:17 PM Blood Culture Peripheral Blood Preliminary            These results will be followed up by [facility]        Discharge Disposition  Discharged to home  Condition at discharge: Good        Hospital Course  This 73 yo male with a PMH significant for DM-1 since age 23, HTN, CKD-2, anemia and subclinical hypothyroidism presented to the ER with complaints of worsening shortness of breath.  He had a cough beginning 4 days PTA and this progressed throughout the week and was not relieved by his usual OTC medications of Mucinex and guaifenesin with codeine.  His blood sugars have also been very high and his HgbA1C is usually in the non-diabetic range so this is unusual for him.  In the ER he was found to have hypoxia, HTN, temp 99.9, sodium 116, Cr 1.05 with GFR 75 with baseline Cr 0.9, GFR >90, WBC 25.2, procalcitonin 4.29 with normal lactic acid at 1.6.  BNP 1,762 but no prior for comparison.  CTA chest showed a large area of consolidation in the RUL and RML and scattered foci of consolidation in the RLL and LIVIA, no PE.  He was treated with Zithromax, Rocephin, Solu-Medrol, Duoneb and NS fluid resuscitation and admitted to ICU for continued cares.  During the night of 11/15-16 he developed atrial fib with RVR rate in the 130's.      Severe sepsis with acute organ dysfunction due to acute respiratory failure from multilobar community acquired pneumonia with hypoxia  - in the ED, CT showed:  Large area of consolidation in the right upper and middle lobes as well as scattered additional foci of consolidation in the right lower and left upper  lobes, compatible with pneumonia. Small right pleural effusion and trace left pleural effusion.  - Started on Rocephin and Zithromax  His antibiotics were changed to levofloxacin and he will be discharged to complete a 7 total day abx course   - at the time of discharge he was doing very well from a respiratory standpoint, stable on room air and feeling good      Paroxysmal atrial fibrillation with rapid ventricular response  - New onset on 11/15-11/16 evening  - likely triggered by pneumonia, as no prior history   - TTE 11/18: Poor acoustic windows.  Global and regional left ventricular function is normal with an EF of 55-60%.  Right ventricular function, chamber size, wall motion, and thickness are normal. Both atria appear normal. Pulmonary artery systolic pressure is normal. No pericardial effusion is present. No significant valvular abnormalities present. IVC diameter and respiratory changes - - to achieve adequate rate control, his coreg was increased to 25 mg bid and cardizem was added.  With this he ws well controlled in the 80s.  I will discharge him on this and have him see cardiology in 2 weeks, and hopefully he will convert or a plan for medical or electrical cardioversion can be planned  - he was started on therapeutic DOAC dosing which will be continued at discharge      Essential hypertension  - with the above changes of increasing the coreg, adding cardizem and the a fib, his BP was running a little lower than his normal  - therefore at discharge I stopped the norvasc (since cardizem was started) and will hold the lisinopril until outpatient follow up.  I have given him a prescription for lisinopril 10 mg that he can start if his SBP is >130.  He understands that there are several moving pieces right now and his medical regimen will change over the next couple months as the a fib plan is determined.      Gastroesophageal reflux disease, esophagitis presence not specified    Assessment: Stable     Plan: Continue PPI .         Subclinical hypothyroidism    Assessment: Controlled    Plan: Last TSH from 6/7/2024 normal at 2.78.  Done at the time of the ER visit elevated at 4.21 with normal FT4 at 1.53        Chronic kidney disease, stage 2 (mild)    Assessment: Mild FLORENTIN    Plan: Discontinued regular IV fluids with improvement in his Cr to 0.84 and GFR to >90.  Will hold ACE for now at discharge.       Hypomagnesemia    Assessment: Replaced       Hyponatremia    Assessment: Improving    Plan: Likely due to pneumonia.  Given 3% saline for 8 hours and improved to 130, and was 133 when last checked       Type 1 diabetes mellitus with hyperglycemia (H)    Assessment: Acute    Plan: Generally has excellent control of his glucose with his HgbA1C < 6.2 for the past 4 years.  Continue his usual medications of Lantus 17 units at bedtime and some carb coverage although he does not particularly count carbs to dose and his sliding scale which is 1 unit per 40 over 120.  As the patient has such excellent control of his glucose levels request the nurses give him the dose of insulin he would administer at home based on his POC glucose measurements.       Consultations This Hospital Stay   PHYSICAL THERAPY ADULT IP CONSULT  OCCUPATIONAL THERAPY ADULT IP CONSULT    Code Status   Full Code    Time Spent on this Encounter   I, Oliver Yap MD, personally saw the patient today and spent greater than 30 minutes discharging this patient.       Oliver Yap MD  [facility]  [address]  [facility] [address]  Phone: [phone]  Fax: [phone]  ______________________________________________________________________    Physical Exam   Vital Signs: Temp: 98.2  F (36.8  C) Temp src: Tympanic BP: 104/64 Pulse: 68   Resp: 18 SpO2: 95 % O2 Device: None (Room air)    Weight: 178 lbs 12.69 oz  ----------------------------------------------------------------------------------------       Primary Care  Physician   Hemanth Cunningham    Discharge Orders      Reason for your hospital stay    Pneumonia, A Fib     Activity    Your activity upon discharge: activity as tolerated     Follow-up and recommended labs and tests     Follow up with Dr Padilla, on 11/25 for hospital follow- up.  No follow up labs or test are needed at this appointment. However he should have a Hgb checked in the next 3-6 months as his inpatient value was slightly lower than his baseline.     Follow up with Dr. العلي , at [facility], on 12/3 for Atrial Fibrillation follow up.     Discharge Instructions    At the time of discharge, you are going home on (for blood pressure control) Coreg and Diltiazem.  With this regimen your blood pressure has been well controlled. However if, in the next month, your blood pressure starts to increase you can start the lisinopril 10 mg/d.  I would start this if your blood pressure is consistently >130.  Over the next month, there may be a few changes to your drug regimen as the a fib plan is determined. So please discuss this further with your cardiologist or primary care provider as needed.     Diet    Follow this diet upon discharge: Current Diet:Orders Placed This Encounter      Consistent Carbohydrate Diet Moderate Consistent Carb (60 g CHO per Meal) Diet       Significant Results and Procedures   Most Recent 3 CBC's:  Recent Labs   Lab Test 11/18/24  0602 11/17/24  0607 11/16/24  0434   WBC 11.7* 11.8* 27.0*   HGB 12.1* 11.9* 11.5*   MCV 90 91 89    359 325     Most Recent 3 BMP's:  Recent Labs   Lab Test 11/19/24  1140 11/19/24  0735 11/19/24  0158 11/18/24  0812 11/18/24  0602 11/17/24  0742 11/17/24  0607 11/16/24  1610 11/16/24  1403 11/16/24  0751 11/16/24  0434   NA  --   --   --   --  133*  --  130*  --  127*  --  123*   POTASSIUM  --   --   --   --  3.5  --  3.6  --   --   --  4.0   CHLORIDE  --   --   --   --  101  --  100  --   --   --  93*   CO2  --   --   --   --  22  --  21*  --   --    --  19*   BUN  --   --   --   --  15.3  --  18.3  --   --   --  24.7*   CR  --   --   --   --  0.80  --  0.85  --   --   --  0.84   ANIONGAP  --   --   --   --  10  --  9  --   --   --  11   MARIE  --   --   --   --  8.3*  --  8.4*  --   --   --  8.8   * 162* 172*   < > 136*   < > 143*   < >  --    < > 322*    < > = values in this interval not displayed.   ,   Results for orders placed or performed during the hospital encounter of 11/14/24   CT Chest Pulmonary Embolism w Contrast    Narrative    Exam:  CT CHEST PULMONARY EMBOLISM W CONTRAST    Exam reason:  Fever, shortness of breath, tachycardia.  Rule out PE,  consolidation, effusion    Technique:  Contrast enhanced helical CT pulmonary angiography was  performed. Sagittal and coronal reformats as well as coronal MIP  reformats were obtained. This CT was performed using one or more of  the following dose reduction techniques: automated exposure control,  adjustment of the mA and/or kV according to patient size, and/or use  of iterative reconstruction technique.    Meds/Contrast: isovue 370 76ml    Comparison:  10/29/2010    FINDINGS:    Technical quality: Diagnostic.    Cardiovascular:   -There are no filling defects in the pulmonary arteries that would  indicate pulmonary embolism.  -The main pulmonary artery is mildly dilated, likely due to pulmonary  hypertension.  -No aortic aneurysm.  Lungs/Airways: There is a large area of right upper and middle lobe  consolidation with additional scattered foci of consolidation in the  right lower lobe and left upper lobe.  Mirella/Mediastinum: There are several prominent mediastinal lymph nodes,  likely reactive.  Pleura: Small right pleural effusion. Trace left pleural effusion. No  pneumothorax.  Chest Wall/Axilla: Unremarkable.    Visualized Upper Abdomen: No acute findings in the visualized upper  abdomen.  Musculoskeletal: No acute osseous abnormalities.      Impression    IMPRESSION:    Large area of consolidation  in the right upper and middle lobes as  well as scattered additional foci of consolidation in the right lower  and left upper lobes, compatible with pneumonia. Small right pleural  effusion and trace left pleural effusion.    No pulmonary embolism.    LUIS MANUEL RAMIREZ MD         SYSTEM ID:  [id number]   Echocardiogram Complete     Value    LVEF  55-60%    [facility]    [id number]  [id number]  [id number]  [id number]^AQUINO^NERIS^GAYLE     [facility]  [address] [place] [address]     Name: REFUGIO GU  MRN: [id number]  : 1950  Study Date: 2024 06:58 AM  Age: 74 yrs  Gender: Male  Patient Location: [facility]  Reason For Study: Atrial Fibrillation  Ordering Physician: NERIS AQUINO  Performed By: Yari Alejo     BSA: 2.0 m2  Height: 70 in  Weight: 178 lb  HR: 120  BP: 142/83 mmHg  ______________________________________________________________________________  Procedure  Complete Portable Echo Adult. Poor acoustic windows.  ______________________________________________________________________________  Interpretation Summary  Poor acoustic windows.  Global and regional left ventricular function is normal with an EF of 55-60%.  Right ventricular function, chamber size, wall motion, and thickness are  normal.  Both atria appear normal.  Pulmonary artery systolic pressure is normal.  No pericardial effusion is present.  No significant valvular abnormalities present.  IVC diameter and respiratory changes fall into an intermediate range  suggesting an RA pressure of 8 mmHg.  There is no prior study for direct comparison.  ______________________________________________________________________________  Left Ventricle  Global and regional left ventricular function is normal with an EF of 55-60%.  Left ventricular wall thickness is normal. Left ventricular size is normal.  Diastolic function not assessed due to atrial fibrillation. No regional wall  motion abnormalities are  seen.     Right Ventricle  Right ventricular function, chamber size, wall motion, and thickness are  normal.     Atria  Both atria appear normal.     Mitral Valve  The mitral valve is normal. Trace mitral insufficiency is present.     Aortic Valve  Aortic valve sclerosis is present. The valve leaflets are not well visualized.  On Doppler interrogation, there is no significant stenosis or regurgitation.     Tricuspid Valve  The tricuspid valve is normal. Trace tricuspid insufficiency is present. The  right ventricular systolic pressure is approximated at 23.2 mmHg plus the  right atrial pressure. Pulmonary artery systolic pressure is normal.     Pulmonic Valve  The valve leaflets are not well visualized. On Doppler interrogation, there is  no significant stenosis or regurgitation.     Vessels  The thoracic aorta is normal. The pulmonary artery cannot be assessed. IVC  diameter and respiratory changes fall into an intermediate range suggesting an  RA pressure of 8 mmHg.     Pericardium  No pericardial effusion is present.     Miscellaneous  No significant valvular abnormalities present.     Compared to Previous Study  There is no prior study for direct comparison.  ______________________________________________________________________________  MMode/2D Measurements & Calculations     IVSd: 0.90 cm  LVIDd: 4.8 cm  LVIDs: 3.6 cm  LVPWd: 1.1 cm  FS: 25.6 %  LV mass(C)d: 168.5 grams  LV mass(C)dI: 84.8 grams/m2  Ao root diam: 2.9 cm  asc Aorta Diam: 3.2 cm  LVOT diam: 2.0 cm  LVOT area: 3.1 cm2  Ao root diam index Ht(cm/m): 1.6  Ao root diam index BSA (cm/m2): 1.5  Asc Ao diam index BSA (cm/m2): 1.6  Asc Ao diam index Ht(cm/m): 1.8  RWT: 0.45  TAPSE: 2.1 cm     Doppler Measurements & Calculations  MV E max pamela: 124.0 cm/sec     MV dec slope: 702.0 cm/sec2  MV dec time: 0.18 sec  Ao V2 max: 83.2 cm/sec  Ao max PG: 3.0 mmHg  Ao V2 mean: 56.5 cm/sec  Ao mean P.7 mmHg  Ao V2 VTI: 16.0 cm  KATRIN(I,D): 2.7 cm2  KATRIN(V,D): 2.8  cm2  LV V1 max P.2 mmHg  LV V1 max: 74.6 cm/sec  LV V1 VTI: 13.6 cm  SV(LVOT): 42.8 ml  SI(LVOT): 21.6 ml/m2  PA acc time: 0.14 sec  TR max angel: 240.5 cm/sec  TR max P.2 mmHg  AV Angel Ratio (DI): 0.90  KATRIN Index (cm2/m2): 1.3  E/E' avg: 10.4  Lateral E/e': 9.1  Medial E/e': 11.6     ______________________________________________________________________________  Report approved by: Phyllis Trotter 2024 08:14 AM             Discharge Medications   Current Discharge Medication List        START taking these medications    Details   apixaban ANTICOAGULANT (ELIQUIS) 5 MG tablet Take 1 tablet (5 mg) by mouth 2 times daily.  Qty: 60 tablet, Refills: 1    Associated Diagnoses: Paroxysmal atrial fibrillation with rapid ventricular response (H)      diltiazem ER COATED BEADS (CARDIZEM CD/CARTIA XT) 240 MG 24 hr capsule Take 1 capsule (240 mg) by mouth daily.  Qty: 30 capsule, Refills: 1    Associated Diagnoses: Paroxysmal atrial fibrillation with rapid ventricular response (H)      levofloxacin (LEVAQUIN) 750 MG tablet Take 1 tablet (750 mg) by mouth daily for 2 days.  Qty: 2 tablet, Refills: 0    Associated Diagnoses: Paroxysmal atrial fibrillation with rapid ventricular response (H)           CONTINUE these medications which have CHANGED    Details   carvedilol (COREG) 25 MG tablet Take 1 tablet (25 mg) by mouth 2 times daily (with meals).  Qty: 60 tablet, Refills: 1    Associated Diagnoses: Paroxysmal atrial fibrillation with rapid ventricular response (H)      lisinopril (ZESTRIL) 10 MG tablet Take 1 tablet (10 mg) by mouth daily. You can start this if your blood pressure is >130.  Qty: 30 tablet, Refills: 0    Associated Diagnoses: Stage 3 chronic kidney disease, unspecified whether stage 3a or 3b CKD (H)           CONTINUE these medications which have NOT CHANGED    Details   acetaminophen (TYLENOL) 500 MG tablet Take 1,000 mg by mouth every 8 hours as needed for pain or fever.      Dextromethorphan HBr  15 MG TABS Take 2 tablets by mouth 2 times daily as needed (COUGH).      EPINEPHrine (ANY BX GENERIC EQUIV) 0.3 MG/0.3ML injection 2-pack INJECT 1 PEN IN THE MUSCLE ONE TIME AS DIRECTED  Qty: 2 each, Refills: 0    Associated Diagnoses: Allergic reaction to wasp sting      guaiFENesin-codeine (ROBITUSSIN AC) 100-10 MG/5ML solution Take 5-10 mLs by mouth every 4 hours as needed for cough.  Qty: 237 mL, Refills: 0    Associated Diagnoses: Viral URI with cough; Painful cough      insulin lispro (HUMALOG KWIKPEN) 100 UNIT/ML (1 unit dial) KWIKPEN ADMINISTER 3 UNITS UNDER THE SKIN BEFORE MEALS PER SLIDING SCALE. MAX OF 20 UNITS PER DAY  Qty: 15 mL, Refills: 11    Associated Diagnoses: Type 1 diabetes mellitus with nephropathy (H)      LANTUS SOLOSTAR 100 UNIT/ML soln ADMINISTER 17 UNITS UNDER THE SKIN DAILY  Qty: 15 mL, Refills: 11    Comments: If Lantus is not covered by insurance, may substitute Basaglar or Semglee or other insulin glargine product per insurance preference at same dose and frequency.    Associated Diagnoses: Type 1 diabetes mellitus with nephropathy (H)      Multiple Vitamin (MULTI VITAMIN  MENS) TABS Take 1 tablet by mouth daily.  Qty: 30 tablet    Associated Diagnoses: Type 1 diabetes, HbA1c goal < 7% (H)      omeprazole (PRILOSEC) 20 MG DR capsule TAKE 1 CAPSULE(20 MG) BY MOUTH DAILY  Qty: 90 capsule, Refills: 0    Associated Diagnoses: Gastroesophageal reflux disease without esophagitis      polyethylene glycol-propylene glycol (SYSTANE ULTRA) 0.4-0.3 % SOLN ophthalmic solution Place 1 drop into both eyes 4 times daily as needed for dry eyes.      sildenafil (VIAGRA) 100 MG tablet Take 1 tablet (100 mg) by mouth daily as needed  Qty: 10 tablet, Refills: 5    Comments: Take 1/2 to 1 tablet by mouth on an empty stomach 1 hour prior to sex.  Do not take within 4 hours of tamsulosin.  Associated Diagnoses: ED (erectile dysfunction) of organic origin      simvastatin (ZOCOR) 10 MG tablet TAKE 1/2  TABLET(5 MG) BY MOUTH AT BEDTIME  Qty: 45 tablet, Refills: 3    Associated Diagnoses: Hyperlipidemia LDL goal <100      tamsulosin (FLOMAX) 0.4 MG capsule Take 1 capsule (0.4 mg) by mouth daily Watch for dizziness upon standing.  Qty: 30 capsule, Refills: 11    Comments: Do not take within 4 hours of sildenafil.  Associated Diagnoses: Benign prostatic hyperplasia with urinary frequency      UNABLE TO FIND Take 2 capsules by mouth daily. MEDICATION NAME: PRN DE Omega capsules for dry eyes      ACCU-CHEK GUIDE test strip TESTING 6 TIMES DAILY  Qty: 100 strip, Refills: 11    Associated Diagnoses: Type 1 diabetes mellitus with nephropathy (H)      blood glucose (NO BRAND SPECIFIED) lancets standard Dispense item covered by insurance. Test blood sugar 1 times daily.  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 Test blood sugar 1 times daily.  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)      Continuous Glucose Sensor (DEXCOM G7 SENSOR) MISC CHANGE EVERY 10 DAYS  Qty: 3 each, Refills: 11    Associated Diagnoses: Type 1 diabetes mellitus with nephropathy (H)      insulin pen needle (B-D U/F) 31G X 8 MM miscellaneous USE WITH INSULIN PEN FOUR TIMES DAILY  Qty: 400 each, Refills: 0    Associated Diagnoses: Type 1 diabetes mellitus with nephropathy (H)           STOP taking these medications       amLODIPine (NORVASC) 2.5 MG tablet Comments:   Reason for Stopping:         aspirin 81 MG tablet Comments:   Reason for Stopping:         ibuprofen (ADVIL/MOTRIN) 200 MG tablet Comments:   Reason for Stopping:              Allergies   Allergies   Allergen Reactions    Wasp Venom Protein Anaphylaxis     Wasp sting    Lipitor [Atorvastatin Calcium]       Elevated Ck and myalgias

## 2024-11-19 NOTE — PROGRESS NOTES
11/19/24 1500   Appointment Info   Signing Clinician's Name / Credentials (PT) Roman Heredia MPT   Therapeutic Activity   Treatment Detail/Skilled Intervention supine<>sit with SBA; sit to stand and stand pivot with SBA without use of Fww   Gait Training   Symptoms Noted During/After Treatment (Gait Training) fatigue   Treatment Detail/Skilled Intervention ambulation within patient's room   [place] Level (Gait Training) stand-by assist   Physical Assistance Level (Gait Training) supervision   Weight Bearing (Gait Training) full weight-bearing   Assistive Device (Gait Training) rolling walker   Pattern Analysis (Gait Training) swing-through gait   Gait Analysis Deviations decreased deyvi   Impairments (Gait Analysis/Training) strength decreased   PT Discharge Planning   PT Plan patient discharging   PT Discharge Recommendation (DC Rec) home with assist   PT Rationale for DC Rec patient demonstrating near baseline mobility   PT Brief overview of current status Patient able to perform functional mobility using a Fww for energy conservation and stability; he may not require any addtional formal PT at time of discharge form this hospital admission   PT Equipment Needed at Discharge walker, rolling  (patient fitted with new Fww for his use)

## 2024-11-19 NOTE — PROGRESS NOTES
[facility] DISCHARGE NOTE    Patient discharged to home at 2:35 PM via wheel chair. Accompanied by spouse and staff. Discharge instructions reviewed with patient, opportunity offered to ask questions. Prescriptions sent to patients preferred pharmacy. All belongings sent with patient.    Carly Butler RN

## 2024-11-19 NOTE — PLAN OF CARE
"Goal Outcome Evaluation:    VSS, Afebrile, HR ranges from 80's-120's non sustaining, Afib. Asymptomatic. Lungs are clear and equal. No SOB or chest pain. Adequate urine output. Adjusted insulin per pt request, 2 units of rapid acting Novolog at Bedtime. All questions and concerns addressed.     /72   Pulse 63   Temp 99  F (37.2  C) (Tympanic)   Resp 18   Ht 1.778 m (5' 10\")   Wt 81.1 kg (178 lb 12.7 oz)   SpO2 94%   BMI 25.65 kg/m          Plan of Care Reviewed With: patient, spouse    Overall Patient Progress: improving         Evita Santana RN on 11/19/2024 at 1:03 AM    " TRANSFER - IN REPORT:    Verbal report received from John luna RN on Cayden Osborne  being received from [place] for routine progression of patient care      Report consisted of patient's Situation, Background, Assessment and   Recommendations(SBAR). Information from the following report(s) Nurse Handoff Report, Index, ED Encounter Summary, ED SBAR, Intake/Output, MAR and Recent Results was reviewed with the receiving nurse. Dual skin assessment completed with second RN: all skin folds assessed and intact. Pt has abrasions on BLE and large scars on back and abdomen from surgeries. 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 Sacrum / heels assessed and intact.  No pressure wounds noted

## 2024-11-19 NOTE — PLAN OF CARE
"Afebrile,b/p 90's systolic, lisinopril held, recheck b/p MD edis aware. HR irregular 60-87 this shift. Pt denies pain. Sba with walker.    /64   Pulse 68   Temp 98.2  F (36.8  C) (Tympanic)   Resp 18   Ht 1.778 m (5' 10\")   Wt 81.1 kg (178 lb 12.7 oz)   SpO2 95%   BMI 25.65 kg/m        Goal Outcome Evaluation:      Plan of Care Reviewed With: patient    Overall Patient Progress: improving           Problem: Adult Inpatient Plan of Care  Goal: Absence of Hospital-Acquired Illness or Injury  11/19/2024 1338 by Carly Butler RN  Outcome: Adequate for Care Transition  11/19/2024 1145 by Carly Butler RN  Outcome: Progressing  Intervention: Identify and Manage Fall Risk  Recent Flowsheet Documentation  Taken 11/19/2024 0805 by Carly Butler RN  Safety Promotion/Fall Prevention:   assistive device/personal items within reach   safety round/check completed   nonskid shoes/slippers when out of bed   clutter free environment maintained  Intervention: Prevent Skin Injury  Recent Flowsheet Documentation  Taken 11/19/2024 0806 by Carly Butler RN  Body Position: position changed independently  Taken 11/19/2024 0805 by Carly Butler RN  Skin Protection: adhesive use limited  Intervention: Prevent and Manage VTE (Venous Thromboembolism) Risk  Recent Flowsheet Documentation  Taken 11/19/2024 0805 by Carly Butler RN  VTE Prevention/Management: (eliquis) other (see comments)  Intervention: Prevent Infection  Recent Flowsheet Documentation  Taken 11/19/2024 0805 by Carly Butler RN  Infection Prevention:   rest/sleep promoted   hand hygiene promoted   equipment surfaces disinfected   single patient room provided     "

## 2024-11-19 NOTE — DISCHARGE SUMMARY
"[facility]  Hospitalist Discharge Summary      Date of Admission:  11/14/2024  Date of Discharge:  11/19/2024  Discharging Provider: Oliver Yap MD  Discharge Service: Hospitalist Service    Discharge Diagnoses   Community Acquired Pneumonia  A Fib    Clinically Significant Risk Factors     # Overweight: Estimated body mass index is 25.65 kg/m  as calculated from the following:    Height as of this encounter: 1.778 m (5' 10\").    Weight as of this encounter: 81.1 kg (178 lb 12.7 oz).       Follow-ups Needed After Discharge   Follow-up Appointments       Follow-up and recommended labs and tests       Follow up with Dr Padilla, on 11/25 for hospital follow- up.  No follow up labs or test are needed.      Follow up with Dr. العلي , at [facility], on 12/3 for Atrial Fibrillation follow up.                Unresulted Labs Ordered in the Past 30 Days of this Admission       Date and Time Order Name Status Description    11/14/2024  6:17 PM Blood Culture Peripheral Blood Preliminary         These results will be followed up by [facility]    Discharge Disposition   Discharged to home  Condition at discharge: Good    Hospital Course   This 75 yo male with a PMH significant for DM-1 since age 23, HTN, CKD-2, anemia and subclinical hypothyroidism presented to the ER with complaints of worsening shortness of breath.  He had a cough beginning 4 days PTA and this progressed throughout the week and was not relieved by his usual OTC medications of Mucinex and guaifenesin with codeine.  His blood sugars have also been very high and his HgbA1C is usually in the non-diabetic range so this is unusual for him.  In the ER he was found to have hypoxia, HTN, temp 99.9, sodium 116, Cr 1.05 with GFR 75 with baseline Cr 0.9, GFR >90, WBC 25.2, procalcitonin 4.29 with normal lactic acid at 1.6.  BNP 1,762 but no prior for comparison.  CTA chest showed a large area of consolidation in the RUL and RML and scattered foci of " consolidation in the RLL and LIVAI, no PE.  He was treated with Zithromax, Rocephin, Solu-Medrol, Duoneb and NS fluid resuscitation and admitted to ICU for continued cares.  During the night of 11/15-16 he developed atrial fib with RVR rate in the 130's.     Severe sepsis with acute organ dysfunction due to acute respiratory failure from multilobar community acquired pneumonia with hypoxia  - in the ED, CT showed:  Large area of consolidation in the right upper and middle lobes as well as scattered additional foci of consolidation in the right lower and left upper lobes, compatible with pneumonia. Small right pleural effusion and trace left pleural effusion.  - Started on Rocephin and Zithromax  His antibiotics were changed to levofloxacin and he will be discharged to complete a 7 total day abx course   - at the time of discharge he was doing very well from a respiratory standpoint, stable on room air and feeling good     Paroxysmal atrial fibrillation with rapid ventricular response  - New onset on 11/15-11/16 evening  - likely triggered by pneumonia, as no prior history   - TTE 11/18: Poor acoustic windows.  Global and regional left ventricular function is normal with an EF of 55-60%.  Right ventricular function, chamber size, wall motion, and thickness are normal. Both atria appear normal. Pulmonary artery systolic pressure is normal. No pericardial effusion is present. No significant valvular abnormalities present. IVC diameter and respiratory changes - - to achieve adequate rate control, his coreg was increased to 25 mg bid and cardizem was added.  With this he ws well controlled in the 80s.  I will discharge him on this and have him see cardiology in 2 weeks, and hopefully he will convert or a plan for medical or electrical cardioversion can be planned  - he was started on therapeutic DOAC dosing which will be continued at discharge     Essential hypertension  - with the above changes of increasing the coreg,  adding cardizem and the a fib, his BP was running a little lower than his normal  - therefore at discharge I stopped the norvasc (since cardizem was started) and will hold the lisinopril until outpatient follow up.  If this is resumed, I would likely start at a lower dose such as 10 and up titrate has needed    Gastroesophageal reflux disease, esophagitis presence not specified    Assessment: Stable    Plan: Continue PPI .        Subclinical hypothyroidism    Assessment: Controlled    Plan: Last TSH from 6/7/2024 normal at 2.78.  Done at the time of the ER visit elevated at 4.21 with normal FT4 at 1.53       Chronic kidney disease, stage 2 (mild)    Assessment: Mild FLORENTIN    Plan: Discontinued regular IV fluids with improvement in his Cr to 0.84 and GFR to >90.  Will hold ACE for now at discharge.      Hypomagnesemia    Assessment: Replaced      Hyponatremia    Assessment: Improving    Plan: Likely due to pneumonia.  Given 3% saline for 8 hours and improved to 130, and was 133 when last checked      Type 1 diabetes mellitus with hyperglycemia (H)    Assessment: Acute    Plan: Generally has excellent control of his glucose with his HgbA1C < 6.2 for the past 4 years.  Continue his usual medications of Lantus 17 units at bedtime and some carb coverage although he does not particularly count carbs to dose and his sliding scale which is 1 unit per 40 over 120.  As the patient has such excellent control of his glucose levels request the nurses give him the dose of insulin he would administer at home based on his POC glucose measurements.       Consultations This Hospital Stay   PHYSICAL THERAPY ADULT IP CONSULT  OCCUPATIONAL THERAPY ADULT IP CONSULT    Code Status   Full Code    Time Spent on this Encounter   IOliver MD, personally saw the patient today and spent greater than 30 minutes discharging this patient.       Oliver Yap MD  [facility]  [address]  [facility]  [address]  Phone: [phone]  Fax: [phone]  ______________________________________________________________________    Physical Exam   Vital Signs: Temp: 98.2  F (36.8  C) Temp src: Tympanic BP: 120/69 Pulse: 82   Resp: 18 SpO2: 95 % O2 Device: None (Room air)    Weight: 178 lbs 12.69 oz  ----------------------------------------------------------------------------------------       Primary Care Physician   Hemanth Cunningham    Discharge Orders      Reason for your hospital stay    Pneumonia, A Fib     Activity    Your activity upon discharge: activity as tolerated     Follow-up and recommended labs and tests     Follow up with Dr Padilla, on 11/25 for hospital follow- up.  No follow up labs or test are needed.      Follow up with Dr. العلي , at [facility], on 12/3 for Atrial Fibrillation follow up.     Diet    Follow this diet upon discharge: Current Diet:Orders Placed This Encounter      Consistent Carbohydrate Diet Moderate Consistent Carb (60 g CHO per Meal) Diet       Significant Results and Procedures   Most Recent 3 CBC's:  Recent Labs   Lab Test 11/18/24  0602 11/17/24  0607 11/16/24  0434   WBC 11.7* 11.8* 27.0*   HGB 12.1* 11.9* 11.5*   MCV 90 91 89    359 325     Most Recent 3 BMP's:  Recent Labs   Lab Test 11/19/24  0735 11/19/24  0158 11/18/24  2211 11/18/24  0812 11/18/24  0602 11/17/24  0742 11/17/24  0607 11/16/24  1610 11/16/24  1403 11/16/24  0751 11/16/24  0434   NA  --   --   --   --  133*  --  130*  --  127*  --  123*   POTASSIUM  --   --   --   --  3.5  --  3.6  --   --   --  4.0   CHLORIDE  --   --   --   --  101  --  100  --   --   --  93*   CO2  --   --   --   --  22  --  21*  --   --   --  19*   BUN  --   --   --   --  15.3  --  18.3  --   --   --  24.7*   CR  --   --   --   --  0.80  --  0.85  --   --   --  0.84   ANIONGAP  --   --   --   --  10  --  9  --   --   --  11   MARIE  --   --   --   --  8.3*  --  8.4*  --   --   --  8.8   * 172* 225*   < > 136*   < > 143*    < >  --    < > 322*    < > = values in this interval not displayed.   ,   Results for orders placed or performed during the hospital encounter of 11/14/24   CT Chest Pulmonary Embolism w Contrast    Narrative    Exam:  CT CHEST PULMONARY EMBOLISM W CONTRAST    Exam reason:  Fever, shortness of breath, tachycardia.  Rule out PE,  consolidation, effusion    Technique:  Contrast enhanced helical CT pulmonary angiography was  performed. Sagittal and coronal reformats as well as coronal MIP  reformats were obtained. This CT was performed using one or more of  the following dose reduction techniques: automated exposure control,  adjustment of the mA and/or kV according to patient size, and/or use  of iterative reconstruction technique.    Meds/Contrast: isovue 370 76ml    Comparison:  10/29/2010    FINDINGS:    Technical quality: Diagnostic.    Cardiovascular:   -There are no filling defects in the pulmonary arteries that would  indicate pulmonary embolism.  -The main pulmonary artery is mildly dilated, likely due to pulmonary  hypertension.  -No aortic aneurysm.  Lungs/Airways: There is a large area of right upper and middle lobe  consolidation with additional scattered foci of consolidation in the  right lower lobe and left upper lobe.  Mirella/Mediastinum: There are several prominent mediastinal lymph nodes,  likely reactive.  Pleura: Small right pleural effusion. Trace left pleural effusion. No  pneumothorax.  Chest Wall/Axilla: Unremarkable.    Visualized Upper Abdomen: No acute findings in the visualized upper  abdomen.  Musculoskeletal: No acute osseous abnormalities.      Impression    IMPRESSION:    Large area of consolidation in the right upper and middle lobes as  well as scattered additional foci of consolidation in the right lower  and left upper lobes, compatible with pneumonia. Small right pleural  effusion and trace left pleural effusion.    No pulmonary embolism.    LUIS MANUEL RAMIREZ MD         SYSTEM ID:   [id number]   Echocardiogram Complete     Value    LVEF  55-60%    [facility]    [id number]  [id number]  [id number]  [id number]^KENIA^NERIS^GAYLE     [facility]  [address] [place] [address]     Name: REFUGIO GU  MRN: [id number]  : 1950  Study Date: 2024 06:58 AM  Age: 74 yrs  Gender: Male  Patient Location: [facility]  Reason For Study: Atrial Fibrillation  Ordering Physician: NERIS AQUINO  Performed By: Yari Alejo     BSA: 2.0 m2  Height: 70 in  Weight: 178 lb  HR: 120  BP: 142/83 mmHg  ______________________________________________________________________________  Procedure  Complete Portable Echo Adult. Poor acoustic windows.  ______________________________________________________________________________  Interpretation Summary  Poor acoustic windows.  Global and regional left ventricular function is normal with an EF of 55-60%.  Right ventricular function, chamber size, wall motion, and thickness are  normal.  Both atria appear normal.  Pulmonary artery systolic pressure is normal.  No pericardial effusion is present.  No significant valvular abnormalities present.  IVC diameter and respiratory changes fall into an intermediate range  suggesting an RA pressure of 8 mmHg.  There is no prior study for direct comparison.  ______________________________________________________________________________  Left Ventricle  Global and regional left ventricular function is normal with an EF of 55-60%.  Left ventricular wall thickness is normal. Left ventricular size is normal.  Diastolic function not assessed due to atrial fibrillation. No regional wall  motion abnormalities are seen.     Right Ventricle  Right ventricular function, chamber size, wall motion, and thickness are  normal.     Atria  Both atria appear normal.     Mitral Valve  The mitral valve is normal. Trace mitral insufficiency is present.     Aortic Valve  Aortic valve sclerosis is present. The valve  leaflets are not well visualized.  On Doppler interrogation, there is no significant stenosis or regurgitation.     Tricuspid Valve  The tricuspid valve is normal. Trace tricuspid insufficiency is present. The  right ventricular systolic pressure is approximated at 23.2 mmHg plus the  right atrial pressure. Pulmonary artery systolic pressure is normal.     Pulmonic Valve  The valve leaflets are not well visualized. On Doppler interrogation, there is  no significant stenosis or regurgitation.     Vessels  The thoracic aorta is normal. The pulmonary artery cannot be assessed. IVC  diameter and respiratory changes fall into an intermediate range suggesting an  RA pressure of 8 mmHg.     Pericardium  No pericardial effusion is present.     Miscellaneous  No significant valvular abnormalities present.     Compared to Previous Study  There is no prior study for direct comparison.  ______________________________________________________________________________  MMode/2D Measurements & Calculations     IVSd: 0.90 cm  LVIDd: 4.8 cm  LVIDs: 3.6 cm  LVPWd: 1.1 cm  FS: 25.6 %  LV mass(C)d: 168.5 grams  LV mass(C)dI: 84.8 grams/m2  Ao root diam: 2.9 cm  asc Aorta Diam: 3.2 cm  LVOT diam: 2.0 cm  LVOT area: 3.1 cm2  Ao root diam index Ht(cm/m): 1.6  Ao root diam index BSA (cm/m2): 1.5  Asc Ao diam index BSA (cm/m2): 1.6  Asc Ao diam index Ht(cm/m): 1.8  RWT: 0.45  TAPSE: 2.1 cm     Doppler Measurements & Calculations  MV E max angel: 124.0 cm/sec     MV dec slope: 702.0 cm/sec2  MV dec time: 0.18 sec  Ao V2 max: 83.2 cm/sec  Ao max PG: 3.0 mmHg  Ao V2 mean: 56.5 cm/sec  Ao mean P.7 mmHg  Ao V2 VTI: 16.0 cm  KATRIN(I,D): 2.7 cm2  KATRIN(V,D): 2.8 cm2  LV V1 max P.2 mmHg  LV V1 max: 74.6 cm/sec  LV V1 VTI: 13.6 cm  SV(LVOT): 42.8 ml  SI(LVOT): 21.6 ml/m2  PA acc time: 0.14 sec  TR max angle: 240.5 cm/sec  TR max P.2 mmHg  AV Angel Ratio (DI): 0.90  KATRIN Index (cm2/m2): 1.3  E/E' avg: 10.4  Lateral E/e': 9.1  Medial E/e': 11.6      ______________________________________________________________________________  Report approved by: Phyllis Trotter 11/18/2024 08:14 AM             Discharge Medications   Current Discharge Medication List        START taking these medications    Details   apixaban ANTICOAGULANT (ELIQUIS) 5 MG tablet Take 1 tablet (5 mg) by mouth 2 times daily.  Qty: 60 tablet, Refills: 1    Associated Diagnoses: Paroxysmal atrial fibrillation with rapid ventricular response (H)      diltiazem ER COATED BEADS (CARDIZEM CD/CARTIA XT) 240 MG 24 hr capsule Take 1 capsule (240 mg) by mouth daily.  Qty: 30 capsule, Refills: 1    Associated Diagnoses: Paroxysmal atrial fibrillation with rapid ventricular response (H)      levofloxacin (LEVAQUIN) 750 MG tablet Take 1 tablet (750 mg) by mouth daily for 2 days.  Qty: 2 tablet, Refills: 0    Associated Diagnoses: Paroxysmal atrial fibrillation with rapid ventricular response (H)           CONTINUE these medications which have CHANGED    Details   carvedilol (COREG) 25 MG tablet Take 1 tablet (25 mg) by mouth 2 times daily (with meals).  Qty: 60 tablet, Refills: 1    Associated Diagnoses: Paroxysmal atrial fibrillation with rapid ventricular response (H)           CONTINUE these medications which have NOT CHANGED    Details   acetaminophen (TYLENOL) 500 MG tablet Take 1,000 mg by mouth every 8 hours as needed for pain or fever.      Dextromethorphan HBr 15 MG TABS Take 2 tablets by mouth 2 times daily as needed (COUGH).      EPINEPHrine (ANY BX GENERIC EQUIV) 0.3 MG/0.3ML injection 2-pack INJECT 1 PEN IN THE MUSCLE ONE TIME AS DIRECTED  Qty: 2 each, Refills: 0    Associated Diagnoses: Allergic reaction to wasp sting      guaiFENesin-codeine (ROBITUSSIN AC) 100-10 MG/5ML solution Take 5-10 mLs by mouth every 4 hours as needed for cough.  Qty: 237 mL, Refills: 0    Associated Diagnoses: Viral URI with cough; Painful cough      insulin lispro (HUMALOG KWIKPEN) 100 UNIT/ML (1 unit dial) KWIKPEN  ADMINISTER 3 UNITS UNDER THE SKIN BEFORE MEALS PER SLIDING SCALE. MAX OF 20 UNITS PER DAY  Qty: 15 mL, Refills: 11    Associated Diagnoses: Type 1 diabetes mellitus with nephropathy (H)      LANTUS SOLOSTAR 100 UNIT/ML soln ADMINISTER 17 UNITS UNDER THE SKIN DAILY  Qty: 15 mL, Refills: 11    Comments: If Lantus is not covered by insurance, may substitute Basaglar or Semglee or other insulin glargine product per insurance preference at same dose and frequency.    Associated Diagnoses: Type 1 diabetes mellitus with nephropathy (H)      Multiple Vitamin (MULTI VITAMIN  MENS) TABS Take 1 tablet by mouth daily.  Qty: 30 tablet    Associated Diagnoses: Type 1 diabetes, HbA1c goal < 7% (H)      omeprazole (PRILOSEC) 20 MG DR capsule TAKE 1 CAPSULE(20 MG) BY MOUTH DAILY  Qty: 90 capsule, Refills: 0    Associated Diagnoses: Gastroesophageal reflux disease without esophagitis      polyethylene glycol-propylene glycol (SYSTANE ULTRA) 0.4-0.3 % SOLN ophthalmic solution Place 1 drop into both eyes 4 times daily as needed for dry eyes.      sildenafil (VIAGRA) 100 MG tablet Take 1 tablet (100 mg) by mouth daily as needed  Qty: 10 tablet, Refills: 5    Comments: Take 1/2 to 1 tablet by mouth on an empty stomach 1 hour prior to sex.  Do not take within 4 hours of tamsulosin.  Associated Diagnoses: ED (erectile dysfunction) of organic origin      simvastatin (ZOCOR) 10 MG tablet TAKE 1/2 TABLET(5 MG) BY MOUTH AT BEDTIME  Qty: 45 tablet, Refills: 3    Associated Diagnoses: Hyperlipidemia LDL goal <100      tamsulosin (FLOMAX) 0.4 MG capsule Take 1 capsule (0.4 mg) by mouth daily Watch for dizziness upon standing.  Qty: 30 capsule, Refills: 11    Comments: Do not take within 4 hours of sildenafil.  Associated Diagnoses: Benign prostatic hyperplasia with urinary frequency      UNABLE TO FIND Take 2 capsules by mouth daily. MEDICATION NAME: PRN DE Omega capsules for dry eyes      ACCU-CHEK GUIDE test strip TESTING 6 TIMES DAILY  Qty:  100 strip, Refills: 11    Associated Diagnoses: Type 1 diabetes mellitus with nephropathy (H)      blood glucose (NO BRAND SPECIFIED) lancets standard Dispense item covered by insurance. Test blood sugar 1 times daily.  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 Test blood sugar 1 times daily.  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

## 2024-11-20 ENCOUNTER — PATIENT OUTREACH (OUTPATIENT)
Dept: PEDIATRICS | Facility: OTHER | Age: 74
End: 2024-11-20
Payer: MEDICARE

## 2024-11-20 NOTE — TELEPHONE ENCOUNTER
Transitions of Care Outreach  Chief Complaint   Patient presents with    Hospital F/U       Most Recent Admission Date: 11/14/2024   Most Recent Admission Diagnosis: Hypomagnesemia - E83.42  Hyponatremia - E87.1  Hypophosphatemia - E83.39  Pneumonia - J18.9  Elevated TSH - R79.89  Type 1 diabetes mellitus with hyperglycemia (H) - E10.65  Acute respiratory failure with hypoxia (H) - J96.01  Anemia in chronic kidney disease (CKD) - N18.9, D63.1  Severe sepsis (H) - A41.9, R65.20     Most Recent Discharge Date: 11/19/2024   Most Recent Discharge Diagnosis: Hyponatremia - E87.1  Severe sepsis (H) - A41.9, R65.20  Pneumonia - J18.9  Acute respiratory failure with hypoxia (H) - J96.01  Type 1 diabetes mellitus with hyperglycemia (H) - E10.65  Hypomagnesemia - E83.42  Hypophosphatemia - E83.39  Anemia in chronic kidney disease (CKD) - N18.9, D63.1  Elevated TSH - R79.89  Pneumonia due to infectious organism, unspecified laterality, unspecified part of lung - J18.9  Type 1 diabetes mellitus with diabetic chronic kidney disease, unspecified CKD stage (H) - E10.22  Stage 3 chronic kidney disease, unspecified whether stage 3a or 3b CKD (H) - N18.30  Anemia in chronic kidney disease (CODE) - D63.1  Nonspecific abnormal results of thyroid function study - R94.6  Lab test negative for COVID-19 virus - Z20.822  Paroxysmal atrial fibrillation with rapid ventricular response (H) - I48.0     Transitions of Care Assessment    Discharge Assessment  How are you doing now that you are home?: Patient and spouse report that patient converted to a normal rhythm last evening. They stopped diltiazem, he had a good night and slept well. Patient's spouse states she is a nurse and was concerned about patient going from 6.25 mg twice daily to 25 mg twice daily so she is giving him 12.5 mg twice daily and checking BP regularly. They wanted to get him back on Lisinopril for renal protection. His BP this morning was 124/69 with HR 73, O2 sats have  been %.  How are your symptoms? (Red Flag symptoms escalate to triage hotline per guidelines): Improved  Do you know how to contact your clinic care team if you have future questions or changes to your health status? : Yes  Does the patient have their discharge instructions? : Yes  Does the patient have questions regarding their discharge instructions? : No  Were you started on any new medications or were there changes to any of your previous medications? : Yes  Does the patient have all of their medications?: Yes  Do you have questions regarding any of your medications? : No  Do you have all of your needed medical supplies or equipment (DME)?  (i.e. oxygen tank, CPAP, cane, etc.): Yes    Follow up Plan     Discharge Follow-Up  Discharge follow up appointment scheduled in alignment with recommended follow up timeframe or Transitions of Risk Category? (Low = within 30 days; Moderate= within 14 days; High= within 7 days): Yes  Discharge Follow Up Appointment Date: 11/25/24 (also has follow-up with Severiano)  Discharge Follow Up Appointment Scheduled with?: Primary Care Provider    Future Appointments   Date Time Provider Department Center   11/25/2024 10:20 AM Chun Padilla MD [facility]   12/3/2024  2:15 PM Isael العلي, DO [facility]   12/19/2024 10:40 AM Hemanth Cunningham MD [facility]       Outpatient Plan as outlined on AVS reviewed with patient.    For any urgent concerns, please contact our 24 hour nurse triage line: [phone] ([phone])       Pao Bryson RN

## 2024-11-25 ENCOUNTER — OFFICE VISIT (OUTPATIENT)
Dept: INTERNAL MEDICINE | Facility: OTHER | Age: 74
End: 2024-11-25
Attending: STUDENT IN AN ORGANIZED HEALTH CARE EDUCATION/TRAINING PROGRAM
Payer: MEDICARE

## 2024-11-25 VITALS
OXYGEN SATURATION: 98 % | SYSTOLIC BLOOD PRESSURE: 136 MMHG | WEIGHT: 162.6 LBS | DIASTOLIC BLOOD PRESSURE: 60 MMHG | HEART RATE: 66 BPM | TEMPERATURE: 97.3 F | BODY MASS INDEX: 23.28 KG/M2 | HEIGHT: 70 IN | RESPIRATION RATE: 16 BRPM

## 2024-11-25 DIAGNOSIS — E78.5 HYPERLIPIDEMIA LDL GOAL <100: ICD-10-CM

## 2024-11-25 DIAGNOSIS — I48.0 PAROXYSMAL ATRIAL FIBRILLATION (H): ICD-10-CM

## 2024-11-25 DIAGNOSIS — E10.21 TYPE 1 DIABETES MELLITUS WITH NEPHROPATHY (H): ICD-10-CM

## 2024-11-25 DIAGNOSIS — Z09 HOSPITAL DISCHARGE FOLLOW-UP: Primary | ICD-10-CM

## 2024-11-25 DIAGNOSIS — N18.30 STAGE 3 CHRONIC KIDNEY DISEASE, UNSPECIFIED WHETHER STAGE 3A OR 3B CKD (H): ICD-10-CM

## 2024-11-25 DIAGNOSIS — D64.9 ANEMIA, UNSPECIFIED TYPE: ICD-10-CM

## 2024-11-25 DIAGNOSIS — I48.0 PAROXYSMAL ATRIAL FIBRILLATION WITH RAPID VENTRICULAR RESPONSE (H): ICD-10-CM

## 2024-11-25 DIAGNOSIS — E10.21 TYPE 1 DIABETES MELLITUS WITH NEPHROPATHY (H): Chronic | ICD-10-CM

## 2024-11-25 PROBLEM — J96.01 ACUTE RESPIRATORY FAILURE WITH HYPOXIA (H): Status: RESOLVED | Noted: 2024-11-14 | Resolved: 2024-11-25

## 2024-11-25 LAB
ALBUMIN SERPL BCG-MCNC: 3.6 G/DL (ref 3.5–5.2)
ALP SERPL-CCNC: 101 U/L (ref 40–150)
ALT SERPL W P-5'-P-CCNC: 25 U/L (ref 0–70)
ANION GAP SERPL CALCULATED.3IONS-SCNC: 10 MMOL/L (ref 7–15)
AST SERPL W P-5'-P-CCNC: 24 U/L (ref 0–45)
BASOPHILS # BLD AUTO: 0.1 10E3/UL (ref 0–0.2)
BASOPHILS NFR BLD AUTO: 1 %
BILIRUB SERPL-MCNC: 0.8 MG/DL
BUN SERPL-MCNC: 15.6 MG/DL (ref 8–23)
CALCIUM SERPL-MCNC: 9 MG/DL (ref 8.8–10.4)
CHLORIDE SERPL-SCNC: 101 MMOL/L (ref 98–107)
CREAT SERPL-MCNC: 0.88 MG/DL (ref 0.67–1.17)
EGFRCR SERPLBLD CKD-EPI 2021: >90 ML/MIN/1.73M2
EOSINOPHIL # BLD AUTO: 0.2 10E3/UL (ref 0–0.7)
EOSINOPHIL NFR BLD AUTO: 3 %
ERYTHROCYTE [DISTWIDTH] IN BLOOD BY AUTOMATED COUNT: 12.3 % (ref 10–15)
GLUCOSE SERPL-MCNC: 87 MG/DL (ref 70–99)
HCO3 SERPL-SCNC: 25 MMOL/L (ref 22–29)
HCT VFR BLD AUTO: 34.4 % (ref 40–53)
HGB BLD-MCNC: 11.8 G/DL (ref 13.3–17.7)
HOLD SPECIMEN: NORMAL
IMM GRANULOCYTES # BLD: 0.1 10E3/UL
IMM GRANULOCYTES NFR BLD: 1 %
IRON BINDING CAPACITY (ROCHE): 149 UG/DL (ref 240–430)
IRON SATN MFR SERPL: 47 % (ref 15–46)
IRON SERPL-MCNC: 70 UG/DL (ref 61–157)
LYMPHOCYTES # BLD AUTO: 1.3 10E3/UL (ref 0.8–5.3)
LYMPHOCYTES NFR BLD AUTO: 27 %
MCH RBC QN AUTO: 31.7 PG (ref 26.5–33)
MCHC RBC AUTO-ENTMCNC: 34.3 G/DL (ref 31.5–36.5)
MCV RBC AUTO: 93 FL (ref 78–100)
MONOCYTES # BLD AUTO: 0.6 10E3/UL (ref 0–1.3)
MONOCYTES NFR BLD AUTO: 12 %
NEUTROPHILS # BLD AUTO: 2.8 10E3/UL (ref 1.6–8.3)
NEUTROPHILS NFR BLD AUTO: 56 %
NRBC # BLD AUTO: 0 10E3/UL
NRBC BLD AUTO-RTO: 0 /100
PLATELET # BLD AUTO: 444 10E3/UL (ref 150–450)
POTASSIUM SERPL-SCNC: 4.3 MMOL/L (ref 3.4–5.3)
PROT SERPL-MCNC: 6.8 G/DL (ref 6.4–8.3)
RBC # BLD AUTO: 3.72 10E6/UL (ref 4.4–5.9)
SODIUM SERPL-SCNC: 136 MMOL/L (ref 135–145)
VIT B12 SERPL-MCNC: 1369 PG/ML (ref 232–1245)
WBC # BLD AUTO: 4.9 10E3/UL (ref 4–11)

## 2024-11-25 PROCEDURE — 85004 AUTOMATED DIFF WBC COUNT: CPT | Mod: ZL | Performed by: STUDENT IN AN ORGANIZED HEALTH CARE EDUCATION/TRAINING PROGRAM

## 2024-11-25 PROCEDURE — 99496 TRANSJ CARE MGMT HIGH F2F 7D: CPT | Performed by: STUDENT IN AN ORGANIZED HEALTH CARE EDUCATION/TRAINING PROGRAM

## 2024-11-25 PROCEDURE — 83540 ASSAY OF IRON: CPT | Mod: ZL | Performed by: STUDENT IN AN ORGANIZED HEALTH CARE EDUCATION/TRAINING PROGRAM

## 2024-11-25 PROCEDURE — 36415 COLL VENOUS BLD VENIPUNCTURE: CPT | Mod: ZL | Performed by: STUDENT IN AN ORGANIZED HEALTH CARE EDUCATION/TRAINING PROGRAM

## 2024-11-25 PROCEDURE — 85041 AUTOMATED RBC COUNT: CPT | Mod: ZL | Performed by: STUDENT IN AN ORGANIZED HEALTH CARE EDUCATION/TRAINING PROGRAM

## 2024-11-25 PROCEDURE — 84075 ASSAY ALKALINE PHOSPHATASE: CPT | Mod: ZL | Performed by: STUDENT IN AN ORGANIZED HEALTH CARE EDUCATION/TRAINING PROGRAM

## 2024-11-25 PROCEDURE — 82607 VITAMIN B-12: CPT | Mod: ZL | Performed by: STUDENT IN AN ORGANIZED HEALTH CARE EDUCATION/TRAINING PROGRAM

## 2024-11-25 PROCEDURE — 84155 ASSAY OF PROTEIN SERUM: CPT | Mod: ZL | Performed by: STUDENT IN AN ORGANIZED HEALTH CARE EDUCATION/TRAINING PROGRAM

## 2024-11-25 PROCEDURE — 82565 ASSAY OF CREATININE: CPT | Mod: ZL | Performed by: STUDENT IN AN ORGANIZED HEALTH CARE EDUCATION/TRAINING PROGRAM

## 2024-11-25 PROCEDURE — 83550 IRON BINDING TEST: CPT | Mod: ZL | Performed by: STUDENT IN AN ORGANIZED HEALTH CARE EDUCATION/TRAINING PROGRAM

## 2024-11-25 PROCEDURE — G0463 HOSPITAL OUTPT CLINIC VISIT: HCPCS

## 2024-11-25 PROCEDURE — 85018 HEMOGLOBIN: CPT | Mod: ZL | Performed by: STUDENT IN AN ORGANIZED HEALTH CARE EDUCATION/TRAINING PROGRAM

## 2024-11-25 RX ORDER — SIMVASTATIN 5 MG
TABLET ORAL
Qty: 90 TABLET | Refills: 4 | Status: SHIPPED | OUTPATIENT
Start: 2024-11-25

## 2024-11-25 RX ORDER — CARVEDILOL 12.5 MG/1
12.5 TABLET ORAL 2 TIMES DAILY WITH MEALS
Qty: 180 TABLET | Refills: 4 | Status: SHIPPED | OUTPATIENT
Start: 2024-11-25

## 2024-11-25 RX ORDER — LISINOPRIL 40 MG/1
40 TABLET ORAL DAILY
Qty: 90 TABLET | Refills: 4 | Status: SHIPPED | OUTPATIENT
Start: 2024-11-25

## 2024-11-25 ASSESSMENT — PAIN SCALES - GENERAL: PAINLEVEL_OUTOF10: NO PAIN (0)

## 2024-11-25 NOTE — PROGRESS NOTES
Assessment & Plan         ICD-10-CM    1. Hospital discharge follow-up  Z09       2. Paroxysmal atrial fibrillation with rapid ventricular response (H)  I48.0 carvedilol (COREG) 12.5 MG tablet      3. Stage 3 chronic kidney disease, unspecified whether stage 3a or 3b CKD (H)  N18.30 lisinopril (ZESTRIL) 40 MG tablet     Comprehensive Metabolic Panel     Comprehensive Metabolic Panel      4. Hyperlipidemia LDL goal <100  E78.5 simvastatin (ZOCOR) 5 MG tablet      5. Type 1 diabetes mellitus with nephropathy (goal A1C<7)  E10.21 Comprehensive Metabolic Panel     Comprehensive Metabolic Panel      6. Anemia, unspecified type  D64.9 CBC and Differential     Iron & Iron Binding Capacity     Vitamin B12     CBC and Differential     Iron & Iron Binding Capacity     Vitamin B12      7. Paroxysmal atrial fibrillation (H)  I48.0         Hospital discharge follow-up: He is doing well post hospital discharge.  Medications were reviewed and renewed with the patient today.  He has reduced his carvedilol to 12.5 mg twice daily, no longer taking his diltiazem as he has converted out of atrial fibrillation.  Recommended and lifelong anticoagulation unless otherwise recommended by cardiology at his appointment in December.  New prescription for carvedilol 12.5 mg twice daily and lisinopril 40 mg daily were given.  Patient is in sinus rhythm today.    Type I diabetes: Blood sugars under much better control than while in the hospital.  Continue insulin without change.    Anemia: Improved by the time of discharge.  Essentially unchanged since discharge from the hospital at 11.8.  Iron and vitamin B12 levels are normal today.      Post Discharge Medication Reconciliation: discharge medications reconciled and changed, per note/orders.  Issues to address: No issues identified.  Plan of care communicated with patient and family    No follow-ups on file.    Chun Padilla MD  [facility] [place]        Minnie   Shay is a 74  "year old, presenting for the following health issues:  Hospital F/U      11/25/2024     9:51 AM   Additional Questions   Roomed by Ghislaine GILES LPN     History of Present Illness       He eats 2-3 servings of fruits and vegetables daily.He consumes 0 sweetened beverage(s) daily.He exercises with enough effort to increase his heart rate 60 or more minutes per day.  He exercises with enough effort to increase his heart rate 7 days per week.   He is taking medications regularly.          Hospital Follow-up Visit:    Hospital/Nursing Home/IP Rehab Facility: [facility]  Date of Admission: 11-  Date of Discharge: 11-  Reason(s) for Admission: Sepsis, Respiratory Failure, Hyponatremia  Was the patient in the ICU or did the patient experience delirium during hospitalization?  Yes       Do you have any other stressors you would like to discuss with your provider? No    Problems taking medications regularly:  None  Medication changes since discharge: None  Problems adhering to non-medication therapy:  None    Summary of hospitalization:  [facility] discharge summary reviewed  Diagnostic Tests/Treatments reviewed.  Follow up needed: Cardiology appointment scheduled  Other Healthcare Providers Involved in Patient s Care:         None  Update since discharge: improved.           He is back in sinus rhythm. Saw it on his apple watch      Blood sugars have improved.         Plan of care communicated with patient and family                     Objective    BP (!) 144/66   Pulse 66   Temp 97.3  F (36.3  C) (Tympanic)   Resp 16   Ht 1.778 m (5' 10\")   Wt 73.8 kg (162 lb 9.6 oz)   SpO2 98%   BMI 23.33 kg/m    Body mass index is 23.33 kg/m .  Physical Exam   General: Pleasant 74-year-old man accompanied by his wife no acute distress  CV: Regular rate and rhythm no peripheral edema appreciated  Pulmonary: Clear to auscultation            Signed Electronically by: Chun Padilla MD    "

## 2024-11-25 NOTE — LETTER
November 25, 2024      Shay Perry  [address] [address]        Dear ,    We are writing to inform you of your test results.    Your laboratory results are below.  Your sodium has normalized.  Hemoglobin is essentially unchanged at 11.8.  Your vitamin B12 and iron levels are normal.  Otherwise everything else looks good.  Please follow-up with Dr. Cunningham as previously scheduled.  Sooner if needed.    Resulted Orders   Comprehensive Metabolic Panel   Result Value Ref Range    Sodium 136 135 - 145 mmol/L    Potassium 4.3 3.4 - 5.3 mmol/L    Carbon Dioxide (CO2) 25 22 - 29 mmol/L    Anion Gap 10 7 - 15 mmol/L    Urea Nitrogen 15.6 8.0 - 23.0 mg/dL    Creatinine 0.88 0.67 - 1.17 mg/dL    GFR Estimate >90 >60 mL/min/1.73m2      Comment:      eGFR calculated using 2021 CKD-EPI equation.    Calcium 9.0 8.8 - 10.4 mg/dL      Comment:      Reference intervals for this test were updated on 7/16/2024 to reflect our healthy population more accurately. There may be differences in the flagging of prior results with similar values performed with this method. Those prior results can be interpreted in the context of the updated reference intervals.    Chloride 101 98 - 107 mmol/L    Glucose 87 70 - 99 mg/dL    Alkaline Phosphatase 101 40 - 150 U/L    AST 24 0 - 45 U/L    ALT 25 0 - 70 U/L    Protein Total 6.8 6.4 - 8.3 g/dL    Albumin 3.6 3.5 - 5.2 g/dL    Bilirubin Total 0.8 <=1.2 mg/dL   Iron & Iron Binding Capacity   Result Value Ref Range    Iron 70 61 - 157 ug/dL    Iron Binding Capacity 149 (L) 240 - 430 ug/dL    Iron Sat Index 47 (H) 15 - 46 %   Vitamin B12   Result Value Ref Range    Vitamin B12 1,369 (H) 232 - 1,245 pg/mL   CBC with platelets and differential   Result Value Ref Range    WBC Count 4.9 4.0 - 11.0 10e3/uL    RBC Count 3.72 (L) 4.40 - 5.90 10e6/uL    Hemoglobin 11.8 (L) 13.3 - 17.7 g/dL    Hematocrit 34.4 (L) 40.0 - 53.0 %    MCV 93 78 - 100 fL    MCH 31.7 26.5 - 33.0 pg    MCHC  34.3 31.5 - 36.5 g/dL    RDW 12.3 10.0 - 15.0 %    Platelet Count 444 150 - 450 10e3/uL    % Neutrophils 56 %    % Lymphocytes 27 %    % Monocytes 12 %    % Eosinophils 3 %    % Basophils 1 %    % Immature Granulocytes 1 %    NRBCs per 100 WBC 0 <1 /100    Absolute Neutrophils 2.8 1.6 - 8.3 10e3/uL    Absolute Lymphocytes 1.3 0.8 - 5.3 10e3/uL    Absolute Monocytes 0.6 0.0 - 1.3 10e3/uL    Absolute Eosinophils 0.2 0.0 - 0.7 10e3/uL    Absolute Basophils 0.1 0.0 - 0.2 10e3/uL    Absolute Immature Granulocytes 0.1 <=0.4 10e3/uL    Absolute NRBCs 0.0 10e3/uL       If you have any questions or concerns, please call the clinic at the number listed above.       Sincerely,      Chun Padilla MD

## 2024-11-25 NOTE — NURSING NOTE
"Chief Complaint   Patient presents with    Hospital F/U       Initial BP (!) 144/66   Pulse 66   Temp 97.3  F (36.3  C) (Tympanic)   Resp 16   Ht 1.778 m (5' 10\")   Wt 73.8 kg (162 lb 9.6 oz)   SpO2 98%   BMI 23.33 kg/m   Estimated body mass index is 23.33 kg/m  as calculated from the following:    Height as of this encounter: 1.778 m (5' 10\").    Weight as of this encounter: 73.8 kg (162 lb 9.6 oz).  Medication Review: complete    The next two questions are to help us understand your food security.  If you are feeling you need any assistance in this area, we have resources available to support you today.          11/14/2024   SDOH- Food Insecurity   Within the past 12 months, did you worry that your food would run out before you got money to buy more? N    N   Within the past 12 months, did the food you bought just not last and you didn t have money to get more? N    N       Multiple values from one day are sorted in reverse-chronological order         Health Care Directive:  Patient has a Health Care Directive on file      Sherry Drummond LPN      "

## 2024-11-27 RX ORDER — PEN NEEDLE, DIABETIC 31 GX5/16"
NEEDLE, DISPOSABLE MISCELLANEOUS
Qty: 400 EACH | Refills: 0 | Status: SHIPPED | OUTPATIENT
Start: 2024-11-27

## 2024-11-27 NOTE — TELEPHONE ENCOUNTER
[facility] sent Rx request for the following:      Requested Prescriptions   Pending Prescriptions Disp Refills    insulin pen needle (TECHLITE PEN NEEDLES) 31G X 8 MM miscellaneous [Pharmacy Med Name: TECHLITE PEN NEEDLES [id number]] 400 each      Sig: USE WITH INSULIN PEN FOUR TIMES DAILY       Diabetic Supplies Protocol Passed - 11/27/2024  2:49 PM        Passed - Medication is active on med list        Passed - Recent (12 month) or future (90 days) visit with authorizing provider s specialty     The patient must have completed an in-person or virtual visit within the past 12 months or has a future visit scheduled within the next 90 days with the authorizing provider s specialty.  Urgent care and e-visits do not qualify as an office visit for this protocol.          Passed - Medication indicated for associated diagnosis        Passed - Patient is 18 years of age or older             Last Prescription Date:   5/14/24  Last Fill Qty/Refills:         400, R-0    Last Office Visit:              11/25/24   Future Office visit:           12/19/24    Prescription approved per [facility] Refill Protocol.

## 2024-12-03 ENCOUNTER — OFFICE VISIT (OUTPATIENT)
Dept: CARDIOLOGY | Facility: OTHER | Age: 74
End: 2024-12-03
Attending: INTERNAL MEDICINE
Payer: MEDICARE

## 2024-12-03 VITALS
DIASTOLIC BLOOD PRESSURE: 70 MMHG | SYSTOLIC BLOOD PRESSURE: 132 MMHG | HEIGHT: 70 IN | RESPIRATION RATE: 16 BRPM | OXYGEN SATURATION: 99 % | HEART RATE: 79 BPM | BODY MASS INDEX: 22.62 KG/M2 | TEMPERATURE: 97 F | WEIGHT: 158 LBS

## 2024-12-03 DIAGNOSIS — I48.91 NEW ONSET A-FIB (H): ICD-10-CM

## 2024-12-03 DIAGNOSIS — E78.2 MIXED HYPERLIPIDEMIA: ICD-10-CM

## 2024-12-03 DIAGNOSIS — I48.0 PAROXYSMAL ATRIAL FIBRILLATION (H): Primary | ICD-10-CM

## 2024-12-03 DIAGNOSIS — E10.21 TYPE 1 DIABETES MELLITUS WITH NEPHROPATHY (H): Chronic | ICD-10-CM

## 2024-12-03 DIAGNOSIS — I70.8 ATHEROSCLEROSIS OF ILIAC ARTERY: ICD-10-CM

## 2024-12-03 DIAGNOSIS — I65.23 BILATERAL CAROTID ARTERY STENOSIS: ICD-10-CM

## 2024-12-03 DIAGNOSIS — E83.42 HYPOMAGNESEMIA: ICD-10-CM

## 2024-12-03 DIAGNOSIS — E87.1 HYPONATREMIA: ICD-10-CM

## 2024-12-03 DIAGNOSIS — D64.9 ANEMIA, UNSPECIFIED TYPE: ICD-10-CM

## 2024-12-03 DIAGNOSIS — Z79.01 CHRONIC ANTICOAGULATION: ICD-10-CM

## 2024-12-03 DIAGNOSIS — I48.0 PAROXYSMAL ATRIAL FIBRILLATION WITH RAPID VENTRICULAR RESPONSE (H): ICD-10-CM

## 2024-12-03 DIAGNOSIS — I10 ESSENTIAL HYPERTENSION: Chronic | ICD-10-CM

## 2024-12-03 LAB
ANION GAP SERPL CALCULATED.3IONS-SCNC: 8 MMOL/L (ref 7–15)
ATRIAL RATE - MUSE: 76 BPM
BUN SERPL-MCNC: 17.6 MG/DL (ref 8–23)
CALCIUM SERPL-MCNC: 9.6 MG/DL (ref 8.8–10.4)
CHLORIDE SERPL-SCNC: 103 MMOL/L (ref 98–107)
CREAT SERPL-MCNC: 0.87 MG/DL (ref 0.67–1.17)
DIASTOLIC BLOOD PRESSURE - MUSE: NORMAL MMHG
EGFRCR SERPLBLD CKD-EPI 2021: >90 ML/MIN/1.73M2
ERYTHROCYTE [DISTWIDTH] IN BLOOD BY AUTOMATED COUNT: 12.6 % (ref 10–15)
GLUCOSE SERPL-MCNC: 92 MG/DL (ref 70–99)
HCO3 SERPL-SCNC: 26 MMOL/L (ref 22–29)
HCT VFR BLD AUTO: 35.1 % (ref 40–53)
HGB BLD-MCNC: 12 G/DL (ref 13.3–17.7)
INTERPRETATION ECG - MUSE: NORMAL
MAGNESIUM SERPL-MCNC: 1.9 MG/DL (ref 1.7–2.3)
MCH RBC QN AUTO: 31.3 PG (ref 26.5–33)
MCHC RBC AUTO-ENTMCNC: 34.2 G/DL (ref 31.5–36.5)
MCV RBC AUTO: 91 FL (ref 78–100)
P AXIS - MUSE: 73 DEGREES
PLATELET # BLD AUTO: 340 10E3/UL (ref 150–450)
POTASSIUM SERPL-SCNC: 4 MMOL/L (ref 3.4–5.3)
PR INTERVAL - MUSE: 160 MS
QRS DURATION - MUSE: 82 MS
QT - MUSE: 380 MS
QTC - MUSE: 427 MS
R AXIS - MUSE: 34 DEGREES
RBC # BLD AUTO: 3.84 10E6/UL (ref 4.4–5.9)
SODIUM SERPL-SCNC: 137 MMOL/L (ref 135–145)
SYSTOLIC BLOOD PRESSURE - MUSE: NORMAL MMHG
T AXIS - MUSE: 46 DEGREES
VENTRICULAR RATE- MUSE: 76 BPM
WBC # BLD AUTO: 4.8 10E3/UL (ref 4–11)

## 2024-12-03 PROCEDURE — G0463 HOSPITAL OUTPT CLINIC VISIT: HCPCS

## 2024-12-03 PROCEDURE — 82374 ASSAY BLOOD CARBON DIOXIDE: CPT | Mod: ZL | Performed by: INTERNAL MEDICINE

## 2024-12-03 PROCEDURE — 36415 COLL VENOUS BLD VENIPUNCTURE: CPT | Mod: ZL | Performed by: INTERNAL MEDICINE

## 2024-12-03 PROCEDURE — 93005 ELECTROCARDIOGRAM TRACING: CPT | Performed by: INTERNAL MEDICINE

## 2024-12-03 PROCEDURE — 85027 COMPLETE CBC AUTOMATED: CPT | Mod: ZL | Performed by: INTERNAL MEDICINE

## 2024-12-03 PROCEDURE — 80048 BASIC METABOLIC PNL TOTAL CA: CPT | Mod: ZL | Performed by: INTERNAL MEDICINE

## 2024-12-03 PROCEDURE — 83735 ASSAY OF MAGNESIUM: CPT | Mod: ZL | Performed by: INTERNAL MEDICINE

## 2024-12-03 RX ORDER — CARVEDILOL 3.12 MG/1
3.12 TABLET ORAL 2 TIMES DAILY WITH MEALS
Qty: 180 TABLET | Refills: 3 | Status: SHIPPED | OUTPATIENT
Start: 2024-12-03

## 2024-12-03 RX ORDER — LISINOPRIL 10 MG/1
10 TABLET ORAL DAILY
Qty: 90 TABLET | Refills: 3 | Status: SHIPPED | OUTPATIENT
Start: 2024-12-03

## 2024-12-03 RX ORDER — SIMVASTATIN 10 MG
10 TABLET ORAL AT BEDTIME
Qty: 90 TABLET | Refills: 3 | Status: SHIPPED | OUTPATIENT
Start: 2024-12-03

## 2024-12-03 ASSESSMENT — PAIN SCALES - GENERAL: PAINLEVEL_OUTOF10: NO PAIN (0)

## 2024-12-03 NOTE — PROGRESS NOTES
[facility] HEART CARE   CARDIOLOGY PROGRESS NOTE     Chief Complaint   Patient presents with    Consult For     Hospital follow up - Afib          Diagnosis:  1.  A-fib, new on 11/14/2024.   -Eliquis and Coreg.  2.  DM-1-controlled.   -5.7% on 6/7/2024.  3.  Hyperlipidemia-controlled.  4.  ED.  5.  CKD-3.  6.  Anemia.  7.  Hypomagnesia.   -1.8 11/18/2024.    -1.5 on 11/14/2024.  8.  HF-diastolic.   -Not assessed on 11/14/2024 2/2 fibrillation.   -Indeterminant on 4/21/2010.   -Indeterminant 5/15/2012.  -Elevated BNP.   -1762 and 11/14/2024.  9.  Vascular calcifications on 7/30/2024.  10.  Aortic aneurysm dilation on 7/30/2024.  11.  Hyponatremia.   -116/severe on 11/14/2024.  12.  Pneumonia.   -Right upper and middle lobes on CT scan with scattered foci of consolidation to right lower lobe, left upper lobe, and no PE.        Assessment/Plan:    1.  A-fib: New onset on 11/15 and into 11/16/24 while hospitalized at [facility].  Heart rates from the 30s.  His echo from 11/14/24 also showed evidence of atrial fibrillation.  Had been hospitalized for sepsis, without result of pneumonia.  He was identified as having this rhythm overnight while being hospitalized.  He had no episodes previously and has had no episodes since.  His echocardiogram from 11/18/2024 showed normal size atria, no pericardial effusion, no significant valvular or chamber abnormalities.  He was given Coreg 25 mg twice a day and diltiazem added which has since been removed.  He converted on his own.  He was continued on Eliquis 5 mg twice a day.  He does have an Apple Watch that would recognize if he has atrial fibrillation.  This watch has not identified this irregular rhythm.  I suggested we do a 14-day Zio patch.  If this monitor is deficient of A-fib, we will stop Eliquis.  I suspect his A-fib is the result of pneumonia which would be a reversible cause of A-fib.  However, if his Zio patch does show A-fib, he will need to be on Eliquis  indefinitely.  Patient was agreeable with this plan.    2.  Hyperlipidemia: Has vascular calcifications in his iliac arteries.  Increase Zocor from 5 mg to 10 mg once daily.  3.  Hypertension: Controlled.  Blood pressures have been 100s to 110s which make him feel poorly.  Continue lisinopril 10 mg daily and decrease Coreg to 3.125 mg twice a day.  Has a log of his blood pressures with him today.  4.  Carotid artery calcifications: Noted back in 2012.  Plan for repeat ultrasound carotid arteries.  5.  Anemia: Plan for CBC today.  6.  Hyponatremia: Down to 116 on 11/14/2024.  Plan for basic metabolic panel.  7.  Follow-up after completion of Zio patch.  If no A-fib, consider stopping Eliquis.  Continue Eliquis.  Decrease Coreg to 3.125 mg twice daily and lisinopril 10 mg daily.  Increase Zocor from 5 mg to 10 mg daily.  Limited labs today.  Plan for Zio patch with history of A-fib.  Ultrasound of carotids with history of carotid artery plaque in 2012.        Interval history:  See above.      Relevant testing:  Echo on 11/14/2024:  Poor acoustic windows.  Global and regional left ventricular function is normal with an EF of 55-60%.  Right ventricular function, chamber size, wall motion, and thickness are  normal.  Both atria appear normal.  Pulmonary artery systolic pressure is normal.  No pericardial effusion is present.  No significant valvular abnormalities present.  IVC diameter and respiratory changes fall into an intermediate range  suggesting an RA pressure of 8 mmHg.  There is no prior study for direct comparison.    CT pelvic bones on 7/30/2024:  PELVIS:  No bowel dilatation or wall thickening. No free fluid or free air.  Vascular calcifications. Aortic aneurysmal dilatation. No  pathologically enlarged lymph nodes. Retroperitoneal hematoma along  the right pelvic wall. Extends anteriorly along the extraperitoneal  space anterior to the bladder.                                                                       IMPRESSION:  1.  Acute comminuted nondisplaced right acetabular fracture extending  to the right pubic eminence, right superior pubic ramus and right  iliac bone.  2.  Retroperitoneal/extraperitoneal hematoma along the right and  anterior pelvis.    US abdominal aorta on 2/25/2019:  Normal-sized abdominal aorta.           ICD-10-CM    1. Paroxysmal atrial fibrillation (H)  I48.0       2. New onset a-fib (H)  I48.91     11/14/2024 while hospitalized with pneumonia at [facility]      3. Paroxysmal atrial fibrillation with rapid ventricular response (H)  I48.0 HC ZIO PATCH 8-14 DAYS APPLICATION     ZIO PATCH 8-14 DAYS (additional cost to patient)     carvedilol (COREG) 3.125 MG tablet     apixaban ANTICOAGULANT (ELIQUIS) 5 MG tablet      4. Chronic anticoagulation  Z79.01       5. Mixed hyperlipidemia  E78.2       6. Essential hypertension  [facility]     EKG 12-lead, tracing only     carvedilol (COREG) 3.125 MG tablet     lisinopril (ZESTRIL) 10 MG tablet      7. Anemia, unspecified type  D64.9 CBC with platelets     CBC with platelets      8. Hyponatremia  E87.1 Basic metabolic panel     Basic metabolic panel      9. Hypomagnesemia  E83.42 Magnesium     Magnesium      10. Type 1 diabetes mellitus with nephropathy (goal A1C<7)  E10.21 lisinopril (ZESTRIL) 10 MG tablet     simvastatin (ZOCOR) 10 MG tablet     Basic metabolic panel     Basic metabolic panel      11. Bilateral carotid artery stenosis  I65.23 US Carotid Bilateral      12. Atherosclerosis of iliac artery  I70.8           Past Medical History:   Diagnosis Date    Anemia     CKD (chronic kidney disease) stage 3, GFR 30-59 ml/min (H)     GERD (gastroesophageal reflux disease)     Hepatitis 2011    elevated AST due to excess ETOH use (5 drinks/day)    HTN (hypertension)     Hyperlipidemia LDL goal <100 3/7/2013    MGUS (monoclonal gammopathy of unknown significance)      IgG Lambda. Followed by Dr Rogers    Proteinuria  1/22/2015    PVC (premature ventricular contraction)     Retinopathy due to secondary diabetes (H)     s/p laser therapy left    Subclinical hypothyroidism 6/11/2018    Tobacco abuse     20 pack hx. Quit 1991    Type 1 diabetes mellitus with nephropathy (goal A1C<7) 1978       Past Surgical History:   Procedure Laterality Date    ENDOSCOPIC RELEASE CARPAL TUNNEL Right 10/14/2022    Procedure: Right Endoscopic Carpal Tunnel Release;  Surgeon: Sebastien Rowe MD;  Location: [facility] OR    HAND SURGERY  01/01/2007    contracture release    VOCAL CORD INJECTION      vocal cord nodules       Allergies   Allergen Reactions    Wasp Venom Protein Anaphylaxis     Wasp sting    Lipitor [Atorvastatin Calcium]       Elevated Ck and myalgias       Current Outpatient Medications   Medication Sig Dispense Refill    apixaban ANTICOAGULANT (ELIQUIS) 5 MG tablet Take 1 tablet (5 mg) by mouth 2 times daily. 60 tablet 3    carvedilol (COREG) 3.125 MG tablet Take 1 tablet (3.125 mg) by mouth 2 times daily (with meals). 180 tablet 3    lisinopril (ZESTRIL) 10 MG tablet Take 1 tablet (10 mg) by mouth daily. You can start this if your blood pressure is >130. 90 tablet 3    simvastatin (ZOCOR) 10 MG tablet Take 1 tablet (10 mg) by mouth at bedtime. TAKE 1 TABLET(5 MG) BY MOUTH AT BEDTIME 90 tablet 3    ACCU-CHEK GUIDE test strip TESTING 6 TIMES DAILY 100 strip 11    blood glucose (NO BRAND SPECIFIED) lancets standard Dispense item covered by insurance. Test blood sugar 1 times daily. 100 each 11    blood glucose monitoring (NO BRAND SPECIFIED) meter device kit Dispense option covered by insurance. Test blood sugar 1 times daily. 1 kit 11    Continuous Blood Gluc  (DEXCOM G7 ) EMERSON Use to read blood sugars as per 's instructions. 1 each 0    Continuous Glucose Sensor (DEXCOM G7 SENSOR) MISC CHANGE EVERY 10 DAYS 3 each 11    Dextromethorphan HBr 15 MG TABS Take 2 tablets by mouth 2 times daily as needed (COUGH).       EPINEPHrine (ANY BX GENERIC EQUIV) 0.3 MG/0.3ML injection 2-pack INJECT 1 PEN IN THE MUSCLE ONE TIME AS DIRECTED 2 each 0    guaiFENesin-codeine (ROBITUSSIN AC) 100-10 MG/5ML solution Take 5-10 mLs by mouth every 4 hours as needed for cough. 237 mL 0    insulin lispro (HUMALOG KWIKPEN) 100 UNIT/ML (1 unit dial) KWIKPEN ADMINISTER 3 UNITS UNDER THE SKIN BEFORE MEALS PER SLIDING SCALE. MAX OF 20 UNITS PER DAY 15 mL 11    insulin pen needle (TECHLITE PEN NEEDLES) 31G X 8 MM miscellaneous USE WITH INSULIN PEN FOUR TIMES DAILY 400 each 0    LANTUS SOLOSTAR 100 UNIT/ML soln ADMINISTER 17 UNITS UNDER THE SKIN DAILY 15 mL 11    Multiple Vitamin (MULTI VITAMIN  MENS) TABS Take 1 tablet by mouth daily. 30 tablet     omeprazole (PRILOSEC) 20 MG DR capsule TAKE 1 CAPSULE(20 MG) BY MOUTH DAILY 90 capsule 0    polyethylene glycol-propylene glycol (SYSTANE ULTRA) 0.4-0.3 % SOLN ophthalmic solution Place 1 drop into both eyes 4 times daily as needed for dry eyes.      sildenafil (VIAGRA) 100 MG tablet Take 1 tablet (100 mg) by mouth daily as needed 10 tablet 5    tamsulosin (FLOMAX) 0.4 MG capsule Take 1 capsule (0.4 mg) by mouth daily Watch for dizziness upon standing. 30 capsule 11    UNABLE TO FIND Take 2 capsules by mouth daily. MEDICATION NAME: PRN DE Omega capsules for dry eyes         Social History     Socioeconomic History    Marital status:      Spouse name: Not on file    Number of children: Not on file    Years of education: Not on file    Highest education level: Not on file   Occupational History     Employer: RETIRED     Comment: anesthesiologist   Tobacco Use    Smoking status: Former     Current packs/day: 0.00     Average packs/day: 1 pack/day for 15.0 years (15.0 ttl pk-yrs)     Types: Cigarettes     Start date: 1965     Quit date: 1980     Years since quittin.9    Smokeless tobacco: Never   Vaping Use    Vaping status: Never Used   Substance and Sexual Activity    Alcohol use: Not Currently      Comment: occ, 1-2 glass of wine daily    Drug use: No    Sexual activity: Yes     Partners: Female   Other Topics Concern    Parent/sibling w/ CABG, MI or angioplasty before 65F 55M? 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[employer]     Social Drivers of Health     Financial Resource Strain: Low Risk  (11/14/2024)    Financial Resource Strain     Within the past 12 months, have you or your family members you live with been unable to get utilities (heat, electricity) when it was really needed?: No   Food Insecurity: Low Risk  (11/14/2024)    Food Insecurity     Within the past 12 months, did you worry that your food would run out before you got money to buy more?: No     Within the past 12 months, did the food you bought just not last and you didn t have money to get more?: No   Transportation Needs: Low Risk  (11/14/2024)    Transportation Needs     Within the past 12 months, has lack of transportation kept you from medical appointments, getting your medicines, non-medical meetings or appointments, work, or from getting things that you need?: No   Physical Activity: Not on file   Stress: Not on file   Social Connections: Not on file   Interpersonal Safety: Low Risk  (11/14/2024)    Interpersonal Safety     Do you feel physically and emotionally safe where you currently live?: Yes     Within the past 12 months, have you been hit, slapped, kicked or otherwise physically hurt by someone?: No     Within the past 12 months, have you been humiliated or emotionally abused in other ways by your partner or ex-partner?: No   Housing Stability: Low Risk  (11/14/2024)    Housing Stability     Do you have housing? : Yes     Are you worried about losing your housing?: No       LAB RESULTS:   Office Visit on 11/25/2024   Component Date Value Ref Range Status    Sodium 11/25/2024 136  135 - 145 mmol/L Final    Potassium 11/25/2024 4.3  3.4 - 5.3 mmol/L Final    Carbon Dioxide (CO2) 11/25/2024  25  22 - 29 mmol/L Final    Anion Gap 11/25/2024 10  7 - 15 mmol/L Final    Urea Nitrogen 11/25/2024 15.6  8.0 - 23.0 mg/dL Final    Creatinine 11/25/2024 0.88  0.67 - 1.17 mg/dL Final    GFR Estimate 11/25/2024 >90  >60 mL/min/1.73m2 Final    Calcium 11/25/2024 9.0  8.8 - 10.4 mg/dL Final    Chloride 11/25/2024 101  98 - 107 mmol/L Final    Glucose 11/25/2024 87  70 - 99 mg/dL Final    Alkaline Phosphatase 11/25/2024 101  40 - 150 U/L Final    AST 11/25/2024 24  0 - 45 U/L Final    ALT 11/25/2024 25  0 - 70 U/L Final    Protein Total 11/25/2024 6.8  6.4 - 8.3 g/dL Final    Albumin 11/25/2024 3.6  3.5 - 5.2 g/dL Final    Bilirubin Total 11/25/2024 0.8  <=1.2 mg/dL Final    Iron 11/25/2024 70  61 - 157 ug/dL Final    Iron Binding Capacity 11/25/2024 149 (L)  240 - 430 ug/dL Final    Iron Sat Index 11/25/2024 47 (H)  15 - 46 % Final    Vitamin B12 11/25/2024 1,369 (H)  232 - 1,245 pg/mL Final    WBC Count 11/25/2024 4.9  4.0 - 11.0 10e3/uL Final    RBC Count 11/25/2024 3.72 (L)  4.40 - 5.90 10e6/uL Final    Hemoglobin 11/25/2024 11.8 (L)  13.3 - 17.7 g/dL Final    Hematocrit 11/25/2024 34.4 (L)  40.0 - 53.0 % Final    MCV 11/25/2024 93  78 - 100 fL Final    MCH 11/25/2024 31.7  26.5 - 33.0 pg Final    MCHC 11/25/2024 34.3  31.5 - 36.5 g/dL Final    RDW 11/25/2024 12.3  10.0 - 15.0 % Final    Platelet Count 11/25/2024 444  150 - 450 10e3/uL Final    % Neutrophils 11/25/2024 56  % Final    % Lymphocytes 11/25/2024 27  % Final    % Monocytes 11/25/2024 12  % Final    % Eosinophils 11/25/2024 3  % Final    % Basophils 11/25/2024 1  % Final    % Immature Granulocytes 11/25/2024 1  % Final    NRBCs per 100 WBC 11/25/2024 0  <1 /100 Final    Absolute Neutrophils 11/25/2024 2.8  1.6 - 8.3 10e3/uL Final    Absolute Lymphocytes 11/25/2024 1.3  0.8 - 5.3 10e3/uL Final    Absolute Monocytes 11/25/2024 0.6  0.0 - 1.3 10e3/uL Final    Absolute Eosinophils 11/25/2024 0.2  0.0 - 0.7 10e3/uL Final    Absolute Basophils 11/25/2024 0.1   "0.0 - 0.2 10e3/uL Final    Absolute Immature Granulocytes 11/25/2024 0.1  <=0.4 10e3/uL Final    Absolute NRBCs 11/25/2024 0.0  10e3/uL Final    Hold Specimen 11/25/2024 [facility]   Final        Review of systems: Negative except that which was noted in the HPI.    Physical examination:  /70 (BP Location: Right arm, Patient Position: Sitting, Cuff Size: Adult Regular)   Pulse 79   Temp 97  F (36.1  C) (Temporal)   Resp 16   Ht 1.77 m (5' 9.69\")   Wt 71.7 kg (158 lb)   SpO2 99%   BMI 22.88 kg/m      GENERAL APPEARANCE: healthy, alert and no distress  CHEST: lungs clear to auscultation.  CARDIOVASCULAR: regular rhythm, normal S1 with physiologic split S2, no S3 or S4 and no murmur, click or rub  EXTREMITIES: no clubbing, cyanosis or edema.    Total time spent on day of visit, including review of tests, obtaining/reviewing separately obtained history, ordering medications/tests/procedures, communicating with PCP/consultants, and documenting in electronic medical record: 60 minutes.              Thank you for allowing me to participate in the care of your patient. Please do not hesitate to contact me if you have any questions.     Iasel العلي, DO          "

## 2024-12-03 NOTE — NURSING NOTE
"Chief Complaint   Patient presents with    Consult For     Hospital follow up - Afib       Initial /70 (BP Location: Right arm, Patient Position: Sitting, Cuff Size: Adult Regular)   Pulse 79   Temp 97  F (36.1  C) (Temporal)   Resp 16   Ht 1.77 m (5' 9.69\")   Wt 71.7 kg (158 lb)   SpO2 99%   BMI 22.88 kg/m   Estimated body mass index is 22.88 kg/m  as calculated from the following:    Height as of this encounter: 1.77 m (5' 9.69\").    Weight as of this encounter: 71.7 kg (158 lb).  Meds Reconciled: complete  Pt is not on Aspirin  Pt is on a Statin  PHQ and/or CRISTA reviewed. Pt referred to PCP/MH Provider as appropriate.    Rebecca Abel LPN    "

## 2024-12-04 DIAGNOSIS — E10.21 TYPE 1 DIABETES MELLITUS WITH NEPHROPATHY (H): ICD-10-CM

## 2024-12-05 RX ORDER — PROCHLORPERAZINE 25 MG/1
SUPPOSITORY RECTAL
Qty: 1 EACH | Refills: 3 | Status: SHIPPED | OUTPATIENT
Start: 2024-12-05

## 2024-12-05 NOTE — TELEPHONE ENCOUNTER
Requested Prescriptions   Pending Prescriptions Disp Refills    Continuous Glucose Transmitter (DEXCOM G6 TRANSMITTER) MISC [Pharmacy Med Name: DEXCOM G6 TRANSMITTER]       Sig: CHANGE EVERY 3 MONTHS       Diabetic Supplies Protocol Failed - 12/5/2024  3:16 PM        Failed - Medication is active on med list     Last Office Visit:              11/25/24 (Hospital discharge follow up)  Future Office visit:             Dec 19, 2024 10:40 AM  (Arrive by 10:25 AM)  Provider Visit with Hemanth Cunningham MD  [facility] ([facility]) [address]  [place] [id number] [phone]     Unable to complete prescription refill per RN Medication Refill Policy. Lucrecia Lechuga RN .............. 12/5/2024  3:24 PM

## 2024-12-09 ENCOUNTER — MYC MEDICAL ADVICE (OUTPATIENT)
Dept: CARDIOLOGY | Facility: OTHER | Age: 74
End: 2024-12-09
Payer: MEDICARE

## 2024-12-09 DIAGNOSIS — I10 ESSENTIAL HYPERTENSION: Chronic | ICD-10-CM

## 2024-12-09 DIAGNOSIS — E10.21 TYPE 1 DIABETES MELLITUS WITH NEPHROPATHY (H): Chronic | ICD-10-CM

## 2024-12-09 DIAGNOSIS — N18.2 CHRONIC KIDNEY DISEASE, STAGE 2 (MILD): Chronic | ICD-10-CM

## 2024-12-09 DIAGNOSIS — I48.0 PAROXYSMAL ATRIAL FIBRILLATION WITH RAPID VENTRICULAR RESPONSE (H): ICD-10-CM

## 2024-12-09 DIAGNOSIS — I48.0 PAROXYSMAL ATRIAL FIBRILLATION (H): ICD-10-CM

## 2024-12-09 DIAGNOSIS — I48.91 NEW ONSET A-FIB (H): ICD-10-CM

## 2024-12-09 DIAGNOSIS — E10.65 TYPE 1 DIABETES MELLITUS WITH HYPERGLYCEMIA (H): Primary | ICD-10-CM

## 2024-12-09 RX ORDER — LISINOPRIL 5 MG/1
5 TABLET ORAL DAILY
Qty: 90 TABLET | Refills: 3 | Status: SHIPPED | OUTPATIENT
Start: 2024-12-09

## 2024-12-11 ENCOUNTER — HOSPITAL ENCOUNTER (OUTPATIENT)
Dept: ULTRASOUND IMAGING | Facility: OTHER | Age: 74
Discharge: HOME | End: 2024-12-11
Attending: INTERNAL MEDICINE
Payer: MEDICARE

## 2024-12-11 ENCOUNTER — ALLIED HEALTH/NURSE VISIT (OUTPATIENT)
Dept: FAMILY MEDICINE | Facility: OTHER | Age: 74
End: 2024-12-11
Attending: INTERNAL MEDICINE
Payer: MEDICARE

## 2024-12-11 DIAGNOSIS — I48.0 PAROXYSMAL ATRIAL FIBRILLATION WITH RAPID VENTRICULAR RESPONSE (H): ICD-10-CM

## 2024-12-11 DIAGNOSIS — I65.23 BILATERAL CAROTID ARTERY STENOSIS: ICD-10-CM

## 2024-12-11 PROCEDURE — 93246 EXT ECG>7D<15D RECORDING: CPT

## 2024-12-11 PROCEDURE — 93880 EXTRACRANIAL BILAT STUDY: CPT

## 2024-12-11 NOTE — PROGRESS NOTES
Patient arrived (date)12/11/2024 (time)1000 for a 14 day Zio monitor per (provider name) Dr. العلي for Paroxysmal A-fib with RVR (Dx).  Patient's skin was prepped per protocol.  Zio monitor was placed.  Patient verbalized understanding of instructions and plan of care.  Patient was given Zio diary and prepaid .

## 2024-12-12 RX ORDER — METOPROLOL SUCCINATE 25 MG/1
25 TABLET, EXTENDED RELEASE ORAL DAILY
Qty: 90 TABLET | Refills: 3 | Status: SHIPPED | OUTPATIENT
Start: 2024-12-12

## 2024-12-19 ENCOUNTER — OFFICE VISIT (OUTPATIENT)
Dept: PEDIATRICS | Facility: OTHER | Age: 74
End: 2024-12-19
Attending: INTERNAL MEDICINE
Payer: MEDICARE

## 2024-12-19 ENCOUNTER — MYC MEDICAL ADVICE (OUTPATIENT)
Dept: PEDIATRICS | Facility: OTHER | Age: 74
End: 2024-12-19

## 2024-12-19 ENCOUNTER — HOSPITAL ENCOUNTER (OUTPATIENT)
Dept: GENERAL RADIOLOGY | Facility: OTHER | Age: 74
Discharge: HOME | End: 2024-12-19
Attending: INTERNAL MEDICINE
Payer: MEDICARE

## 2024-12-19 VITALS
HEIGHT: 70 IN | TEMPERATURE: 97 F | BODY MASS INDEX: 23.18 KG/M2 | DIASTOLIC BLOOD PRESSURE: 75 MMHG | RESPIRATION RATE: 16 BRPM | HEART RATE: 78 BPM | SYSTOLIC BLOOD PRESSURE: 139 MMHG | OXYGEN SATURATION: 98 % | WEIGHT: 161.9 LBS

## 2024-12-19 DIAGNOSIS — Z87.891 PERSONAL HISTORY OF TOBACCO USE, PRESENTING HAZARDS TO HEALTH: ICD-10-CM

## 2024-12-19 DIAGNOSIS — E87.1 ACUTE HYPONATREMIA: ICD-10-CM

## 2024-12-19 DIAGNOSIS — E78.2 MIXED HYPERLIPIDEMIA: ICD-10-CM

## 2024-12-19 DIAGNOSIS — Z09 HOSPITAL DISCHARGE FOLLOW-UP: Primary | ICD-10-CM

## 2024-12-19 DIAGNOSIS — J18.9 PNEUMONIA OF RIGHT MIDDLE LOBE DUE TO INFECTIOUS ORGANISM: ICD-10-CM

## 2024-12-19 DIAGNOSIS — D64.9 ANEMIA, UNSPECIFIED TYPE: Primary | ICD-10-CM

## 2024-12-19 DIAGNOSIS — E10.21 TYPE 1 DIABETES MELLITUS WITH NEPHROPATHY (H): Chronic | ICD-10-CM

## 2024-12-19 DIAGNOSIS — D47.2 MGUS (MONOCLONAL GAMMOPATHY OF UNKNOWN SIGNIFICANCE): ICD-10-CM

## 2024-12-19 LAB
ANION GAP SERPL CALCULATED.3IONS-SCNC: 8 MMOL/L (ref 7–15)
BASOPHILS # BLD AUTO: 0 10E3/UL (ref 0–0.2)
BASOPHILS NFR BLD AUTO: 1 %
BUN SERPL-MCNC: 12.6 MG/DL (ref 8–23)
CALCIUM SERPL-MCNC: 9.7 MG/DL (ref 8.8–10.4)
CHLORIDE SERPL-SCNC: 99 MMOL/L (ref 98–107)
CHOLEST SERPL-MCNC: 131 MG/DL
CREAT SERPL-MCNC: 0.84 MG/DL (ref 0.67–1.17)
EGFRCR SERPLBLD CKD-EPI 2021: >90 ML/MIN/1.73M2
EOSINOPHIL # BLD AUTO: 0.1 10E3/UL (ref 0–0.7)
EOSINOPHIL NFR BLD AUTO: 3 %
ERYTHROCYTE [DISTWIDTH] IN BLOOD BY AUTOMATED COUNT: 12.6 % (ref 10–15)
EST. AVERAGE GLUCOSE BLD GHB EST-MCNC: 126 MG/DL
FASTING STATUS PATIENT QL REPORTED: NO
FASTING STATUS PATIENT QL REPORTED: NO
GLUCOSE SERPL-MCNC: 132 MG/DL (ref 70–99)
HBA1C MFR BLD: 6 %
HCO3 SERPL-SCNC: 26 MMOL/L (ref 22–29)
HCT VFR BLD AUTO: 37.1 % (ref 40–53)
HDLC SERPL-MCNC: 61 MG/DL
HGB BLD-MCNC: 13.1 G/DL (ref 13.3–17.7)
IMM GRANULOCYTES # BLD: 0 10E3/UL
IMM GRANULOCYTES NFR BLD: 0 %
LDLC SERPL CALC-MCNC: 59 MG/DL
LYMPHOCYTES # BLD AUTO: 1.9 10E3/UL (ref 0.8–5.3)
LYMPHOCYTES NFR BLD AUTO: 37 %
MCH RBC QN AUTO: 31.5 PG (ref 26.5–33)
MCHC RBC AUTO-ENTMCNC: 35.3 G/DL (ref 31.5–36.5)
MCV RBC AUTO: 89 FL (ref 78–100)
MONOCYTES # BLD AUTO: 0.4 10E3/UL (ref 0–1.3)
MONOCYTES NFR BLD AUTO: 8 %
NEUTROPHILS # BLD AUTO: 2.5 10E3/UL (ref 1.6–8.3)
NEUTROPHILS NFR BLD AUTO: 51 %
NONHDLC SERPL-MCNC: 70 MG/DL
NRBC # BLD AUTO: 0 10E3/UL
NRBC BLD AUTO-RTO: 0 /100
PLATELET # BLD AUTO: 272 10E3/UL (ref 150–450)
POTASSIUM SERPL-SCNC: 4.7 MMOL/L (ref 3.4–5.3)
RBC # BLD AUTO: 4.16 10E6/UL (ref 4.4–5.9)
SODIUM SERPL-SCNC: 133 MMOL/L (ref 135–145)
TRIGL SERPL-MCNC: 57 MG/DL
WBC # BLD AUTO: 5 10E3/UL (ref 4–11)

## 2024-12-19 PROCEDURE — 85041 AUTOMATED RBC COUNT: CPT | Mod: ZL | Performed by: INTERNAL MEDICINE

## 2024-12-19 PROCEDURE — 71046 X-RAY EXAM CHEST 2 VIEWS: CPT

## 2024-12-19 PROCEDURE — 80061 LIPID PANEL: CPT | Mod: ZL | Performed by: INTERNAL MEDICINE

## 2024-12-19 PROCEDURE — G0463 HOSPITAL OUTPT CLINIC VISIT: HCPCS | Mod: 25

## 2024-12-19 PROCEDURE — 85004 AUTOMATED DIFF WBC COUNT: CPT | Mod: ZL | Performed by: INTERNAL MEDICINE

## 2024-12-19 PROCEDURE — 80048 BASIC METABOLIC PNL TOTAL CA: CPT | Mod: ZL | Performed by: INTERNAL MEDICINE

## 2024-12-19 PROCEDURE — 36415 COLL VENOUS BLD VENIPUNCTURE: CPT | Mod: ZL | Performed by: INTERNAL MEDICINE

## 2024-12-19 PROCEDURE — 83036 HEMOGLOBIN GLYCOSYLATED A1C: CPT | Mod: ZL | Performed by: INTERNAL MEDICINE

## 2024-12-19 ASSESSMENT — PAIN SCALES - GENERAL: PAINLEVEL_OUTOF10: NO PAIN (0)

## 2024-12-19 NOTE — PATIENT INSTRUCTIONS
-- Schedule PFT     -- Low salt diet, under 2000 mg/day   -- Fluid restrict, under 2000 mL/day aka 8.5 cups/day   -- Eat healthy protein   -- Learn about DASH Diet for dietary ways to reduce blood pressure  Google: [facility] DASH diet  [facility] site: https://www.nhlbi.nih.gov/health-topics/dash-eating-plan  PDF from [facility]: https://www.nhlbi.nih.gov/files/docs/public/heart/new_dash.pdf   -- Elevate feet above your hips for 20-30 minutes, 4 times per day   -- Compression stockings from morning to night   -- Work on 5-10% weight loss

## 2024-12-19 NOTE — TELEPHONE ENCOUNTER
12/3/2024  Last CBC drawn.      Danny'd up an add-on if you want this repeated.           Arabella Cameron RN on 12/19/2024 at 12:20 PM

## 2024-12-19 NOTE — NURSING NOTE
"Chief Complaint   Patient presents with    Diabetes     CGM documentation     Patient presents to the Clinic today for CGM documentation. Patient also felt he would like it noted that he was in the ICU with pneumonia and Afib on 11/14-11/19    Initial /75 (BP Location: Left arm, Patient Position: Sitting, Cuff Size: Adult Regular)   Pulse 78   Temp 97  F (36.1  C) (Tympanic)   Resp 16   Ht 1.765 m (5' 9.5\")   Wt 73.4 kg (161 lb 14.4 oz)   SpO2 98%   BMI 23.57 kg/m   Estimated body mass index is 23.57 kg/m  as calculated from the following:    Height as of this encounter: 1.765 m (5' 9.5\").    Weight as of this encounter: 73.4 kg (161 lb 14.4 oz).  Medication Review: complete    The next two questions are to help us understand your food security.  If you are feeling you need any assistance in this area, we have resources available to support you today.          12/19/2024   SDOH- Food Insecurity   Within the past 12 months, did you worry that your food would run out before you got money to buy more? N   Within the past 12 months, did the food you bought just not last and you didn t have money to get more? N         Health Care Directive:  Patient has a Health Care Directive on file      Rahel Feliciano      " [phone]

## 2024-12-19 NOTE — TELEPHONE ENCOUNTER
Added one. Due to MGUS, not expecting any change.    Signed, Hemanth Cunningham MD, FAAP, FACP  Internal Medicine & Pediatrics

## 2024-12-19 NOTE — PROGRESS NOTES
Assessment & Plan   1. Hospital discharge follow-up (Primary)  2. Pneumonia of right middle lobe due to infectious organism  This pneumonia with sepsis came on quite rapidly.  I am suspicious it may have been a Staph aureus pneumonia.  Reassuringly no evidence for abscess is seen on imaging.  He is feeling sick essentially better.  I recommend obtaining a chest x-ray to ensure resolution to exclude postobstructive processes.  - XR Chest 2 Views; Future  - Pulmonary Function Test (); Future    3. Personal history of tobacco use, presenting hazards to health  - Pulmonary Function Test (); Future    4. Type 1 diabetes mellitus with nephropathy (goal A1C<7)  Continuous Glucose Monitor (CGM) Professional interpretation procedural note  Eh Perry is using their CGM and is benefiting from it.   I personally reviewed more than 72 hours of CGM data for Mr. Eh Perry 12/19/2024.   Despite having been recently ill his last 50 days of glucose numbers are very good.  65% in range with only 31% high and 4% very high.    Recommendations:  Based on the patterns and trends, the following recommendations are made today:   -- Medication changes, if applicable outlined below.   -- Close follow-up is recommended for in person follow-up and review, with ongoing therapy adjustments.      - Hemoglobin A1c; Future  - Hemoglobin A1c    5. MGUS (monoclonal gammopathy of unknown significance)  - CBC and Differential; Future  - CBC and Differential    6. Acute hyponatremia  - Basic metabolic panel; Future  - Basic metabolic panel    7. Mixed hyperlipidemia  - Lipid Profile; Future  - Lipid Profile      Patient Instructions    -- Schedule PFT     -- Low salt diet, under 2000 mg/day   -- Fluid restrict, under 2000 mL/day aka 8.5 cups/day   -- Eat healthy protein   -- Learn about DASH Diet for dietary ways to reduce blood pressure  Google: NIH DASH diet  [facility]H site: https://www.nhlbi.nih.gov/health-topics/dash-eating-plan  PDF  "from NIH: https://www.nhlbi.nih.gov/files/docs/public/heart/new_dash.pdf   -- Elevate feet above your hips for 20-30 minutes, 4 times per day   -- Compression stockings from morning to night   -- Work on 5-10% weight loss          Return in about 3 months (around 3/19/2025), or if symptoms worsen or fail to improve, for diabetes.    Signed, Hemanth Cunningham MD, FAAP, FACP  Internal Medicine & Pediatrics    Subjective   Eh Perry is a 74 year old male who presents for Diabetes (CGM documentation).  Also here for hospital discharge follow-up.  He recently been quite ill all of a sudden.  This resulted in an evaluation in the emergency department discovering severe sepsis associated with hyponatremia and right middle lobe pneumonia.  He was admitted to the ICU.  In retrospect his sugars had been higher for about a week.  He is now feeling quite a bit better on the other side.  He did have some atrial fibs but it has not recurred.  He is wearing the Zio patch.  His Apple Watch was appropriately detecting the atrial FaBB episodes.  He has not had any episodes since hospitalization.    Objective   Vitals: /75 (BP Location: Left arm, Patient Position: Sitting, Cuff Size: Adult Regular)   Pulse 78   Temp 97  F (36.1  C) (Tympanic)   Resp 16   Ht 1.765 m (5' 9.5\")   Wt 73.4 kg (161 lb 14.4 oz)   SpO2 98%   BMI 23.57 kg/m      General: well appearing  CV: Regular rate and rhythm, no murmur, rub or gallop  Pulm: Clear to auscultation bilaterally, no wheezing, rales or rhonchi  Neuro: Grossly intact  Musculoskeletal: No lower extremity edema  Skin: No rash  Psychiatry: Normal affect and insight.    Review and Analysis of Data   I personally reviewed the following:  External notes: No  Results: Yes local lab, imaging reports, chest imaging CT images  Use of an independent historian: Yes wife  Independent review of a test performed by another physician: No  Discussion of management with another physician: " No  Moderate risk of morbidity from additional diagnostic testing and/or treatment.    Patient currently uses a Dexcom CGM for continuous monitoring of glucose.   Patient injects or boluses insulin 1 or more times daily.  Patient requires frequent adjustments to their insulin treatment regimen on the basis of therapeutic CGM testing results.

## 2024-12-30 LAB — CV ZIO PRELIM RESULTS: NORMAL

## 2025-01-02 ENCOUNTER — MYC MEDICAL ADVICE (OUTPATIENT)
Dept: CARDIOLOGY | Facility: OTHER | Age: 75
End: 2025-01-02
Payer: MEDICARE

## 2025-01-25 ENCOUNTER — HEALTH MAINTENANCE LETTER (OUTPATIENT)
Age: 75
End: 2025-01-25

## 2025-02-17 DIAGNOSIS — K21.9 GASTROESOPHAGEAL REFLUX DISEASE WITHOUT ESOPHAGITIS: ICD-10-CM

## 2025-02-20 RX ORDER — OMEPRAZOLE 20 MG/1
20 CAPSULE, DELAYED RELEASE ORAL DAILY
Qty: 90 CAPSULE | Refills: 0 | Status: SHIPPED | OUTPATIENT
Start: 2025-02-20

## 2025-02-20 NOTE — TELEPHONE ENCOUNTER
[id number] - [place], [place] - [address] AT [facility] [address] [phone]  sent Rx request for the following:      Requested Prescriptions   Pending Prescriptions Disp Refills    omeprazole (PRILOSEC) 20 MG DR capsule [Pharmacy Med Name: OMEPRAZOLE 20MG CAPSULES] 90 capsule 0     Sig: TAKE 1 CAPSULE(20 MG) BY MOUTH DAILY       PPI Protocol Passed - 2/20/2025 11:05 AM        Last Prescription Date:   8/23/204  Last Fill Qty/Refills:         90, R-0    Last Office Visit:              12/11/2023   Future Office visit:           3/6/2025 medicare visit    Unable to complete prescription refill per RN Medication Refill Policy.   Will route to pcp to review and approve.   Ephraim Leyva RN on 2/20/2025 at 11:08 AM        Next 5 appointments (look out 90 days)      Mar 06, 2025 8:40 AM  (Arrive by 8:25 AM)  Annual Wellness Visit with Hemanth Cunningham MD  [facility] ([facility]) [address]  [place] [address]  [phone]     Mar 13, 2025 10:15 AM  (Arrive by 10:00 AM)  Return Visit with Isael العلي,   [facility] ([facility]) [address]  [place] [address]  [phone]     Apr 23, 2025 8:30 AM  (Arrive by 8:15 AM)  Return Visit with Chava Reinoso MD  [facility] ([facility]) [address]  [place] [address]  [phone]

## 2025-02-27 DIAGNOSIS — N40.1 BENIGN PROSTATIC HYPERPLASIA WITH URINARY FREQUENCY: ICD-10-CM

## 2025-02-27 DIAGNOSIS — R35.0 BENIGN PROSTATIC HYPERPLASIA WITH URINARY FREQUENCY: ICD-10-CM

## 2025-03-03 NOTE — TELEPHONE ENCOUNTER
Patient has follow up appointment with Dr. Reinoso on 04/23/25. Chantal Menezes LPN ....................3/3/2025  3:21 PM

## 2025-03-04 RX ORDER — TAMSULOSIN HYDROCHLORIDE 0.4 MG/1
CAPSULE ORAL
Qty: 90 CAPSULE | Refills: 4 | Status: SHIPPED | OUTPATIENT
Start: 2025-03-04

## 2025-03-04 NOTE — TELEPHONE ENCOUNTER
Last Office Visit:              virtual visit 6/24/24 [facility]   Future Office visit:           4/23/25 [facility] for prostate exam    Requested Prescriptions   Pending Prescriptions Disp Refills    tamsulosin (FLOMAX) 0.4 MG capsule [Pharmacy Med Name: TAMSULOSIN 0.4MG CAPSULES] 30 capsule 11     Sig: TAKE ONE CAPSULE BY MOUTH EVERY DAY. DO NOT TAKE WITHIN 4 HOURS OF SILDENAFIL       Alpha Blockers Failed - 3/4/2025 10:07 AM        Failed - Patient does not have Tadalafil, Vardenafil, or Sildenafil on their medication list        Failed - Medication is active on med list and the sig matches. RN to manually verify dose and sig if red X/fail.     If the protocol passes (green check), you do not need to verify med dose and sig.    A prescription matches if they are the same clinical intention.    For Example: once daily and every morning are the same.    For all fails (red x), verify dose and sig.    If the refill does match what is on file, the RN can still proceed to approve the refill request.     If they do not match, route to the appropriate provider.          Last Prescription Date:   3/13/24  Last Fill Qty/Refills:         30 / 11

## 2025-03-05 SDOH — HEALTH STABILITY: PHYSICAL HEALTH: ON AVERAGE, HOW MANY MINUTES DO YOU ENGAGE IN EXERCISE AT THIS LEVEL?: 60 MIN

## 2025-03-05 SDOH — HEALTH STABILITY: PHYSICAL HEALTH: ON AVERAGE, HOW MANY DAYS PER WEEK DO YOU ENGAGE IN MODERATE TO STRENUOUS EXERCISE (LIKE A BRISK WALK)?: 7 DAYS

## 2025-03-05 ASSESSMENT — SOCIAL DETERMINANTS OF HEALTH (SDOH): HOW OFTEN DO YOU GET TOGETHER WITH FRIENDS OR RELATIVES?: THREE TIMES A WEEK

## 2025-03-06 ENCOUNTER — OFFICE VISIT (OUTPATIENT)
Dept: PEDIATRICS | Facility: OTHER | Age: 75
End: 2025-03-06
Attending: INTERNAL MEDICINE
Payer: MEDICARE

## 2025-03-06 VITALS
HEART RATE: 78 BPM | OXYGEN SATURATION: 98 % | WEIGHT: 161 LBS | HEIGHT: 70 IN | SYSTOLIC BLOOD PRESSURE: 132 MMHG | DIASTOLIC BLOOD PRESSURE: 80 MMHG | BODY MASS INDEX: 23.05 KG/M2 | RESPIRATION RATE: 16 BRPM | TEMPERATURE: 96.5 F

## 2025-03-06 DIAGNOSIS — K21.9 GASTROESOPHAGEAL REFLUX DISEASE WITHOUT ESOPHAGITIS: ICD-10-CM

## 2025-03-06 DIAGNOSIS — I48.91 NEW ONSET A-FIB (H): ICD-10-CM

## 2025-03-06 DIAGNOSIS — E03.8 SUBCLINICAL HYPOTHYROIDISM: ICD-10-CM

## 2025-03-06 DIAGNOSIS — I49.3 PVC'S (PREMATURE VENTRICULAR CONTRACTIONS): ICD-10-CM

## 2025-03-06 DIAGNOSIS — E10.21 TYPE 1 DIABETES MELLITUS WITH NEPHROPATHY (H): ICD-10-CM

## 2025-03-06 DIAGNOSIS — T63.461A ALLERGIC REACTION TO WASP STING: ICD-10-CM

## 2025-03-06 DIAGNOSIS — Z12.11 ENCOUNTER FOR SCREENING COLONOSCOPY: Primary | ICD-10-CM

## 2025-03-06 DIAGNOSIS — Z00.00 ENCOUNTER FOR MEDICARE ANNUAL WELLNESS EXAM: Primary | ICD-10-CM

## 2025-03-06 DIAGNOSIS — Z12.11 SCREEN FOR COLON CANCER: ICD-10-CM

## 2025-03-06 DIAGNOSIS — D64.9 NORMOCYTIC ANEMIA: ICD-10-CM

## 2025-03-06 DIAGNOSIS — Z12.5 SCREENING FOR PROSTATE CANCER: ICD-10-CM

## 2025-03-06 DIAGNOSIS — N18.2 CHRONIC KIDNEY DISEASE, STAGE 2 (MILD): Chronic | ICD-10-CM

## 2025-03-06 PROBLEM — J18.9 PNEUMONIA OF BOTH UPPER LOBES DUE TO INFECTIOUS ORGANISM: Status: RESOLVED | Noted: 2024-11-14 | Resolved: 2025-03-06

## 2025-03-06 PROBLEM — I48.0 PAROXYSMAL ATRIAL FIBRILLATION WITH RAPID VENTRICULAR RESPONSE (H): Status: RESOLVED | Noted: 2024-11-16 | Resolved: 2025-03-06

## 2025-03-06 PROBLEM — R65.20 SEVERE SEPSIS WITH ACUTE ORGAN DYSFUNCTION (H): Status: RESOLVED | Noted: 2024-11-14 | Resolved: 2025-03-06

## 2025-03-06 PROBLEM — A41.9 SEVERE SEPSIS WITH ACUTE ORGAN DYSFUNCTION (H): Status: RESOLVED | Noted: 2024-11-14 | Resolved: 2025-03-06

## 2025-03-06 LAB
ANION GAP SERPL CALCULATED.3IONS-SCNC: 9 MMOL/L (ref 7–15)
BUN SERPL-MCNC: 15.6 MG/DL (ref 8–23)
CALCIUM SERPL-MCNC: 9.8 MG/DL (ref 8.8–10.4)
CHLORIDE SERPL-SCNC: 103 MMOL/L (ref 98–107)
CHOLEST SERPL-MCNC: 122 MG/DL
CREAT SERPL-MCNC: 0.86 MG/DL (ref 0.67–1.17)
EGFRCR SERPLBLD CKD-EPI 2021: >90 ML/MIN/1.73M2
ERYTHROCYTE [DISTWIDTH] IN BLOOD BY AUTOMATED COUNT: 12.5 % (ref 10–15)
EST. AVERAGE GLUCOSE BLD GHB EST-MCNC: 137 MG/DL
FASTING STATUS PATIENT QL REPORTED: YES
FASTING STATUS PATIENT QL REPORTED: YES
GLUCOSE SERPL-MCNC: 106 MG/DL (ref 70–99)
HBA1C MFR BLD: 6.4 %
HCO3 SERPL-SCNC: 26 MMOL/L (ref 22–29)
HCT VFR BLD AUTO: 40.2 % (ref 40–53)
HDLC SERPL-MCNC: 58 MG/DL
HGB BLD-MCNC: 14.6 G/DL (ref 13.3–17.7)
LDLC SERPL CALC-MCNC: 54 MG/DL
MCH RBC QN AUTO: 32.7 PG (ref 26.5–33)
MCHC RBC AUTO-ENTMCNC: 36.3 G/DL (ref 31.5–36.5)
MCV RBC AUTO: 90 FL (ref 78–100)
NONHDLC SERPL-MCNC: 64 MG/DL
PLATELET # BLD AUTO: 256 10E3/UL (ref 150–450)
POTASSIUM SERPL-SCNC: 5 MMOL/L (ref 3.4–5.3)
PSA SERPL DL<=0.01 NG/ML-MCNC: 0.68 NG/ML (ref 0–6.5)
RBC # BLD AUTO: 4.47 10E6/UL (ref 4.4–5.9)
SODIUM SERPL-SCNC: 138 MMOL/L (ref 135–145)
TRIGL SERPL-MCNC: 52 MG/DL
TSH SERPL DL<=0.005 MIU/L-ACNC: 2.91 UIU/ML (ref 0.3–4.2)
WBC # BLD AUTO: 5 10E3/UL (ref 4–11)

## 2025-03-06 PROCEDURE — 85018 HEMOGLOBIN: CPT | Mod: ZL | Performed by: INTERNAL MEDICINE

## 2025-03-06 PROCEDURE — 82565 ASSAY OF CREATININE: CPT | Mod: ZL | Performed by: INTERNAL MEDICINE

## 2025-03-06 PROCEDURE — 36415 COLL VENOUS BLD VENIPUNCTURE: CPT | Mod: ZL | Performed by: INTERNAL MEDICINE

## 2025-03-06 PROCEDURE — 83036 HEMOGLOBIN GLYCOSYLATED A1C: CPT | Mod: ZL | Performed by: INTERNAL MEDICINE

## 2025-03-06 PROCEDURE — 84478 ASSAY OF TRIGLYCERIDES: CPT | Mod: ZL | Performed by: INTERNAL MEDICINE

## 2025-03-06 PROCEDURE — G0103 PSA SCREENING: HCPCS | Mod: ZL | Performed by: INTERNAL MEDICINE

## 2025-03-06 PROCEDURE — 85048 AUTOMATED LEUKOCYTE COUNT: CPT | Mod: ZL | Performed by: INTERNAL MEDICINE

## 2025-03-06 PROCEDURE — 84443 ASSAY THYROID STIM HORMONE: CPT | Mod: ZL | Performed by: INTERNAL MEDICINE

## 2025-03-06 PROCEDURE — 80048 BASIC METABOLIC PNL TOTAL CA: CPT | Mod: ZL | Performed by: INTERNAL MEDICINE

## 2025-03-06 RX ORDER — PEN NEEDLE, DIABETIC 31 GX5/16"
NEEDLE, DISPOSABLE MISCELLANEOUS
Qty: 400 EACH | Refills: 11 | Status: SHIPPED | OUTPATIENT
Start: 2025-03-06

## 2025-03-06 RX ORDER — EPINEPHRINE 0.3 MG/.3ML
0.3 INJECTION SUBCUTANEOUS PRN
Qty: 2 EACH | Refills: 11 | Status: SHIPPED | OUTPATIENT
Start: 2025-03-06

## 2025-03-06 RX ORDER — INSULIN LISPRO 100 [IU]/ML
INJECTION, SOLUTION INTRAVENOUS; SUBCUTANEOUS
Qty: 15 ML | Refills: 11 | Status: SHIPPED | OUTPATIENT
Start: 2025-03-06

## 2025-03-06 RX ORDER — INSULIN GLARGINE 100 [IU]/ML
INJECTION, SOLUTION SUBCUTANEOUS
Qty: 15 ML | Refills: 11 | Status: SHIPPED | OUTPATIENT
Start: 2025-03-06

## 2025-03-06 RX ORDER — SIMVASTATIN 10 MG
10 TABLET ORAL AT BEDTIME
Qty: 90 TABLET | Refills: 4 | Status: SHIPPED | OUTPATIENT
Start: 2025-03-06

## 2025-03-06 RX ORDER — CARVEDILOL 3.12 MG/1
3.12 TABLET ORAL 2 TIMES DAILY WITH MEALS
Qty: 90 TABLET | Refills: 4 | Status: SHIPPED | OUTPATIENT
Start: 2025-03-06

## 2025-03-06 RX ORDER — LISINOPRIL 10 MG/1
10 TABLET ORAL DAILY
Qty: 90 TABLET | Refills: 4 | Status: SHIPPED | OUTPATIENT
Start: 2025-03-06

## 2025-03-06 RX ORDER — ASPIRIN 81 MG/1
81 TABLET ORAL DAILY
Qty: 90 TABLET | Refills: 4 | Status: SHIPPED | OUTPATIENT
Start: 2025-03-06

## 2025-03-06 RX ORDER — OMEPRAZOLE 20 MG/1
20 CAPSULE, DELAYED RELEASE ORAL DAILY
Qty: 90 CAPSULE | Refills: 4 | Status: SHIPPED | OUTPATIENT
Start: 2025-03-06

## 2025-03-06 ASSESSMENT — PAIN SCALES - GENERAL: PAINLEVEL_OUTOF10: NO PAIN (0)

## 2025-03-06 NOTE — NURSING NOTE
"Chief Complaint   Patient presents with    Medicare Visit    Recheck Medication       Initial /80   Pulse 78   Temp (!) 96.5  F (35.8  C) (Tympanic)   Resp 16   Ht 1.778 m (5' 10\")   Wt 73 kg (161 lb)   SpO2 98%   BMI 23.10 kg/m   Estimated body mass index is 23.1 kg/m  as calculated from the following:    Height as of this encounter: 1.778 m (5' 10\").    Weight as of this encounter: 73 kg (161 lb).  Medication Review: complete    The next two questions are to help us understand your food security.  If you are feeling you need any assistance in this area, we have resources available to support you today.          3/5/2025   SDOH- Food Insecurity   Within the past 12 months, did you worry that your food would run out before you got money to buy more? N   Within the past 12 months, did the food you bought just not last and you didn t have money to get more? N         Health Care Directive:  Patient has a Health Care Directive on file      Norma J. Gosselin, LPN      "

## 2025-03-06 NOTE — TELEPHONE ENCOUNTER
Screening Questions for the Scheduling of Screening Colonoscopies   (If Colonoscopy is diagnostic, Provider should review the chart before scheduling.)  Are you younger than 45 or older than 80?  NO  Do you take aspirin or fish oil?  ASPIRIN (if yes, tell patient to stop 1 week prior to Colonoscopy)  Do you take warfarin (Coumadin), clopidogrel (Plavix), apixaban (Eliquis), dabigatram (Pradaxa), rivaroxaban (Xarelto) or any blood thinner? NO  Do you take semaglutide (Ozempic or Wegovy), tirzepatide (Mounjaro or Zepbound), liraglutide (Victoza), or dulaglutide (Trulicity)? NO  Do you use oxygen or a CPAP at home?  NO  Do you have kidney disease? NO  Are you on dialysis? NO  Have you had a stroke or heart attack in the last year? NO  Have you had a stent in your heart or any blood vessel in the last year? NO  Have you had a transplant of any organ? NO  Have you had a colonoscopy or upper endoscopy (EGD) before? YES         When?  2012  Date of scheduled Colonoscopy. 05/06/2025  Provider [facility]  Pharmacy [facility]

## 2025-03-06 NOTE — PROGRESS NOTES
Preventive Care Visit  [facility]  Hemanth Cunningham MD, Internal Medicine  Mar 6, 2025      Assessment & Plan  Type 1 Diabetes Mellitus  Type 1 diabetes mellitus with recent instability. Current management includes Lantus and Humalog. Dexcom data shows 71% in range, no lows, 28% high, and 1% very high. Estimated A1c is 7.1%. Evening hyperglycemia noted. Potential insulin resistance with metoprolol, switched to carvedilol 3.125 mg to aid blood sugar control.  - Continue Lantus and Humalog  - Recommend dietary modifications for evening hyperglycemia  - Order labs for diabetes management  - Continue carvedilol 3.125 mg daily    Hypertension  Hypertension with recent improvement following weight loss. Current medications include lisinopril 10 mg and carvedilol 3.125 mg. Blood pressure is well controlled.  - Continue lisinopril 10 mg daily  - Continue carvedilol 3.125 mg daily    Atrial Fibrillation  Atrial fibrillation, not currently active. Previously monitored with a cardiac monitor and currently using an Apple Watch for home monitoring. Off anticoagulants due to absence of recent episodes. Potential need to resume anticoagulants if atrial fibrillation recurs.  - Continue home monitoring with Apple Watch  - Consider resuming anticoagulants if atrial fibrillation recurs    Hyperlipidemia  Hyperlipidemia managed with simvastatin 10 mg. No history of abnormal lipid levels. Statin use is primarily for vascular calcification and diabetes-related risk. Discussed extrapolation of statin benefits from type 2 to type 1 diabetes.  - Continue simvastatin 10 mg daily    Subclinical Hypothyroidism  Subclinical hypothyroidism likely related to previous illness. TSH was slightly elevated at 5.25, possibly due to past pneumonia. Discussed potential impact of illness on TSH levels.  - Recheck thyroid function tests    Normocytic Anemia  Slight normocytic anemia noted on previous labs. No specific symptoms  reported.  - Follow up with repeat CBC    Annual Wellness Exam  Routine annual wellness examination. No major concerns reported. Recent history of pneumonia in the fall, which has resolved. Weight loss noted, positively impacting insulin requirements and blood pressure.  - Continue current management and lifestyle modifications  - Encourage adherence to diet to maintain weight and blood pressure    Colorectal Cancer Screening  Colorectal cancer screening due. Last Cologuard test was three years ago. Prefers colonoscopy for screening. Discussed risks of colonoscopy, including increased perforation risk with age, and benefits of polyp removal during the procedure.  - Order colonoscopy for colorectal cancer screening    Bee Sting Allergy  Bee sting allergy. EpiPen supply needs updating as current ones are nearing expiration.  - Ensure EpiPen prescription is up to date and refill as needed    COVID-19 Vaccination  Received COVID-19 vaccination in the fall. Discussion about potential second dose, but not deemed necessary at this time due to seasonal considerations. Discussed limited duration of vaccine efficacy and potential need for a second dose if traveling.  - Consider second COVID-19 vaccine dose if he plans to travel or if deemed necessary    Signed, Hemanth Cunningham MD, FAAP, FACP  Internal Medicine & Pediatrics      Minnie Day is a 74 year old, presenting for the following:  Medicare Visit and Recheck Medication    History of Present Illness  The patient, with a history of type 1 diabetes, atrial fibrillation, and a recent bout of pneumonia, presents for an annual wellness visit. He reports a significant recovery from the pneumonia episode last fall, which resulted in weight loss and a decrease in insulin requirements. Despite the weight loss, the patient wishes to maintain his current insulin prescription. He also notes an improvement in his blood pressure.    The patient reports experiencing ectopy but has  not had any recent episodes of atrial fibrillation. He is monitoring his heart rhythm with an Apple Watch and has discontinued anticoagulants. He also mentions a switch from metoprolol to carvedilol for ectopy management, believing that carvedilol may help better control his blood sugar.    The patient also reports a decrease in his lisinopril dosage to 10 milligrams, attributing this change to his recent weight loss. He expresses a desire to continue taking an ACE inhibitor. He is also on a 10 milligram dose of simvastatin due to vascular calcification observed in previous imaging studies.    The patient mentions occasional numbness in his toes, suggesting possible neuropathy. However, he also reports being very ticklish on his feet. He expresses a preference for colonoscopy over Cologuard for colon cancer screening, despite having used Cologuard in the past.              History of Present Illness       Diabetes:   He presents for follow up of diabetes.   He is checking home blood glucose with a continuous glucose monitor.       He is aware of hypoglycemia symptoms including shakiness, weakness, blurred vision and confusion.                  Vascular Disease:  He presents for follow up of vascular disease.     He never takes nitroglycerin. He is not taking daily aspirin.              Advance Care Planning  Patient has a Health Care Directive on file  Advance care planning document is on file and is current.      3/5/2025   General Health   How would you rate your overall physical health? Good   Feel stress (tense, anxious, or unable to sleep) Only a little   (!) STRESS CONCERN      3/5/2025   Nutrition   Diet: Diabetic         3/5/2025   Exercise   Days per week of moderate/strenous exercise 7 days   Average minutes spent exercising at this level 60 min         3/5/2025   Social Factors   Frequency of gathering with friends or relatives Three times a week   Worry food won't last until get money to buy more No    Food not last or not have enough money for food? No   Do you have housing? (Housing is defined as stable permanent housing and does not include staying ouside in a car, in a tent, in an abandoned building, in an overnight shelter, or couch-surfing.) Yes   Are you worried about losing your housing? No   Lack of transportation? No   Unable to get utilities (heat,electricity)? No         3/5/2025   Fall Risk   Fallen 2 or more times in the past year? No   Trouble with walking or balance? No          3/5/2025   Activities of Daily Living- Home Safety   Needs help with the following daily activites None of the above   Safety concerns in the home No grab bars in the bathroom         3/5/2025   Dental   Dentist two times every year? Yes         3/5/2025   Hearing Screening   Hearing concerns? (!) I NEED TO ASK PEOPLE TO SPEAK UP OR REPEAT THEMSELVES.    (!) IT'S HARD TO FOLLOW A CONVERSATION IN A NOISY RESTAURANT OR CROWDED ROOM.    (!) TROUBLE FOLLOWING DIALOGUE IN THE THEATHER.       Multiple values from one day are sorted in reverse-chronological order         3/5/2025   Driving Risk Screening   Patient/family members have concerns about driving No         3/5/2025   General Alertness/Fatigue Screening   Have you been more tired than usual lately? No         3/5/2025   Urinary Incontinence Screening   Bothered by leaking urine in past 6 months No            Today's PHQ-2 Score:       3/5/2025     9:02 AM   PHQ-2 ( 1999 Pfizer)   Q1: Little interest or pleasure in doing things 0   Q2: Feeling down, depressed or hopeless 0   PHQ-2 Score 0    Q1: Little interest or pleasure in doing things Not at all   Q2: Feeling down, depressed or hopeless Not at all   PHQ-2 Score 0       Patient-reported           3/5/2025   Substance Use   Alcohol more than 3/day or more than 7/wk No   Do you have a current opioid prescription? No   How severe/bad is pain from 1 to 10? 0/10 (No Pain)   Do you use any other substances recreationally?  No     Social History     Tobacco Use    Smoking status: Former     Current packs/day: 0.00     Average packs/day: 1 pack/day for 15.0 years (15.0 ttl pk-yrs)     Types: Cigarettes     Start date: 1965     Quit date: 1980     Years since quittin.2    Smokeless tobacco: Never   Vaping Use    Vaping status: Never Used   Substance Use Topics    Alcohol use: Not Currently     Comment: occ, 1-2 glass of wine daily    Drug use: No       ASCVD Risk   The 10-year ASCVD risk score (Ángel VARMA, et al., 2019) is: 32.3%    Values used to calculate the score:      Age: 74 years      Sex: Male      Is Non- : No      Diabetic: Yes      Tobacco smoker: No      Systolic Blood Pressure: 132 mmHg      Is BP treated: No      HDL Cholesterol: 61 mg/dL      Total Cholesterol: 131 mg/dL            Reviewed and updated as needed this visit by Provider   Tobacco  Allergies  Meds  Problems  Med Hx  Surg Hx  Fam Hx              Current providers sharing in care for this patient include:  Patient Care Team:  Hemanth Cunningham MD as PCP - General (Internal Medicine)  Hemanth Cunningham MD as Assigned PCP  Hemanth Cunningham MD as MD (Internal Medicine)  Sebastien Rowe MD as MD (Orthopaedic Surgery)  Chun Padilla MD as MD (Internal Medicine)  Sebastien Rowe MD as MD (Orthopaedic Surgery)  Jaime Wisdom MD as Assigned Musculoskeletal Provider  Isael العلي DO as Assigned Heart and Vascular Provider    The following health maintenance items are reviewed in Epic and correct as of today:  Health Maintenance   Topic Date Due    COLORECTAL CANCER SCREENING  2025    COVID-19 Vaccine ( season) 2025 (Originally 3/11/2025)    MICROALBUMIN  2025    DTAP/TDAP/TD IMMUNIZATION (4 - Td or Tdap) 2025    EYE EXAM  2025    A1C  2025    TSH W/FREE T4 REFLEX  2025    BMP  2025    LIPID  2025    MEDICARE ANNUAL  "WELLNESS VISIT  03/06/2026    DIABETIC FOOT EXAM  03/06/2026    FALL RISK ASSESSMENT  03/06/2026    PSA  05/28/2027    ADVANCE CARE PLANNING  03/06/2030    HEPATITIS C SCREENING  Completed    PHQ-2 (once per calendar year)  Completed    INFLUENZA VACCINE  Completed    Pneumococcal Vaccine: 50+ Years  Completed    URINALYSIS  Completed    ZOSTER IMMUNIZATION  Completed    RSV VACCINE  Completed    AORTIC ANEURYSM SCREENING (SYSTEM ASSIGNED)  Completed    HPV IMMUNIZATION  Aged Out    MENINGITIS IMMUNIZATION  Aged Out            Objective    Exam  /80   Pulse 78   Temp (!) 96.5  F (35.8  C) (Tympanic)   Resp 16   Ht 1.778 m (5' 10\")   Wt 73 kg (161 lb)   SpO2 98%   BMI 23.10 kg/m     Estimated body mass index is 23.1 kg/m  as calculated from the following:    Height as of this encounter: 1.778 m (5' 10\").    Weight as of this encounter: 73 kg (161 lb).    Physical Exam  Physical Exam  VITALS: P- 78, BP- 161/  MEASUREMENTS: BMI- 23.0.  CHEST: Lungs clear to auscultation bilaterally.  CARDIOVASCULAR: Regular rate and rhythm, no murmurs. No carotid bruits.  EXTREMITIES: Skin healthy, DP pulses palpable. No edema.  NEUROLOGICAL: Sensation intact in feet.    Foot Exam:  3/6/2025  Foot deformity? No   Current or previous foot ulcer? No   Current or previous ulcerative callus? No   Partial or complete amputation of foot? No   Peripheral neuropathy with callus formation? No   Poor circulation? No   pedal pulses palpable    Sensation Testing done with monofilament   Right Foot: Sensation Normal at all points  Left Foot: Sensation Normal at all points         Risk Category: 0- No loss of protective sensation          3/6/2025   Mini Cog   Clock Draw Score 2 Normal   3 Item Recall 3 objects recalled   Mini Cog Total Score 5              Signed Electronically by: Hemanth Cunningham MD    Answers submitted by the patient for this visit:  Combined Diabetes / Lipid/ Hypertension Visit (Submitted on 3/5/2025)  Dietary " sodium intake:: No  Questionnaire about: Chronic problems general questions HPI Form (Submitted on 3/5/2025)  Chief Complaint: Chronic problems general questions HPI Form

## 2025-03-06 NOTE — PATIENT INSTRUCTIONS
Patient Education   Preventive Care Advice   This is general advice given by our system to help you stay healthy. However, your care team may have specific advice just for you. Please talk to your care team about your preventive care needs.  Nutrition  Eat 5 or more servings of fruits and vegetables each day.  Try wheat bread, brown rice and whole grain pasta (instead of white bread, rice, and pasta).  Get enough calcium and vitamin D. Check the label on foods and aim for 100% of the RDA (recommended daily allowance).  Lifestyle  Exercise at least 150 minutes each week  (30 minutes a day, 5 days a week).  Do muscle strengthening activities 2 days a week. These help control your weight and prevent disease.  No smoking.  Wear sunscreen to prevent skin cancer.  Have a dental exam and cleaning every 6 months.  Yearly exams  See your health care team every year to talk about:  Any changes in your health.  Any medicines your care team has prescribed.  Preventive care, family planning, and ways to prevent chronic diseases.  Shots (vaccines)   HPV shots (up to age 26), if you've never had them before.  Hepatitis B shots (up to age 59), if you've never had them before.  COVID-19 shot: Get this shot when it's due.  Flu shot: Get a flu shot every year.  Tetanus shot: Get a tetanus shot every 10 years.  Pneumococcal, hepatitis A, and RSV shots: Ask your care team if you need these based on your risk.  Shingles shot (for age 50 and up)  General health tests  Diabetes screening:  Starting at age 35, Get screened for diabetes at least every 3 years.  If you are younger than age 35, ask your care team if you should be screened for diabetes.  Cholesterol test: At age 39, start having a cholesterol test every 5 years, or more often if advised.  Bone density scan (DEXA): At age 50, ask your care team if you should have this scan for osteoporosis (brittle bones).  Hepatitis C: Get tested at least once in your life.  STIs (sexually  transmitted infections)  Before age 24: Ask your care team if you should be screened for STIs.  After age 24: Get screened for STIs if you're at risk. You are at risk for STIs (including HIV) if:  You are sexually active with more than one person.  You don't use condoms every time.  You or a partner was diagnosed with a sexually transmitted infection.  If you are at risk for HIV, ask about PrEP medicine to prevent HIV.  Get tested for HIV at least once in your life, whether you are at risk for HIV or not.  Cancer screening tests  Cervical cancer screening: If you have a cervix, begin getting regular cervical cancer screening tests starting at age 21.  Breast cancer scan (mammogram): If you've ever had breasts, begin having regular mammograms starting at age 40. This is a scan to check for breast cancer.  Colon cancer screening: It is important to start screening for colon cancer at age 45.  Have a colonoscopy test every 10 years (or more often if you're at risk) Or, ask your provider about stool tests like a FIT test every year or Cologuard test every 3 years.  To learn more about your testing options, visit:   .  For help making a decision, visit:   https://bit.ly/kx44052.  Prostate cancer screening test: If you have a prostate, ask your care team if a prostate cancer screening test (PSA) at age 55 is right for you.  Lung cancer screening: If you are a current or former smoker ages 50 to 80, ask your care team if ongoing lung cancer screenings are right for you.  For informational purposes only. Not to replace the advice of your health care provider. Copyright   2023 [facility]. All rights reserved. Clinically reviewed by the [facility]. [employer] 553131 - REV 01/24.  Learning About Activities of Daily Living  What are activities of daily living?     Activities of daily living (ADLs) are the basic self-care tasks you do every day. These include eating, bathing, dressing,  and moving around.  As you age, and if you have health problems, you may find that it's harder to do some of these tasks. If so, your doctor can suggest ideas that may help.  To measure what kind of help you may need, your doctor will ask how well you are able to do ADLs. Let your doctor know if there are any tasks that you are having trouble doing. This is an important first step to getting help. And when you have the help you need, you can stay as independent as possible.  How will a doctor assess your ADLs?  Asking about ADLs is part of a routine health checkup your doctor will likely do as you age. Your health check might be done in a doctor's office, in your home, or at a hospital. The goal is to find out if you are having any problems that could make it hard to care for yourself or that make it unsafe for you to be on your own.  To measure your ADLs, your doctor will ask how hard it is for you to do routine tasks. Your doctor may also want to know if you have changed the way you do a task because of a health problem. Your doctor may watch how you:  Walk back and forth.  Keep your balance while you stand or walk.  Move from sitting to standing or from a bed to a chair.  Button or unbutton a shirt or sweater.  Remove and put on your shoes.  It's common to feel a little worried or anxious if you find you can't do all the things you used to be able to do. Talking with your doctor about ADLs is a way to make sure you're as safe as possible and able to care for yourself as well as you can. You may want to bring a caregiver, friend, or family member to your checkup. They can help you talk to your doctor.  Follow-up care is a key part of your treatment and safety. Be sure to make and go to all appointments, and call your doctor if you are having problems. It's also a good idea to know your test results and keep a list of the medicines you take.  Current as of: October 24, 2023  Content Version: 14.3    2024 [facility]  [employer].   Care instructions adapted under license by your healthcare professional. If you have questions about a medical condition or this instruction, always ask your healthcare professional. [employer] disclaims any warranty or liability for your use of this information.    Hearing Loss: Care Instructions  Overview     Hearing loss is a sudden or slow decrease in how well you hear. It can range from slight to profound. Permanent hearing loss can occur with aging. It also can happen when you are exposed long-term to loud noise. Examples include listening to loud music, riding motorcycles, or being around other loud machines.  Hearing loss can affect your work and home life. It can make you feel lonely or depressed. You may feel that you have lost your independence. But hearing aids and other devices can help you hear better and feel connected to others.  Follow-up care is a key part of your treatment and safety. Be sure to make and go to all appointments, and call your doctor if you are having problems. It's also a good idea to know your test results and keep a list of the medicines you take.  How can you care for yourself at home?  Avoid loud noises whenever possible. This helps keep your hearing from getting worse.  Always wear hearing protection around loud noises.  Wear a hearing aid as directed.  A professional can help you pick a hearing aid that will work best for you.  You can also get hearing aids over the counter for mild to moderate hearing loss.  Have hearing tests as your doctor suggests. They can show whether your hearing has changed. Your hearing aid may need to be adjusted.  Use other devices as needed. These may include:  Telephone amplifiers and hearing aids that can connect to a television, stereo, radio, or microphone.  Devices that use lights or vibrations. These alert you to the doorbell, a ringing telephone, or a baby monitor.  Television closed-captioning. This shows the  "words at the bottom of the screen. Most new TVs can do this.  TTY (text telephone). This lets you type messages back and forth on the telephone instead of talking or listening. These devices are also called TDD. When messages are typed on the keyboard, they are sent over the phone line to a receiving TTY. The message is shown on a monitor.  Use text messaging, social media, and email if it is hard for you to communicate by telephone.  Try to learn a listening technique called speechreading. It is not lipreading. You pay attention to people's gestures, expressions, posture, and tone of voice. These clues can help you understand what a person is saying. Face the person you are talking to, and have them face you. Make sure the lighting is good. You need to see the other person's face clearly.  Think about counseling if you need help to adjust to your hearing loss.  When should you call for help?  Watch closely for changes in your health, and be sure to contact your doctor if:    You think your hearing is getting worse.     You have new symptoms, such as dizziness or nausea.   Where can you learn more?  Go to https://www.[employer].net/patiented  Enter R798 in the search box to learn more about \"Hearing Loss: Care Instructions.\"  Current as of: September 27, 2023  Content Version: 14.3    2024 [employer].   Care instructions adapted under license by your healthcare professional. If you have questions about a medical condition or this instruction, always ask your healthcare professional. [employer] disclaims any warranty or liability for your use of this information.       "

## 2025-03-10 ENCOUNTER — HOSPITAL ENCOUNTER (OUTPATIENT)
Dept: RESPIRATORY THERAPY | Facility: OTHER | Age: 75
Discharge: HOME | End: 2025-03-10
Attending: INTERNAL MEDICINE
Payer: MEDICARE

## 2025-03-10 DIAGNOSIS — J18.9 PNEUMONIA OF RIGHT MIDDLE LOBE DUE TO INFECTIOUS ORGANISM: ICD-10-CM

## 2025-03-10 DIAGNOSIS — Z87.891 PERSONAL HISTORY OF TOBACCO USE, PRESENTING HAZARDS TO HEALTH: ICD-10-CM

## 2025-03-10 PROCEDURE — 94726 PLETHYSMOGRAPHY LUNG VOLUMES: CPT

## 2025-03-10 PROCEDURE — 94729 DIFFUSING CAPACITY: CPT

## 2025-03-10 PROCEDURE — 999N000157 HC STATISTIC RCP TIME EA 10 MIN

## 2025-03-10 PROCEDURE — 94060 EVALUATION OF WHEEZING: CPT

## 2025-03-10 PROCEDURE — 250N000009 HC RX 250: Performed by: INTERNAL MEDICINE

## 2025-03-10 RX ORDER — ALBUTEROL SULFATE 0.83 MG/ML
2.5 SOLUTION RESPIRATORY (INHALATION) ONCE
Status: COMPLETED | OUTPATIENT
Start: 2025-03-10 | End: 2025-03-10

## 2025-03-10 RX ORDER — POLYETHYLENE GLYCOL 3350, SODIUM CHLORIDE, SODIUM BICARBONATE, POTASSIUM CHLORIDE 420; 11.2; 5.72; 1.48 G/4L; G/4L; G/4L; G/4L
4000 POWDER, FOR SOLUTION ORAL ONCE
Qty: 4000 ML | Refills: 0 | Status: SHIPPED | OUTPATIENT
Start: 2025-04-29 | End: 2025-04-29

## 2025-03-10 RX ORDER — BISACODYL 5 MG/1
TABLET, DELAYED RELEASE ORAL
Qty: 2 TABLET | Refills: 0 | Status: SHIPPED | OUTPATIENT
Start: 2025-04-29

## 2025-03-10 RX ADMIN — ALBUTEROL SULFATE 2.5 MG: 2.5 SOLUTION RESPIRATORY (INHALATION) at 09:30

## 2025-03-12 NOTE — PROGRESS NOTES
[facility] HEART CARE   CARDIOLOGY PROGRESS NOTE     Chief Complaint   Patient presents with    Atrial Fib          Diagnosis:  1.  A-fib, new on 11/14/2024.   -Eliquis and Coreg.   -None on 112/11/24.  2.  DM-1-controlled.   -5.7% on 6/7/2024.  3.  Hyperlipidemia-controlled.  4.  ED.  5.  CKD-3.  6.  Anemia.  7.  Hypomagnesia.   -1.9 on 12/3/2024.   -1.8 11/18/2024.    -1.5 on 11/14/2024.  8.  HF-diastolic.   -Not assessed on 11/14/2024 2/2 fibrillation.   -Indeterminant on 4/21/2010.   -Indeterminant 5/15/2012.  -Elevated BNP.   -1762 and 11/14/2024.  9.  Vascular calcifications on 7/30/2024.  10.  Aortic aneurysm dilation on 7/30/2024.  11.  Hyponatremia.   -138 on 3/6/2025.   -116/severe on 11/14/2024.  12.  Pneumonia.   -Right upper and middle lobes on CT scan with scattered foci of consolidation to right lower lobe, left upper lobe, and no PE.        Assessment/Plan:    Seen on 12/3/2024.  Follow-up on Zio patch/US carotids.    Zio patch on 12/11/2024.  Showed x7 episodes of SVT up to 10 beats.  PVCs 3.8%.  No A-fib.  Slowest heart rate 63 bpm.  US carotids on 12/11/2024.  No significant disease.  No refills.  Labs on 3/6/2025.  Vascular calcifications.  Increase Zocor from 5 to 10 mg daily.  Stop Eliquis.  1.  A-fib: Here in follow-up to atrial fibrillation.  Had a Zio patch which was deficient of A-fib.  He has had no evidence of A-fib on his watch.  As result, I recommended he stop Eliquis.  Certainly, if he experiences A-fib in the future, he should start back on anticoagulation and be seen in follow-up.  Originally, he was noted to have A-fib on 11/15 and into 11/16/24 while hospitalized at [facility].  Heart rates from the 30s.  His echo from 11/14/24 also showed evidence of atrial fibrillation.  Had been hospitalized for sepsis, the result of pneumonia.  He was identified as having this rhythm overnight while being hospitalized.  He had no episodes previously and has had no episodes since.  His  echocardiogram from 11/18/2024 showed normal size atria, no pericardial effusion, no significant valvular or chamber abnormalities.  He was given Coreg 25 mg twice a day and diltiazem added which has since been removed.  He converted on his own.  He was continued on Eliquis 5 mg twice a day.  He does have an Apple Watch that would recognize if he has atrial fibrillation.  This watch has not identified this irregular rhythm to date. I believe his atrial fib is was the result of pneumonia which would be a reversible cause of A-fib.   2.  Hyperlipidemia: Continue on Zocor 10 mg once daily.  3.  Hypertension: Blood pressure today 132/72.  Slightly elevated.  We discussed this abnormality.  Currently on Coreg 6.25 mg twice a day and lisinopril 10 mg daily.  Will increase lisinopril to 20 mg daily.  4.  Carotid artery calcifications: Noted back in 2012.  Had a repeat ultrasound of his carotid arteries.  Carotid arteries showed no significant stenosis.  5.  Anemia: Resolved with hemoglobin of 14.6 on 3/6/2025.  6.  Hyponatremia: Down to 116 on 11/14/2024.  Resolved with sodium 138 on 3/6/2025.  7.  Will stop Eliquis today.  Patient plans to follow-up with his primary care doc and if has any cardiac concerns in the future will be seen back.        Interval history:  See above.      Relevant testing:  Echo on 11/14/2024:  Poor acoustic windows.  Global and regional left ventricular function is normal with an EF of 55-60%.  Right ventricular function, chamber size, wall motion, and thickness are  normal.  Both atria appear normal.  Pulmonary artery systolic pressure is normal.  No pericardial effusion is present.  No significant valvular abnormalities present.  IVC diameter and respiratory changes fall into an intermediate range  suggesting an RA pressure of 8 mmHg.  There is no prior study for direct comparison.    CT pelvic bones on 7/30/2024:  No bowel dilatation or wall thickening. No free fluid or free air.  Vascular  calcifications. Aortic aneurysmal dilatation. No  pathologically enlarged lymph nodes. Retroperitoneal hematoma along  the right pelvic wall. Extends anteriorly along the extraperitoneal  space anterior to the bladder.                                                                      IMPRESSION:  1.  Acute comminuted nondisplaced right acetabular fracture extending  to the right pubic eminence, right superior pubic ramus and right  iliac bone.  2.  Retroperitoneal/extraperitoneal hematoma along the right and  anterior pelvis.    US abdominal aorta on 2/25/2019:  Normal-sized abdominal aorta.           ICD-10-CM    1. PVC's (premature ventricular contractions)  I49.3             Past Medical History:   Diagnosis Date    Anemia     CKD (chronic kidney disease) stage 3, GFR 30-59 ml/min (H)     GERD (gastroesophageal reflux disease)     Hepatitis 2011    elevated AST due to excess ETOH use (5 drinks/day)    HTN (hypertension)     Hyperlipidemia LDL goal <100 3/7/2013    MGUS (monoclonal gammopathy of unknown significance)      IgG Lambda. Followed by Dr Rogers    Proteinuria 1/22/2015    PVC (premature ventricular contraction)     Retinopathy due to secondary diabetes (H)     s/p laser therapy left    Subclinical hypothyroidism 6/11/2018    Tobacco abuse     20 pack hx. Quit 1991    Type 1 diabetes mellitus with nephropathy (goal A1C<7) 1978       Past Surgical History:   Procedure Laterality Date    ENDOSCOPIC RELEASE CARPAL TUNNEL Right 10/14/2022    Procedure: Right Endoscopic Carpal Tunnel Release;  Surgeon: Sebastien Rowe MD;  Location:  OR    HAND SURGERY  01/01/2007    contracture release    VOCAL CORD INJECTION      vocal cord nodules       Allergies   Allergen Reactions    Wasp Venom Protein Anaphylaxis     Wasp sting    Lipitor [Atorvastatin Calcium]       Elevated Ck and myalgias       Current Outpatient Medications   Medication Sig Dispense Refill    ACCU-CHEK GUIDE test strip TESTING 6 TIMES  DAILY 100 strip 11    aspirin 81 MG EC tablet Take 1 tablet (81 mg) by mouth daily. 90 tablet 4    [START ON 4/29/2025] bisacodyl (DULCOLAX) 5 MG EC tablet Take as directed by colonoscopy prep instructions 2 tablet 0    blood glucose (NO BRAND SPECIFIED) lancets standard Dispense item covered by insurance. Test blood sugar 1 times daily. 100 each 11    blood glucose monitoring (NO BRAND SPECIFIED) meter device kit Dispense option covered by insurance. Test blood sugar 1 times daily. 1 kit 11    carvedilol (COREG) 3.125 MG tablet Take 1 tablet (3.125 mg) by mouth 2 times daily (with meals). (Patient taking differently: Take 6.25 mg by mouth 2 times daily (with meals).) 90 tablet 4    Continuous Blood Gluc  (DEXCOM G7 ) EMERSON Use to read blood sugars as per 's instructions. 1 each 0    Continuous Glucose Sensor (DEXCOM G7 SENSOR) MISC CHANGE EVERY 10 DAYS 3 each 11    EPINEPHrine (ANY BX GENERIC EQUIV) 0.3 MG/0.3ML injection 2-pack Inject 0.3 mLs (0.3 mg) into the muscle as needed for anaphylaxis. May repeat one time in 5-15 minutes if response to initial dose is inadequate. 2 each 11    insulin glargine (LANTUS SOLOSTAR) 100 UNIT/ML pen ADMINISTER 17 UNITS UNDER THE SKIN DAILY 15 mL 11    insulin lispro (HUMALOG KWIKPEN) 100 UNIT/ML (1 unit dial) KWIKPEN ADMINISTER 3 UNITS UNDER THE SKIN BEFORE MEALS PER SLIDING SCALE. MAX OF 20 UNITS PER DAY 15 mL 11    insulin pen needle (TECHLITE PEN NEEDLES) 31G X 8 MM miscellaneous Use 6 pen needles daily or as directed. 400 each 11    lisinopril (ZESTRIL) 10 MG tablet Take 1 tablet (10 mg) by mouth daily. 90 tablet 4    Multiple Vitamin (MULTI VITAMIN  MENS) TABS Take 1 tablet by mouth daily. 30 tablet     omeprazole (PRILOSEC) 20 MG DR capsule Take 1 capsule (20 mg) by mouth daily. 90 capsule 4    [START ON 4/29/2025] polyethylene glycol-electrolytes (NULYTELY) 420 g solution Take 4,000 mLs by mouth once for 1 dose. 4000 mL 0    polyethylene  glycol-propylene glycol (SYSTANE ULTRA) 0.4-0.3 % SOLN ophthalmic solution Place 1 drop into both eyes 4 times daily as needed for dry eyes.      sildenafil (VIAGRA) 100 MG tablet Take 1 tablet (100 mg) by mouth daily as needed 10 tablet 5    simvastatin (ZOCOR) 10 MG tablet Take 1 tablet (10 mg) by mouth at bedtime. 90 tablet 4    tamsulosin (FLOMAX) 0.4 MG capsule TAKE ONE CAPSULE BY MOUTH EVERY DAY. DO NOT TAKE WITHIN 4 HOURS OF SILDENAFIL 90 capsule 4    UNABLE TO FIND Take 2 capsules by mouth daily. MEDICATION NAME: PRN DE Omega capsules for dry eyes         Social History     Socioeconomic History    Marital status:      Spouse name: Not on file    Number of children: Not on file    Years of education: Not on file    Highest education level: Not on file   Occupational History     Employer: RETIRED     Comment: anesthesiologist   Tobacco Use    Smoking status: Former     Current packs/day: 0.00     Average packs/day: 1 pack/day for 15.0 years (15.0 ttl pk-yrs)     Types: Cigarettes     Start date: 1965     Quit date: 1980     Years since quittin.2    Smokeless tobacco: Never   Vaping Use    Vaping status: Never Used   Substance and Sexual Activity    Alcohol use: Not Currently     Comment: occ, 1-2 glass of wine daily    Drug use: No    Sexual activity: Yes     Partners: Female   Other Topics Concern    Parent/sibling w/ CABG, MI or angioplasty before 65F 55M? 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[employer]     Social Drivers of Health     Financial Resource Strain: Low Risk  (3/5/2025)    Financial Resource Strain     Within the past 12 months, have you or your family members you live with been unable to get utilities (heat, electricity) when it was really needed?: No   Food Insecurity: Low Risk  (3/5/2025)    Food Insecurity     Within the past 12 months, did you worry that your food would run out before you got money to buy more?: No     Within the past  12 months, did the food you bought just not last and you didn t have money to get more?: No   Transportation Needs: Low Risk  (3/5/2025)    Transportation Needs     Within the past 12 months, has lack of transportation kept you from medical appointments, getting your medicines, non-medical meetings or appointments, work, or from getting things that you need?: No   Physical Activity: Sufficiently Active (3/5/2025)    Exercise Vital Sign     Days of Exercise per Week: 7 days     Minutes of Exercise per Session: 60 min   Stress: No Stress Concern Present (3/5/2025)    [employer] - Occupational Stress Questionnaire     Feeling of Stress : Only a little   Social Connections: Unknown (3/5/2025)    Social Connection and Isolation Panel [NHANES]     Frequency of Communication with Friends and Family: Not on file     Frequency of Social Gatherings with Friends and Family: Three times a week     Attends Zoroastrianism Services: Not on file     Active Member of Clubs or Organizations: Not on file     Attends Club or Organization Meetings: Not on file     Marital Status: Not on file   Interpersonal Safety: Low Risk  (3/13/2025)    Interpersonal Safety     Do you feel physically and emotionally safe where you currently live?: Yes     Within the past 12 months, have you been hit, slapped, kicked or otherwise physically hurt by someone?: No     Within the past 12 months, have you been humiliated or emotionally abused in other ways by your partner or ex-partner?: No   Housing Stability: Low Risk  (3/5/2025)    Housing Stability     Do you have housing? : Yes     Are you worried about losing your housing?: No       LAB RESULTS:   Office Visit on 11/25/2024   Component Date Value Ref Range Status    Sodium 11/25/2024 136  135 - 145 mmol/L Final    Potassium 11/25/2024 4.3  3.4 - 5.3 mmol/L Final    Carbon Dioxide (CO2) 11/25/2024 25  22 - 29 mmol/L Final    Anion Gap 11/25/2024 10  7 - 15 mmol/L Final    Urea  Nitrogen 11/25/2024 15.6  8.0 - 23.0 mg/dL Final    Creatinine 11/25/2024 0.88  0.67 - 1.17 mg/dL Final    GFR Estimate 11/25/2024 >90  >60 mL/min/1.73m2 Final    Calcium 11/25/2024 9.0  8.8 - 10.4 mg/dL Final    Chloride 11/25/2024 101  98 - 107 mmol/L Final    Glucose 11/25/2024 87  70 - 99 mg/dL Final    Alkaline Phosphatase 11/25/2024 101  40 - 150 U/L Final    AST 11/25/2024 24  0 - 45 U/L Final    ALT 11/25/2024 25  0 - 70 U/L Final    Protein Total 11/25/2024 6.8  6.4 - 8.3 g/dL Final    Albumin 11/25/2024 3.6  3.5 - 5.2 g/dL Final    Bilirubin Total 11/25/2024 0.8  <=1.2 mg/dL Final    Iron 11/25/2024 70  61 - 157 ug/dL Final    Iron Binding Capacity 11/25/2024 149 (L)  240 - 430 ug/dL Final    Iron Sat Index 11/25/2024 47 (H)  15 - 46 % Final    Vitamin B12 11/25/2024 1,369 (H)  232 - 1,245 pg/mL Final    WBC Count 11/25/2024 4.9  4.0 - 11.0 10e3/uL Final    RBC Count 11/25/2024 3.72 (L)  4.40 - 5.90 10e6/uL Final    Hemoglobin 11/25/2024 11.8 (L)  13.3 - 17.7 g/dL Final    Hematocrit 11/25/2024 34.4 (L)  40.0 - 53.0 % Final    MCV 11/25/2024 93  78 - 100 fL Final    MCH 11/25/2024 31.7  26.5 - 33.0 pg Final    MCHC 11/25/2024 34.3  31.5 - 36.5 g/dL Final    RDW 11/25/2024 12.3  10.0 - 15.0 % Final    Platelet Count 11/25/2024 444  150 - 450 10e3/uL Final    % Neutrophils 11/25/2024 56  % Final    % Lymphocytes 11/25/2024 27  % Final    % Monocytes 11/25/2024 12  % Final    % Eosinophils 11/25/2024 3  % Final    % Basophils 11/25/2024 1  % Final    % Immature Granulocytes 11/25/2024 1  % Final    NRBCs per 100 WBC 11/25/2024 0  <1 /100 Final    Absolute Neutrophils 11/25/2024 2.8  1.6 - 8.3 10e3/uL Final    Absolute Lymphocytes 11/25/2024 1.3  0.8 - 5.3 10e3/uL Final    Absolute Monocytes 11/25/2024 0.6  0.0 - 1.3 10e3/uL Final    Absolute Eosinophils 11/25/2024 0.2  0.0 - 0.7 10e3/uL Final    Absolute Basophils 11/25/2024 0.1  0.0 - 0.2 10e3/uL Final    Absolute Immature Granulocytes 11/25/2024 0.1  <=0.4  10e3/uL Final    Absolute NRBCs 11/25/2024 0.0  10e3/uL Final    Hold Specimen 11/25/2024 [facility]   Final        Review of systems: Negative except that which was noted in the HPI.    Physical examination:  /72   Pulse 70   Temp 97.6  F (36.4  C)   Resp 18   Wt 75.3 kg (166 lb)   SpO2 98%   BMI 23.82 kg/m      GENERAL APPEARANCE: healthy, alert and no distress  CHEST: lungs clear to auscultation.  CARDIOVASCULAR: regular rhythm, normal S1 with physiologic split S2, no S3 or S4 and no murmur, click or rub  EXTREMITIES: no clubbing, cyanosis or edema.              Thank you for allowing me to participate in the care of your patient. Please do not hesitate to contact me if you have any questions.     Isael العلي, DO

## 2025-03-13 ENCOUNTER — OFFICE VISIT (OUTPATIENT)
Dept: CARDIOLOGY | Facility: OTHER | Age: 75
End: 2025-03-13
Attending: INTERNAL MEDICINE
Payer: MEDICARE

## 2025-03-13 VITALS
TEMPERATURE: 97.6 F | OXYGEN SATURATION: 98 % | RESPIRATION RATE: 18 BRPM | BODY MASS INDEX: 23.82 KG/M2 | WEIGHT: 166 LBS | DIASTOLIC BLOOD PRESSURE: 72 MMHG | HEART RATE: 70 BPM | SYSTOLIC BLOOD PRESSURE: 132 MMHG

## 2025-03-13 DIAGNOSIS — I48.0 PAROXYSMAL ATRIAL FIBRILLATION (H): Primary | ICD-10-CM

## 2025-03-13 DIAGNOSIS — I10 ESSENTIAL HYPERTENSION: Chronic | ICD-10-CM

## 2025-03-13 DIAGNOSIS — I48.91 NEW ONSET A-FIB (H): ICD-10-CM

## 2025-03-13 DIAGNOSIS — I49.3 PVC'S (PREMATURE VENTRICULAR CONTRACTIONS): ICD-10-CM

## 2025-03-13 DIAGNOSIS — N18.2 CHRONIC KIDNEY DISEASE, STAGE 2 (MILD): Chronic | ICD-10-CM

## 2025-03-13 DIAGNOSIS — E10.21 TYPE 1 DIABETES MELLITUS WITH NEPHROPATHY (H): ICD-10-CM

## 2025-03-13 PROCEDURE — G0463 HOSPITAL OUTPT CLINIC VISIT: HCPCS

## 2025-03-13 RX ORDER — LISINOPRIL 20 MG/1
20 TABLET ORAL DAILY
Qty: 90 TABLET | Refills: 3 | Status: SHIPPED | OUTPATIENT
Start: 2025-03-13

## 2025-03-13 RX ORDER — CARVEDILOL 6.25 MG/1
6.25 TABLET ORAL 2 TIMES DAILY WITH MEALS
Qty: 180 TABLET | Refills: 3 | Status: SHIPPED | OUTPATIENT
Start: 2025-03-13

## 2025-03-13 ASSESSMENT — PAIN SCALES - GENERAL: PAINLEVEL_OUTOF10: NO PAIN (0)

## 2025-03-13 NOTE — NURSING NOTE
"Chief Complaint   Patient presents with    Atrial Fib       Initial /72   Pulse 70   Temp 97.6  F (36.4  C)   Resp 18   Wt 75.3 kg (166 lb)   SpO2 98%   BMI 23.82 kg/m   Estimated body mass index is 23.82 kg/m  as calculated from the following:    Height as of 3/6/25: 1.778 m (5' 10\").    Weight as of this encounter: 75.3 kg (166 lb).  Medication Reconciliation: complete    FOOD SECURITY SCREENING QUESTIONS  Hunger Vital Signs:  Within the past 12 months we worried whether our food would run out before we got money to buy more. Never  Within the past 12 months the food we bought just didn't last and we didn't have money to get more. Never  Estrellita Spencer LPN 3/13/2025 10:11 AM       Advance care directive on file? No   Advance care directive provided to patient? Nm/a       Estrellita Spencer LPN    "

## 2025-04-23 ENCOUNTER — LAB (OUTPATIENT)
Dept: LAB | Facility: OTHER | Age: 75
End: 2025-04-23
Attending: UROLOGY
Payer: MEDICARE

## 2025-04-23 ENCOUNTER — OFFICE VISIT (OUTPATIENT)
Dept: UROLOGY | Facility: OTHER | Age: 75
End: 2025-04-23
Attending: UROLOGY
Payer: MEDICARE

## 2025-04-23 VITALS
WEIGHT: 166 LBS | HEART RATE: 73 BPM | TEMPERATURE: 97.5 F | RESPIRATION RATE: 18 BRPM | HEIGHT: 70 IN | OXYGEN SATURATION: 97 % | BODY MASS INDEX: 23.77 KG/M2

## 2025-04-23 DIAGNOSIS — N40.1 BENIGN PROSTATIC HYPERPLASIA WITH URINARY FREQUENCY: Primary | ICD-10-CM

## 2025-04-23 DIAGNOSIS — R97.20 INCREASED PROSTATE SPECIFIC ANTIGEN (PSA) VELOCITY: ICD-10-CM

## 2025-04-23 DIAGNOSIS — R35.0 BENIGN PROSTATIC HYPERPLASIA WITH URINARY FREQUENCY: Primary | ICD-10-CM

## 2025-04-23 LAB
ALBUMIN UR-MCNC: 10 MG/DL
APPEARANCE UR: CLEAR
BILIRUB UR QL STRIP: NEGATIVE
COLOR UR AUTO: YELLOW
GLUCOSE UR STRIP-MCNC: NEGATIVE MG/DL
HGB UR QL STRIP: NEGATIVE
KETONES UR STRIP-MCNC: NEGATIVE MG/DL
LEUKOCYTE ESTERASE UR QL STRIP: NEGATIVE
NITRATE UR QL: NEGATIVE
PH UR STRIP: 6.5 [PH] (ref 5–9)
SP GR UR STRIP: 1.01 (ref 1–1.03)
UROBILINOGEN UR STRIP-MCNC: NORMAL MG/DL

## 2025-04-23 PROCEDURE — 81003 URINALYSIS AUTO W/O SCOPE: CPT | Mod: ZL

## 2025-04-23 NOTE — PROGRESS NOTES
Chief Complaint: No chief complaint on file.  .    HPI: Mr. Eh Perry is a 75 year old year old male presenting today April 23, 2025 in follow up for evaluation of an elevated PSA velocity which ended up normalizing.    History of BPH on tamsulosin.  Reports nocturia x 1    Last seen June 2024.  Recent PSA 0.68 ng/mL 1-month ago which was very normal and stable.    Past Medical History:   Diagnosis Date    Anemia     CKD (chronic kidney disease) stage 3, GFR 30-59 ml/min (H)     GERD (gastroesophageal reflux disease)     Hepatitis 2011    elevated AST due to excess ETOH use (5 drinks/day)    HTN (hypertension)     Hyperlipidemia LDL goal <100 3/7/2013    MGUS (monoclonal gammopathy of unknown significance)      IgG Lambda. Followed by Dr Rogers    Proteinuria 1/22/2015    PVC (premature ventricular contraction)     Retinopathy due to secondary diabetes (H)     s/p laser therapy left    Subclinical hypothyroidism 6/11/2018    Tobacco abuse     20 pack hx. Quit 1991    Type 1 diabetes mellitus with nephropathy (goal A1C<7) 1978       Past Surgical History:   Procedure Laterality Date    ENDOSCOPIC RELEASE CARPAL TUNNEL Right 10/14/2022    Procedure: Right Endoscopic Carpal Tunnel Release;  Surgeon: Sebastien Rowe MD;  Location: [facility] OR    HAND SURGERY  01/01/2007    contracture release    VOCAL CORD INJECTION      vocal cord nodules       Current Outpatient Medications   Medication Sig Dispense Refill    ACCU-CHEK GUIDE test strip TESTING 6 TIMES DAILY 100 strip 11    aspirin 81 MG EC tablet Take 1 tablet (81 mg) by mouth daily. 90 tablet 4    [START ON 4/29/2025] bisacodyl (DULCOLAX) 5 MG EC tablet Take as directed by colonoscopy prep instructions 2 tablet 0    blood glucose (NO BRAND SPECIFIED) lancets standard Dispense item covered by insurance. Test blood sugar 1 times daily. 100 each 11    blood glucose monitoring (NO BRAND SPECIFIED) meter device kit Dispense option covered by insurance. Test blood  sugar 1 times daily. 1 kit 11    carvedilol (COREG) 6.25 MG tablet Take 1 tablet (6.25 mg) by mouth 2 times daily (with meals). 180 tablet 3    Continuous Blood Gluc  (DEXCOM G7 ) EMERSON Use to read blood sugars as per 's instructions. 1 each 0    Continuous Glucose Sensor (DEXCOM G7 SENSOR) MISC CHANGE EVERY 10 DAYS 3 each 11    EPINEPHrine (ANY BX GENERIC EQUIV) 0.3 MG/0.3ML injection 2-pack Inject 0.3 mLs (0.3 mg) into the muscle as needed for anaphylaxis. May repeat one time in 5-15 minutes if response to initial dose is inadequate. 2 each 11    insulin glargine (LANTUS SOLOSTAR) 100 UNIT/ML pen ADMINISTER 17 UNITS UNDER THE SKIN DAILY 15 mL 11    insulin lispro (HUMALOG KWIKPEN) 100 UNIT/ML (1 unit dial) KWIKPEN ADMINISTER 3 UNITS UNDER THE SKIN BEFORE MEALS PER SLIDING SCALE. MAX OF 20 UNITS PER DAY 15 mL 11    insulin pen needle (MediaSilo PEN NEEDLES) 31G X 8 MM miscellaneous Use 6 pen needles daily or as directed. 400 each 11    lisinopril (ZESTRIL) 20 MG tablet Take 1 tablet (20 mg) by mouth daily. 90 tablet 3    Multiple Vitamin (MULTI VITAMIN  MENS) TABS Take 1 tablet by mouth daily. 30 tablet     omeprazole (PRILOSEC) 20 MG DR capsule Take 1 capsule (20 mg) by mouth daily. 90 capsule 4    [START ON 4/29/2025] polyethylene glycol-electrolytes (NULYTELY) 420 g solution Take 4,000 mLs by mouth once for 1 dose. 4000 mL 0    polyethylene glycol-propylene glycol (SYSTANE ULTRA) 0.4-0.3 % SOLN ophthalmic solution Place 1 drop into both eyes 4 times daily as needed for dry eyes.      sildenafil (VIAGRA) 100 MG tablet Take 1 tablet (100 mg) by mouth daily as needed 10 tablet 5    simvastatin (ZOCOR) 10 MG tablet Take 1 tablet (10 mg) by mouth at bedtime. 90 tablet 4    tamsulosin (FLOMAX) 0.4 MG capsule TAKE ONE CAPSULE BY MOUTH EVERY DAY. DO NOT TAKE WITHIN 4 HOURS OF SILDENAFIL 90 capsule 4    UNABLE TO FIND Take 2 capsules by mouth daily. MEDICATION NAME: PRN DE Omega capsules for dry  eyes         ALLERGIES: Wasp venom protein and Lipitor [atorvastatin calcium]     GENERAL PHYSICAL EXAM:   Vitals: There were no vitals taken for this visit.  There is no height or weight on file to calculate BMI.    GENERAL: Well groomed, well developed, well nourished male in NAD.  MS: Moving all four  NEURO: Alert and oriented x 3.  PSYCH: Normal mood and affect, pleasant and agreeable during interview and exam.    :  Prostate 30 gms smooth, benign     PVR- 53 ml  AUASS- 7  LABS: The last test results for Mr. Eh Perry were reviewed:  No results found for this or any previous visit (from the past 24 hours).    PSA -   Lab Results   Component Value Date    PSA 0.68 03/06/2025    PSA 0.94 05/28/2024    PSA 2.36 12/11/2023    PSA 1.13 12/06/2022    PSA 0.50 01/17/2022    PSA 0.80 08/05/2020    PSA 0.59 01/22/2020    PSA 0.60 01/09/2019    PSA 0.74 05/24/2017     BMP -   Recent Labs   Lab Test 03/06/25  0918 12/19/24  1129 12/03/24  1541 11/18/24  0812 11/18/24  0602 11/16/24  0751 11/16/24  0434 11/15/24  1135 11/15/24  0804 11/14/24  1938 11/14/24  1826    133* 137   < > 133*   < > 123*   < > 121*   < > 116*   POTASSIUM 5.0 4.7 4.0   < > 3.5   < > 4.0  --   --   --  4.1   CHLORIDE 103 99 103   < > 101   < > 93*  --   --   --  80*   CO2 26 26 26   < > 22   < > 19*  --   --   --  22   BUN 15.6 12.6 17.6   < > 15.3   < > 24.7*  --   --   --  25.1*   CR 0.86 0.84 0.87   < > 0.80   < > 0.84  --   --   --  1.05   * 132* 92   < > 136*   < > 322*   < >  --    < > 259*   MARIE 9.8 9.7 9.6   < > 8.3*   < > 8.8  --   --   --  9.0   MAG  --   --  1.9  --  1.8  --  2.1  --  2.2  --  1.5*   PHOS  --   --   --   --  3.1  --   --   --  2.8  --  2.3*    < > = values in this interval not displayed.       CBC -   Recent Labs   Lab Test 03/06/25  0918 12/19/24  1129 12/03/24  1541   WBC 5.0 5.0 4.8   HGB 14.6 13.1* 12.0*    272 340       ASSESSMENT:   Elevated PSA velocity  BPH with LUTS on tamsulosin    PLAN:    Patient doing well.  At this point he can be followed from a distance.  Dr. Cunningham can continue to check his PSAs if he is healthy enough.  As long as they remain low then he will not need to see us.    Patient was reassured.  I think he could go off the tamsulosin if he desires.  I think most of his symptoms are related to excessive caffeine and some alcohol at night.     10 minutes spent on the date of this encounter doing chart review, history and exam, documentation and further activities as noted above.      Chava Reinoso MD  [facility]

## 2025-05-06 ENCOUNTER — HOSPITAL ENCOUNTER (OUTPATIENT)
Facility: OTHER | Age: 75
Discharge: HOME | End: 2025-05-06
Attending: SURGERY | Admitting: SURGERY
Payer: MEDICARE

## 2025-05-06 ENCOUNTER — ANESTHESIA (OUTPATIENT)
Dept: SURGERY | Facility: OTHER | Age: 75
End: 2025-05-06
Payer: MEDICARE

## 2025-05-06 ENCOUNTER — ANESTHESIA EVENT (OUTPATIENT)
Dept: SURGERY | Facility: OTHER | Age: 75
End: 2025-05-06
Payer: MEDICARE

## 2025-05-06 VITALS
RESPIRATION RATE: 18 BRPM | TEMPERATURE: 96.3 F | BODY MASS INDEX: 23.77 KG/M2 | DIASTOLIC BLOOD PRESSURE: 85 MMHG | HEIGHT: 70 IN | HEART RATE: 65 BPM | WEIGHT: 166 LBS | OXYGEN SATURATION: 99 % | SYSTOLIC BLOOD PRESSURE: 175 MMHG

## 2025-05-06 PROCEDURE — 45378 DIAGNOSTIC COLONOSCOPY: CPT

## 2025-05-06 PROCEDURE — 99100 ANES PT EXTEME AGE<1 YR&>70: CPT

## 2025-05-06 PROCEDURE — G0121 COLON CA SCRN NOT HI RSK IND: HCPCS | Performed by: SURGERY

## 2025-05-06 PROCEDURE — 45378 DIAGNOSTIC COLONOSCOPY: CPT | Performed by: SURGERY

## 2025-05-06 PROCEDURE — 999N000010 HC STATISTIC ANES STAT CODE-CRNA PER MINUTE: Performed by: SURGERY

## 2025-05-06 PROCEDURE — 258N000003 HC RX IP 258 OP 636: Performed by: SURGERY

## 2025-05-06 PROCEDURE — 250N000009 HC RX 250: Performed by: NURSE ANESTHETIST, CERTIFIED REGISTERED

## 2025-05-06 PROCEDURE — 250N000011 HC RX IP 250 OP 636: Performed by: NURSE ANESTHETIST, CERTIFIED REGISTERED

## 2025-05-06 RX ORDER — FLUMAZENIL 0.1 MG/ML
0.2 INJECTION, SOLUTION INTRAVENOUS
Status: DISCONTINUED | OUTPATIENT
Start: 2025-05-06 | End: 2025-05-06 | Stop reason: HOSPADM

## 2025-05-06 RX ORDER — NALOXONE HYDROCHLORIDE 0.4 MG/ML
0.2 INJECTION, SOLUTION INTRAMUSCULAR; INTRAVENOUS; SUBCUTANEOUS
Status: DISCONTINUED | OUTPATIENT
Start: 2025-05-06 | End: 2025-05-06 | Stop reason: HOSPADM

## 2025-05-06 RX ORDER — NALOXONE HYDROCHLORIDE 0.4 MG/ML
0.4 INJECTION, SOLUTION INTRAMUSCULAR; INTRAVENOUS; SUBCUTANEOUS
Status: DISCONTINUED | OUTPATIENT
Start: 2025-05-06 | End: 2025-05-06 | Stop reason: HOSPADM

## 2025-05-06 RX ORDER — SODIUM CHLORIDE, SODIUM LACTATE, POTASSIUM CHLORIDE, CALCIUM CHLORIDE 600; 310; 30; 20 MG/100ML; MG/100ML; MG/100ML; MG/100ML
INJECTION, SOLUTION INTRAVENOUS CONTINUOUS
Status: DISCONTINUED | OUTPATIENT
Start: 2025-05-06 | End: 2025-05-06 | Stop reason: HOSPADM

## 2025-05-06 RX ORDER — LIDOCAINE 40 MG/G
CREAM TOPICAL
Status: DISCONTINUED | OUTPATIENT
Start: 2025-05-06 | End: 2025-05-06 | Stop reason: HOSPADM

## 2025-05-06 RX ORDER — PROPOFOL 10 MG/ML
INJECTION, EMULSION INTRAVENOUS CONTINUOUS PRN
Status: DISCONTINUED | OUTPATIENT
Start: 2025-05-06 | End: 2025-05-06

## 2025-05-06 RX ORDER — PROPOFOL 10 MG/ML
INJECTION, EMULSION INTRAVENOUS PRN
Status: DISCONTINUED | OUTPATIENT
Start: 2025-05-06 | End: 2025-05-06

## 2025-05-06 RX ORDER — LIDOCAINE HYDROCHLORIDE 20 MG/ML
INJECTION, SOLUTION INFILTRATION; PERINEURAL PRN
Status: DISCONTINUED | OUTPATIENT
Start: 2025-05-06 | End: 2025-05-06

## 2025-05-06 RX ADMIN — LIDOCAINE HYDROCHLORIDE 40 MG: 20 INJECTION, SOLUTION INFILTRATION; PERINEURAL at 10:40

## 2025-05-06 RX ADMIN — PROPOFOL 140 MCG/KG/MIN: 10 INJECTION, EMULSION INTRAVENOUS at 10:40

## 2025-05-06 RX ADMIN — PROPOFOL 60 MG: 10 INJECTION, EMULSION INTRAVENOUS at 10:40

## 2025-05-06 RX ADMIN — SODIUM CHLORIDE, SODIUM LACTATE, POTASSIUM CHLORIDE, AND CALCIUM CHLORIDE: .6; .31; .03; .02 INJECTION, SOLUTION INTRAVENOUS at 09:48

## 2025-05-06 ASSESSMENT — ACTIVITIES OF DAILY LIVING (ADL)
ADLS_ACUITY_SCORE: 53

## 2025-05-06 NOTE — H&P
GENERAL SURGERY CONSULTATION NOTE    Eh Perry   [address] [address] [address]  75 year old  male    Primary Care Provider:  Hemanth Cunningham      HPI: Eh Perry presents to day surgery in need of colonoscopy for screening for colon acncer.   Eh Perry denies family history of colon cancer. Patient denies change in bowel habits or blood in stools.     REVIEW OF SYSTEMS:    GENERAL: No fevers or chills. Denies fatigue, recent weight loss.  HEENT: No sinus drainage. No changes with vision or hearing. No difficulty swallowing.   LYMPHATICS:  Noswollen nodes in axilla, neck or groin.  CARDIOVASCULAR: Denies chest pain, palpitations and dyspnea on exertion.  PULMONARY: No shortness of breath or cough. No increase in sputum production.  GI: Denies melena,bright red blood in stools. No hematemesis. No constipation or diarrhea.  : No dysuria or hematuria.  SKIN: No recent rashes or ulcers.   HEMATOLOGY:  No history of easy bruising or bleeding.  ENDOCRINE:  No history of diabetes or thyroid problems.  NEUROLOGY:  No history of seizures or headaches. No motor or sensory changes.        Patient Active Problem List   Diagnosis    ACP (advance care planning)    Type 1 diabetes mellitus with nephropathy (goal A1C<7)    Essential hypertension    Gastroesophageal reflux disease, esophagitis presence not specified    Erectile dysfunction, unspecified erectile dysfunction type    Subclinical hypothyroidism    Dupuytren's contracture    Chronic kidney disease, stage 2 (mild)    MGUS (monoclonal gammopathy of unknown significance)    Neck pain    Hypomagnesemia    Hyponatremia    Hypophosphatemia    Type 1 diabetes mellitus with hyperglycemia (H)    Anemia, unspecified type    Elevated TSH    Atherosclerosis of iliac artery    New onset a-fib (H)    Chronic anticoagulation    Paroxysmal atrial fibrillation (H)    Mixed hyperlipidemia       Past Medical History:   Diagnosis Date    Anemia     CKD  (chronic kidney disease) stage 3, GFR 30-59 ml/min (H)     GERD (gastroesophageal reflux disease)     Hepatitis     elevated AST due to excess ETOH use (5 drinks/day)    HTN (hypertension)     Hyperlipidemia LDL goal <100 3/7/2013    MGUS (monoclonal gammopathy of unknown significance)      IgG Lambda. Followed by Dr Rogers    Proteinuria 2015    PVC (premature ventricular contraction)     Retinopathy due to secondary diabetes (H)     s/p laser therapy left    Subclinical hypothyroidism 2018    Tobacco abuse     20 pack hx. Quit     Type 1 diabetes mellitus with nephropathy (goal A1C<7)        Past Surgical History:   Procedure Laterality Date    ENDOSCOPIC RELEASE CARPAL TUNNEL Right 10/14/2022    Procedure: Right Endoscopic Carpal Tunnel Release;  Surgeon: Sebastien Rowe MD;  Location: [facility] OR    HAND SURGERY  2007    contracture release    VOCAL CORD INJECTION      vocal cord nodules       Family History   Problem Relation Age of Onset    Cancer Mother         peritoneal carcinoma    Diabetes Father     Diabetes Sister     Cerebrovascular Disease Maternal Grandmother     Diabetes Paternal Grandmother     Prostate Cancer Cousin     Anesthesia Reaction No family hx of     Bleeding Diathesis No family hx of     Colon Cancer No family hx of        Social History     Social History Narrative    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[employer]       Social History     Socioeconomic History    Marital status:      Spouse name: Not on file    Number of children: Not on file    Years of education: Not on file    Highest education level: Not on file   Occupational History     Employer: RETIRED     Comment: anesthesiologist   Tobacco Use    Smoking status: Former     Current packs/day: 0.00     Average packs/day: 1 pack/day for 15.0 years (15.0 ttl pk-yrs)     Types: Cigarettes     Start date: 1965     Quit date: 1980     Years since quittin.3    Smokeless tobacco: Never    Vaping Use    Vaping status: Never Used   Substance and Sexual Activity    Alcohol use: Not Currently     Comment: occ, 1-2 glass of wine daily    Drug use: No    Sexual activity: Yes     Partners: Female   Other Topics Concern    Parent/sibling w/ CABG, MI or angioplasty before 65F 55M? 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[employer]     Social Drivers of Health     Financial Resource Strain: Low Risk  (3/5/2025)    Financial Resource Strain     Within the past 12 months, have you or your family members you live with been unable to get utilities (heat, electricity) when it was really needed?: No   Food Insecurity: Low Risk  (3/5/2025)    Food Insecurity     Within the past 12 months, did you worry that your food would run out before you got money to buy more?: No     Within the past 12 months, did the food you bought just not last and you didn t have money to get more?: No   Transportation Needs: Low Risk  (3/5/2025)    Transportation Needs     Within the past 12 months, has lack of transportation kept you from medical appointments, getting your medicines, non-medical meetings or appointments, work, or from getting things that you need?: No   Physical Activity: Sufficiently Active (3/5/2025)    Exercise Vital Sign     Days of Exercise per Week: 7 days     Minutes of Exercise per Session: 60 min   Stress: No Stress Concern Present (3/5/2025)    [place] [employer] - Occupational Stress Questionnaire     Feeling of Stress : Only a little   Social Connections: Unknown (3/5/2025)    Social Connection and Isolation Panel [NHANES]     Frequency of Communication with Friends and Family: Not on file     Frequency of Social Gatherings with Friends and Family: Three times a week     Attends Church Services: Not on file     Active Member of Clubs or Organizations: Not on file     Attends Club or Organization Meetings: Not on file     Marital Status: Not on file  "  Interpersonal Safety: Low Risk  (5/6/2025)    Interpersonal Safety     Do you feel physically and emotionally safe where you currently live?: Yes     Within the past 12 months, have you been hit, slapped, kicked or otherwise physically hurt by someone?: No     Within the past 12 months, have you been humiliated or emotionally abused in other ways by your partner or ex-partner?: No   Housing Stability: Low Risk  (3/5/2025)    Housing Stability     Do you have housing? : Yes     Are you worried about losing your housing?: No       Current Facility-Administered Medications   Medication Dose Route Frequency Provider Last Rate Last Admin    lactated ringers infusion   Intravenous Continuous Trav Ray MD        lidocaine (LMX4) cream   Topical Q1H PRN Trav Ray MD        lidocaine 1 % 0.1-1 mL  0.1-1 mL Other Q1H PRN Trav Ray MD        sodium chloride (PF) 0.9% PF flush 3 mL  3 mL Intracatheter Q8H Trav Ray MD        sodium chloride (PF) 0.9% PF flush 3 mL  3 mL Intracatheter q1 min prn Trav Ray MD             ALLERGIES/SENSITIVITIES:   Allergies   Allergen Reactions    Wasp Venom Protein Anaphylaxis     Wasp sting    Lipitor [Atorvastatin Calcium]       Elevated Ck and myalgias       PHYSICAL EXAM:     BP (!) 199/106   Pulse 74   Temp (!) 96.3  F (35.7  C) (Tympanic)   Resp 18   Ht 1.778 m (5' 10\")   Wt 75.3 kg (166 lb)   SpO2 99%   BMI 23.82 kg/m      General Appearance:   Sitting up in bed, no apparent distress  HEENT: Pupils are equal and reactive, no scleral icterus   Heart & CV:  RRR, no murmur.  LUNGS: No increased work of breathing. Lungs are CTA B/L, no wheezing or crackles.  Abd:  soft, non-tender, no masses   Ext: no lower extremity edema   Neuro: alert and oriented, normal speech and mentation         CONSULTATION ASSESSMENT AND PLAN:    75 year old male with average risk for colon cancer.      The technical details of " colonoscopy were discussed with the patient along with the risks and benefits to include bleeding, perforation and incomplete study. Eh Perry demonstrated understanding and is willing to proceed.       Trav Ray MD on 5/6/2025 at 9:42 AM

## 2025-05-06 NOTE — ANESTHESIA PREPROCEDURE EVALUATION
Anesthesia Pre-Procedure Evaluation    Patient: Eh Perry   MRN: [id number] : 1950        Procedure : Procedure(s):  Colonoscopy          Past Medical History:   Diagnosis Date    Anemia     CKD (chronic kidney disease) stage 3, GFR 30-59 ml/min (H)     GERD (gastroesophageal reflux disease)     Hepatitis     elevated AST due to excess ETOH use (5 drinks/day)    HTN (hypertension)     Hyperlipidemia LDL goal <100 3/7/2013    MGUS (monoclonal gammopathy of unknown significance)      IgG Lambda. Followed by Dr Rogers    Proteinuria 2015    PVC (premature ventricular contraction)     Retinopathy due to secondary diabetes (H)     s/p laser therapy left    Subclinical hypothyroidism 2018    Tobacco abuse     20 pack hx. Quit     Type 1 diabetes mellitus with nephropathy (goal A1C<7)       Past Surgical History:   Procedure Laterality Date    ENDOSCOPIC RELEASE CARPAL TUNNEL Right 10/14/2022    Procedure: Right Endoscopic Carpal Tunnel Release;  Surgeon: Sebastien Rowe MD;  Location: [facility] OR    HAND SURGERY  2007    contracture release    VOCAL CORD INJECTION      vocal cord nodules      Allergies   Allergen Reactions    Wasp Venom Protein Anaphylaxis     Wasp sting    Lipitor [Atorvastatin Calcium]       Elevated Ck and myalgias      Social History     Tobacco Use    Smoking status: Former     Current packs/day: 0.00     Average packs/day: 1 pack/day for 15.0 years (15.0 ttl pk-yrs)     Types: Cigarettes     Start date: 1965     Quit date: 1980     Years since quittin.3    Smokeless tobacco: Never   Substance Use Topics    Alcohol use: Not Currently     Comment: occ, 1-2 glass of wine daily      Wt Readings from Last 1 Encounters:   25 75.3 kg (166 lb)        Anesthesia Evaluation   Pt has had prior anesthetic.     No history of anesthetic complications       ROS/MED HX  ENT/Pulmonary:  - neg pulmonary ROS     Neurologic:  - neg neurologic ROS    "  Cardiovascular:     (+) Dyslipidemia hypertension- -   -  - -                                      METS/Exercise Tolerance: >4 METS    Hematologic:  - neg hematologic  ROS     Musculoskeletal:  - neg musculoskeletal ROS     GI/Hepatic:     (+) GERD, Asymptomatic on medication,      bowel prep,            Renal/Genitourinary:     (+) renal disease, type: CRI,            Endo:     (+) type I DM,                     Psychiatric/Substance Use:  - neg psychiatric ROS     Infectious Disease:  - neg infectious disease ROS     Malignancy:  - neg malignancy ROS     Other:  - neg other ROS          Physical Exam    Airway        Mallampati: II       Respiratory Devices and Support         Dental           Cardiovascular   cardiovascular exam normal       Rhythm and rate: regular and normal     Pulmonary   pulmonary exam normal        breath sounds clear to auscultation           OUTSIDE LABS:  CBC:   Lab Results   Component Value Date    WBC 5.0 03/06/2025    WBC 5.0 12/19/2024    HGB 14.6 03/06/2025    HGB 13.1 (L) 12/19/2024    HCT 40.2 03/06/2025    HCT 37.1 (L) 12/19/2024     03/06/2025     12/19/2024     BMP:   Lab Results   Component Value Date     03/06/2025     (L) 12/19/2024    POTASSIUM 5.0 03/06/2025    POTASSIUM 4.7 12/19/2024    CHLORIDE 103 03/06/2025    CHLORIDE 99 12/19/2024    CO2 26 03/06/2025    CO2 26 12/19/2024    BUN 15.6 03/06/2025    BUN 12.6 12/19/2024    CR 0.86 03/06/2025    CR 0.84 12/19/2024     (H) 03/06/2025     (H) 12/19/2024     COAGS: No results found for: \"PTT\", \"INR\", \"FIBR\"  POC:   Lab Results   Component Value Date     (H) 10/29/2010     HEPATIC:   Lab Results   Component Value Date    ALBUMIN 3.6 11/25/2024    PROTTOTAL 6.8 11/25/2024    ALT 25 11/25/2024    AST 24 11/25/2024    ALKPHOS 101 11/25/2024    BILITOTAL 0.8 11/25/2024     OTHER:   Lab Results   Component Value Date    LACT 1.6 11/14/2024    A1C 6.4 (H) 03/06/2025    MARIE 9.8 " 03/06/2025    PHOS 3.1 11/18/2024    MAG 1.9 12/03/2024    TSH 2.91 03/06/2025    T4 1.53 11/14/2024       Anesthesia Plan    ASA Status:  3    NPO Status:  NPO Appropriate    Anesthesia Type: MAC.     - Reason for MAC: straight local not clinically adequate   Induction: Intravenous.           Consents    Anesthesia Plan(s) and associated risks, benefits, and realistic alternatives discussed. Questions answered and patient/representative(s) expressed understanding.     - Discussed: Risks, Benefits and Alternatives for BOTH SEDATION and the PROCEDURE were discussed     - Discussed with:  Patient            Postoperative Care            Comments:               DONTRELL CARDENAS CRNA    Clinically Significant Risk Factors Present on Admission                 # Drug Induced Platelet Defect: home medication list includes an antiplatelet medication   # Hypertension: Noted on problem list

## 2025-05-06 NOTE — ANESTHESIA CARE TRANSFER NOTE
Patient: Eh Perry    Procedure: Procedure(s):  Colonoscopy       Diagnosis: Colon cancer screening [Z12.11]  Diagnosis Additional Information: No value filed.    Anesthesia Type:   MAC     Note:    Oropharynx: spontaneously breathing  Level of Consciousness: drowsy  Oxygen Supplementation: room air    Independent Airway: airway patency satisfactory and stable    Vital Signs Stable: post-procedure vital signs reviewed and stable  Report to RN Given: handoff report given  Patient transferred to: Phase II    Handoff Report: Identifed the Patient, Identified the Reponsible Provider, Reviewed the pertinent medical history, Discussed the surgical course, Reviewed Intra-OP anesthesia mangement and issues during anesthesia, Set expectations for post-procedure period and Allowed opportunity for questions and acknowledgement of understanding      Vitals:  Vitals Value Taken Time   BP     Temp     Pulse     Resp     SpO2         Electronically Signed By: DONTRELL DE LEON CRNA  May 6, 2025  11:21 AM

## 2025-05-06 NOTE — DISCHARGE INSTRUCTIONS
[facility]  Adult Discharge Orders & Instructions    ________________________________________________________________          For 12 hours after surgery  Get plenty of rest.  A responsible adult must stay with you for at least 12 hours after you leave the hospital.   You may feel lightheaded.  IF so, sit for a few minutes before standing.  Have someone help you get up.   You may have a slight fever. Call the doctor if your fever is over 101 F (38.3 C) (taken under the tongue) or lasts longer than 24 hours.  You may have a dry mouth, a sore throat, muscle aches or trouble sleeping.  These should go away after 24 hours.  Do not make important or legal decisions.  6.   Do not drive or use heavy equipment.  If you have weakness or tingling, don't drive or use heavy equipment until this feeling goes away.    To contact a doctor, call   [phone]_______________________

## 2025-05-06 NOTE — ANESTHESIA POSTPROCEDURE EVALUATION
Patient: Eh Perry    Procedure: Procedure(s):  Colonoscopy       Anesthesia Type:  MAC    Note:  Disposition: Outpatient   Postop Pain Control: Uneventful            Sign Out: Well controlled pain   PONV: No   Neuro/Psych: Uneventful            Sign Out: Acceptable/Baseline neuro status   Airway/Respiratory: Uneventful            Sign Out: Acceptable/Baseline resp. status   CV/Hemodynamics: Uneventful            Sign Out: Acceptable CV status; No obvious hypovolemia; No obvious fluid overload   Other NRE: NONE   DID A NON-ROUTINE EVENT OCCUR?            Last vitals:  Vitals Value Taken Time   /85 05/06/25 1200   Temp     Pulse 65 05/06/25 1200   Resp     SpO2 97 % 05/06/25 1204   Vitals shown include unfiled device data.    Electronically Signed By: DONTRELL DE LEON CRNA  May 6, 2025  12:05 PM

## 2025-05-06 NOTE — OR NURSING
Patient has been discharged to home at 1225 via ambulatory accompanied by his wife    Written discharge instructions were provided to patient.  Prescriptions were n/a.  Patient states their pain is 0/10, and denies any nausea or dizziness upon discharge.    Patient and adult caring for them verbalize understanding of discharge instructions including no driving until tomorrow and no longer taking narcotic pain medications - no operating mechanical equipment and no making any important decisions.They understand reason for discharge, and necessary follow-up appointments.       Smita Li RN

## 2025-05-06 NOTE — OP NOTE
COLONOSCOPY PROCEDURE NOTE    DATE OF SERVICE: 5/6/2025    SURGEON: JOSS Ray MD     PRE-OP DIAGNOSIS:    Screening for colon cancer       POST-OP DIAGNOSIS:    Normal Exam    PROCEDURE:   Colonoscopy    ASSISTANT:  [place]aren[place]: Pao Coker RN  Scrub Person: Joe Osman    ANESTHESIA:  [facility]: Maximus Barber APRN CRNA; Karolina Peace APRN CRNA    INDICATION FOR THE PROCEDURE: The patient is a 75 year old male with average risk for colon cacner. The patient has no other complaints.  After explaining the risks to include bleeding, perforation, potential inability to reach the cecum the patient wishes to proceed.    PROCEDURE: After adequate sedation, the patient was in the left lateral decubitus position.  Rectal exam was performed.  There was normal tone and no palpable masses.  The colonoscope was introduced into the rectum and advanced to the cecum with Mild difficulty.  The patient's prep was excellent.  The terminal cecum was reached.  The cecum, ascending, transverse, descending and sigmoid colon were grossly unremarkable .  The scope was retroflexed in the rectum.  The anorectal junction was unremarkable.  The scope was straightened and removed.  The patient tolerated the procedure well.     ESTIMATED BLOOD LOSS: none    COMPLICATIONS:  None    TISSUE REMOVED:  No    RECOMMEND:    No further screening colonoscopy recommended      JOSS Ray MD

## 2025-05-13 ENCOUNTER — TRANSFERRED RECORDS (OUTPATIENT)
Dept: HEALTH INFORMATION MANAGEMENT | Facility: OTHER | Age: 75
End: 2025-05-13
Payer: MEDICARE

## 2025-06-05 ENCOUNTER — RESULTS FOLLOW-UP (OUTPATIENT)
Dept: PEDIATRICS | Facility: OTHER | Age: 75
End: 2025-06-05

## 2025-06-05 ENCOUNTER — OFFICE VISIT (OUTPATIENT)
Dept: PEDIATRICS | Facility: OTHER | Age: 75
End: 2025-06-05
Attending: INTERNAL MEDICINE
Payer: MEDICARE

## 2025-06-05 VITALS
SYSTOLIC BLOOD PRESSURE: 130 MMHG | TEMPERATURE: 96.9 F | HEART RATE: 70 BPM | OXYGEN SATURATION: 99 % | WEIGHT: 163 LBS | RESPIRATION RATE: 18 BRPM | BODY MASS INDEX: 23.34 KG/M2 | DIASTOLIC BLOOD PRESSURE: 74 MMHG | HEIGHT: 70 IN

## 2025-06-05 DIAGNOSIS — N18.2 CHRONIC KIDNEY DISEASE, STAGE 2 (MILD): Chronic | ICD-10-CM

## 2025-06-05 DIAGNOSIS — I49.3 SYMPTOMATIC PVCS: ICD-10-CM

## 2025-06-05 DIAGNOSIS — R79.89 ELEVATED TSH: ICD-10-CM

## 2025-06-05 DIAGNOSIS — I48.0 PAROXYSMAL ATRIAL FIBRILLATION (H): ICD-10-CM

## 2025-06-05 DIAGNOSIS — R00.2 PALPITATIONS: ICD-10-CM

## 2025-06-05 DIAGNOSIS — E10.21 TYPE 1 DIABETES MELLITUS WITH NEPHROPATHY (H): Primary | Chronic | ICD-10-CM

## 2025-06-05 PROBLEM — E10.65 TYPE 1 DIABETES MELLITUS WITH HYPERGLYCEMIA (H): Status: RESOLVED | Noted: 2024-11-14 | Resolved: 2025-06-05

## 2025-06-05 LAB
ANION GAP SERPL CALCULATED.3IONS-SCNC: 10 MMOL/L (ref 7–15)
BUN SERPL-MCNC: 16.2 MG/DL (ref 8–23)
CALCIUM SERPL-MCNC: 9.5 MG/DL (ref 8.8–10.4)
CHLORIDE SERPL-SCNC: 101 MMOL/L (ref 98–107)
CREAT SERPL-MCNC: 0.87 MG/DL (ref 0.67–1.17)
CREAT UR-MCNC: 62.1 MG/DL
EGFRCR SERPLBLD CKD-EPI 2021: >90 ML/MIN/1.73M2
EST. AVERAGE GLUCOSE BLD GHB EST-MCNC: 134 MG/DL
GLUCOSE SERPL-MCNC: 95 MG/DL (ref 70–99)
HBA1C MFR BLD: 6.3 %
HCO3 SERPL-SCNC: 25 MMOL/L (ref 22–29)
MICROALBUMIN UR-MCNC: 205.7 MG/L
MICROALBUMIN/CREAT UR: 331.24 MG/G CR (ref 0–17)
POTASSIUM SERPL-SCNC: 4.9 MMOL/L (ref 3.4–5.3)
SODIUM SERPL-SCNC: 136 MMOL/L (ref 135–145)
TSH SERPL DL<=0.005 MIU/L-ACNC: 3.48 UIU/ML (ref 0.3–4.2)

## 2025-06-05 PROCEDURE — G0463 HOSPITAL OUTPT CLINIC VISIT: HCPCS

## 2025-06-05 PROCEDURE — 82570 ASSAY OF URINE CREATININE: CPT | Mod: ZL | Performed by: INTERNAL MEDICINE

## 2025-06-05 PROCEDURE — 36415 COLL VENOUS BLD VENIPUNCTURE: CPT | Mod: ZL | Performed by: INTERNAL MEDICINE

## 2025-06-05 PROCEDURE — 83036 HEMOGLOBIN GLYCOSYLATED A1C: CPT | Mod: ZL | Performed by: INTERNAL MEDICINE

## 2025-06-05 PROCEDURE — 84443 ASSAY THYROID STIM HORMONE: CPT | Mod: ZL | Performed by: INTERNAL MEDICINE

## 2025-06-05 PROCEDURE — 82310 ASSAY OF CALCIUM: CPT | Mod: ZL | Performed by: INTERNAL MEDICINE

## 2025-06-05 RX ORDER — MELOXICAM 15 MG/1
1 TABLET ORAL
COMMUNITY
Start: 2025-04-24

## 2025-06-05 ASSESSMENT — PAIN SCALES - GENERAL: PAINLEVEL_OUTOF10: NO PAIN (0)

## 2025-06-05 NOTE — PROGRESS NOTES
Assessment & Plan     1. Type 1 diabetes mellitus with nephropathy (goal A1C<7) (Primary)  Continuous Glucose Monitor (CGM) Professional interpretation procedural note  Eh Perry is using their CGM and is benefiting from it.   I personally reviewed more than 72 hours of CGM data for Mr. Eh Perry 6/5/2025.   Over the last 90 days he is 72% in range with 25% high.  No adjustments were made today.    Recommendations:  Based on the patterns and trends, the following recommendations are made today:   -- Medication changes, if applicable outlined below.   -- Close follow-up is recommended for in person follow-up and review, with ongoing therapy adjustments.      - Hemoglobin A1c; Future  - Basic metabolic panel; Future  - Albumin Random Urine Quantitative with Creat Ratio; Future  - Hemoglobin A1c  - Basic metabolic panel  - Albumin Random Urine Quantitative with Creat Ratio    2. Chronic kidney disease, stage 2 (mild)  Lab today    3. Symptomatic PVCs  4. Elevated TSH  5. Palpitations  6. Paroxysmal atrial fibrillation (H)  Lengthy discussion today with regard to symptomatic PVCs.  He has had paroxysmal atrial fibrillation in the past and he has been preferring to monitor that with Apple Watch rather than take anticoagulation.  Discussed modifiable factors today and decided to obtain TSH looking for hyperthyroidism, as well as a trial of a caffeine holiday.  He is on beta-blocker.  - TSH with free T4 reflex; Future  - TSH with free T4 reflex      Return in about 6 months (around 12/5/2025), or if symptoms worsen or fail to improve, for med management, diabetes.    Signed, Hemanth Cunningham MD, FAAP, FACP  Internal Medicine & Pediatrics    Subjective   hE Perry is a 75 year old male who presents for Diabetes (3 month follow up).    Objective   Vitals: /74 (BP Location: Left arm, Patient Position: Sitting, Cuff Size: Adult Regular)   Pulse 70   Temp 96.9  F (36.1  C) (Tympanic)   Resp 18   Ht 1.778 m  "(5' 10\")   Wt 73.9 kg (163 lb)   SpO2 99%   BMI 23.39 kg/m        Review and Analysis of Data   I personally reviewed the following:  External notes: Yes dexcom clarity  Results: Yes diabetic lab  Use of an independent historian: No  Independent review of a test performed by another physician: No  Discussion of management with another physician: No  Moderate risk of morbidity from additional diagnostic testing and/or treatment.    "

## 2025-06-05 NOTE — NURSING NOTE
"Chief Complaint   Patient presents with    Diabetes     3 month follow up       Initial /74 (BP Location: Left arm, Patient Position: Sitting, Cuff Size: Adult Regular)   Pulse 70   Temp 96.9  F (36.1  C) (Tympanic)   Resp 18   Ht 1.778 m (5' 10\")   Wt 73.9 kg (163 lb)   SpO2 99%   BMI 23.39 kg/m   Estimated body mass index is 23.39 kg/m  as calculated from the following:    Height as of this encounter: 1.778 m (5' 10\").    Weight as of this encounter: 73.9 kg (163 lb).  Medication Review: complete    The next two questions are to help us understand your food security.  If you are feeling you need any assistance in this area, we have resources available to support you today.          3/5/2025   SDOH- Food Insecurity   Within the past 12 months, did you worry that your food would run out before you got money to buy more? N   Within the past 12 months, did the food you bought just not last and you didn t have money to get more? N        Data saved with a previous flowsheet row definition         Health Care Directive:  Patient has a Health Care Directive on file      Lucrecia Danielle LPN      "

## 2025-06-12 ENCOUNTER — TRANSFERRED RECORDS (OUTPATIENT)
Dept: MULTI SPECIALTY CLINIC | Facility: CLINIC | Age: 75
End: 2025-06-12

## 2025-06-12 LAB — RETINOPATHY: POSITIVE

## 2025-08-27 DIAGNOSIS — E10.21 TYPE 1 DIABETES MELLITUS WITH NEPHROPATHY (H): ICD-10-CM

## 2025-08-27 RX ORDER — SIMVASTATIN 10 MG
10 TABLET ORAL AT BEDTIME
Qty: 90 TABLET | Refills: 4 | OUTPATIENT
Start: 2025-08-27

## (undated) DEVICE — SUCTION MANIFOLD NEPTUNE 2 SYS 4 PORT 0702-020-000

## (undated) DEVICE — GLOVE PROTEXIS POWDER FREE SMT 8.5 2D72PT85X

## (undated) DEVICE — DRSG XEROFORM 1X8"

## (undated) DEVICE — BNDG ELASTIC 2"X5YDS UNSTERILE 6611-20

## (undated) DEVICE — PACK MAJOR EXTREMITY SOP15MEFCA

## (undated) DEVICE — SOL WATER 1500ML

## (undated) DEVICE — PREP CHLORAPREP 26ML TINTED ORANGE  260815

## (undated) DEVICE — TOURNIQUET SGL BLADDER 18"X4" RED 5921-218-135

## (undated) DEVICE — DRSG GAUZE 4X4" TRAY 6939

## (undated) DEVICE — ENDO BRUSH CHANNEL MASTER CLEANING 2-4.2MM BW-412T

## (undated) DEVICE — DRAPE STERI TOWEL LG 1010

## (undated) DEVICE — ENDO KIT COMPLIANCE DYKENDOCMPLY

## (undated) DEVICE — ANTIFOG SOLUTION W/FOAM PAD CF-1001

## (undated) DEVICE — TUBING SUCTION 10'X3/16" N510

## (undated) DEVICE — GLOVE SENSICARE 8.5 MSG1085 LATEX FREE

## (undated) DEVICE — SU ETHILON 4-0 FS-2 18" 662H

## (undated) DEVICE — COVER LIGHT HANDLE LT-F02

## (undated) DEVICE — BLADE CARPAL TUNNEL ENDO 81010-1

## (undated) DEVICE — CAST PADDING-STERILE 2"

## (undated) RX ORDER — ONDANSETRON 2 MG/ML
INJECTION INTRAMUSCULAR; INTRAVENOUS
Status: DISPENSED
Start: 2022-10-14

## (undated) RX ORDER — BUPIVACAINE HYDROCHLORIDE 2.5 MG/ML
INJECTION, SOLUTION EPIDURAL; INFILTRATION; INTRACAUDAL
Status: DISPENSED
Start: 2022-10-14

## (undated) RX ORDER — AZITHROMYCIN MONOHYDRATE 500 MG/5ML
INJECTION, POWDER, LYOPHILIZED, FOR SOLUTION INTRAVENOUS
Status: DISPENSED
Start: 2024-11-15

## (undated) RX ORDER — AZITHROMYCIN 500 MG/5ML
INJECTION, POWDER, LYOPHILIZED, FOR SOLUTION INTRAVENOUS
Status: DISPENSED
Start: 2024-11-14

## (undated) RX ORDER — ACETAMINOPHEN 325 MG/1
TABLET ORAL
Status: DISPENSED
Start: 2024-07-30

## (undated) RX ORDER — LIDOCAINE HYDROCHLORIDE 10 MG/ML
INJECTION, SOLUTION EPIDURAL; INFILTRATION; INTRACAUDAL; PERINEURAL
Status: DISPENSED
Start: 2022-10-14

## (undated) RX ORDER — CEFTRIAXONE 2 G/1
INJECTION, POWDER, FOR SOLUTION INTRAMUSCULAR; INTRAVENOUS
Status: DISPENSED
Start: 2024-11-14

## (undated) RX ORDER — LIDOCAINE HYDROCHLORIDE 10 MG/ML
INJECTION, SOLUTION INFILTRATION; PERINEURAL
Status: DISPENSED
Start: 2022-10-14

## (undated) RX ORDER — GLYCOPYRROLATE 0.2 MG/ML
INJECTION, SOLUTION INTRAMUSCULAR; INTRAVENOUS
Status: DISPENSED
Start: 2022-10-14

## (undated) RX ORDER — AZITHROMYCIN 500 MG/5ML
INJECTION, POWDER, LYOPHILIZED, FOR SOLUTION INTRAVENOUS
Status: DISPENSED
Start: 2024-11-15

## (undated) RX ORDER — 3% SODIUM CHLORIDE 3 G/100ML
INJECTION, SOLUTION INTRAVENOUS
Status: DISPENSED
Start: 2024-11-14

## (undated) RX ORDER — PROPOFOL 10 MG/ML
INJECTION, EMULSION INTRAVENOUS
Status: DISPENSED
Start: 2022-10-14

## (undated) RX ORDER — PROPOFOL 10 MG/ML
INJECTION, EMULSION INTRAVENOUS
Status: DISPENSED
Start: 2025-05-06

## (undated) RX ORDER — IPRATROPIUM BROMIDE AND ALBUTEROL SULFATE 2.5; .5 MG/3ML; MG/3ML
SOLUTION RESPIRATORY (INHALATION)
Status: DISPENSED
Start: 2024-11-14

## (undated) RX ORDER — MAGNESIUM SULFATE HEPTAHYDRATE 40 MG/ML
INJECTION, SOLUTION INTRAVENOUS
Status: DISPENSED
Start: 2024-11-14

## (undated) RX ORDER — DEXAMETHASONE SODIUM PHOSPHATE 4 MG/ML
INJECTION, SOLUTION INTRA-ARTICULAR; INTRALESIONAL; INTRAMUSCULAR; INTRAVENOUS; SOFT TISSUE
Status: DISPENSED
Start: 2022-10-14

## (undated) RX ORDER — METHYLPREDNISOLONE SODIUM SUCCINATE 125 MG/2ML
INJECTION INTRAMUSCULAR; INTRAVENOUS
Status: DISPENSED
Start: 2024-11-14